# Patient Record
Sex: FEMALE | Race: WHITE | HISPANIC OR LATINO | Employment: FULL TIME | URBAN - METROPOLITAN AREA
[De-identification: names, ages, dates, MRNs, and addresses within clinical notes are randomized per-mention and may not be internally consistent; named-entity substitution may affect disease eponyms.]

---

## 2023-06-09 ENCOUNTER — APPOINTMENT (OUTPATIENT)
Dept: LAB | Facility: HOSPITAL | Age: 68
End: 2023-06-09
Payer: COMMERCIAL

## 2023-06-09 DIAGNOSIS — Z01.818 PREOP TESTING: ICD-10-CM

## 2023-06-09 DIAGNOSIS — M20.41 HAMMER TOE OF RIGHT FOOT: ICD-10-CM

## 2023-06-09 LAB
ALBUMIN SERPL BCP-MCNC: 4.5 G/DL (ref 3.5–5)
ALP SERPL-CCNC: 74 U/L (ref 34–104)
ALT SERPL W P-5'-P-CCNC: 16 U/L (ref 7–52)
ANION GAP SERPL CALCULATED.3IONS-SCNC: 7 MMOL/L (ref 4–13)
AST SERPL W P-5'-P-CCNC: 16 U/L (ref 13–39)
BASOPHILS # BLD AUTO: 0.04 THOUSANDS/ÂΜL (ref 0–0.1)
BASOPHILS NFR BLD AUTO: 1 % (ref 0–1)
BILIRUB SERPL-MCNC: 0.49 MG/DL (ref 0.2–1)
BUN SERPL-MCNC: 12 MG/DL (ref 5–25)
CALCIUM SERPL-MCNC: 9.7 MG/DL (ref 8.4–10.2)
CHLORIDE SERPL-SCNC: 101 MMOL/L (ref 96–108)
CO2 SERPL-SCNC: 30 MMOL/L (ref 21–32)
CREAT SERPL-MCNC: 0.57 MG/DL (ref 0.6–1.3)
EOSINOPHIL # BLD AUTO: 0.22 THOUSAND/ÂΜL (ref 0–0.61)
EOSINOPHIL NFR BLD AUTO: 3 % (ref 0–6)
ERYTHROCYTE [DISTWIDTH] IN BLOOD BY AUTOMATED COUNT: 15.5 % (ref 11.6–15.1)
GFR SERPL CREATININE-BSD FRML MDRD: 96 ML/MIN/1.73SQ M
GLUCOSE P FAST SERPL-MCNC: 90 MG/DL (ref 65–99)
HCT VFR BLD AUTO: 41.8 % (ref 34.8–46.1)
HGB BLD-MCNC: 13.3 G/DL (ref 11.5–15.4)
IMM GRANULOCYTES # BLD AUTO: 0.07 THOUSAND/UL (ref 0–0.2)
IMM GRANULOCYTES NFR BLD AUTO: 1 % (ref 0–2)
LYMPHOCYTES # BLD AUTO: 2.41 THOUSANDS/ÂΜL (ref 0.6–4.47)
LYMPHOCYTES NFR BLD AUTO: 30 % (ref 14–44)
MCH RBC QN AUTO: 28.9 PG (ref 26.8–34.3)
MCHC RBC AUTO-ENTMCNC: 31.8 G/DL (ref 31.4–37.4)
MCV RBC AUTO: 91 FL (ref 82–98)
MONOCYTES # BLD AUTO: 0.55 THOUSAND/ÂΜL (ref 0.17–1.22)
MONOCYTES NFR BLD AUTO: 7 % (ref 4–12)
NEUTROPHILS # BLD AUTO: 4.84 THOUSANDS/ÂΜL (ref 1.85–7.62)
NEUTS SEG NFR BLD AUTO: 58 % (ref 43–75)
NRBC BLD AUTO-RTO: 0 /100 WBCS
PLATELET # BLD AUTO: 262 THOUSANDS/UL (ref 149–390)
PMV BLD AUTO: 11.1 FL (ref 8.9–12.7)
POTASSIUM SERPL-SCNC: 3.9 MMOL/L (ref 3.5–5.3)
PROT SERPL-MCNC: 7.1 G/DL (ref 6.4–8.4)
RBC # BLD AUTO: 4.61 MILLION/UL (ref 3.81–5.12)
SODIUM SERPL-SCNC: 138 MMOL/L (ref 135–147)
WBC # BLD AUTO: 8.13 THOUSAND/UL (ref 4.31–10.16)

## 2023-06-09 PROCEDURE — 85025 COMPLETE CBC W/AUTO DIFF WBC: CPT

## 2023-06-09 PROCEDURE — 36415 COLL VENOUS BLD VENIPUNCTURE: CPT

## 2023-06-09 PROCEDURE — 80053 COMPREHEN METABOLIC PANEL: CPT

## 2023-06-20 ENCOUNTER — TELEPHONE (OUTPATIENT)
Dept: PODIATRY | Facility: CLINIC | Age: 68
End: 2023-06-20

## 2023-06-20 NOTE — TELEPHONE ENCOUNTER
Left message for patient to call me back DIRECTLY to schedule  Left my DIRECT phone # [phone] on message       [phone]

## 2023-06-20 NOTE — TELEPHONE ENCOUNTER
Patient has called several times and is [employer]  When I call back using  Services there is a voicemail left  Attempted again today, patient not home, will try later

## 2023-06-21 ENCOUNTER — TELEPHONE (OUTPATIENT)
Dept: PODIATRY | Facility: CLINIC | Age: 68
End: 2023-06-21

## 2023-06-21 ENCOUNTER — APPOINTMENT (OUTPATIENT)
Dept: PREADMISSION TESTING | Facility: HOSPITAL | Age: 68
End: 2023-06-21
Payer: COMMERCIAL

## 2023-06-21 NOTE — TELEPHONE ENCOUNTER
Caller: Pre Admission Testing/[facility]    Doctor/Office: Chioma Sanders DPM    #: [phone]    Escalation: Pre Admission Testing called to let Dr. Suze Tobar know that Elvin Dorsey canceled her surgery for 6/23/23 due to her work. This needs to be removed from the schedule.   She said she is requesting more foot cream.

## 2023-06-21 NOTE — TELEPHONE ENCOUNTER
Caller: Pre Admission Testing/[facility]    Doctor/Office: Ian Heredia DPM    #: [phone]    Escalation:  Pre Admission Testing said that they spoke with Toby Cabrales and she is canceling her surgery for this Friday, 6/23/23  She had to cancel because of her work  This surgery needs to be removed from the schedule  Toby Cabrales told Mirian Dandy that she needs cream for her foot

## 2023-06-22 ENCOUNTER — HOSPITAL ENCOUNTER (EMERGENCY)
Facility: HOSPITAL | Age: 68
Discharge: HOME | End: 2023-06-22
Attending: STUDENT IN AN ORGANIZED HEALTH CARE EDUCATION/TRAINING PROGRAM
Payer: COMMERCIAL

## 2023-06-22 VITALS
RESPIRATION RATE: 20 BRPM | OXYGEN SATURATION: 100 % | HEART RATE: 89 BPM | SYSTOLIC BLOOD PRESSURE: 131 MMHG | DIASTOLIC BLOOD PRESSURE: 94 MMHG | TEMPERATURE: 98.6 F

## 2023-06-22 DIAGNOSIS — I10 HYPERTENSION, UNSPECIFIED TYPE: Primary | ICD-10-CM

## 2023-06-22 PROCEDURE — 99283 EMERGENCY DEPT VISIT LOW MDM: CPT

## 2023-06-22 PROCEDURE — 93005 ELECTROCARDIOGRAM TRACING: CPT

## 2023-06-23 LAB
ATRIAL RATE: 89 BPM
P AXIS: 50 DEGREES
PR INTERVAL: 178 MS
QRS AXIS: -1 DEGREES
QRSD INTERVAL: 86 MS
QT INTERVAL: 356 MS
QTC INTERVAL: 433 MS
T WAVE AXIS: 31 DEGREES
VENTRICULAR RATE: 89 BPM

## 2023-06-23 PROCEDURE — 93010 ELECTROCARDIOGRAM REPORT: CPT | Performed by: INTERNAL MEDICINE

## 2023-06-23 NOTE — ED PROVIDER NOTES
"History  Chief Complaint   Patient presents with   • High Blood Pressure     Pt came to ER c/o systolic BP at home of 890  Pt denies CP, dyspnea, and dizziness  Pt c/o a warm feeling coming over her face  HPI   [employer], [facility] ED technician providing [employer] translation with patient consent  Nyasia Hale is a 80 yr old female, pmhx of HTN, HLD, presenting to the ED for elevated BP reading at home PTA  Patient reports she was getting ready for bed, took her usual nightly 5 mg amlodipine  After she takes her BP medication she always checks her BP  She took her BP tonight and the numbers kept \"going up\"  - highest /78  She deniedany symptoms while she was checking her BP - denied any CP, SOB, visual changes, HA, numbness/tingling, weakness, presyncope/syncope  She reporst she takes amlodipine 5 mg in the AM and 5 mg qhs  She denies any symptoms in the ED currently as well  BP has naturally decreased while in the ED  Prior to Admission Medications   Prescriptions Last Dose Informant Patient Reported? Taking?    Diclofenac Sodium (VOLTAREN) 1 %   No No   Sig: Apply 2 g topically 4 (four) times a day   amLODIPine (NORVASC) 5 mg tablet   No No   Sig: Take 1 tablet (5 mg total) by mouth 2 (two) times a day   atenolol (TENORMIN) 25 mg tablet   No No   Sig: Take 1 tablet (25 mg total) by mouth daily   benzonatate (TESSALON PERLES) 100 mg capsule   No No   Sig: Take 1 capsule (100 mg total) by mouth 3 (three) times a day as needed for cough   cholecalciferol (VITAMIN D3) 1,000 units tablet   No No   Sig: Take 2 tablets (2,000 Units total) by mouth daily   clopidogrel (PLAVIX) 75 mg tablet   Yes No   Sig: Take 75 mg by mouth daily   Patient not taking: Reported on 5/26/2023   lidocaine (Lidoderm) 5 %   No No   Sig: Apply 1 patch topically daily for 15 days Remove & Discard patch within 12 hours or as directed by MD   naproxen (NAPROSYN) 500 mg tablet   Yes No   Sig: naproxen 500 mg tablet " rosuvastatin (CRESTOR) 10 MG tablet   No No   Sig: Take 1 tablet (10 mg total) by mouth daily      Facility-Administered Medications: None       Past Medical History:   Diagnosis Date   • Allergic    • Hypertension        Past Surgical History:   Procedure Laterality Date   •  SECTION     • HERNIA REPAIR  1969   • HYSTERECTOMY     • KS LAPS ABD PRTM&OMENTUM DX W/WO SPEC BR/WA SPX N/A 2023    Procedure: LAPAROSCOPY DIAGNOSTIC, LYSIS OF ADHESIONS;  Surgeon: Jerry Streeter MD;  Location: [facility] OR;  Service: General       Family History   Problem Relation Age of Onset   • Breast cancer Mother    • Thrombosis Father    • No Known Problems Sister    • No Known Problems Sister    • No Known Problems Sister    • Hypertension Sister    • Hypertension Brother    • Hypertension Brother    • Diabetes Maternal Grandmother      I have reviewed and agree with the history as documented  E-Cigarette/Vaping   • E-Cigarette Use Never User      E-Cigarette/Vaping Substances   • Nicotine No    • THC No    • CBD No    • Flavoring No    • Other No    • Unknown No      Social History     Tobacco Use   • Smoking status: Never   • Smokeless tobacco: Never   Vaping Use   • Vaping Use: Never used   Substance Use Topics   • Alcohol use: Never   • Drug use: Never       Review of Systems   Constitutional: Negative for fever  Eyes: Negative for visual disturbance  Respiratory: Negative for cough and shortness of breath  Cardiovascular: Negative for chest pain, palpitations and leg swelling  Gastrointestinal: Negative for abdominal pain, nausea and vomiting  Neurological: Negative for dizziness, syncope, weakness, light-headedness, numbness and headaches  Physical Exam  Physical Exam  Vitals and nursing note reviewed  Constitutional:       General: She is not in acute distress  Appearance: She is well-developed  She is not ill-appearing, toxic-appearing or diaphoretic     HENT:      Head: Normocephalic and atraumatic  Mouth/Throat:      Pharynx: Oropharynx is clear  Eyes:      Pupils: Pupils are equal, round, and reactive to light  Cardiovascular:      Rate and Rhythm: Normal rate  Pulmonary:      Effort: Pulmonary effort is normal  No respiratory distress  Abdominal:      General: There is no distension  Musculoskeletal:      Cervical back: Neck supple  Right lower leg: No edema  Left lower leg: No edema  Skin:     General: Skin is warm and dry  Neurological:      General: No focal deficit present  Mental Status: She is alert and oriented to person, place, and time  Mental status is at baseline     Psychiatric:         Mood and Affect: Mood normal          Behavior: Behavior normal          Vital Signs  ED Triage Vitals   Temperature Pulse Respirations Blood Pressure SpO2   06/22/23 2114 06/22/23 2109 06/22/23 2109 06/22/23 2109 06/22/23 2109   98 6 °F (37 °C) 89 20 (!) 179/98 100 %      Temp Source Heart Rate Source Patient Position - Orthostatic VS BP Location FiO2 (%)   06/22/23 2114 06/22/23 2109 06/22/23 2109 06/22/23 2109 --   Tympanic Monitor Lying Left arm       Pain Score       --                  Vitals:    06/22/23 2109 06/22/23 2130 06/22/23 2140   BP: (!) 179/98 157/81 131/94   Pulse: 89     Patient Position - Orthostatic VS: Lying         Visual Acuity      ED Medications  Medications - No data to display    Diagnostic Studies  Results Reviewed     None                 No orders to display              Procedures  ECG 12 Lead Documentation Only    Date/Time: 6/22/2023 9:10 PM    Performed by: Rica Angelo DO  Authorized by: Rica Angelo DO    Indications / Diagnosis:  HTN  ECG reviewed by me, the ED Provider: yes    Patient location:  ED  Interpretation:     Interpretation: abnormal    Rate:     ECG rate:  89    ECG rate assessment: normal    Rhythm:     Rhythm: sinus rhythm    QRS:     QRS axis:  Normal    QRS intervals:  Normal  T waves: T waves: flattening      Flattening:  III             ED Course  ED Course as of 06/23/23 0240   Thu Jun 22, 2023 2116 Blood Pressure(!): 179/98   2141 Blood Pressure: 157/81   2141 Blood Pressure: 131/94   2141 Patient BP naturally decreased while in the ED  Again, patient denies any symptoms  Pt discharged with strict return precautions and PCP follow-up  Well appearing, AVSS upon discharge  Pt verbalized understanding of discharge instructions and return precautions  All questions patient had were answered  Medical Decision Making  Soco Zendejas is a 80 yr old female, pmhx of HTN, HLD, presenting to the ED for elevated BP reading at home PTA  Afebrile, initial /98  Patient is otherwise asymptomatic without confusion, chest pain, visual changes, headache, hematuria, or SOB  Patient currently on maintenance blood pressure medications  Doubt CV, AMI, heart failure, renal infarction or failure or other end organ damage  Blood pressure naturally decreased during ED visit without any intervention  Discussed with patient their elevated blood pressure and that BP normalized here without intervention  Recommended patient continue their current regimen and arrange close outpatient follow-up  Patient agreeable with plan and all questions were answered  Patient remained asymptomatic without any complaints upon discharge from ED  Risk  OTC drugs  Prescription drug management  Disposition  Final diagnoses:   Hypertension, unspecified type     Time reflects when diagnosis was documented in both MDM as applicable and the Disposition within this note     Time User Action Codes Description Comment    6/22/2023  9:31 PM Deyanira Plunkett Add [I10] Hypertension, unspecified type       ED Disposition     ED Disposition   Discharge    Condition   Stable    Date/Time   Thu Jun 22, 2023  9:31 PM    Comment   Lor Thakkar discharge to home/self care                 Follow-up Information     Follow up With Specialties Details Why Contact Info Additional Information    BUSTER Cantor Nurse Practitioner Schedule an appointment as soon as possible for a visit in 2 days  [phone]       [facility] Emergency Department Emergency Medicine Go to  As needed, If symptoms worsen [address] [facility] [id number] [address] [facility] Emergency Department          Discharge Medication List as of 6/22/2023  9:41 PM      CONTINUE these medications which have NOT CHANGED    Details   amLODIPine (NORVASC) 5 mg tablet Take 1 tablet (5 mg total) by mouth 2 (two) times a day, Starting Mon 4/17/2023, Normal      atenolol (TENORMIN) 25 mg tablet Take 1 tablet (25 mg total) by mouth daily, Starting Mon 6/5/2023, Normal      benzonatate (TESSALON PERLES) 100 mg capsule Take 1 capsule (100 mg total) by mouth 3 (three) times a day as needed for cough, Starting Tue 1/3/2023, Normal      cholecalciferol (VITAMIN D3) 1,000 units tablet Take 2 tablets (2,000 Units total) by mouth daily, Starting Thu 4/27/2023, Normal      clopidogrel (PLAVIX) 75 mg tablet Take 75 mg by mouth daily, Historical Med      Diclofenac Sodium (VOLTAREN) 1 % Apply 2 g topically 4 (four) times a day, Starting Mon 6/5/2023, Normal      lidocaine (Lidoderm) 5 % Apply 1 patch topically daily for 15 days Remove & Discard patch within 12 hours or as directed by MD, Starting Mon 11/28/2022, Until Tue 12/13/2022, Normal      naproxen (NAPROSYN) 500 mg tablet naproxen 500 mg tablet, Historical Med      rosuvastatin (CRESTOR) 10 MG tablet Take 1 tablet (10 mg total) by mouth daily, Starting Thu 4/27/2023, Normal             No discharge procedures on file      PDMP Review     None          ED Provider  Electronically Signed by Willy Cruz DO  06/23/23 0182

## 2023-06-23 NOTE — DISCHARGE INSTRUCTIONS
Ferreira presión arterial estaba elevada en el Departamento de Emergencias  Dado que no tenía síntomas asociados y ferreira presión arterial disminuyó naturalmente, no se indicó ningún análisis de laboratorio en jose e momento   Khalil Donning un seguimiento con ferreira PCP para repetir el control de la presión arterial y Unk Coffin posible evaluación y manejo adicionales de ferreira presión arterial

## 2023-06-26 ENCOUNTER — OFFICE VISIT (OUTPATIENT)
Dept: FAMILY MEDICINE CLINIC | Facility: CLINIC | Age: 68
End: 2023-06-26
Payer: COMMERCIAL

## 2023-06-26 VITALS
WEIGHT: 172 LBS | DIASTOLIC BLOOD PRESSURE: 80 MMHG | SYSTOLIC BLOOD PRESSURE: 130 MMHG | OXYGEN SATURATION: 97 % | HEART RATE: 75 BPM | BODY MASS INDEX: 31.46 KG/M2 | TEMPERATURE: 97.5 F

## 2023-06-26 DIAGNOSIS — R10.31 RIGHT LOWER QUADRANT ABDOMINAL PAIN: ICD-10-CM

## 2023-06-26 DIAGNOSIS — Z87.19 HISTORY OF SMALL BOWEL OBSTRUCTION: ICD-10-CM

## 2023-06-26 DIAGNOSIS — I10 PRIMARY HYPERTENSION: Primary | ICD-10-CM

## 2023-06-26 PROCEDURE — 99214 OFFICE O/P EST MOD 30 MIN: CPT | Performed by: FAMILY MEDICINE

## 2023-06-26 RX ORDER — NEBULIZER AND COMPRESSOR
EACH MISCELLANEOUS DAILY
Qty: 1 KIT | Refills: 0 | Status: SHIPPED | OUTPATIENT
Start: 2023-06-26

## 2023-06-26 RX ORDER — ATENOLOL 50 MG/1
50 TABLET ORAL DAILY
Qty: 90 TABLET | Refills: 0 | Status: SHIPPED | OUTPATIENT
Start: 2023-06-26

## 2023-06-26 NOTE — ASSESSMENT & PLAN NOTE
Stable  · Seen in ed for hypertensive urgency with bp 404'Z systolic  · Her bp today is 130/80  · Hbpm: 145-201/70-80  · Current regimen is amlodipine 5mg BID and atenolol 25mg  · Will increase atenolol to 50mg daily  · Continue to monitor and follow up at next appointment

## 2023-06-26 NOTE — PROGRESS NOTES
Assessment/Plan:      1  Primary hypertension  Assessment & Plan:  Stable  Seen in ed for hypertensive urgency with bp 953'G systolic  Her bp today is 130/80  Hbpm: 145-201/70-80  Current regimen is amlodipine 5mg BID and atenolol 25mg  Will increase atenolol to 50mg daily  Continue to monitor and follow up at next appointment    Orders:  -     Blood Pressure Monitoring (Adult Blood Pressure Cuff Lg) KIT; Use in the morning  -     atenolol (TENORMIN) 50 mg tablet; Take 1 tablet (50 mg total) by mouth daily  -     Blood Pressure Monitoring (Blood Pressure Monitor/M Cuff) MISC; Use in the morning    2  Right lower quadrant abdominal pain  -     Ambulatory Referral to General Surgery; Future    3  History of small bowel obstruction  -     Ambulatory Referral to General Surgery; Future            Subjective:      Patient ID: Keaton Stovall is a 80 y o  female presents today for er follow up   #[id number]  Her bp self improved while in the ED  She is on amlodipine 5mg BID and atenolol 25mg  She also complains of recurrent right lower quadrant pain  Hx of SBO in 1/2023 s/p ex lap  Patient would like a referral back to general surgery, Dr Ike Ricardo  Patient is eating and drinking normally  No nausea, vomiting, diarrhea, or constipation  She is moving bowels normally  HPI    The following portions of the patient's history were reviewed and updated as appropriate: allergies, current medications, past family history, past medical history, past social history, past surgical history and problem list     Review of Systems   Constitutional: Negative for activity change, chills, diaphoresis and fever  HENT: Negative for ear pain, hearing loss, postnasal drip, rhinorrhea, sinus pressure, sinus pain, sneezing and sore throat  Respiratory: Negative for cough, chest tightness, shortness of breath and wheezing  Cardiovascular: Negative for chest pain, palpitations and leg swelling  Gastrointestinal: Negative for abdominal pain, blood in stool, constipation, diarrhea, nausea and vomiting  Genitourinary: Negative for dysuria, frequency, hematuria and urgency  Musculoskeletal: Positive for neck pain  Negative for arthralgias and myalgias  Neurological: Negative for dizziness, syncope, weakness, light-headedness, numbness and headaches  Objective:      /80 (BP Location: Left arm, Patient Position: Sitting, Cuff Size: Standard)   Pulse 75   Temp 97 5 °F (36 4 °C) (Temporal)   Wt 78 kg (172 lb)   SpO2 97%   BMI 31 46 kg/m²          Physical Exam  Vitals reviewed  Constitutional:       General: She is not in acute distress  Appearance: She is well-developed  She is not diaphoretic  HENT:      Head: Normocephalic and atraumatic  Right Ear: External ear normal       Left Ear: External ear normal       Nose: Nose normal  No congestion  Mouth/Throat:      Mouth: Mucous membranes are moist       Pharynx: Oropharynx is clear  No oropharyngeal exudate  Eyes:      General: No scleral icterus  Right eye: No discharge  Left eye: No discharge  Conjunctiva/sclera: Conjunctivae normal       Pupils: Pupils are equal, round, and reactive to light  Neck:      Thyroid: No thyromegaly  Vascular: No JVD  Trachea: No tracheal deviation  Cardiovascular:      Rate and Rhythm: Normal rate and regular rhythm  Heart sounds: Normal heart sounds  No murmur heard  No friction rub  No gallop  Pulmonary:      Effort: Pulmonary effort is normal  No respiratory distress  Breath sounds: Normal breath sounds  No wheezing or rales  Chest:      Chest wall: No tenderness  Abdominal:      General: Bowel sounds are normal  There is no distension  Palpations: Abdomen is soft  Tenderness: There is no abdominal tenderness  There is no right CVA tenderness, left CVA tenderness, guarding or rebound     Musculoskeletal:         General: Normal range of motion  Cervical back: Normal range of motion and neck supple  No tenderness  Right lower leg: No edema  Left lower leg: No edema  Lymphadenopathy:      Cervical: No cervical adenopathy  Skin:     General: Skin is warm and dry  Neurological:      Mental Status: She is alert and oriented to person, place, and time  Mental status is at baseline  Psychiatric:         Mood and Affect: Mood normal          Behavior: Behavior normal          Thought Content:  Thought content normal          Judgment: Judgment normal

## 2023-07-13 ENCOUNTER — CONSULT (OUTPATIENT)
Dept: FAMILY MEDICINE CLINIC | Facility: CLINIC | Age: 68
End: 2023-07-13
Payer: COMMERCIAL

## 2023-07-13 VITALS
SYSTOLIC BLOOD PRESSURE: 139 MMHG | HEART RATE: 78 BPM | WEIGHT: 170.8 LBS | BODY MASS INDEX: 31.24 KG/M2 | TEMPERATURE: 97.1 F | OXYGEN SATURATION: 95 % | DIASTOLIC BLOOD PRESSURE: 63 MMHG

## 2023-07-13 DIAGNOSIS — Z01.818 PREOPERATIVE CLEARANCE: Primary | ICD-10-CM

## 2023-07-13 DIAGNOSIS — I10 PRIMARY HYPERTENSION: ICD-10-CM

## 2023-07-13 DIAGNOSIS — M20.41 HAMMER TOE OF RIGHT FOOT: ICD-10-CM

## 2023-07-13 PROCEDURE — 99214 OFFICE O/P EST MOD 30 MIN: CPT

## 2023-07-13 NOTE — PROGRESS NOTES
[facility] PRE-OPERATIVE EVALUATION  [facility] PHYSICIAN GROUP - [facility]    NAME: Sammie Tay  AGE: 80 y.o. SEX: female  : 1955     DATE: 2023    [facility] Pre-Operative Evaluation      Chief Complaint: Pre-operative Evaluation     Surgery: Repair right hammertoe, 3rd digit  Anticipated Date of Surgery: 23  Referring Provider: Ynes Nicole       History of Present Illness:     Sammie Tay is a 80 y.o. female who presents to the office today for a preoperative consultation at the request of surgeon, Dr. Sary García, who plans on performing  Repair right hammertoe, 3rd digit on 23. Planned anesthesia is local. Patient has a bleeding risk of: no recent abnormal bleeding. Patient does not have objections to receiving blood products if needed. Current anti-platelet/anti-coagulation medications that the patient is prescribed includes: none. Assessment of Chronic Conditions:   - Hypertension: Well controlled on atenolol and amlodipine      Assessment of Cardiac Risk:  · Denies unstable or severe angina or MI in the last 6 weeks or history of stent placement in the last year   · Denies decompensated heart failure (e.g. New onset heart failure, NYHA functional class IV heart failure, or worsening existing heart failure)  · Denies significant arrhythmias such as high grade AV block, symptomatic ventricular arrhythmia, newly recognized ventricular tachycardia, supraventricular tachycardia with resting heart rate >100, or symptomatic bradycardia  · Denies severe heart valve disease including aortic stenosis or symptomatic mitral stenosis     Exercise Capacity:  · Able to walk 4 blocks without symptoms?: No, related to hammer toe   · Able to walk 2 flights without symptoms?: No, related to hammer toe     Prior Anesthesia Reactions: No     Personal history of venous thromboembolic disease?  No    History of steroid use for >2 weeks within last year? No         Review of Systems:     Review of Systems   Constitutional: Negative for activity change, chills, fatigue and fever. HENT: Negative for congestion, ear pain, rhinorrhea, sore throat and trouble swallowing. Eyes: Negative for pain and visual disturbance. Respiratory: Negative for cough, chest tightness and shortness of breath. Cardiovascular: Negative for chest pain, palpitations and leg swelling. Gastrointestinal: Negative for abdominal pain, constipation, diarrhea, nausea and vomiting. Genitourinary: Negative for difficulty urinating, dysuria, hematuria and urgency. Musculoskeletal: Negative for arthralgias and back pain. Skin: Negative for color change and rash. Neurological: Negative for dizziness, seizures, syncope and headaches. Psychiatric/Behavioral: Negative for dysphoric mood. The patient is not nervous/anxious. All other systems reviewed and are negative. Current Problem List:     Patient Active Problem List   Diagnosis   • Hypertension   • Varicose veins of both lower extremities with pain   • Lumbar radiculopathy       Allergies:      Allergies   Allergen Reactions   • Nsaids Swelling       Current Medications:       Current Outpatient Medications:   •  amLODIPine (NORVASC) 5 mg tablet, Take 1 tablet (5 mg total) by mouth 2 (two) times a day, Disp: 60 tablet, Rfl: 3  •  atenolol (TENORMIN) 50 mg tablet, Take 1 tablet (50 mg total) by mouth daily, Disp: 90 tablet, Rfl: 0  •  benzonatate (TESSALON PERLES) 100 mg capsule, Take 1 capsule (100 mg total) by mouth 3 (three) times a day as needed for cough, Disp: 20 capsule, Rfl: 0  •  Blood Pressure Monitoring (Adult Blood Pressure Cuff Lg) KIT, Use in the morning, Disp: 1 kit, Rfl: 0  •  Blood Pressure Monitoring (Blood Pressure Monitor/M Cuff) MISC, Use in the morning, Disp: 1 each, Rfl: 0  •  cholecalciferol (VITAMIN D3) 1,000 units tablet, Take 2 tablets (2,000 Units total) by mouth daily, Disp: 60 tablet, Rfl: 2  •  Diclofenac Sodium (VOLTAREN) 1 %, Apply 2 g topically 4 (four) times a day, Disp: 100 g, Rfl: 0  •  naproxen (NAPROSYN) 500 mg tablet, naproxen 500 mg tablet, Disp: , Rfl:   •  rosuvastatin (CRESTOR) 10 MG tablet, Take 1 tablet (10 mg total) by mouth daily, Disp: 90 tablet, Rfl: 1  •  lidocaine (Lidoderm) 5 %, Apply 1 patch topically daily for 15 days Remove & Discard patch within 12 hours or as directed by MD, Disp: 15 patch, Rfl: 0    Past Medical History:       Past Medical History:   Diagnosis Date   • Allergic    • Hypertension         Past Surgical History:   Procedure Laterality Date   •  SECTION     • HERNIA REPAIR  1969   • HYSTERECTOMY     • TX LAPS ABD PRTM&OMENTUM DX W/WO SPEC BR/WA SPX N/A 2023    Procedure: LAPAROSCOPY DIAGNOSTIC, LYSIS OF ADHESIONS;  Surgeon: Alessia Ashford MD;  Location: [facility] OR;  Service: General        Family History   Problem Relation Age of Onset   • Breast cancer Mother    • Thrombosis Father    • No Known Problems Sister    • No Known Problems Sister    • No Known Problems Sister    • Hypertension Sister    • Hypertension Brother    • Hypertension Brother    • Diabetes Maternal Grandmother         Social History     Socioeconomic History   • Marital status: /Civil Union     Spouse name: Not on file   • Number of children: 4   • Years of education: Not on file   • Highest education level: Not on file   Occupational History   • Not on file   Tobacco Use   • Smoking status: Never   • Smokeless tobacco: Never   Vaping Use   • Vaping Use: Never used   Substance and Sexual Activity   • Alcohol use: Never   • Drug use: Never   • Sexual activity: Not on file   Other Topics Concern   • Not on file   Social History Narrative   • Not on file     Social Determinants of Health     Financial Resource Strain: Not on file   Food Insecurity: Not on file   Transportation Needs: Not on file   Physical Activity: Not on file   Stress: Not on file   Social Connections: Not on file   Intimate Partner Violence: Not on file   Housing Stability: Not on file        Physical Exam:     /63 (BP Location: Left arm, Patient Position: Sitting, Cuff Size: Adult)   Pulse 78   Temp (!) 97.1 °F (36.2 °C) (Temporal)   Wt 77.5 kg (170 lb 12.8 oz)   SpO2 95%   BMI 31.24 kg/m²     Physical Exam  Vitals and nursing note reviewed. Constitutional:       Appearance: Normal appearance. She is normal weight. HENT:      Head: Normocephalic. Right Ear: Tympanic membrane, ear canal and external ear normal. There is no impacted cerumen. Left Ear: Tympanic membrane, ear canal and external ear normal. There is no impacted cerumen. Nose: Nose normal.      Mouth/Throat:      Mouth: Mucous membranes are moist.      Pharynx: Oropharynx is clear. Eyes:      Extraocular Movements: Extraocular movements intact. Conjunctiva/sclera: Conjunctivae normal.      Pupils: Pupils are equal, round, and reactive to light. Cardiovascular:      Rate and Rhythm: Normal rate and regular rhythm. Pulses: Normal pulses. Heart sounds: Normal heart sounds. Pulmonary:      Effort: Pulmonary effort is normal.      Breath sounds: Normal breath sounds. Abdominal:      General: Bowel sounds are normal. There is no distension. Palpations: Abdomen is soft. Tenderness: There is no abdominal tenderness. Musculoskeletal:         General: Normal range of motion. Cervical back: Normal range of motion. Feet:    Skin:     General: Skin is warm. Capillary Refill: Capillary refill takes less than 2 seconds. Neurological:      General: No focal deficit present. Mental Status: She is alert and oriented to person, place, and time. Mental status is at baseline. Psychiatric:         Mood and Affect: Mood normal.         Behavior: Behavior normal.         Thought Content:  Thought content normal.         Judgment: Judgment normal. Data:     Pre-operative work-up    Laboratory Results: I have personally reviewed the pertinent laboratory results/reports      EKG: I have personally reviewed pertinent reports. Chest x-ray: N/A      Previous cardiopulmonary studies within the past year:  · Echocardiogram: N/A  · Cardiac Catheterization: N/A  · Stress Test: N/A  · Pulmonary Function Testing: N/A      Assessment & Recommendations:     No diagnosis found. Pre-Op Evaluation Assessment  80 y.o. female with planned surgery: Repair right hammertoe, 3rd digit. Known risk factors for perioperative complications: None  none. Current medications which may produce withdrawal symptoms if withheld perioperatively: none. Pre-Op Evaluation Plan  1. Further preoperative workup as follows:   - None; no further preoperative work-up is required    2. Medication Management/Recommendations:   - Patient should continue antihypertensive medications up through and including the day of surgery. - Patient should continue beta-blocker medication up through and including the day of surgery. 3. Prophylaxis for cardiac events with perioperative beta-blockers: not indicated. 4. Patient requires further consultation with: None    Clearance  Patient is CLEARED for surgery without any additional cardiac testing.      Paul Alegria, [address] [place] [id number]  Phone#  [phone]  Fax#  [phone]

## 2023-07-13 NOTE — LETTER
2023     Patrick Tellez, [address]  [address]  [address] [address]    Patient: Sarah Watson   YOB: 1955   Date of Visit: 2023       Dear Dr. Kristie eVra: Thank you for referring Annelise Bobby to me for evaluation. Below are my notes for this consultation. If you have questions, please do not hesitate to call me. I look forward to following your patient along with you. Sincerely,        BUSTER Osorio        CC: No Recipients    Natalie Chun, [address] [facility]  2023 12:17 PM  Incomplete  [facility] PRE-OPERATIVE EVALUATION  [facility]    NAME: Sarah Watson  AGE: 80 y.o. SEX: female  : 1955     DATE: 2023    [facility] Pre-Operative Evaluation      Chief Complaint: Pre-operative Evaluation     Surgery: Repair right hammertoe, 3rd digit  Anticipated Date of Surgery: 23  Referring Provider: No ref. provider found       History of Present Illness:     Sarah Watson is a 80 y.o. female who presents to the office today for a preoperative consultation at the request of surgeon, Dr. Kristie Vera, who plans on performing  Repair right hammertoe, 3rd digit on 23. Planned anesthesia is local. Patient has a bleeding risk of: no recent abnormal bleeding. Patient does not have objections to receiving blood products if needed. Current anti-platelet/anti-coagulation medications that the patient is prescribed includes: none.       Assessment of Chronic Conditions:   - Hypertension: Well controlled on atenolol and amlodipine      Assessment of Cardiac Risk:  · Denies unstable or severe angina or MI in the last 6 weeks or history of stent placement in the last year   · Denies decompensated heart failure (e.g. New onset heart failure, NYHA functional class IV heart failure, or worsening existing heart failure)  · Denies significant arrhythmias such as high grade AV block, symptomatic ventricular arrhythmia, newly recognized ventricular tachycardia, supraventricular tachycardia with resting heart rate >100, or symptomatic bradycardia  · Denies severe heart valve disease including aortic stenosis or symptomatic mitral stenosis     Exercise Capacity:  · Able to walk 4 blocks without symptoms?: No, related to hammer toe   · Able to walk 2 flights without symptoms?: No, related to hammer toe     Prior Anesthesia Reactions: No     Personal history of venous thromboembolic disease? No    History of steroid use for >2 weeks within last year? No         Review of Systems:     Review of Systems   Constitutional: Negative for activity change, chills, fatigue and fever. HENT: Negative for congestion, ear pain, rhinorrhea, sore throat and trouble swallowing. Eyes: Negative for pain and visual disturbance. Respiratory: Negative for cough, chest tightness and shortness of breath. Cardiovascular: Negative for chest pain, palpitations and leg swelling. Gastrointestinal: Negative for abdominal pain, constipation, diarrhea, nausea and vomiting. Genitourinary: Negative for difficulty urinating, dysuria, hematuria and urgency. Musculoskeletal: Negative for arthralgias and back pain. Skin: Negative for color change and rash. Neurological: Negative for dizziness, seizures, syncope and headaches. Psychiatric/Behavioral: Negative for dysphoric mood. The patient is not nervous/anxious. All other systems reviewed and are negative. Current Problem List:     Patient Active Problem List   Diagnosis   • Hypertension   • Varicose veins of both lower extremities with pain   • Lumbar radiculopathy       Allergies:      Allergies   Allergen Reactions   • Nsaids Swelling       Current Medications:       Current Outpatient Medications:   •  amLODIPine (NORVASC) 5 mg tablet, Take 1 tablet (5 mg total) by mouth 2 (two) times a day, Disp: 60 tablet, Rfl: 3  •  atenolol (TENORMIN) 50 mg tablet, Take 1 tablet (50 mg total) by mouth daily, Disp: 90 tablet, Rfl: 0  •  benzonatate (TESSALON PERLES) 100 mg capsule, Take 1 capsule (100 mg total) by mouth 3 (three) times a day as needed for cough, Disp: 20 capsule, Rfl: 0  •  Blood Pressure Monitoring (Adult Blood Pressure Cuff Lg) KIT, Use in the morning, Disp: 1 kit, Rfl: 0  •  Blood Pressure Monitoring (Blood Pressure Monitor/M Cuff) MISC, Use in the morning, Disp: 1 each, Rfl: 0  •  cholecalciferol (VITAMIN D3) 1,000 units tablet, Take 2 tablets (2,000 Units total) by mouth daily, Disp: 60 tablet, Rfl: 2  •  Diclofenac Sodium (VOLTAREN) 1 %, Apply 2 g topically 4 (four) times a day, Disp: 100 g, Rfl: 0  •  naproxen (NAPROSYN) 500 mg tablet, naproxen 500 mg tablet, Disp: , Rfl:   •  rosuvastatin (CRESTOR) 10 MG tablet, Take 1 tablet (10 mg total) by mouth daily, Disp: 90 tablet, Rfl: 1  •  lidocaine (Lidoderm) 5 %, Apply 1 patch topically daily for 15 days Remove & Discard patch within 12 hours or as directed by MD, Disp: 15 patch, Rfl: 0    Past Medical History:       Past Medical History:   Diagnosis Date   • Allergic    • Hypertension         Past Surgical History:   Procedure Laterality Date   •  SECTION     • HERNIA REPAIR  1969   • HYSTERECTOMY     • MT LAPS ABD PRTM&OMENTUM DX W/WO SPEC BR/WA SPX N/A 2023    Procedure: LAPAROSCOPY DIAGNOSTIC, LYSIS OF ADHESIONS;  Surgeon: Edward Gresham MD;  Location: [facility] OR;  Service: General        Family History   Problem Relation Age of Onset   • Breast cancer Mother    • Thrombosis Father    • No Known Problems Sister    • No Known Problems Sister    • No Known Problems Sister    • Hypertension Sister    • Hypertension Brother    • Hypertension Brother    • Diabetes Maternal Grandmother         Social History     Socioeconomic History   • Marital status: /Civil Union     Spouse name: Not on file   • Number of children: 4   • Years of education: Not on file   • Highest education level: Not on file   Occupational History   • Not on file   Tobacco Use   • Smoking status: Never   • Smokeless tobacco: Never   Vaping Use   • Vaping Use: Never used   Substance and Sexual Activity   • Alcohol use: Never   • Drug use: Never   • Sexual activity: Not on file   Other Topics Concern   • Not on file   Social History Narrative   • Not on file     Social Determinants of Health     Financial Resource Strain: Not on file   Food Insecurity: Not on file   Transportation Needs: Not on file   Physical Activity: Not on file   Stress: Not on file   Social Connections: Not on file   Intimate Partner Violence: Not on file   Housing Stability: Not on file        Physical Exam:     /63 (BP Location: Left arm, Patient Position: Sitting, Cuff Size: Adult)   Pulse 78   Temp (!) 97.1 °F (36.2 °C) (Temporal)   Wt 77.5 kg (170 lb 12.8 oz)   SpO2 95%   BMI 31.24 kg/m²     Physical Exam  Vitals and nursing note reviewed. Constitutional:       Appearance: Normal appearance. She is normal weight. HENT:      Head: Normocephalic. Right Ear: Tympanic membrane, ear canal and external ear normal. There is no impacted cerumen. Left Ear: Tympanic membrane, ear canal and external ear normal. There is no impacted cerumen. Nose: Nose normal.      Mouth/Throat:      Mouth: Mucous membranes are moist.      Pharynx: Oropharynx is clear. Eyes:      Extraocular Movements: Extraocular movements intact. Conjunctiva/sclera: Conjunctivae normal.      Pupils: Pupils are equal, round, and reactive to light. Cardiovascular:      Rate and Rhythm: Normal rate and regular rhythm. Pulses: Normal pulses. Heart sounds: Normal heart sounds. Pulmonary:      Effort: Pulmonary effort is normal.      Breath sounds: Normal breath sounds. Abdominal:      General: Bowel sounds are normal. There is no distension. Palpations: Abdomen is soft.       Tenderness: There is no abdominal tenderness. Musculoskeletal:         General: Normal range of motion. Cervical back: Normal range of motion. Feet:    Skin:     General: Skin is warm. Capillary Refill: Capillary refill takes less than 2 seconds. Neurological:      General: No focal deficit present. Mental Status: She is alert and oriented to person, place, and time. Mental status is at baseline. Psychiatric:         Mood and Affect: Mood normal.         Behavior: Behavior normal.         Thought Content: Thought content normal.         Judgment: Judgment normal.          Data:     Pre-operative work-up    Laboratory Results: I have personally reviewed the pertinent laboratory results/reports      EKG: I have personally reviewed pertinent reports. Chest x-ray: N/A      Previous cardiopulmonary studies within the past year:  · Echocardiogram: N/A  · Cardiac Catheterization: N/A  · Stress Test: N/A  · Pulmonary Function Testing: N/A      Assessment & Recommendations:     No diagnosis found. Pre-Op Evaluation Assessment  80 y.o. female with planned surgery: Repair right hammertoe, 3rd digit. Known risk factors for perioperative complications: None  none. Current medications which may produce withdrawal symptoms if withheld perioperatively: none. Pre-Op Evaluation Plan  1. Further preoperative workup as follows:   - None; no further preoperative work-up is required    2. Medication Management/Recommendations:   - Patient should continue antihypertensive medications up through and including the day of surgery. - Patient should continue beta-blocker medication up through and including the day of surgery. 3. Prophylaxis for cardiac events with perioperative beta-blockers: not indicated. 4. Patient requires further consultation with: None    Clearance  Patient is CLEARED for surgery without any additional cardiac testing.      Pito Santos, [address] [place] [address] MEDICAL [address] [address]  Phone#  [phone]  Fax#  [phone]    BUSTER Holly  2023 12:07 PM  Incomplete  [facility] PRE-OPERATIVE EVALUATION  [facility] - [facility]    NAME: Leigh Cornell  AGE: 80 y.o. SEX: female  : 1955     DATE: 2023    [facility] Pre-Operative Evaluation      Chief Complaint: Pre-operative Evaluation     Surgery: Repair right hammertoe, 3rd digit  Anticipated Date of Surgery: 23  Referring Provider: No ref. provider found       History of Present Illness:     Leigh Cornell is a 80 y.o. female who presents to the office today for a preoperative consultation at the request of surgeon, Dr. Carmelita Denton, who plans on performing  Repair right hammertoe, 3rd digit on 23. Planned anesthesia is local. Patient has a bleeding risk of: no recent abnormal bleeding. Patient does not have objections to receiving blood products if needed. Current anti-platelet/anti-coagulation medications that the patient is prescribed includes: none.       Assessment of Chronic Conditions:   - Hypertension: Well controlled on atenolol and amlodipine      Assessment of Cardiac Risk:  · Denies unstable or severe angina or MI in the last 6 weeks or history of stent placement in the last year   · Denies decompensated heart failure (e.g. New onset heart failure, NYHA functional class IV heart failure, or worsening existing heart failure)  · Denies significant arrhythmias such as high grade AV block, symptomatic ventricular arrhythmia, newly recognized ventricular tachycardia, supraventricular tachycardia with resting heart rate >100, or symptomatic bradycardia  · Denies severe heart valve disease including aortic stenosis or symptomatic mitral stenosis     Exercise Capacity:  · Able to walk 4 blocks without symptoms?: No, related to hammer toe   · Able to walk 2 flights without symptoms?: No, related to hammer toe     Prior Anesthesia Reactions: No     Personal history of venous thromboembolic disease? No    History of steroid use for >2 weeks within last year? No         Review of Systems:     Review of Systems   Constitutional: Negative for activity change, chills, fatigue and fever. HENT: Negative for congestion, ear pain, rhinorrhea, sore throat and trouble swallowing. Eyes: Negative for pain and visual disturbance. Respiratory: Negative for cough, chest tightness and shortness of breath. Cardiovascular: Negative for chest pain, palpitations and leg swelling. Gastrointestinal: Negative for abdominal pain, constipation, diarrhea, nausea and vomiting. Genitourinary: Negative for difficulty urinating, dysuria, hematuria and urgency. Musculoskeletal: Negative for arthralgias and back pain. Skin: Negative for color change and rash. Neurological: Negative for dizziness, seizures, syncope and headaches. Psychiatric/Behavioral: Negative for dysphoric mood. The patient is not nervous/anxious. All other systems reviewed and are negative. Current Problem List:     Patient Active Problem List   Diagnosis   • Hypertension   • Varicose veins of both lower extremities with pain   • Lumbar radiculopathy       Allergies:      Allergies   Allergen Reactions   • Nsaids Swelling       Current Medications:       Current Outpatient Medications:   •  amLODIPine (NORVASC) 5 mg tablet, Take 1 tablet (5 mg total) by mouth 2 (two) times a day, Disp: 60 tablet, Rfl: 3  •  atenolol (TENORMIN) 50 mg tablet, Take 1 tablet (50 mg total) by mouth daily, Disp: 90 tablet, Rfl: 0  •  benzonatate (TESSALON PERLES) 100 mg capsule, Take 1 capsule (100 mg total) by mouth 3 (three) times a day as needed for cough, Disp: 20 capsule, Rfl: 0  •  Blood Pressure Monitoring (Adult Blood Pressure Cuff Lg) KIT, Use in the morning, Disp: 1 kit, Rfl: 0  •  Blood Pressure Monitoring (Blood Pressure Monitor/M Cuff) MISC, Use in the morning, Disp: 1 each, Rfl: 0  •  cholecalciferol (VITAMIN D3) 1,000 units tablet, Take 2 tablets (2,000 Units total) by mouth daily, Disp: 60 tablet, Rfl: 2  •  Diclofenac Sodium (VOLTAREN) 1 %, Apply 2 g topically 4 (four) times a day, Disp: 100 g, Rfl: 0  •  naproxen (NAPROSYN) 500 mg tablet, naproxen 500 mg tablet, Disp: , Rfl:   •  rosuvastatin (CRESTOR) 10 MG tablet, Take 1 tablet (10 mg total) by mouth daily, Disp: 90 tablet, Rfl: 1  •  lidocaine (Lidoderm) 5 %, Apply 1 patch topically daily for 15 days Remove & Discard patch within 12 hours or as directed by MD, Disp: 15 patch, Rfl: 0    Past Medical History:       Past Medical History:   Diagnosis Date   • Allergic    • Hypertension         Past Surgical History:   Procedure Laterality Date   •  SECTION     • HERNIA REPAIR  1969   • HYSTERECTOMY     • MD LAPS ABD PRTM&OMENTUM DX W/WO SPEC BR/WA SPX N/A 2023    Procedure: LAPAROSCOPY DIAGNOSTIC, LYSIS OF ADHESIONS;  Surgeon: Carroll Hanson MD;  Location: [facility];  Service: General        Family History   Problem Relation Age of Onset   • Breast cancer Mother    • Thrombosis Father    • No Known Problems Sister    • No Known Problems Sister    • No Known Problems Sister    • Hypertension Sister    • Hypertension Brother    • Hypertension Brother    • Diabetes Maternal Grandmother         Social History     Socioeconomic History   • Marital status: /Civil Union     Spouse name: Not on file   • Number of children: 4   • Years of education: Not on file   • Highest education level: Not on file   Occupational History   • Not on file   Tobacco Use   • Smoking status: Never   • Smokeless tobacco: Never   Vaping Use   • Vaping Use: Never used   Substance and Sexual Activity   • Alcohol use: Never   • Drug use: Never   • Sexual activity: Not on file   Other Topics Concern   • Not on file   Social History Narrative   • Not on file     Social Determinants of Health Financial Resource Strain: Not on file   Food Insecurity: Not on file   Transportation Needs: Not on file   Physical Activity: Not on file   Stress: Not on file   Social Connections: Not on file   Intimate Partner Violence: Not on file   Housing Stability: Not on file        Physical Exam:     /63 (BP Location: Left arm, Patient Position: Sitting, Cuff Size: Adult)   Pulse 78   Temp (!) 97.1 °F (36.2 °C) (Temporal)   Wt 77.5 kg (170 lb 12.8 oz)   SpO2 95%   BMI 31.24 kg/m²     Physical Exam  Vitals and nursing note reviewed. Constitutional:       Appearance: Normal appearance. She is normal weight. HENT:      Head: Normocephalic. Right Ear: Tympanic membrane, ear canal and external ear normal. There is no impacted cerumen. Left Ear: Tympanic membrane, ear canal and external ear normal. There is no impacted cerumen. Nose: Nose normal.      Mouth/Throat:      Mouth: Mucous membranes are moist.      Pharynx: Oropharynx is clear. Eyes:      Extraocular Movements: Extraocular movements intact. Conjunctiva/sclera: Conjunctivae normal.      Pupils: Pupils are equal, round, and reactive to light. Cardiovascular:      Rate and Rhythm: Normal rate and regular rhythm. Pulses: Normal pulses. Heart sounds: Normal heart sounds. Pulmonary:      Effort: Pulmonary effort is normal.      Breath sounds: Normal breath sounds. Abdominal:      General: Bowel sounds are normal. There is no distension. Palpations: Abdomen is soft. Tenderness: There is no abdominal tenderness. Musculoskeletal:         General: Normal range of motion. Cervical back: Normal range of motion. Skin:     General: Skin is warm. Capillary Refill: Capillary refill takes less than 2 seconds. Neurological:      General: No focal deficit present. Mental Status: She is alert and oriented to person, place, and time. Mental status is at baseline.    Psychiatric:         Mood and Affect: Mood normal.         Behavior: Behavior normal.         Thought Content: Thought content normal.         Judgment: Judgment normal.          Data:     Pre-operative work-up    Laboratory Results: I have personally reviewed the pertinent laboratory results/reports      EKG: I have personally reviewed pertinent reports. Chest x-ray: N/A      Previous cardiopulmonary studies within the past year:  · Echocardiogram: N/A  · Cardiac Catheterization: N/A  · Stress Test: N/A  · Pulmonary Function Testing: N/A      Assessment & Recommendations:     No diagnosis found. Pre-Op Evaluation Assessment  80 y.o. female with planned surgery: Repair right hammertoe, 3rd digit. Known risk factors for perioperative complications: None  none. Current medications which may produce withdrawal symptoms if withheld perioperatively: none. Pre-Op Evaluation Plan  1. Further preoperative workup as follows:   - None; no further preoperative work-up is required    2. Medication Management/Recommendations:   - Patient should continue antihypertensive medications up through and including the day of surgery. - Patient should continue beta-blocker medication up through and including the day of surgery. 3. Prophylaxis for cardiac events with perioperative beta-blockers: not indicated. 4. Patient requires further consultation with: None    Clearance  Patient is CLEARED for surgery without any additional cardiac testing.      Yanira Correa, [address] [place] [address]  Phone#  [phone]  Fax#  [phone]

## 2023-07-13 NOTE — H&P (VIEW-ONLY)
[facility] PRE-OPERATIVE EVALUATION  [facility] PHYSICIAN GROUP - [facility]    NAME: Janki Galaviz  AGE: 80 y.o. SEX: female  : 1955     DATE: 2023    [facility] Pre-Operative Evaluation      Chief Complaint: Pre-operative Evaluation     Surgery: Repair right hammertoe, 3rd digit  Anticipated Date of Surgery: 23  Referring Provider: Brendan Colon       History of Present Illness:     Janki Galaviz is a 80 y.o. female who presents to the office today for a preoperative consultation at the request of surgeon, Dr. Laura Shea, who plans on performing  Repair right hammertoe, 3rd digit on 23. Planned anesthesia is local. Patient has a bleeding risk of: no recent abnormal bleeding. Patient does not have objections to receiving blood products if needed. Current anti-platelet/anti-coagulation medications that the patient is prescribed includes: none. Assessment of Chronic Conditions:   - Hypertension: Well controlled on atenolol and amlodipine      Assessment of Cardiac Risk:  · Denies unstable or severe angina or MI in the last 6 weeks or history of stent placement in the last year   · Denies decompensated heart failure (e.g. New onset heart failure, NYHA functional class IV heart failure, or worsening existing heart failure)  · Denies significant arrhythmias such as high grade AV block, symptomatic ventricular arrhythmia, newly recognized ventricular tachycardia, supraventricular tachycardia with resting heart rate >100, or symptomatic bradycardia  · Denies severe heart valve disease including aortic stenosis or symptomatic mitral stenosis     Exercise Capacity:  · Able to walk 4 blocks without symptoms?: No, related to hammer toe   · Able to walk 2 flights without symptoms?: No, related to hammer toe     Prior Anesthesia Reactions: No     Personal history of venous thromboembolic disease?  No    History of steroid use for >2 weeks within last year? No         Review of Systems:     Review of Systems   Constitutional: Negative for activity change, chills, fatigue and fever. HENT: Negative for congestion, ear pain, rhinorrhea, sore throat and trouble swallowing. Eyes: Negative for pain and visual disturbance. Respiratory: Negative for cough, chest tightness and shortness of breath. Cardiovascular: Negative for chest pain, palpitations and leg swelling. Gastrointestinal: Negative for abdominal pain, constipation, diarrhea, nausea and vomiting. Genitourinary: Negative for difficulty urinating, dysuria, hematuria and urgency. Musculoskeletal: Negative for arthralgias and back pain. Skin: Negative for color change and rash. Neurological: Negative for dizziness, seizures, syncope and headaches. Psychiatric/Behavioral: Negative for dysphoric mood. The patient is not nervous/anxious. All other systems reviewed and are negative. Current Problem List:     Patient Active Problem List   Diagnosis   • Hypertension   • Varicose veins of both lower extremities with pain   • Lumbar radiculopathy       Allergies:      Allergies   Allergen Reactions   • Nsaids Swelling       Current Medications:       Current Outpatient Medications:   •  amLODIPine (NORVASC) 5 mg tablet, Take 1 tablet (5 mg total) by mouth 2 (two) times a day, Disp: 60 tablet, Rfl: 3  •  atenolol (TENORMIN) 50 mg tablet, Take 1 tablet (50 mg total) by mouth daily, Disp: 90 tablet, Rfl: 0  •  benzonatate (TESSALON PERLES) 100 mg capsule, Take 1 capsule (100 mg total) by mouth 3 (three) times a day as needed for cough, Disp: 20 capsule, Rfl: 0  •  Blood Pressure Monitoring (Adult Blood Pressure Cuff Lg) KIT, Use in the morning, Disp: 1 kit, Rfl: 0  •  Blood Pressure Monitoring (Blood Pressure Monitor/M Cuff) MISC, Use in the morning, Disp: 1 each, Rfl: 0  •  cholecalciferol (VITAMIN D3) 1,000 units tablet, Take 2 tablets (2,000 Units total) by mouth daily, Disp: 60 tablet, Rfl: 2  •  Diclofenac Sodium (VOLTAREN) 1 %, Apply 2 g topically 4 (four) times a day, Disp: 100 g, Rfl: 0  •  naproxen (NAPROSYN) 500 mg tablet, naproxen 500 mg tablet, Disp: , Rfl:   •  rosuvastatin (CRESTOR) 10 MG tablet, Take 1 tablet (10 mg total) by mouth daily, Disp: 90 tablet, Rfl: 1  •  lidocaine (Lidoderm) 5 %, Apply 1 patch topically daily for 15 days Remove & Discard patch within 12 hours or as directed by MD, Disp: 15 patch, Rfl: 0    Past Medical History:       Past Medical History:   Diagnosis Date   • Allergic    • Hypertension         Past Surgical History:   Procedure Laterality Date   •  SECTION     • HERNIA REPAIR  1969   • HYSTERECTOMY     • NE LAPS ABD PRTM&OMENTUM DX W/WO SPEC BR/WA SPX N/A 2023    Procedure: LAPAROSCOPY DIAGNOSTIC, LYSIS OF ADHESIONS;  Surgeon: Dulce Acosta MD;  Location: [facility] OR;  Service: General        Family History   Problem Relation Age of Onset   • Breast cancer Mother    • Thrombosis Father    • No Known Problems Sister    • No Known Problems Sister    • No Known Problems Sister    • Hypertension Sister    • Hypertension Brother    • Hypertension Brother    • Diabetes Maternal Grandmother         Social History     Socioeconomic History   • Marital status: /Civil Union     Spouse name: Not on file   • Number of children: 4   • Years of education: Not on file   • Highest education level: Not on file   Occupational History   • Not on file   Tobacco Use   • Smoking status: Never   • Smokeless tobacco: Never   Vaping Use   • Vaping Use: Never used   Substance and Sexual Activity   • Alcohol use: Never   • Drug use: Never   • Sexual activity: Not on file   Other Topics Concern   • Not on file   Social History Narrative   • Not on file     Social Determinants of Health     Financial Resource Strain: Not on file   Food Insecurity: Not on file   Transportation Needs: Not on file   Physical Activity: Not on file   Stress: Not on file   Social Connections: Not on file   Intimate Partner Violence: Not on file   Housing Stability: Not on file        Physical Exam:     /63 (BP Location: Left arm, Patient Position: Sitting, Cuff Size: Adult)   Pulse 78   Temp (!) 97.1 °F (36.2 °C) (Temporal)   Wt 77.5 kg (170 lb 12.8 oz)   SpO2 95%   BMI 31.24 kg/m²     Physical Exam  Vitals and nursing note reviewed. Constitutional:       Appearance: Normal appearance. She is normal weight. HENT:      Head: Normocephalic. Right Ear: Tympanic membrane, ear canal and external ear normal. There is no impacted cerumen. Left Ear: Tympanic membrane, ear canal and external ear normal. There is no impacted cerumen. Nose: Nose normal.      Mouth/Throat:      Mouth: Mucous membranes are moist.      Pharynx: Oropharynx is clear. Eyes:      Extraocular Movements: Extraocular movements intact. Conjunctiva/sclera: Conjunctivae normal.      Pupils: Pupils are equal, round, and reactive to light. Cardiovascular:      Rate and Rhythm: Normal rate and regular rhythm. Pulses: Normal pulses. Heart sounds: Normal heart sounds. Pulmonary:      Effort: Pulmonary effort is normal.      Breath sounds: Normal breath sounds. Abdominal:      General: Bowel sounds are normal. There is no distension. Palpations: Abdomen is soft. Tenderness: There is no abdominal tenderness. Musculoskeletal:         General: Normal range of motion. Cervical back: Normal range of motion. Feet:    Skin:     General: Skin is warm. Capillary Refill: Capillary refill takes less than 2 seconds. Neurological:      General: No focal deficit present. Mental Status: She is alert and oriented to person, place, and time. Mental status is at baseline. Psychiatric:         Mood and Affect: Mood normal.         Behavior: Behavior normal.         Thought Content:  Thought content normal.         Judgment: Judgment normal. Data:     Pre-operative work-up    Laboratory Results: I have personally reviewed the pertinent laboratory results/reports      EKG: I have personally reviewed pertinent reports. Chest x-ray: N/A      Previous cardiopulmonary studies within the past year:  · Echocardiogram: N/A  · Cardiac Catheterization: N/A  · Stress Test: N/A  · Pulmonary Function Testing: N/A      Assessment & Recommendations:     No diagnosis found. Pre-Op Evaluation Assessment  80 y.o. female with planned surgery: Repair right hammertoe, 3rd digit. Known risk factors for perioperative complications: None  none. Current medications which may produce withdrawal symptoms if withheld perioperatively: none. Pre-Op Evaluation Plan  1. Further preoperative workup as follows:   - None; no further preoperative work-up is required    2. Medication Management/Recommendations:   - Patient should continue antihypertensive medications up through and including the day of surgery. - Patient should continue beta-blocker medication up through and including the day of surgery. 3. Prophylaxis for cardiac events with perioperative beta-blockers: not indicated. 4. Patient requires further consultation with: None    Clearance  Patient is CLEARED for surgery without any additional cardiac testing.      Svitlana Yan, [address] [place] [id number]  Phone#  [phone]  Fax#  [phone]

## 2023-07-14 DIAGNOSIS — I10 PRIMARY HYPERTENSION: ICD-10-CM

## 2023-07-14 RX ORDER — AMLODIPINE BESYLATE 5 MG/1
TABLET ORAL
Qty: 180 TABLET | Refills: 1 | OUTPATIENT
Start: 2023-07-14

## 2023-07-14 RX ORDER — AMLODIPINE BESYLATE 5 MG/1
TABLET ORAL
Qty: 180 TABLET | Refills: 1 | Status: SHIPPED | OUTPATIENT
Start: 2023-07-14 | End: 2023-07-18 | Stop reason: SDUPTHER

## 2023-07-16 ENCOUNTER — HOSPITAL ENCOUNTER (EMERGENCY)
Facility: HOSPITAL | Age: 68
Discharge: HOME | End: 2023-07-16
Attending: EMERGENCY MEDICINE
Payer: COMMERCIAL

## 2023-07-16 ENCOUNTER — APPOINTMENT (EMERGENCY)
Dept: CT IMAGING | Facility: HOSPITAL | Age: 68
End: 2023-07-16
Payer: COMMERCIAL

## 2023-07-16 VITALS
TEMPERATURE: 98.9 F | OXYGEN SATURATION: 99 % | SYSTOLIC BLOOD PRESSURE: 148 MMHG | WEIGHT: 171 LBS | BODY MASS INDEX: 31.28 KG/M2 | HEART RATE: 63 BPM | RESPIRATION RATE: 18 BRPM | DIASTOLIC BLOOD PRESSURE: 77 MMHG

## 2023-07-16 DIAGNOSIS — S39.011A ABDOMINAL WALL STRAIN, INITIAL ENCOUNTER: Primary | ICD-10-CM

## 2023-07-16 LAB
ANION GAP SERPL CALCULATED.3IONS-SCNC: 5 MMOL/L
BASOPHILS # BLD AUTO: 0.05 THOUSANDS/ÂΜL (ref 0–0.1)
BASOPHILS NFR BLD AUTO: 1 % (ref 0–1)
BILIRUB UR QL STRIP: NEGATIVE
BILIRUB UR QL STRIP: NEGATIVE
BUN SERPL-MCNC: 17 MG/DL (ref 5–25)
CALCIUM SERPL-MCNC: 9.7 MG/DL (ref 8.4–10.2)
CHLORIDE SERPL-SCNC: 102 MMOL/L (ref 96–108)
CLARITY UR: CLEAR
CLARITY UR: CLEAR
CO2 SERPL-SCNC: 29 MMOL/L (ref 21–32)
COLOR UR: YELLOW
COLOR UR: YELLOW
CREAT SERPL-MCNC: 0.59 MG/DL (ref 0.6–1.3)
EOSINOPHIL # BLD AUTO: 0.2 THOUSAND/ÂΜL (ref 0–0.61)
EOSINOPHIL NFR BLD AUTO: 2 % (ref 0–6)
ERYTHROCYTE [DISTWIDTH] IN BLOOD BY AUTOMATED COUNT: 15.6 % (ref 11.6–15.1)
GFR SERPL CREATININE-BSD FRML MDRD: 95 ML/MIN/1.73SQ M
GLUCOSE SERPL-MCNC: 109 MG/DL (ref 65–140)
GLUCOSE UR STRIP-MCNC: NEGATIVE MG/DL
GLUCOSE UR STRIP-MCNC: NEGATIVE MG/DL
HCT VFR BLD AUTO: 41.7 % (ref 34.8–46.1)
HGB BLD-MCNC: 13 G/DL (ref 11.5–15.4)
HGB UR QL STRIP.AUTO: NEGATIVE
HGB UR QL STRIP.AUTO: NEGATIVE
IMM GRANULOCYTES # BLD AUTO: 0.02 THOUSAND/UL (ref 0–0.2)
IMM GRANULOCYTES NFR BLD AUTO: 0 % (ref 0–2)
KETONES UR STRIP-MCNC: NEGATIVE MG/DL
KETONES UR STRIP-MCNC: NEGATIVE MG/DL
LEUKOCYTE ESTERASE UR QL STRIP: NEGATIVE
LEUKOCYTE ESTERASE UR QL STRIP: NEGATIVE
LYMPHOCYTES # BLD AUTO: 2.27 THOUSANDS/ÂΜL (ref 0.6–4.47)
LYMPHOCYTES NFR BLD AUTO: 27 % (ref 14–44)
MCH RBC QN AUTO: 28.4 PG (ref 26.8–34.3)
MCHC RBC AUTO-ENTMCNC: 31.2 G/DL (ref 31.4–37.4)
MCV RBC AUTO: 91 FL (ref 82–98)
MONOCYTES # BLD AUTO: 0.68 THOUSAND/ÂΜL (ref 0.17–1.22)
MONOCYTES NFR BLD AUTO: 8 % (ref 4–12)
NEUTROPHILS # BLD AUTO: 5.05 THOUSANDS/ÂΜL (ref 1.85–7.62)
NEUTS SEG NFR BLD AUTO: 62 % (ref 43–75)
NITRITE UR QL STRIP: NEGATIVE
NITRITE UR QL STRIP: NEGATIVE
NRBC BLD AUTO-RTO: 0 /100 WBCS
PH UR STRIP.AUTO: 7.5 [PH] (ref 4.5–8)
PH UR STRIP.AUTO: 8 [PH] (ref 4.5–8)
PLATELET # BLD AUTO: 238 THOUSANDS/UL (ref 149–390)
PMV BLD AUTO: 10.7 FL (ref 8.9–12.7)
POTASSIUM SERPL-SCNC: 4.1 MMOL/L (ref 3.5–5.3)
PROT UR STRIP-MCNC: NEGATIVE MG/DL
PROT UR STRIP-MCNC: NEGATIVE MG/DL
RBC # BLD AUTO: 4.57 MILLION/UL (ref 3.81–5.12)
SODIUM SERPL-SCNC: 136 MMOL/L (ref 135–147)
SP GR UR STRIP.AUTO: 1.01 (ref 1–1.03)
SP GR UR STRIP.AUTO: 1.02 (ref 1–1.03)
UROBILINOGEN UR QL STRIP.AUTO: 0.2 E.U./DL
UROBILINOGEN UR QL STRIP.AUTO: 0.2 E.U./DL
WBC # BLD AUTO: 8.27 THOUSAND/UL (ref 4.31–10.16)

## 2023-07-16 PROCEDURE — 85025 COMPLETE CBC W/AUTO DIFF WBC: CPT | Performed by: EMERGENCY MEDICINE

## 2023-07-16 PROCEDURE — G1004 CDSM NDSC: HCPCS

## 2023-07-16 PROCEDURE — 36415 COLL VENOUS BLD VENIPUNCTURE: CPT | Performed by: EMERGENCY MEDICINE

## 2023-07-16 PROCEDURE — 99284 EMERGENCY DEPT VISIT MOD MDM: CPT

## 2023-07-16 PROCEDURE — 81003 URINALYSIS AUTO W/O SCOPE: CPT

## 2023-07-16 PROCEDURE — 80048 BASIC METABOLIC PNL TOTAL CA: CPT | Performed by: EMERGENCY MEDICINE

## 2023-07-16 PROCEDURE — 96360 HYDRATION IV INFUSION INIT: CPT

## 2023-07-16 PROCEDURE — 74177 CT ABD & PELVIS W/CONTRAST: CPT

## 2023-07-16 RX ORDER — LIDOCAINE 50 MG/G
1 PATCH TOPICAL DAILY
Qty: 5 PATCH | Refills: 0 | Status: SHIPPED | OUTPATIENT
Start: 2023-07-16 | End: 2023-07-20 | Stop reason: SDUPTHER

## 2023-07-16 RX ADMIN — SODIUM CHLORIDE 1000 ML: 0.9 INJECTION, SOLUTION INTRAVENOUS at 15:31

## 2023-07-16 RX ADMIN — IOHEXOL 100 ML: 350 INJECTION, SOLUTION INTRAVENOUS at 16:05

## 2023-07-16 NOTE — ED PROVIDER NOTES
History  Chief Complaint   Patient presents with   • Abdominal Pain     With surgery in January, with pain pain      52-year-old female with history of hypertension, previous bowel obstruction presents to the ED with left lower quadrant pain. This started today at work. Patient states she supposed to only lift 15 pounds maximum. Her boss made her  something that was much heavier and then she developed left lower quadrant pain. The pain comes and goes. She has had no nausea or vomiting. Her last bowel movement was just prior to the ED arrival.  No fever or urinary complaints. Patient states this feels the same as her previous bowel obstruction in January. History provided by:  Patient   used: Yes    Abdominal Pain  Pain location:  LLQ  Pain quality: aching    Pain radiates to:  Does not radiate  Pain severity:  Moderate  Onset quality:  Sudden  Timing:  Intermittent  Chronicity:  Recurrent  Context: previous surgery    Context: not alcohol use    Context comment:  Heavy lifting  Relieved by:  None tried  Worsened by:  Nothing  Ineffective treatments:  None tried  Associated symptoms: no anorexia, no belching, no chest pain, no chills, no constipation, no cough, no diarrhea, no dysuria, no fatigue, no fever, no flatus, no hematemesis, no hematochezia, no hematuria, no melena, no nausea, no shortness of breath, no sore throat and no vomiting    Risk factors: has not had multiple surgeries, no NSAID use and not obese        Prior to Admission Medications   Prescriptions Last Dose Informant Patient Reported? Taking?    Blood Pressure Monitoring (Adult Blood Pressure Cuff Lg) KIT  Self No No   Sig: Use in the morning   Blood Pressure Monitoring (Blood Pressure Monitor/M Cuff) MISC  Self No No   Sig: Use in the morning   Diclofenac Sodium (VOLTAREN) 1 %  Self No No   Sig: Apply 2 g topically 4 (four) times a day   amLODIPine (NORVASC) 5 mg tablet   No No   Sig: TAKE 1 TABLET BY MOUTH TWICE A DAY   atenolol (TENORMIN) 50 mg tablet  Self No No   Sig: Take 1 tablet (50 mg total) by mouth daily   benzonatate (TESSALON PERLES) 100 mg capsule  Self No No   Sig: Take 1 capsule (100 mg total) by mouth 3 (three) times a day as needed for cough   cholecalciferol (VITAMIN D3) 1,000 units tablet  Self No No   Sig: Take 2 tablets (2,000 Units total) by mouth daily   lidocaine (Lidoderm) 5 %   No No   Sig: Apply 1 patch topically daily for 15 days Remove & Discard patch within 12 hours or as directed by MD   naproxen (NAPROSYN) 500 mg tablet  Self Yes No   Sig: naproxen 500 mg tablet   rosuvastatin (CRESTOR) 10 MG tablet  Self No No   Sig: Take 1 tablet (10 mg total) by mouth daily      Facility-Administered Medications: None       Past Medical History:   Diagnosis Date   • Allergic    • Hypertension        Past Surgical History:   Procedure Laterality Date   •  SECTION     • HERNIA REPAIR  1969   • HYSTERECTOMY     • OR LAPS ABD PRTM&OMENTUM DX W/WO SPEC BR/WA SPX N/A 2023    Procedure: LAPAROSCOPY DIAGNOSTIC, LYSIS OF ADHESIONS;  Surgeon: Mary Kate Trejo MD;  Location: [facility] OR;  Service: General       Family History   Problem Relation Age of Onset   • Breast cancer Mother    • Thrombosis Father    • No Known Problems Sister    • No Known Problems Sister    • No Known Problems Sister    • Hypertension Sister    • Hypertension Brother    • Hypertension Brother    • Diabetes Maternal Grandmother      I have reviewed and agree with the history as documented. E-Cigarette/Vaping   • E-Cigarette Use Never User      E-Cigarette/Vaping Substances   • Nicotine No    • THC No    • CBD No    • Flavoring No    • Other No    • Unknown No      Social History     Tobacco Use   • Smoking status: Never   • Smokeless tobacco: Never   Vaping Use   • Vaping Use: Never used   Substance Use Topics   • Alcohol use: Never   • Drug use: Never       Review of Systems   Constitutional: Negative.   Negative for chills, diaphoresis, fatigue and fever. HENT: Negative. Negative for congestion, rhinorrhea and sore throat. Eyes: Negative. Negative for discharge, redness and itching. Respiratory: Negative. Negative for apnea, cough, chest tightness, shortness of breath and wheezing. Cardiovascular: Negative for chest pain, palpitations and leg swelling. Gastrointestinal: Positive for abdominal pain. Negative for abdominal distention, anorexia, blood in stool, constipation, diarrhea, flatus, hematemesis, hematochezia, melena, nausea and vomiting. Endocrine: Negative. Genitourinary: Negative. Negative for dysuria, flank pain, frequency, hematuria and urgency. Musculoskeletal: Negative. Negative for back pain. Skin: Negative. Allergic/Immunologic: Negative. Neurological: Negative. Negative for dizziness, syncope, weakness, light-headedness, numbness and headaches. Hematological: Negative. All other systems reviewed and are negative. Physical Exam  Physical Exam  Vitals and nursing note reviewed. Constitutional:       General: She is awake. She is not in acute distress. Appearance: She is well-developed and overweight. She is not ill-appearing, toxic-appearing or diaphoretic. HENT:      Head: Normocephalic and atraumatic. Right Ear: External ear normal.      Left Ear: External ear normal.      Nose: Nose normal.      Mouth/Throat:      Mouth: Mucous membranes are moist.      Pharynx: No oropharyngeal exudate. Eyes:      General: No scleral icterus. Right eye: No discharge. Left eye: No discharge. Conjunctiva/sclera: Conjunctivae normal.   Neck:      Thyroid: No thyromegaly. Vascular: No JVD. Trachea: No tracheal deviation. Cardiovascular:      Rate and Rhythm: Normal rate and regular rhythm. Heart sounds: Normal heart sounds. No murmur heard. No friction rub. No gallop.    Pulmonary:      Effort: Pulmonary effort is normal. No respiratory distress. Breath sounds: Normal breath sounds. No stridor. No wheezing, rhonchi or rales. Chest:      Chest wall: No tenderness. Abdominal:      General: Bowel sounds are normal. There is no distension. Palpations: Abdomen is soft. There is no mass. Tenderness: There is abdominal tenderness in the left lower quadrant. There is no guarding or rebound. Hernia: No hernia is present. Skin:     General: Skin is warm and dry. Coloration: Skin is not jaundiced or pale. Findings: No bruising, erythema, lesion or rash. Neurological:      General: No focal deficit present. Mental Status: She is alert and oriented to person, place, and time. Motor: No weakness or abnormal muscle tone. Deep Tendon Reflexes: Reflexes are normal and symmetric. Psychiatric:         Mood and Affect: Mood normal.         Behavior: Behavior is cooperative.          Vital Signs  ED Triage Vitals [07/16/23 1502]   Temperature Pulse Respirations Blood Pressure SpO2   98.9 °F (37.2 °C) 73 20 143/98 100 %      Temp Source Heart Rate Source Patient Position - Orthostatic VS BP Location FiO2 (%)   Oral Monitor Sitting Right arm --      Pain Score       8           Vitals:    07/16/23 1502 07/16/23 1802   BP: 143/98 148/77   Pulse: 73 63   Patient Position - Orthostatic VS: Sitting          Visual Acuity      ED Medications  Medications   sodium chloride 0.9 % bolus 1,000 mL (0 mL Intravenous Stopped 7/16/23 1631)   iohexol (OMNIPAQUE) 350 MG/ML injection (SINGLE-DOSE) 100 mL (100 mL Intravenous Given 7/16/23 1605)       Diagnostic Studies  Results Reviewed     Procedure Component Value Units Date/Time    Urine Macroscopic, POC [[id number]] Collected: 07/16/23 1559    Lab Status: Final result Specimen: Urine Updated: 07/16/23 1601     Color, UA Yellow     Clarity, UA Clear     pH, UA 8.0     Leukocytes, UA Negative     Nitrite, UA Negative     Protein, UA Negative mg/dl      Glucose, UA Negative mg/dl      Ketones, UA Negative mg/dl      Urobilinogen, UA 0.2 E.U./dl      Bilirubin, UA Negative     Occult Blood, UA Negative     Specific Gravity, UA 1.020    Narrative:      CLINITEK RESULT    Basic metabolic panel [[id number]]  (Abnormal) Collected: 07/16/23 1530    Lab Status: Final result Specimen: Blood from Arm, Left Updated: 07/16/23 1552     Sodium 136 mmol/L      Potassium 4.1 mmol/L      Chloride 102 mmol/L      CO2 29 mmol/L      ANION GAP 5 mmol/L      BUN 17 mg/dL      Creatinine 0.59 mg/dL      Glucose 109 mg/dL      Calcium 9.7 mg/dL      eGFR 95 ml/min/1.73sq m     Narrative:      National Kidney Disease Foundation guidelines for Chronic Kidney Disease (CKD):   •  Stage 1 with normal or high GFR (GFR > 90 mL/min/1.73 square meters)  •  Stage 2 Mild CKD (GFR = 60-89 mL/min/1.73 square meters)  •  Stage 3A Moderate CKD (GFR = 45-59 mL/min/1.73 square meters)  •  Stage 3B Moderate CKD (GFR = 30-44 mL/min/1.73 square meters)  •  Stage 4 Severe CKD (GFR = 15-29 mL/min/1.73 square meters)  •  Stage 5 End Stage CKD (GFR <15 mL/min/1.73 square meters)  Note: GFR calculation is accurate only with a steady state creatinine    CBC and differential [[id number]]  (Abnormal) Collected: 07/16/23 1530    Lab Status: Final result Specimen: Blood from Arm, Left Updated: 07/16/23 1537     WBC 8.27 Thousand/uL      RBC 4.57 Million/uL      Hemoglobin 13.0 g/dL      Hematocrit 41.7 %      MCV 91 fL      MCH 28.4 pg      MCHC 31.2 g/dL      RDW 15.6 %      MPV 10.7 fL      Platelets 005 Thousands/uL      nRBC 0 /100 WBCs      Neutrophils Relative 62 %      Immat GRANS % 0 %      Lymphocytes Relative 27 %      Monocytes Relative 8 %      Eosinophils Relative 2 %      Basophils Relative 1 %      Neutrophils Absolute 5.05 Thousands/µL      Immature Grans Absolute 0.02 Thousand/uL      Lymphocytes Absolute 2.27 Thousands/µL      Monocytes Absolute 0.68 Thousand/µL      Eosinophils Absolute 0.20 Thousand/µL Basophils Absolute 0.05 Thousands/µL     Urine Macroscopic, POC [[id number]] Collected: 07/16/23 1535    Lab Status: Final result Specimen: Urine Updated: 07/16/23 1536     Color, UA Yellow     Clarity, UA Clear     pH, UA 7.5     Leukocytes, UA Negative     Nitrite, UA Negative     Protein, UA Negative mg/dl      Glucose, UA Negative mg/dl      Ketones, UA Negative mg/dl      Urobilinogen, UA 0.2 E.U./dl      Bilirubin, UA Negative     Occult Blood, UA Negative     Specific Gravity, UA 1.015    Narrative:      CLINITEK RESULT                 CT abdomen pelvis with contrast   Final Result by Gayatri Perez MD (07/16 1459)         1. There is no acute abdominal/pelvic inflammatory process. 2. There is no evidence of bowel obstruction, free intraperitoneal air, or drainable ascites. The appendix appears within normal limits. 3. Superior T12 endplate compression fracture is again noted with mild to moderate anterior wedging and increased loss of height compared to 1/1/2023 which can be seen with acute on chronic injury. Workstation performed: [id number]                    Procedures  Procedures         ED Course  ED Course as of 07/17/23 2252   Sun Jul 16, 2023   1606 WBC: 8.27   1606 BMP normal   1606 Urine normal                                             Medical Decision Making  26-year-old female presents to the ED with left lower quadrant pain. This started after lifting a heavy object today at work. No other symptoms. Patient states this feels the same as when she had her bowel obstruction and surgery in January. On review of the records, the patient did have lysis of adhesions in January, however she did also complain of nausea and vomiting at that time. She has no nausea and vomiting now. She has mild tenderness in the left lower quadrant.   Her bowel sounds are normal.  I suspect that her symptoms are likely abdominal wall strain, however the patient is pretty insistent on having a CT of her abdomen done because she states this feels exactly the same as her bowel obstruction in January. We will check basic labs, urinalysis and CT abdomen pelvis but very low clinical suspicion for hernia, acute diverticulitis or bowel obstruction. Amount and/or Complexity of Data Reviewed  Labs: ordered. Decision-making details documented in ED Course. Radiology: ordered. Disposition  Final diagnoses:   Abdominal wall strain, initial encounter     Time reflects when diagnosis was documented in both MDM as applicable and the Disposition within this note     Time User Action Codes Description Comment    7/16/2023  5:45 PM Kelly CESAR Add [S39.011A] Abdominal wall strain, initial encounter       ED Disposition     ED Disposition   Discharge    Condition   Good    Date/Time   Sun Jul 16, 2023  5:45 PM    Comment   Gianfranco Forge discharge to home/self care.                Follow-up Information     Follow up With Specialties Details Why Contact BUSTER Montes Nurse Practitioner Schedule an appointment as soon as possible for a visit in 2 days If symptoms worsen [address]  [phone]            Discharge Medication List as of 7/16/2023  6:00 PM      CONTINUE these medications which have CHANGED    Details   lidocaine (LIDODERM) 5 % Apply 1 patch topically over 12 hours daily Remove & Discard patch within 12 hours or as directed by MD, Starting Sun 7/16/2023, Normal         CONTINUE these medications which have NOT CHANGED    Details   amLODIPine (NORVASC) 5 mg tablet TAKE 1 TABLET BY MOUTH TWICE A DAY, Normal      atenolol (TENORMIN) 50 mg tablet Take 1 tablet (50 mg total) by mouth daily, Starting Mon 6/26/2023, Normal      benzonatate (TESSALON PERLES) 100 mg capsule Take 1 capsule (100 mg total) by mouth 3 (three) times a day as needed for cough, Starting Tue 1/3/2023, Normal      Blood Pressure Monitoring (Adult Blood Pressure Cuff Lg) KIT Use in the morning, Starting Mon 6/26/2023, Normal      Blood Pressure Monitoring (Blood Pressure Monitor/M Cuff) MISC Use in the morning, Starting Mon 6/26/2023, Print      cholecalciferol (VITAMIN D3) 1,000 units tablet Take 2 tablets (2,000 Units total) by mouth daily, Starting Thu 4/27/2023, Normal      Diclofenac Sodium (VOLTAREN) 1 % Apply 2 g topically 4 (four) times a day, Starting Mon 6/5/2023, Normal      naproxen (NAPROSYN) 500 mg tablet naproxen 500 mg tablet, Historical Med      rosuvastatin (CRESTOR) 10 MG tablet Take 1 tablet (10 mg total) by mouth daily, Starting Thu 4/27/2023, Normal             No discharge procedures on file.     PDMP Review     None          ED Provider  Electronically Signed by           Howard Zamudio DO  07/17/23 8875

## 2023-07-18 ENCOUNTER — CONSULT (OUTPATIENT)
Dept: SURGERY | Facility: CLINIC | Age: 68
End: 2023-07-18
Payer: COMMERCIAL

## 2023-07-18 ENCOUNTER — OFFICE VISIT (OUTPATIENT)
Dept: FAMILY MEDICINE CLINIC | Facility: CLINIC | Age: 68
End: 2023-07-18
Payer: COMMERCIAL

## 2023-07-18 VITALS
SYSTOLIC BLOOD PRESSURE: 128 MMHG | WEIGHT: 171 LBS | TEMPERATURE: 97.7 F | DIASTOLIC BLOOD PRESSURE: 76 MMHG | HEART RATE: 71 BPM | BODY MASS INDEX: 30.3 KG/M2 | HEIGHT: 63 IN | OXYGEN SATURATION: 98 %

## 2023-07-18 VITALS
HEART RATE: 65 BPM | HEIGHT: 63 IN | WEIGHT: 171.4 LBS | TEMPERATURE: 97.4 F | BODY MASS INDEX: 30.37 KG/M2 | SYSTOLIC BLOOD PRESSURE: 116 MMHG | DIASTOLIC BLOOD PRESSURE: 80 MMHG | OXYGEN SATURATION: 99 %

## 2023-07-18 DIAGNOSIS — R05.1 ACUTE COUGH: ICD-10-CM

## 2023-07-18 DIAGNOSIS — I10 PRIMARY HYPERTENSION: ICD-10-CM

## 2023-07-18 DIAGNOSIS — S39.011D STRAIN OF ABDOMINAL MUSCLE, SUBSEQUENT ENCOUNTER: Primary | ICD-10-CM

## 2023-07-18 DIAGNOSIS — R10.31 RIGHT LOWER QUADRANT ABDOMINAL PAIN: ICD-10-CM

## 2023-07-18 DIAGNOSIS — Z87.19 HISTORY OF SMALL BOWEL OBSTRUCTION: ICD-10-CM

## 2023-07-18 DIAGNOSIS — E78.2 MIXED HYPERLIPIDEMIA: ICD-10-CM

## 2023-07-18 PROCEDURE — 99214 OFFICE O/P EST MOD 30 MIN: CPT | Performed by: NURSE PRACTITIONER

## 2023-07-18 PROCEDURE — 99213 OFFICE O/P EST LOW 20 MIN: CPT | Performed by: SURGERY

## 2023-07-18 RX ORDER — AMLODIPINE BESYLATE 5 MG/1
5 TABLET ORAL 2 TIMES DAILY
Qty: 180 TABLET | Refills: 1 | Status: SHIPPED | OUTPATIENT
Start: 2023-07-18

## 2023-07-18 RX ORDER — ATENOLOL 50 MG/1
50 TABLET ORAL DAILY
Qty: 90 TABLET | Refills: 0 | Status: SHIPPED | OUTPATIENT
Start: 2023-07-18

## 2023-07-18 RX ORDER — ROSUVASTATIN CALCIUM 10 MG/1
10 TABLET, COATED ORAL DAILY
Qty: 90 TABLET | Refills: 1 | Status: SHIPPED | OUTPATIENT
Start: 2023-07-18

## 2023-07-18 RX ORDER — BENZONATATE 100 MG/1
100 CAPSULE ORAL 3 TIMES DAILY PRN
Qty: 20 CAPSULE | Refills: 0 | Status: SHIPPED | OUTPATIENT
Start: 2023-07-18

## 2023-07-18 NOTE — PROGRESS NOTES
Subjective:   Chief Complaint   Patient presents with   • Follow-up     Ed         Patient ID: Ian Whitehead is a 80 y.o. female. Presents today for ED follow up form Sunday 7/16/2023 for complaints of abdominal pain. She was diagnosed with abdominal wall strain which has since resolved. BP today is well controlled at 116/80 and she continues to take amlodipine 5 mg and atenolol 50 mg daily. She has hyperlipidemia and was ordered rosuvastatin 10mg which she didn't realize had been ordered and has not been taking. Discussed the importance of starting on the statin for cholesterol control. The following portions of the patient's history were reviewed and updated as appropriate: allergies, current medications, past family history, past medical history, past social history, past surgical history and problem list.    Review of Systems   Constitutional: Negative for chills, fatigue and fever. Respiratory: Negative for cough and shortness of breath. Cardiovascular: Negative for palpitations and leg swelling. Gastrointestinal: Negative for abdominal pain. Psychiatric/Behavioral: Negative for dysphoric mood. The patient is not nervous/anxious. Objective:  Vitals:    07/18/23 1632   BP: 116/80   BP Location: Left arm   Patient Position: Sitting   Cuff Size: Adult   Pulse: 65   Temp: (!) 97.4 °F (36.3 °C)   TempSrc: Temporal   SpO2: 99%   Weight: 77.7 kg (171 lb 6.4 oz)   Height: 5' 3" (1.6 m)      Physical Exam  Vitals reviewed. Constitutional:       Appearance: Normal appearance. She is obese. Neck:      Vascular: No carotid bruit. Cardiovascular:      Rate and Rhythm: Normal rate and regular rhythm. Pulses: Normal pulses. Heart sounds: Normal heart sounds. Pulmonary:      Effort: Pulmonary effort is normal.      Breath sounds: Normal breath sounds. Musculoskeletal:      Cervical back: Normal range of motion and neck supple.    Lymphadenopathy:      Cervical: No cervical adenopathy. Skin:     General: Skin is warm and dry. Capillary Refill: Capillary refill takes less than 2 seconds. Neurological:      Mental Status: She is alert and oriented to person, place, and time. Psychiatric:         Mood and Affect: Mood normal.         Behavior: Behavior normal.           Assessment/Plan:    No problem-specific Assessment & Plan notes found for this encounter. Diagnoses and all orders for this visit:    Strain of abdominal muscle, subsequent encounter  Comments:  Resolved    Primary hypertension  -     amLODIPine (NORVASC) 5 mg tablet; Take 1 tablet (5 mg total) by mouth 2 (two) times a day  -     atenolol (TENORMIN) 50 mg tablet; Take 1 tablet (50 mg total) by mouth daily    Mixed hyperlipidemia  -     rosuvastatin (CRESTOR) 10 MG tablet; Take 1 tablet (10 mg total) by mouth daily    Acute cough  -     benzonatate (TESSALON PERLES) 100 mg capsule;  Take 1 capsule (100 mg total) by mouth 3 (three) times a day as needed for cough

## 2023-07-18 NOTE — PROGRESS NOTES
Assessment/Plan:  No abdominal pain or tenderness today, unclear etiology of pain may be abdominal wall strain from work but no surgical intervention warranted. Recommend avoiding heavy lifting and using NSAIDs or Tylenol for any discomfort, abdominal wall strain symptoms improve with time. As she is not returning to work for several months due to an upcoming toe surgery anticipate that this will improve prior to returning to work. She does not have any specific lifting restrictions. No problem-specific Assessment & Plan notes found for this encounter. Diagnoses and all orders for this visit:    Right lower quadrant abdominal pain  -     Ambulatory Referral to General Surgery    History of small bowel obstruction  -     Ambulatory Referral to General Surgery          Subjective:      Patient ID: Layo Orta is a 80 y.o. female. She presents she presents with intermittent abdominal wall pain, was right-sided and then she lifted something heavy at work and developed some left-sided pain as well. She had pain yesterday and took a Tylenol, denies any pain today. She went to the emergency room on 7/16 for abdominal pain and had a CT scan at that time that did not show any intra-abdominal or abdominal wall findings. Labs are within normal limits. She does report occasional constipation but says that the pain is not associated with constipation. No nausea or vomiting. She said after her surgery in January the abdominal pain she experienced completely resolved and she has not had any similar symptoms since. She is actually taking a few months off from working due to an upcoming toe surgery. The following portions of the patient's history were reviewed and updated as appropriate:   She  has a past medical history of Allergic and Hypertension.   She   Patient Active Problem List    Diagnosis Date Noted   • Lumbar radiculopathy    • Hypertension    • Varicose veins of both lower extremities with pain 2020     She  has a past surgical history that includes Hernia repair (1969); Hysterectomy;  section; and pr laps abd prtm&omentum dx w/wo spec br/wa spx (N/A, 2023). Her family history includes Breast cancer in her mother; Diabetes in her maternal grandmother; Hypertension in her brother, brother, and sister; No Known Problems in her sister, sister, and sister; Thrombosis in her father. She  reports that she has never smoked. She has never used smokeless tobacco. She reports that she does not drink alcohol and does not use drugs. Current Outpatient Medications   Medication Sig Dispense Refill   • amLODIPine (NORVASC) 5 mg tablet TAKE 1 TABLET BY MOUTH TWICE A  tablet 1   • atenolol (TENORMIN) 50 mg tablet Take 1 tablet (50 mg total) by mouth daily 90 tablet 0   • benzonatate (TESSALON PERLES) 100 mg capsule Take 1 capsule (100 mg total) by mouth 3 (three) times a day as needed for cough 20 capsule 0   • Blood Pressure Monitoring (Adult Blood Pressure Cuff Lg) KIT Use in the morning 1 kit 0   • Blood Pressure Monitoring (Blood Pressure Monitor/M Cuff) MISC Use in the morning 1 each 0   • cholecalciferol (VITAMIN D3) 1,000 units tablet Take 2 tablets (2,000 Units total) by mouth daily 60 tablet 2   • Diclofenac Sodium (VOLTAREN) 1 % Apply 2 g topically 4 (four) times a day 100 g 0   • lidocaine (LIDODERM) 5 % Apply 1 patch topically over 12 hours daily Remove & Discard patch within 12 hours or as directed by MD 5 patch 0   • naproxen (NAPROSYN) 500 mg tablet naproxen 500 mg tablet     • rosuvastatin (CRESTOR) 10 MG tablet Take 1 tablet (10 mg total) by mouth daily 90 tablet 1   • lidocaine (Lidoderm) 5 % Apply 1 patch topically daily for 15 days Remove & Discard patch within 12 hours or as directed by MD 15 patch 0     No current facility-administered medications for this visit. She is allergic to aspirin and nsaids. .    Review of Systems   Gastrointestinal: Positive for abdominal pain. All other systems reviewed and are negative. Objective:      /76 (BP Location: Left arm, Patient Position: Sitting, Cuff Size: Standard)   Pulse 71   Temp 97.7 °F (36.5 °C) (Tympanic)   Ht 5' 3" (1.6 m)   Wt 77.6 kg (171 lb)   SpO2 98%   BMI 30.29 kg/m²          Physical Exam  Vitals reviewed. Constitutional:       General: She is not in acute distress. Appearance: Normal appearance. She is obese. HENT:      Head: Normocephalic and atraumatic. Nose: Nose normal.      Mouth/Throat:      Mouth: Mucous membranes are moist.      Pharynx: Oropharynx is clear. Eyes:      Extraocular Movements: Extraocular movements intact. Conjunctiva/sclera: Conjunctivae normal.      Pupils: Pupils are equal, round, and reactive to light. Cardiovascular:      Rate and Rhythm: Normal rate and regular rhythm. Heart sounds: Normal heart sounds. Pulmonary:      Effort: Pulmonary effort is normal. No respiratory distress. Breath sounds: Normal breath sounds. Abdominal:      General: Abdomen is flat. There is no distension. Palpations: Abdomen is soft. Tenderness: There is no abdominal tenderness. There is no rebound. Hernia: No hernia is present. Comments: No tenderness over right or left lower quadrant where she points to previous sites of pain, incisions well-healed   Musculoskeletal:         General: No swelling or tenderness. Normal range of motion. Cervical back: Normal range of motion and neck supple. Skin:     General: Skin is warm and dry. Neurological:      General: No focal deficit present. Mental Status: She is alert and oriented to person, place, and time.

## 2023-07-19 ENCOUNTER — ANESTHESIA EVENT (OUTPATIENT)
Dept: PERIOP | Facility: HOSPITAL | Age: 68
End: 2023-07-19
Payer: COMMERCIAL

## 2023-07-20 ENCOUNTER — ANNUAL EXAM (OUTPATIENT)
Dept: FAMILY MEDICINE CLINIC | Facility: CLINIC | Age: 68
End: 2023-07-20
Payer: COMMERCIAL

## 2023-07-20 ENCOUNTER — APPOINTMENT (OUTPATIENT)
Dept: LAB | Facility: HOSPITAL | Age: 68
End: 2023-07-20
Payer: COMMERCIAL

## 2023-07-20 VITALS
TEMPERATURE: 98.1 F | BODY MASS INDEX: 30.37 KG/M2 | WEIGHT: 171.4 LBS | SYSTOLIC BLOOD PRESSURE: 120 MMHG | HEART RATE: 80 BPM | DIASTOLIC BLOOD PRESSURE: 78 MMHG | OXYGEN SATURATION: 99 % | HEIGHT: 63 IN

## 2023-07-20 DIAGNOSIS — Z01.419 ENCOUNTER FOR ANNUAL ROUTINE GYNECOLOGICAL EXAMINATION: Primary | ICD-10-CM

## 2023-07-20 DIAGNOSIS — S39.011A ABDOMINAL WALL STRAIN, INITIAL ENCOUNTER: ICD-10-CM

## 2023-07-20 DIAGNOSIS — M79.18 MUSCLE PAIN, LUMBAR: ICD-10-CM

## 2023-07-20 DIAGNOSIS — Z12.31 ENCOUNTER FOR SCREENING MAMMOGRAM FOR BREAST CANCER: ICD-10-CM

## 2023-07-20 DIAGNOSIS — N81.10 BLADDER PROLAPSE, FEMALE, ACQUIRED: ICD-10-CM

## 2023-07-20 PROCEDURE — G0145 SCR C/V CYTO,THINLAYER,RESCR: HCPCS | Performed by: NURSE PRACTITIONER

## 2023-07-20 PROCEDURE — 99212 OFFICE O/P EST SF 10 MIN: CPT | Performed by: NURSE PRACTITIONER

## 2023-07-20 PROCEDURE — G0476 HPV COMBO ASSAY CA SCREEN: HCPCS | Performed by: NURSE PRACTITIONER

## 2023-07-20 PROCEDURE — S0612 ANNUAL GYNECOLOGICAL EXAMINA: HCPCS | Performed by: NURSE PRACTITIONER

## 2023-07-20 RX ORDER — MULTIVIT-MIN/IRON FUM/FOLIC AC 7.5 MG-4
1 TABLET ORAL DAILY
COMMUNITY

## 2023-07-20 RX ORDER — NAPROXEN 500 MG/1
500 TABLET ORAL 2 TIMES DAILY WITH MEALS
Qty: 30 TABLET | Refills: 0 | Status: SHIPPED | OUTPATIENT
Start: 2023-07-20

## 2023-07-20 RX ORDER — LIDOCAINE 50 MG/G
1 PATCH TOPICAL DAILY
Qty: 5 PATCH | Refills: 0 | Status: SHIPPED | OUTPATIENT
Start: 2023-07-20

## 2023-07-20 NOTE — PRE-PROCEDURE INSTRUCTIONS
Pre-Surgery Instructions:   Medication Instructions   • amLODIPine (NORVASC) 5 mg tablet Take day of surgery. • atenolol (TENORMIN) 50 mg tablet Take day of surgery. • benzonatate (TESSALON PERLES) 100 mg capsule Uses PRN- OK to take day of surgery   • Blood Pressure Monitoring (Adult Blood Pressure Cuff Lg) KIT Take day of surgery. • Blood Pressure Monitoring (Blood Pressure Monitor/M Cuff) MISC Take day of surgery. • Calcium-Magnesium-Vitamin D (CALCIUM MAGNESIUM PO) Hold day of surgery. • cholecalciferol (VITAMIN D3) 1,000 units tablet Hold day of surgery. • Diclofenac Sodium (VOLTAREN) 1 % Hold day of surgery. • lidocaine (LIDODERM) 5 % Hold day of surgery. • Multiple Vitamins-Minerals (multivitamin with minerals) tablet Hold day of surgery. • rosuvastatin (CRESTOR) 10 MG tablet Take day of surgery. See above    . Medication instructions for day surgery reviewed. Please use only a sip of water to take your instructed medications. Avoid all over the counter vitamins, supplements and NSAIDS for one week prior to surgery per anesthesia guidelines. Tylenol is ok to take as needed. You will receive a call one business day prior to surgery with an arrival time and hospital directions. If your surgery is scheduled on a Monday, the hospital will be calling you on the Friday prior to your surgery. If you have not heard from anyone by 8pm, please call the hospital supervisor through the hospital  at [phone]. Radha Poe [phone]). Do not eat or drink anything after midnight the night before your surgery, including candy, mints, lifesavers, or chewing gum. Do not drink alcohol 24hrs before your surgery. Try not to smoke at least 24hrs before your surgery. Follow the pre surgery showering instructions as listed in the [facility] Surgical Experience Booklet” or otherwise provided by your surgeon's office. Do not shave the surgical area 24 hours before surgery.  Do not apply any lotions, creams, including makeup, cologne, deodorant, or perfumes after showering on the day of your surgery. No contact lenses, eye make-up, or artificial eyelashes. Remove nail polish, including gel polish, and any artificial, gel, or acrylic nails if possible. Remove all jewelry including rings and body piercing jewelry. Wear causal clothing that is easy to take on and off. Consider your type of surgery. Keep any valuables, jewelry, piercings at home. Please bring any specially ordered equipment (sling, braces) if indicated. Arrange for a responsible person to drive you to and from the hospital on the day of your surgery. Visitor Guidelines discussed. Call the surgeon's office with any new illnesses, exposures, or additional questions prior to surgery. Please reference your [facility] Surgical Experience Booklet” for additional information to prepare for your upcoming surgery.

## 2023-07-20 NOTE — PROGRESS NOTES
Subjective     Brynn Morgan is a 80 y.o.  female and is here for routine health maintenance. The patient reports no problems. History of Present Illness     HPI    Well Adult Physical   Patient here for a Gynecological exam.      Diet and Physical Activity  Diet: well balanced diet  Weight concerns: Patient has class 1 obesity (BMI 30-34. 9)  Exercise: daily      Depression Screen  PHQ-2/9 Depression Screening             History:    Menopause at 45 years  : 6  Para: 4  Breast Fed yes  Bottle Fed no  Both no   Screening  Colononoscopy scheduled  Mammogram    Pap unknown  DEXA   Abnormal pap? not asked      The following portions of the patient's history were reviewed and updated as appropriate: allergies, current medications, past family history, past medical history, past social history, past surgical history and problem list.    Review of Systems     Review of Systems   Constitutional: Negative for chills, fatigue and fever. Cardiovascular: Negative for leg swelling. Gastrointestinal: Negative for abdominal pain. Genitourinary: Negative for dyspareunia, dysuria, frequency, genital sores, menstrual problem, pelvic pain, urgency, vaginal bleeding, vaginal discharge and vaginal pain. Skin: Negative for rash. Hematological: Negative for adenopathy. Psychiatric/Behavioral: Negative for dysphoric mood. The patient is not nervous/anxious.         Breast ROS: negative for breast lumps  Self Breast Exam yes  Genito-Urinary ROS: no dysuria, trouble voiding, or hematuria  Hot Flashes no  Night Sweats no  Vaginal dryness yes  Insomnia no  Sexually Active no  Calcium yes  Vitamin D yes  Weight bearing Exercise yes  Past Medical History     Past Medical History:   Diagnosis Date   • Allergic    • Hammertoe of right foot    • Hypertension        Past Surgical History     Past Surgical History:   Procedure Laterality Date   •  SECTION     • HERNIA REPAIR  1969   • HYSTERECTOMY  1994    removed some of left ovary   • IA LAPS ABD PRTM&OMENTUM DX W/WO SPEC BR/WA SPX N/A 01/01/2023    Procedure: LAPAROSCOPY DIAGNOSTIC, LYSIS OF ADHESIONS;  Surgeon: Rigoberto Ocampo MD;  Location: [facility] OR;  Service: General       Social History     Social History     Socioeconomic History   • Marital status: /Civil Union     Spouse name: None   • Number of children: 4   • Years of education: None   • Highest education level: None   Occupational History   • None   Tobacco Use   • Smoking status: Never   • Smokeless tobacco: Never   Vaping Use   • Vaping Use: Never used   Substance and Sexual Activity   • Alcohol use: Never   • Drug use: Never   • Sexual activity: Not Currently     Partners: Male   Other Topics Concern   • None   Social History Narrative   • None     Social Determinants of Health     Financial Resource Strain: Not on file   Food Insecurity: Not on file   Transportation Needs: Not on file   Physical Activity: Not on file   Stress: Not on file   Social Connections: Not on file   Intimate Partner Violence: Not on file   Housing Stability: Not on file       Family History     Family History   Problem Relation Age of Onset   • Breast cancer Mother    • Thrombosis Father    • No Known Problems Sister    • No Known Problems Sister    • No Known Problems Sister    • Hypertension Sister    • Hypertension Brother    • Hypertension Brother    • Diabetes Maternal Grandmother        Current Medications       Current Outpatient Medications:   •  amLODIPine (NORVASC) 5 mg tablet, Take 1 tablet (5 mg total) by mouth 2 (two) times a day, Disp: 180 tablet, Rfl: 1  •  atenolol (TENORMIN) 50 mg tablet, Take 1 tablet (50 mg total) by mouth daily, Disp: 90 tablet, Rfl: 0  •  benzonatate (TESSALON PERLES) 100 mg capsule, Take 1 capsule (100 mg total) by mouth 3 (three) times a day as needed for cough, Disp: 20 capsule, Rfl: 0  •  Blood Pressure Monitoring (Adult Blood Pressure Cuff Lg) KIT, Use in the morning, Disp: 1 kit, Rfl: 0  •  Blood Pressure Monitoring (Blood Pressure Monitor/M Cuff) MISC, Use in the morning, Disp: 1 each, Rfl: 0  •  Calcium-Magnesium-Vitamin D (CALCIUM MAGNESIUM PO), Take 2 capsules by mouth 3 (three) times a day, Disp: , Rfl:   •  cholecalciferol (VITAMIN D3) 1,000 units tablet, Take 2 tablets (2,000 Units total) by mouth daily, Disp: 60 tablet, Rfl: 2  •  Multiple Vitamins-Minerals (multivitamin with minerals) tablet, Take 1 tablet by mouth daily, Disp: , Rfl:   •  rosuvastatin (CRESTOR) 10 MG tablet, Take 1 tablet (10 mg total) by mouth daily, Disp: 90 tablet, Rfl: 1  •  Diclofenac Sodium (VOLTAREN) 1 %, Apply 2 g topically 4 (four) times a day, Disp: 100 g, Rfl: 0  •  lidocaine (LIDODERM) 5 %, Apply 1 patch topically over 12 hours daily Remove & Discard patch within 12 hours or as directed by MD, Disp: 5 patch, Rfl: 0  •  naproxen (NAPROSYN) 500 mg tablet, Take 1 tablet (500 mg total) by mouth 2 (two) times a day with meals, Disp: 30 tablet, Rfl: 0     Allergies     Allergies   Allergen Reactions   • Aspirin Swelling   • Nsaids Swelling       Objective     /78 (BP Location: Left arm, Patient Position: Sitting, Cuff Size: Adult)   Pulse 80   Temp 98.1 °F (36.7 °C) (Temporal)   Ht 5' 3" (1.6 m)   Wt 77.7 kg (171 lb 6.4 oz)   SpO2 99%   BMI 30.36 kg/m²      Physical Exam  Vitals reviewed. Exam conducted with a chaperone present. Constitutional:       Appearance: She is well-developed. Neck:      Thyroid: No thyromegaly. Cardiovascular:      Rate and Rhythm: Normal rate and regular rhythm. Heart sounds: Normal heart sounds. Pulmonary:      Effort: Pulmonary effort is normal.      Breath sounds: Normal breath sounds. Chest:   Breasts: Radu Score is 5. Breasts are symmetrical.      Right: Normal. No swelling, bleeding, inverted nipple, mass, nipple discharge, skin change or tenderness.       Left: Normal. No swelling, bleeding, inverted nipple, mass, nipple discharge, skin change or tenderness. Abdominal:      Tenderness: There is no abdominal tenderness. Genitourinary:     General: Normal vulva. Radu stage (genital): 5. Labia:         Right: No rash, tenderness, lesion or injury. Left: No rash, tenderness, lesion or injury. Urethra: Prolapse present. Rectum: External hemorrhoid present. Comments: Vagina Atrophic  Cervix surgically absent  Adnexa surgically absent  Uterus surgically absent  Lymphadenopathy:      Cervical: No cervical adenopathy. Skin:     General: Skin is warm and dry. Psychiatric:         Behavior: Behavior normal.           No results found. Health Maintenance     Health Maintenance   Topic Date Due   • COVID-19 Vaccine (1) Never done   • Colorectal Cancer Screening  Never done   • PT PLAN OF CARE  01/05/2023   • Influenza Vaccine (1) 09/01/2023   • Fall Risk  12/06/2023   • Urinary Incontinence Screening  12/08/2023   • Breast Cancer Screening: Mammogram  03/24/2024   • BMI: Followup Plan  04/27/2024   • Annual Physical  04/27/2024   • Depression Screening  07/13/2024   • BMI: Adult  07/20/2024   • DTaP,Tdap,and Td Vaccines (2 - Td or Tdap) 01/01/2026   • Hepatitis C Screening  Completed   • Osteoporosis Screening  Completed   • Pneumococcal Vaccine: 65+ Years  Completed   • HIB Vaccine  Aged Out   • IPV Vaccine  Aged Out   • Hepatitis A Vaccine  Aged Out   • Meningococcal ACWY Vaccine  Aged Out   • HPV Vaccine  Aged Out     Immunization History   Administered Date(s) Administered   • INFLUENZA 12/08/2022   • Influenza, high dose seasonal 0.7 mL 12/08/2022   • Pneumococcal Conjugate Vaccine 20-valent (Pcv20), Polysace 12/08/2022   • Tdap 01/01/2016       Assessment/Plan   1. Encounter for annual routine gynecological examination  - Liquid-based pap, screening  - Liquid-based pap, screening    2. Bladder prolapse, female, acquired  - Ambulatory Referral to Urogynecology; Future    3. Encounter for screening mammogram for breast cancer  - Mammo screening bilateral w 3d & cad; Future    BUSTER Weston

## 2023-07-21 ENCOUNTER — APPOINTMENT (OUTPATIENT)
Dept: RADIOLOGY | Facility: HOSPITAL | Age: 68
End: 2023-07-21
Payer: COMMERCIAL

## 2023-07-21 ENCOUNTER — ANESTHESIA (OUTPATIENT)
Dept: PERIOP | Facility: HOSPITAL | Age: 68
End: 2023-07-21
Payer: COMMERCIAL

## 2023-07-21 ENCOUNTER — HOSPITAL ENCOUNTER (OUTPATIENT)
Facility: HOSPITAL | Age: 68
Setting detail: OUTPATIENT SURGERY
Discharge: HOME | End: 2023-07-21
Attending: PODIATRIST | Admitting: PODIATRIST
Payer: COMMERCIAL

## 2023-07-21 ENCOUNTER — HOSPITAL ENCOUNTER (OUTPATIENT)
Dept: RADIOLOGY | Facility: HOSPITAL | Age: 68
Setting detail: OUTPATIENT SURGERY
Discharge: HOME | End: 2023-07-21
Payer: COMMERCIAL

## 2023-07-21 VITALS
OXYGEN SATURATION: 98 % | RESPIRATION RATE: 18 BRPM | HEIGHT: 63 IN | WEIGHT: 172.84 LBS | DIASTOLIC BLOOD PRESSURE: 72 MMHG | TEMPERATURE: 97.9 F | SYSTOLIC BLOOD PRESSURE: 126 MMHG | HEART RATE: 57 BPM | BODY MASS INDEX: 30.62 KG/M2

## 2023-07-21 DIAGNOSIS — M20.41 HAMMER TOE OF RIGHT FOOT: ICD-10-CM

## 2023-07-21 DIAGNOSIS — Z98.890 POST-OPERATIVE STATE: Primary | ICD-10-CM

## 2023-07-21 LAB
HPV HR 12 DNA CVX QL NAA+PROBE: NEGATIVE
HPV16 DNA CVX QL NAA+PROBE: NEGATIVE
HPV18 DNA CVX QL NAA+PROBE: NEGATIVE

## 2023-07-21 PROCEDURE — 73660 X-RAY EXAM OF TOE(S): CPT

## 2023-07-21 PROCEDURE — 73630 X-RAY EXAM OF FOOT: CPT

## 2023-07-21 PROCEDURE — 99024 POSTOP FOLLOW-UP VISIT: CPT | Performed by: PODIATRIST

## 2023-07-21 PROCEDURE — 28285 REPAIR OF HAMMERTOE: CPT | Performed by: PODIATRIST

## 2023-07-21 PROCEDURE — C1713 ANCHOR/SCREW BN/BN,TIS/BN: HCPCS | Performed by: PODIATRIST

## 2023-07-21 DEVICE — IMPLANTABLE DEVICE: Type: IMPLANTABLE DEVICE | Site: FOOT | Status: FUNCTIONAL

## 2023-07-21 DEVICE — WIRE 0.86MM TROCAR TIP: Type: IMPLANTABLE DEVICE | Status: FUNCTIONAL

## 2023-07-21 RX ORDER — PROPOFOL 10 MG/ML
INJECTION, EMULSION INTRAVENOUS AS NEEDED
Status: DISCONTINUED | OUTPATIENT
Start: 2023-07-21 | End: 2023-07-21

## 2023-07-21 RX ORDER — OXYCODONE HYDROCHLORIDE AND ACETAMINOPHEN 5; 325 MG/1; MG/1
1 TABLET ORAL ONCE AS NEEDED
Status: DISCONTINUED | OUTPATIENT
Start: 2023-07-21 | End: 2023-07-21 | Stop reason: HOSPADM

## 2023-07-21 RX ORDER — FENTANYL CITRATE 50 UG/ML
INJECTION, SOLUTION INTRAMUSCULAR; INTRAVENOUS AS NEEDED
Status: DISCONTINUED | OUTPATIENT
Start: 2023-07-21 | End: 2023-07-21

## 2023-07-21 RX ORDER — OXYCODONE HYDROCHLORIDE AND ACETAMINOPHEN 5; 325 MG/1; MG/1
1 TABLET ORAL EVERY 4 HOURS PRN
Qty: 10 TABLET | Refills: 0 | Status: SHIPPED | OUTPATIENT
Start: 2023-07-21 | End: 2023-07-26

## 2023-07-21 RX ORDER — MAGNESIUM HYDROXIDE 1200 MG/15ML
LIQUID ORAL AS NEEDED
Status: DISCONTINUED | OUTPATIENT
Start: 2023-07-21 | End: 2023-07-21 | Stop reason: HOSPADM

## 2023-07-21 RX ORDER — ONDANSETRON 2 MG/ML
4 INJECTION INTRAMUSCULAR; INTRAVENOUS ONCE AS NEEDED
Status: DISCONTINUED | OUTPATIENT
Start: 2023-07-21 | End: 2023-07-21 | Stop reason: HOSPADM

## 2023-07-21 RX ORDER — CEFAZOLIN SODIUM 2 G/50ML
2000 SOLUTION INTRAVENOUS ONCE
Status: COMPLETED | OUTPATIENT
Start: 2023-07-21 | End: 2023-07-21

## 2023-07-21 RX ORDER — ONDANSETRON 2 MG/ML
INJECTION INTRAMUSCULAR; INTRAVENOUS AS NEEDED
Status: DISCONTINUED | OUTPATIENT
Start: 2023-07-21 | End: 2023-07-21

## 2023-07-21 RX ORDER — MEPERIDINE HYDROCHLORIDE 25 MG/ML
12.5 INJECTION INTRAMUSCULAR; INTRAVENOUS; SUBCUTANEOUS
Status: DISCONTINUED | OUTPATIENT
Start: 2023-07-21 | End: 2023-07-21 | Stop reason: HOSPADM

## 2023-07-21 RX ORDER — PROPOFOL 10 MG/ML
INJECTION, EMULSION INTRAVENOUS CONTINUOUS PRN
Status: DISCONTINUED | OUTPATIENT
Start: 2023-07-21 | End: 2023-07-21

## 2023-07-21 RX ORDER — MIDAZOLAM HYDROCHLORIDE 2 MG/2ML
INJECTION, SOLUTION INTRAMUSCULAR; INTRAVENOUS AS NEEDED
Status: DISCONTINUED | OUTPATIENT
Start: 2023-07-21 | End: 2023-07-21

## 2023-07-21 RX ORDER — SODIUM CHLORIDE, SODIUM LACTATE, POTASSIUM CHLORIDE, CALCIUM CHLORIDE 600; 310; 30; 20 MG/100ML; MG/100ML; MG/100ML; MG/100ML
125 INJECTION, SOLUTION INTRAVENOUS CONTINUOUS
Status: DISCONTINUED | OUTPATIENT
Start: 2023-07-21 | End: 2023-07-21 | Stop reason: HOSPADM

## 2023-07-21 RX ORDER — HYDROMORPHONE HCL/PF 1 MG/ML
0.5 SYRINGE (ML) INJECTION
Status: DISCONTINUED | OUTPATIENT
Start: 2023-07-21 | End: 2023-07-21 | Stop reason: HOSPADM

## 2023-07-21 RX ORDER — FENTANYL CITRATE/PF 50 MCG/ML
25 SYRINGE (ML) INJECTION
Status: DISCONTINUED | OUTPATIENT
Start: 2023-07-21 | End: 2023-07-21 | Stop reason: HOSPADM

## 2023-07-21 RX ADMIN — FENTANYL CITRATE 50 MCG: 50 INJECTION INTRAMUSCULAR; INTRAVENOUS at 10:46

## 2023-07-21 RX ADMIN — SODIUM CHLORIDE, SODIUM LACTATE, POTASSIUM CHLORIDE, AND CALCIUM CHLORIDE 125 ML/HR: .6; .31; .03; .02 INJECTION, SOLUTION INTRAVENOUS at 09:10

## 2023-07-21 RX ADMIN — MIDAZOLAM 1 MG: 1 INJECTION INTRAMUSCULAR; INTRAVENOUS at 10:01

## 2023-07-21 RX ADMIN — PROPOFOL 30 MG: 10 INJECTION, EMULSION INTRAVENOUS at 10:04

## 2023-07-21 RX ADMIN — MIDAZOLAM 1 MG: 1 INJECTION INTRAMUSCULAR; INTRAVENOUS at 09:57

## 2023-07-21 RX ADMIN — FENTANYL CITRATE 25 MCG: 50 INJECTION INTRAMUSCULAR; INTRAVENOUS at 10:24

## 2023-07-21 RX ADMIN — FENTANYL CITRATE 25 MCG: 50 INJECTION INTRAMUSCULAR; INTRAVENOUS at 10:06

## 2023-07-21 RX ADMIN — CEFAZOLIN SODIUM 2000 MG: 2 SOLUTION INTRAVENOUS at 09:57

## 2023-07-21 RX ADMIN — ONDANSETRON 4 MG: 2 INJECTION INTRAMUSCULAR; INTRAVENOUS at 10:45

## 2023-07-21 RX ADMIN — PROPOFOL 60 MCG/KG/MIN: 10 INJECTION, EMULSION INTRAVENOUS at 10:04

## 2023-07-21 NOTE — DISCHARGE INSTR - AVS FIRST PAGE
Discharge Instructions - Podiatry    Weight Bearing Status: Weight bearing as tolerated wearing a surgical shoe on the right foot at all times. Pain: Continue analgesics as directed    Follow-up appointment instructions: Please go to your post op office visit in about 1 week with Dr. Baldomero Boothe. Contact sooner if any increase in pain, or signs of infection occur    Wound Care: Leave dressings clean, dry, and intact to your post op office visit. Keep the foot elevated as much as possible in the first 48 hours post op to minimize bleeding, swelling, and pain.

## 2023-07-21 NOTE — ANESTHESIA PREPROCEDURE EVALUATION
Procedure:  REPAIR HAMMERTOE 3RD TOE (Right: Foot)    Relevant Problems   ANESTHESIA (within normal limits)      CARDIO   (+) Hypertension      ENDO (within normal limits)      GI/HEPATIC (within normal limits)      PULMONARY (within normal limits)      Musculoskeletal and Integument   (+) Hammertoe of right foot        Physical Exam    Airway    Mallampati score: I  TM Distance: >3 FB  Neck ROM: full     Dental   No notable dental hx     Cardiovascular  Cardiovascular exam normal    Pulmonary  Pulmonary exam normal     Other Findings        Anesthesia Plan  ASA Score- 2     Anesthesia Type- IV sedation with anesthesia with ASA Monitors. Additional Monitors:   Airway Plan:     Comment: Required Brazilian translation for consent via ipad. Plan Factors-    Chart reviewed. Patient summary reviewed. Induction- intravenous. Postoperative Plan-     Informed Consent- Anesthetic plan and risks discussed with patient.

## 2023-07-21 NOTE — INTERVAL H&P NOTE
H&P reviewed. After examining the patient I find no changes in the patients condition since the H&P had been written.     Vitals:    07/21/23 0823   BP: 144/73   Pulse: 66   Resp: 16   Temp: 97.8 °F (36.6 °C)   SpO2: 98%

## 2023-07-21 NOTE — ANESTHESIA POSTPROCEDURE EVALUATION
Post-Op Assessment Note    CV Status:  Stable    Pain management: adequate     Mental Status:  Alert and awake   Hydration Status:  Euvolemic   PONV Controlled:  Controlled   Airway Patency:  Patent      Post Op Vitals Reviewed: Yes            No notable events documented.     BP      Temp      Pulse     Resp      SpO2      /72   Pulse 57   Temp 97.9 °F (36.6 °C) (Temporal)   Resp 18   Ht 5' 3" (1.6 m)   Wt 78.4 kg (172 lb 13.5 oz)   SpO2 98%   BMI 30.62 kg/m²

## 2023-07-21 NOTE — DISCHARGE SUMMARY
Discharge Summary Outpatient Procedure Podiatry -   Kimmy De La Cruz 80 y.o. female MRN: [id number]  Unit/Bed#: OR POOL Encounter: [id number]    Admission Date: 7/21/2023     Admitting Diagnosis: Hammer toe of right foot [M20.41]    Discharge Diagnosis: same    Procedures Performed: REPAIR HAMMERTOE 3RD TOE: 83305 (CPT®)    Complications: none    Condition at Discharge: stable    Discharge instructions/Information to patient and family:   See after visit summary for information provided to patient and family. Provisions for Follow-Up Care/Important appointments:  See after visit summary for information related to follow-up care and any pertinent home health orders. Discharge Medications:  See after visit summary for reconciled discharge medications provided to patient and family.

## 2023-07-21 NOTE — OP NOTE
OPERATIVE REPORT - Podiatry  PATIENT NAME: Tom Teixeira    :  1955  MRN: [id number]  Pt Location: [facility] OR ROOM 03    SURGERY DATE: 2023    Surgeon(s) and Role:     * Olman Reaves DPM - Primary     * Isaac Felty, DPM - Assisting    Pre-op Diagnosis:  Hammer toe of right foot [M20.41]    Post-Op Diagnosis Codes:     * Hammer toe of right foot [M20.41]    Procedure(s) (LRB):  REPAIR HAMMERTOE 3RD TOE (Right)    Specimen(s):  * No specimens in log *    Estimated Blood Loss:   Minimal    Drains:  * No LDAs found *    Anesthesia Type:   IV Sedation with Anesthesia with 5 ml of 1% Lidocaine and 0.5% Bupivacaine in a 1:1 mixture    Hemostasis:  Ankle tourniquet 250mmHg    Materials:  Implant Name Type Inv. Item Serial No.  Lot No. LRB No. Used Action   WIRE 0.86MM TROCAR TIP - [id number]  WIRE 0.86MM TROCAR TIP  [employer]  Right 1 Implanted     3-0 Vicryl suture  4-0 Nylon suture    Operative Findings:  Consistent with pre op diagnosis    Complications:   None    Procedure and Technique:     Under mild sedation, the patient was brought into the operating room and placed on the operating room table in the supine position. IV sedation was achieved by anesthesia team and a universal timeout was performed where all parties are in agreement of correct patient, correct procedure and correct site. A pneumatic tourniquet was then placed over the patient's right ankle with ample padding. A 3rd ray block was performed consisting of 5 ml of 1% Lidocaine and 0.5% Bupivacaine in a 1:1 mixture. The foot was then prepped and draped in the usual aseptic manner. An esmarch bandage was used to exsangunate the foot and the pneumatic tourniquet was then inflated to 250mmHg. Attention was then directed to the right 3rd toe. A 15 blade was used to make a transverse ellipse skin incision over the dorsal PIPJ. This ellipse of skin was excised and passed off the field.  A transverse tenotomy was then made in the extensor tendon and the tendon was reflected proximally. The medial and lateral collateral attachments at the PIPJ were then freed to fully expose the head of the proximal phalanx. The sagittal saw was then used to resect the head of the proximal phalanx and resect the articular surface of the base of the middle phalanx. These portions of bone and cartilage were excised and passed off the field. A K wire was then inserted longitudinally across the PIPJ and DIPJ and extending out the tip of the toe. An Arthrex headless cannulated compression screw was then placed over the K wire per  protocol with positioning across the DIPJ and PIPJ. The K wire was removed. C arm xray was used intra op at all critical steps. The toe was noted to rest in a rectus position and no further procedures were required. The site was irrigated with normal saline. 3-0 Vicryl suture was used to repair the extensor tendon. Skin closure was obtained using 4-0 Nylon suture in interrupted fashion. The site was dressed with adaptic, 4x4 gauze, stephen, and 4in ACE. The tourniquet was deflated at approximately 23 min and normal hyperemic response was noted to all digits. The patient tolerated the procedure and anesthesia well without immediate complications and transferred to PACU with vital signs stable. Dr. Fernandez Brandt was present during the entire procedure and participated in all key aspects. SIGNATURE: Kenneth Cobian DPM  DATE: July 21, 2023  TIME: 10:55 AM      Portions of the record may have been created with voice recognition software. Occasional wrong word or "sound a like" substitutions may have occurred due to the inherent limitations of voice recognition software. Read the chart carefully and recognize, using context, where substitutions have occurred.

## 2023-07-27 LAB
LAB AP GYN PRIMARY INTERPRETATION: NORMAL
Lab: NORMAL

## 2023-07-31 ENCOUNTER — OFFICE VISIT (OUTPATIENT)
Dept: OBGYN CLINIC | Facility: CLINIC | Age: 68
End: 2023-07-31
Payer: COMMERCIAL

## 2023-07-31 VITALS
HEIGHT: 63 IN | BODY MASS INDEX: 30.48 KG/M2 | WEIGHT: 172 LBS | DIASTOLIC BLOOD PRESSURE: 70 MMHG | SYSTOLIC BLOOD PRESSURE: 120 MMHG

## 2023-07-31 DIAGNOSIS — M79.642 LEFT HAND PAIN: Primary | ICD-10-CM

## 2023-07-31 PROCEDURE — 99213 OFFICE O/P EST LOW 20 MIN: CPT | Performed by: PHYSICIAN ASSISTANT

## 2023-07-31 NOTE — PROGRESS NOTES
Assessment:    Left ring trigger finger with recent increase of pain in this area after injury  No fracture on x-ray    Plan:    Recommend Voltaren gel to the area as she has listed oral NSAID allergy, and has tolerated this before. Activities as tolerated, no restrictions. Follow-up 6-8 weeks for re-evaluation. If still symptomatic at that time, consider repeat steroid injection. Visit Diagnoses     Left hand pain    -  Primary    Relevant Orders    XR hand 3+ vw left                   Subjective:     HPI    Patient ID:  Carmelita Peres is a 80 y.o. female presenting for follow-up evaluation of the left ring trigger finger. She was provided an injection at last visit which she states helped. She has no further locking or triggering of the digit. Today she states about 2 weeks ago she had an injury to the left hand where she was sitting in the backseat of a car and grabbed onto something and twisted this finger. It flared up her symptoms of pain however did not experience much locking or triggering. She has no numbness and tingling of the finger. She still feels like she has the same range of motion. The following portions of the patient's history were reviewed and updated as appropriate: allergies, current medications, past family history, past medical history, past social history, past surgical history, and problem list.    Review of Systems     Objective:    Imaging: Left hand x-rays obtained in the office today demonstrate no acute abnormality at the area of concern. No fracture or dislocation at the MCP joint of the left ring finger. No significant arthritic changes. Physical Exam     Vitals:    07/31/23 1038   BP: 120/70       General appearance:  NAD   Cardiac:  Regular rate  Lungs:  Unlabored breathing  Abdomen:  Non-distended    Orthopedic Examination:  Left ring finger     Inspection:  No open wounds or erythema. No swelling or ecchymosis.     Palpation: Mild tenderness palpation A1 pulley    Range-of-motion: Full extension of the digit, just short of full flexion due to stiffness at the PIP joint.     Strength: 5/5 , flexion extension    Sensation: Intact to light touch    Special Tests: Palpable radial pulse  Good cap refill at the fingertip

## 2023-08-07 ENCOUNTER — OFFICE VISIT (OUTPATIENT)
Dept: PODIATRY | Facility: CLINIC | Age: 68
End: 2023-08-07

## 2023-08-07 VITALS
DIASTOLIC BLOOD PRESSURE: 70 MMHG | BODY MASS INDEX: 30.48 KG/M2 | WEIGHT: 172 LBS | SYSTOLIC BLOOD PRESSURE: 120 MMHG | HEIGHT: 63 IN

## 2023-08-07 DIAGNOSIS — M20.41 HAMMER TOE OF RIGHT FOOT: Primary | ICD-10-CM

## 2023-08-07 PROCEDURE — 99024 POSTOP FOLLOW-UP VISIT: CPT | Performed by: PODIATRIST

## 2023-08-07 NOTE — PROGRESS NOTES
Assessment/Plan:      Diagnoses and all orders for this visit:    Hammer toe of right foot      Patient is stable postop 2.5 weeks    Incision: healed sutures removed. Instructions given to patient. Rest the foot as much as possible and elevate/ice  if swollen. Ambulatory status: WB in surgical shoe, Serial XR in 3-4 weeks. Surgical shoe until then    Images reviewed today: postop images shown to patient, no concerns    RTC: 3-4 weeks, serial XR, hopeful transition to sneaker    Subjective:     Patient ID: Mario Hanson is a 80 y.o. female. DOS: 7/21/2023     Procedure: right 3rd hammertoe repair, arthrodesis     Condition: Dressing C/D/I. No pain. Patient denies N/F/V/C/D/CP/SOB          Review of Systems      Objective:     Physical Exam  Vitals reviewed. Cardiovascular:      Pulses: Normal pulses. Musculoskeletal:         General: No deformity (right 3rd toe is fused at DIPJ and PIPJ, normal MTPJ ROM. CRT brisk. No calf pain). Neurological:      Mental Status: She is alert.

## 2023-08-09 ENCOUNTER — TELEPHONE (OUTPATIENT)
Age: 68
End: 2023-08-09

## 2023-08-09 ENCOUNTER — CONSULT (OUTPATIENT)
Dept: GASTROENTEROLOGY | Facility: MEDICAL CENTER | Age: 68
End: 2023-08-09
Payer: COMMERCIAL

## 2023-08-09 VITALS
HEART RATE: 71 BPM | TEMPERATURE: 98.7 F | BODY MASS INDEX: 30.47 KG/M2 | DIASTOLIC BLOOD PRESSURE: 74 MMHG | SYSTOLIC BLOOD PRESSURE: 116 MMHG | WEIGHT: 172 LBS

## 2023-08-09 DIAGNOSIS — Z12.11 COLON CANCER SCREENING: Primary | ICD-10-CM

## 2023-08-09 PROCEDURE — 99203 OFFICE O/P NEW LOW 30 MIN: CPT | Performed by: PHYSICIAN ASSISTANT

## 2023-08-09 NOTE — PROGRESS NOTES
[facility] - Outpatient Consultation  Tigist Shine 80 y.o. female MRN: [id number]  Encounter: [id number]      Assessment and Plan    1. Colon cancer screening  Last colonoscopy in 1992 with polyps removed, repeat was recommended 5 years later but she was unable to repeat the colonoscopy at that time and had yes to undergo a repeat. At this time she has no GI symptoms or complaints. Denies any family history of colon cancer  -Colonoscopy discussed in detail including risks of bleeding, infection, bowel perforation, and missed polyps    Follow up as needed after colonoscopy     ______________________________________________________________________    History of Present Illness  Mylene Donahue is a 80 y.o. female with HTN and recent SBO 1/2023 s/p ex lap with lysis of adhesions here for consultation of colon cancer screening. Last colonoscopy in 1992 with polyps removed, repeat was recommended 5 years later but she was unable to repeat the colonoscopy at that time and had yes to undergo a repeat. At this time she has no GI symptoms or complaints. Denies any family history of colon cancer. Of note she did recently have a SBO in January for which she is s/p ex lap with lysis of adhesions. All bowel appeared healthy and no resection was required. [employer] interpretor utilized      Review of Systems   Constitutional: Negative for activity change, appetite change, chills, fatigue, fever and unexpected weight change. Gastrointestinal: Negative for abdominal distention, abdominal pain, anal bleeding, blood in stool, constipation, diarrhea, nausea, rectal pain and vomiting. Musculoskeletal: Positive for gait problem. Psychiatric/Behavioral: Negative for confusion.        Past Medical History  Past Medical History:   Diagnosis Date   • Allergic    • Hammertoe of right foot     OR   reapir today 7/21/2023   • Hypertension    • Wears glasses        Past Social history  Past Surgical History:   Procedure Laterality Date   •  SECTION     • HERNIA REPAIR  1969   • HYSTERECTOMY      removed some of left ovary   • MI CORRECTION HAMMERTOE Right 2023    Procedure: REPAIR HAMMERTOE 3RD TOE;  Surgeon: Makayla Srinivasan DPM;  Location: [facility];  Service: Podiatry   • MI LAPS ABD PRTM&OMENTUM DX W/WO SPEC BR/WA SPX N/A 2023    Procedure: LAPAROSCOPY DIAGNOSTIC, LYSIS OF ADHESIONS;  Surgeon: Daniel Sosa MD;  Location: [facility] OR;  Service: General     Social History     Socioeconomic History   • Marital status: /Civil Union     Spouse name: Not on file   • Number of children: 4   • Years of education: Not on file   • Highest education level: Not on file   Occupational History   • Not on file   Tobacco Use   • Smoking status: Never   • Smokeless tobacco: Never   Vaping Use   • Vaping Use: Never used   Substance and Sexual Activity   • Alcohol use: Never   • Drug use: Never   • Sexual activity: Not Currently     Partners: Male   Other Topics Concern   • Not on file   Social History Narrative   • Not on file     Social Determinants of Health     Financial Resource Strain: Not on file   Food Insecurity: Not on file   Transportation Needs: Not on file   Physical Activity: Not on file   Stress: Not on file   Social Connections: Not on file   Intimate Partner Violence: Not on file   Housing Stability: Not on file     Social History     Substance and Sexual Activity   Alcohol Use Never     Social History     Substance and Sexual Activity   Drug Use Never     Social History     Tobacco Use   Smoking Status Never   Smokeless Tobacco Never       Past Family History  Family History   Problem Relation Age of Onset   • Breast cancer Mother    • Thrombosis Father    • No Known Problems Sister    • No Known Problems Sister    • No Known Problems Sister    • Hypertension Sister    • Hypertension Brother    • Hypertension Brother    • Diabetes Maternal Grandmother        Current Medications  Current Outpatient Medications   Medication Sig Dispense Refill   • amLODIPine (NORVASC) 5 mg tablet Take 1 tablet (5 mg total) by mouth 2 (two) times a day 180 tablet 1   • atenolol (TENORMIN) 50 mg tablet Take 1 tablet (50 mg total) by mouth daily 90 tablet 0   • benzonatate (TESSALON PERLES) 100 mg capsule Take 1 capsule (100 mg total) by mouth 3 (three) times a day as needed for cough 20 capsule 0   • Blood Pressure Monitoring (Adult Blood Pressure Cuff Lg) KIT Use in the morning 1 kit 0   • Blood Pressure Monitoring (Blood Pressure Monitor/M Cuff) MISC Use in the morning 1 each 0   • Calcium-Magnesium-Vitamin D (CALCIUM MAGNESIUM PO) Take 2 capsules by mouth 3 (three) times a day     • cholecalciferol (VITAMIN D3) 1,000 units tablet Take 2 tablets (2,000 Units total) by mouth daily 60 tablet 2   • Diclofenac Sodium (VOLTAREN) 1 % Apply 2 g topically 4 (four) times a day 100 g 0   • lidocaine (LIDODERM) 5 % Apply 1 patch topically over 12 hours daily Remove & Discard patch within 12 hours or as directed by MD 5 patch 0   • Multiple Vitamins-Minerals (multivitamin with minerals) tablet Take 1 tablet by mouth daily     • naproxen (NAPROSYN) 500 mg tablet Take 1 tablet (500 mg total) by mouth 2 (two) times a day with meals 30 tablet 0   • rosuvastatin (CRESTOR) 10 MG tablet Take 1 tablet (10 mg total) by mouth daily 90 tablet 1     No current facility-administered medications for this visit.        Allergies  Allergies   Allergen Reactions   • Aspirin Swelling   • Nsaids Swelling         The following portions of the patient's history were reviewed and updated as appropriate: allergies, current medications, past medical history, past social history, past surgical history and problem list.      Vitals  Vitals:    08/09/23 0821   BP: 116/74   BP Location: Right arm   Pulse: 71   Temp: 98.7 °F (37.1 °C)   Weight: 78 kg (172 lb)         Physical Exam  Constitutional   General appearance: Patient is seated and in no acute distress, well appearing and well nourished. Head and Face   Head and face: Normal.    Eyes   Conjunctiva and lids: No erythema, swelling or discharge. Anicteric. Ears, Nose, Mouth, and Throat   Hearing: Normal.    Neck: Supple, trachea midline. Pulmonary   Respiratory effort: No increased work of breathing or signs of respiratory distress. Lungs: Clear to ascultation, no wheezes, rhonchi, or rales  Cardiovascular   Heart: Regular rate and rhythm, no murmurs gallops or rubs   Examination of extremities for edema and/or varicosities: Normal.    Musculoskeletal   Gait and station: Wheel chair    Skin   Skin and subcutaneous tissue: Warm, dry, and intact. No visible jaundice, lesions or rashes. Psychiatric   Judgment and insight: Normal  Recent and remote memory:  Normal  Mood and affect: Normal      Results  No visits with results within 1 Day(s) from this visit. Latest known visit with results is:   Annual Exam on 07/20/2023   Component Date Value   • Case Report 07/20/2023                      Value:Gynecologic Cytology Report                       Case: [id number]                                  Authorizing Provider:  BUSTER Boone           Collected:           07/20/2023 1207              Ordering Location:     [address] [place] [address]  Received:            07/20/2023 1207                                     Medical Group                                                                First Screen:          Eric Manzano                                                               Specimen:    LIQUID-BASED PAP, SCREENING, Vaginal                                                      • Primary Interpretation 07/20/2023 Negative for intraepithelial lesion or malignancy    • Specimen Adequacy 07/20/2023 Satisfactory for evaluation. • Additional Information 07/20/2023                      Value: This result contains rich text formatting which cannot be displayed here. • HPV Other HR 07/20/2023 Negative    • HPV16 07/20/2023 Negative    • HPV18 07/20/2023 Negative        Radiology Results  XR foot 3+ vw right    Result Date: 8/1/2023  Narrative: RIGHT FOOT INDICATION:   post op. COMPARISON: Right third toe intraoperative fluoroscopy 7/21/2023 VIEWS:  XR FOOT 3+ VW RIGHT Images: 3 FINDINGS: There is no acute fracture or dislocation. Fixation screw is present across the third PIP and DIP joints. Mild lateral subluxation at the third MTP joint. Calcaneal spur present. No lytic or blastic osseous lesion. Soft tissues are unremarkable. Impression: Stable postsurgical changes of third hammertoe repair. Workstation performed: [id number]     XR toe right third min 2 views    Result Date: 8/1/2023  Narrative: C-ARM -right third toe INDICATION: M20.41: Other hammer toe(s) (acquired), right foot. Procedure guidance. COMPARISON:  None TECHNIQUE: FLUOROSCOPY TIME:   12 seconds 3 FLUOROSCOPIC IMAGES FINDINGS: Fluoroscopic guidance provided for procedure guidance. Third hammertoe repair. Osseous and soft tissue detail limited by technique. Impression: Fluoroscopic guidance provided for procedure guidance. Please refer to the separate procedure notes for additional details. Workstation performed: [id number]     CT abdomen pelvis with contrast    Result Date: 7/16/2023  Narrative: CT ABDOMEN AND PELVIS WITH IV CONTRAST INDICATION:   LLQ abdominal pain Left lower quadrant pain/history of bowel obstruction. COMPARISON: CT abdomen and pelvis 1/1/2023 TECHNIQUE:  CT examination of the abdomen and pelvis was performed. Multiplanar 2D reformatted images were created from the source data. This examination, like all CT scans performed in the [facility], was performed utilizing techniques to minimize radiation dose exposure, including the use of iterative reconstruction and automated exposure control.  Radiation dose length product (DLP) for this visit:  683 mGy-cm IV Contrast:  100 mL of iohexol (OMNIPAQUE) Enteric Contrast:  Enteric contrast was not administered. FINDINGS: ABDOMEN LOWER CHEST:  No clinically significant abnormality identified in the visualized lower chest. LIVER/BILIARY TREE:  One or more subcentimeter sharply circumscribed low-density hepatic lesion(s) are noted, too small to accurately characterize, but statistically most likely to represent subcentimeter hepatic cysts. No suspicious solid hepatic lesion is identified. Hepatic contours are normal.  No biliary dilatation. GALLBLADDER:  No calcified gallstones. No pericholecystic inflammatory change. SPLEEN:  Unremarkable. PANCREAS:  Unremarkable. ADRENAL GLANDS:  Unremarkable. KIDNEYS/URETERS:  Unremarkable. No hydronephrosis. STOMACH AND BOWEL:  Unremarkable. APPENDIX: A normal appendix was visualized. ABDOMINOPELVIC CAVITY:  No ascites. No pneumoperitoneum. No lymphadenopathy. VESSELS: Atherosclerotic changes are present. No evidence of aneurysm. PELVIS REPRODUCTIVE ORGANS: Surgical changes of prior hysterectomy. URINARY BLADDER:  Unremarkable. ABDOMINAL WALL/INGUINAL REGIONS:  Unremarkable. OSSEOUS STRUCTURES: Grade 1 degenerative anterolisthesis is noted at the L4-5 and L5-S1 levels. Superior T12 endplate compression fracture with mild to moderate anterior wedging and increased loss of height compared to prior     Impression: 1. There is no acute abdominal/pelvic inflammatory process. 2. There is no evidence of bowel obstruction, free intraperitoneal air, or drainable ascites. The appendix appears within normal limits. 3. Superior T12 endplate compression fracture is again noted with mild to moderate anterior wedging and increased loss of height compared to 1/1/2023 which can be seen with acute on chronic injury. Workstation performed: [id number]       Orders  No orders of the defined types were placed in this encounter.

## 2023-08-14 ENCOUNTER — TELEPHONE (OUTPATIENT)
Dept: OTHER | Facility: HOSPITAL | Age: 68
End: 2023-08-14

## 2023-08-14 NOTE — TELEPHONE ENCOUNTER
Patient called in using translation line  Interp- [id number]    Regarding QDWP#[id number]  Paper work needs to be sent to fax #: [phone] or can be emailed to: [email or web address]    Patient stated the office would know what she is referring to please look into this and call patient to let her know what is going on with the paperwork.  Her call back number is as follows: [phone]
Patient called in using translation line. Interp- [id number]    Patient called regarding her Oxycodone script. She is requesting a refill on this medication, but it shows as  in her chart. Please look into this an call patient with any questions.
Never smoker

## 2023-08-17 ENCOUNTER — HOSPITAL ENCOUNTER (EMERGENCY)
Facility: HOSPITAL | Age: 68
Discharge: HOME | End: 2023-08-17
Attending: EMERGENCY MEDICINE
Payer: COMMERCIAL

## 2023-08-17 VITALS
SYSTOLIC BLOOD PRESSURE: 147 MMHG | BODY MASS INDEX: 31.32 KG/M2 | WEIGHT: 176.81 LBS | OXYGEN SATURATION: 99 % | TEMPERATURE: 98.4 F | RESPIRATION RATE: 20 BRPM | HEART RATE: 65 BPM | DIASTOLIC BLOOD PRESSURE: 84 MMHG

## 2023-08-17 DIAGNOSIS — R51.9 HEADACHE: Primary | ICD-10-CM

## 2023-08-17 DIAGNOSIS — M20.41 HAMMERTOE OF RIGHT FOOT: ICD-10-CM

## 2023-08-17 DIAGNOSIS — R42 DIZZINESS: ICD-10-CM

## 2023-08-17 LAB
2HR DELTA HS TROPONIN: 0 NG/L
ANION GAP SERPL CALCULATED.3IONS-SCNC: 4 MMOL/L
ATRIAL RATE: 64 BPM
BASOPHILS # BLD AUTO: 0.04 THOUSANDS/ÂΜL (ref 0–0.1)
BASOPHILS NFR BLD AUTO: 0 % (ref 0–1)
BILIRUB UR QL STRIP: NEGATIVE
BUN SERPL-MCNC: 22 MG/DL (ref 5–25)
CALCIUM SERPL-MCNC: 9.3 MG/DL (ref 8.4–10.2)
CARDIAC TROPONIN I PNL SERPL HS: 3 NG/L
CARDIAC TROPONIN I PNL SERPL HS: 3 NG/L
CHLORIDE SERPL-SCNC: 105 MMOL/L (ref 96–108)
CLARITY UR: CLEAR
CO2 SERPL-SCNC: 30 MMOL/L (ref 21–32)
COLOR UR: YELLOW
CREAT SERPL-MCNC: 0.65 MG/DL (ref 0.6–1.3)
EOSINOPHIL # BLD AUTO: 0.27 THOUSAND/ÂΜL (ref 0–0.61)
EOSINOPHIL NFR BLD AUTO: 3 % (ref 0–6)
ERYTHROCYTE [DISTWIDTH] IN BLOOD BY AUTOMATED COUNT: 15.3 % (ref 11.6–15.1)
GFR SERPL CREATININE-BSD FRML MDRD: 92 ML/MIN/1.73SQ M
GLUCOSE SERPL-MCNC: 118 MG/DL (ref 65–140)
GLUCOSE UR STRIP-MCNC: NEGATIVE MG/DL
HCT VFR BLD AUTO: 40.7 % (ref 34.8–46.1)
HGB BLD-MCNC: 12.6 G/DL (ref 11.5–15.4)
HGB UR QL STRIP.AUTO: NEGATIVE
IMM GRANULOCYTES # BLD AUTO: 0.02 THOUSAND/UL (ref 0–0.2)
IMM GRANULOCYTES NFR BLD AUTO: 0 % (ref 0–2)
KETONES UR STRIP-MCNC: NEGATIVE MG/DL
LEUKOCYTE ESTERASE UR QL STRIP: NEGATIVE
LYMPHOCYTES # BLD AUTO: 2.51 THOUSANDS/ÂΜL (ref 0.6–4.47)
LYMPHOCYTES NFR BLD AUTO: 28 % (ref 14–44)
MAGNESIUM SERPL-MCNC: 2.3 MG/DL (ref 1.9–2.7)
MCH RBC QN AUTO: 28.5 PG (ref 26.8–34.3)
MCHC RBC AUTO-ENTMCNC: 31 G/DL (ref 31.4–37.4)
MCV RBC AUTO: 92 FL (ref 82–98)
MONOCYTES # BLD AUTO: 0.57 THOUSAND/ÂΜL (ref 0.17–1.22)
MONOCYTES NFR BLD AUTO: 6 % (ref 4–12)
NEUTROPHILS # BLD AUTO: 5.49 THOUSANDS/ÂΜL (ref 1.85–7.62)
NEUTS SEG NFR BLD AUTO: 63 % (ref 43–75)
NITRITE UR QL STRIP: NEGATIVE
NRBC BLD AUTO-RTO: 0 /100 WBCS
P AXIS: 59 DEGREES
PH UR STRIP.AUTO: 7 [PH] (ref 4.5–8)
PLATELET # BLD AUTO: 233 THOUSANDS/UL (ref 149–390)
PMV BLD AUTO: 11 FL (ref 8.9–12.7)
POTASSIUM SERPL-SCNC: 4 MMOL/L (ref 3.5–5.3)
PR INTERVAL: 190 MS
PROT UR STRIP-MCNC: NEGATIVE MG/DL
QRS AXIS: 24 DEGREES
QRSD INTERVAL: 78 MS
QT INTERVAL: 398 MS
QTC INTERVAL: 410 MS
RBC # BLD AUTO: 4.42 MILLION/UL (ref 3.81–5.12)
SODIUM SERPL-SCNC: 139 MMOL/L (ref 135–147)
SP GR UR STRIP.AUTO: 1.02 (ref 1–1.03)
T WAVE AXIS: -4 DEGREES
UROBILINOGEN UR QL STRIP.AUTO: 0.2 E.U./DL
VENTRICULAR RATE: 64 BPM
WBC # BLD AUTO: 8.9 THOUSAND/UL (ref 4.31–10.16)

## 2023-08-17 PROCEDURE — 81003 URINALYSIS AUTO W/O SCOPE: CPT

## 2023-08-17 PROCEDURE — 93005 ELECTROCARDIOGRAM TRACING: CPT

## 2023-08-17 PROCEDURE — 80048 BASIC METABOLIC PNL TOTAL CA: CPT | Performed by: EMERGENCY MEDICINE

## 2023-08-17 PROCEDURE — 36415 COLL VENOUS BLD VENIPUNCTURE: CPT | Performed by: EMERGENCY MEDICINE

## 2023-08-17 PROCEDURE — 84484 ASSAY OF TROPONIN QUANT: CPT | Performed by: EMERGENCY MEDICINE

## 2023-08-17 PROCEDURE — 99284 EMERGENCY DEPT VISIT MOD MDM: CPT

## 2023-08-17 PROCEDURE — 83735 ASSAY OF MAGNESIUM: CPT | Performed by: EMERGENCY MEDICINE

## 2023-08-17 PROCEDURE — 93010 ELECTROCARDIOGRAM REPORT: CPT | Performed by: STUDENT IN AN ORGANIZED HEALTH CARE EDUCATION/TRAINING PROGRAM

## 2023-08-17 PROCEDURE — 85025 COMPLETE CBC W/AUTO DIFF WBC: CPT | Performed by: EMERGENCY MEDICINE

## 2023-08-17 RX ORDER — ACETAMINOPHEN 325 MG/1
650 TABLET ORAL ONCE
Status: COMPLETED | OUTPATIENT
Start: 2023-08-17 | End: 2023-08-17

## 2023-08-17 RX ORDER — TRAMADOL HYDROCHLORIDE 50 MG/1
25 TABLET ORAL 2 TIMES DAILY PRN
Qty: 2 TABLET | Refills: 0 | Status: SHIPPED | OUTPATIENT
Start: 2023-08-17 | End: 2023-08-19

## 2023-08-17 RX ORDER — MECLIZINE HYDROCHLORIDE 25 MG/1
25 TABLET ORAL 3 TIMES DAILY PRN
Qty: 9 TABLET | Refills: 0 | Status: SHIPPED | OUTPATIENT
Start: 2023-08-17 | End: 2023-08-23 | Stop reason: SDUPTHER

## 2023-08-17 RX ORDER — LIDOCAINE 50 MG/G
1 PATCH TOPICAL DAILY
Qty: 4 PATCH | Refills: 0 | Status: SHIPPED | OUTPATIENT
Start: 2023-08-17 | End: 2023-08-21

## 2023-08-17 RX ORDER — MECLIZINE HYDROCHLORIDE 25 MG/1
25 TABLET ORAL ONCE
Status: COMPLETED | OUTPATIENT
Start: 2023-08-17 | End: 2023-08-17

## 2023-08-17 RX ORDER — LIDOCAINE 50 MG/G
1 PATCH TOPICAL ONCE
Status: DISCONTINUED | OUTPATIENT
Start: 2023-08-17 | End: 2023-08-17 | Stop reason: HOSPADM

## 2023-08-17 RX ADMIN — MECLIZINE HYDROCHLORIDE 25 MG: 25 TABLET ORAL at 14:49

## 2023-08-17 RX ADMIN — LIDOCAINE 1 PATCH: 50 PATCH TOPICAL at 14:52

## 2023-08-17 RX ADMIN — ACETAMINOPHEN 325MG 650 MG: 325 TABLET ORAL at 14:48

## 2023-08-17 NOTE — ED PROVIDER NOTES
History  Chief Complaint   Patient presents with   • Headache     Pt c/o headache and dizziness for the past x 2 days. Pt denies cp/sob/n/v/d or fevers     Patient is a pleasant 72-year-old female [place]-speaking only coming in today with complaints of headache and dizziness. She has a history of hypertension and hyperlipidemia. States that her symptoms started about 2 days ago and nontraumatic in nature. She states that her pain is at the back of her head and points to the occipital region. It does not radiate. Describes it as a pain. She states that it is worse with laying flat or with movement. She has no falls, fevers, chills, photophobia, diplopia. She denies any tenderness. She denies any focal weakness of the bilateral upper extremities or lower extremities. She denies any focal paresthesias of the bilateral upper extremities or lower extremity. She has not taken anything for this. She also states when she is lying down and turns to her left she has dizziness described as room spinning. She states it is no other time and has no other associated symptoms. She denies any palpitations, syncope, nausea, vomiting, fevers, chest pain, shortness of breath, dyspnea on exertion. She states that she had surgery in July and she ran out of her medications. She has a prescription just oxycodone. In chart review it shows that patient made a phone call on the 14th stating that she was out of her oxycodone. History provided by:  Patient and medical records   used: Yes    Dizziness  Quality:  Room spinning  Severity:  Mild  Onset quality:  Gradual  Timing:  Intermittent  Progression:  Unchanged  Chronicity:  New  Context comment:  Turning head to the left while laying flat  Relieved by:  None tried  Worsened by:   Movement  Ineffective treatments:  None tried  Associated symptoms: headaches    Associated symptoms: no blood in stool, no chest pain, no diarrhea, no hearing loss, no nausea, no palpitations, no shortness of breath, no syncope, no tinnitus, no vision changes, no vomiting and no weakness    Risk factors: no anemia, no heart disease, no hx of stroke, no hx of vertigo, no Meniere's disease, no multiple medications and no new medications        Prior to Admission Medications   Prescriptions Last Dose Informant Patient Reported? Taking?    Blood Pressure Monitoring (Adult Blood Pressure Cuff Lg) KIT  Self No No   Sig: Use in the morning   Blood Pressure Monitoring (Blood Pressure Monitor/M Cuff) MISC  Self No No   Sig: Use in the morning   Calcium-Magnesium-Vitamin D (CALCIUM MAGNESIUM PO)   Yes No   Sig: Take 2 capsules by mouth 3 (three) times a day   Diclofenac Sodium (VOLTAREN) 1 %   No No   Sig: Apply 2 g topically 4 (four) times a day   Multiple Vitamins-Minerals (multivitamin with minerals) tablet   Yes No   Sig: Take 1 tablet by mouth daily   amLODIPine (NORVASC) 5 mg tablet   No No   Sig: Take 1 tablet (5 mg total) by mouth 2 (two) times a day   atenolol (TENORMIN) 50 mg tablet   No No   Sig: Take 1 tablet (50 mg total) by mouth daily   benzonatate (TESSALON PERLES) 100 mg capsule   No No   Sig: Take 1 capsule (100 mg total) by mouth 3 (three) times a day as needed for cough   cholecalciferol (VITAMIN D3) 1,000 units tablet  Self No No   Sig: Take 2 tablets (2,000 Units total) by mouth daily   lidocaine (LIDODERM) 5 %   No No   Sig: Apply 1 patch topically over 12 hours daily Remove & Discard patch within 12 hours or as directed by MD   naproxen (NAPROSYN) 500 mg tablet   No No   Sig: Take 1 tablet (500 mg total) by mouth 2 (two) times a day with meals   rosuvastatin (CRESTOR) 10 MG tablet   No No   Sig: Take 1 tablet (10 mg total) by mouth daily   sodium picosulfate, magnesium oxide, citric acid (Clenpiq) oral solution   No No   Sig: Use as directed as per written instructions from office      Facility-Administered Medications: None       Past Medical History:   Diagnosis Date   • Allergic    • Hammertoe of right foot     OR   reapir today 2023   • Hypertension    • Wears glasses        Past Surgical History:   Procedure Laterality Date   •  SECTION     • HERNIA REPAIR  1969   • HYSTERECTOMY      removed some of left ovary   • TX CORRECTION HAMMERTOE Right 2023    Procedure: REPAIR HAMMERTOE 3RD TOE;  Surgeon: Arnel Cho DPM;  Location: [facility];  Service: Podiatry   • TX LAPS ABD PRTM&OMENTUM DX W/WO SPEC BR/WA SPX N/A 2023    Procedure: LAPAROSCOPY DIAGNOSTIC, LYSIS OF ADHESIONS;  Surgeon: Gertrude Lr MD;  Location: [facility];  Service: General       Family History   Problem Relation Age of Onset   • Breast cancer Mother    • Thrombosis Father    • No Known Problems Sister    • No Known Problems Sister    • No Known Problems Sister    • Hypertension Sister    • Hypertension Brother    • Hypertension Brother    • Diabetes Maternal Grandmother      I have reviewed and agree with the history as documented. E-Cigarette/Vaping   • E-Cigarette Use Never User      E-Cigarette/Vaping Substances   • Nicotine No    • THC No    • CBD No    • Flavoring No    • Other No    • Unknown No      Social History     Tobacco Use   • Smoking status: Never   • Smokeless tobacco: Never   Vaping Use   • Vaping Use: Never used   Substance Use Topics   • Alcohol use: Never   • Drug use: Never       Review of Systems   Constitutional: Negative. HENT: Negative. Negative for hearing loss and tinnitus. Respiratory: Negative. Negative for shortness of breath. Cardiovascular: Negative. Negative for chest pain, palpitations and syncope. Gastrointestinal: Negative. Negative for blood in stool, diarrhea, nausea and vomiting. Genitourinary: Negative. Musculoskeletal: Negative. Neurological: Positive for dizziness and headaches. Negative for weakness. Hematological: Negative. Psychiatric/Behavioral: Negative.     All other systems reviewed and are negative. Physical Exam  Physical Exam  Vitals and nursing note reviewed. Constitutional:       General: She is not in acute distress. Appearance: She is well-developed. HENT:      Head: Normocephalic and atraumatic. Comments: Patient maintaining airway and secretions. No stridor . No brawniness under tongue. Mouth/Throat:      Mouth: Mucous membranes are moist.   Eyes:      Extraocular Movements: Extraocular movements intact. Conjunctiva/sclera: Conjunctivae normal.      Pupils: Pupils are equal, round, and reactive to light. Cardiovascular:      Rate and Rhythm: Normal rate and regular rhythm. Pulses:           Radial pulses are 2+ on the right side and 2+ on the left side. Dorsalis pedis pulses are 2+ on the right side and 2+ on the left side. Heart sounds: Normal heart sounds, S1 normal and S2 normal. No murmur heard. Pulmonary:      Effort: Pulmonary effort is normal. No respiratory distress. Breath sounds: Normal breath sounds. Abdominal:      Palpations: Abdomen is soft. Tenderness: There is no abdominal tenderness. Musculoskeletal:         General: No swelling. Cervical back: Neck supple. Right lower leg: No edema. Left lower leg: No edema. Skin:     General: Skin is warm and dry. Capillary Refill: Capillary refill takes less than 2 seconds. Neurological:      General: No focal deficit present. Mental Status: She is alert and oriented to person, place, and time. Cranial Nerves: Cranial nerves 2-12 are intact. Sensory: Sensation is intact. Motor: Motor function is intact. Coordination: Coordination is intact. Comments: No slurred speech. No facial asymmetry. No tongue deviation. Patient is independent with bed mobility. She can move bile upper extremities and lower extremities spontaneously of each other independently and pain-free.  No nystagmus     Psychiatric: Mood and Affect: Mood normal.         Behavior: Behavior normal.         Thought Content:  Thought content normal.         Judgment: Judgment normal.         Vital Signs  ED Triage Vitals [08/17/23 1415]   Temperature Pulse Respirations Blood Pressure SpO2   98.4 °F (36.9 °C) 66 17 154/74 100 %      Temp Source Heart Rate Source Patient Position - Orthostatic VS BP Location FiO2 (%)   Oral Monitor Lying Right arm --      Pain Score       5           Vitals:    08/17/23 1415 08/17/23 1609 08/17/23 1732   BP: 154/74 150/70 147/84   Pulse: 66 64 65   Patient Position - Orthostatic VS: Lying           Visual Acuity      ED Medications  Medications   lidocaine (LIDODERM) 5 % patch 1 patch (1 patch Topical Medication Applied 8/17/23 1452)   meclizine (ANTIVERT) tablet 25 mg (25 mg Oral Given 8/17/23 1449)   acetaminophen (TYLENOL) tablet 650 mg (650 mg Oral Given 8/17/23 1448)       Diagnostic Studies  Results Reviewed     Procedure Component Value Units Date/Time    HS Troponin I 2hr [[id number]]  (Normal) Collected: 08/17/23 1652    Lab Status: Final result Specimen: Blood from Arm, Left Updated: 08/17/23 1721     hs TnI 2hr 3 ng/L      Delta 2hr hsTnI 0 ng/L     HS Troponin 0hr (reflex protocol) [[id number]]  (Normal) Collected: 08/17/23 1458    Lab Status: Final result Specimen: Blood from Arm, Left Updated: 08/17/23 1526     hs TnI 0hr 3 ng/L     Basic metabolic panel [[id number]] Collected: 08/17/23 1458    Lab Status: Final result Specimen: Blood from Arm, Left Updated: 08/17/23 1519     Sodium 139 mmol/L      Potassium 4.0 mmol/L      Chloride 105 mmol/L      CO2 30 mmol/L      ANION GAP 4 mmol/L      BUN 22 mg/dL      Creatinine 0.65 mg/dL      Glucose 118 mg/dL      Calcium 9.3 mg/dL      eGFR 92 ml/min/1.73sq m     Narrative:      [facility] guidelines for Chronic Kidney Disease (CKD):   •  Stage 1 with normal or high GFR (GFR > 90 mL/min/1.73 square meters)  •  Stage 2 Mild CKD (GFR = 60-89 mL/min/1.73 square meters)  •  Stage 3A Moderate CKD (GFR = 45-59 mL/min/1.73 square meters)  •  Stage 3B Moderate CKD (GFR = 30-44 mL/min/1.73 square meters)  •  Stage 4 Severe CKD (GFR = 15-29 mL/min/1.73 square meters)  •  Stage 5 End Stage CKD (GFR <15 mL/min/1.73 square meters)  Note: GFR calculation is accurate only with a steady state creatinine    Magnesium [[id number]]  (Normal) Collected: 08/17/23 1458    Lab Status: Final result Specimen: Blood from Arm, Left Updated: 08/17/23 1519     Magnesium 2.3 mg/dL     CBC and differential [[id number]]  (Abnormal) Collected: 08/17/23 1458    Lab Status: Final result Specimen: Blood from Arm, Left Updated: 08/17/23 1506     WBC 8.90 Thousand/uL      RBC 4.42 Million/uL      Hemoglobin 12.6 g/dL      Hematocrit 40.7 %      MCV 92 fL      MCH 28.5 pg      MCHC 31.0 g/dL      RDW 15.3 %      MPV 11.0 fL      Platelets 255 Thousands/uL      nRBC 0 /100 WBCs      Neutrophils Relative 63 %      Immat GRANS % 0 %      Lymphocytes Relative 28 %      Monocytes Relative 6 %      Eosinophils Relative 3 %      Basophils Relative 0 %      Neutrophils Absolute 5.49 Thousands/µL      Immature Grans Absolute 0.02 Thousand/uL      Lymphocytes Absolute 2.51 Thousands/µL      Monocytes Absolute 0.57 Thousand/µL      Eosinophils Absolute 0.27 Thousand/µL      Basophils Absolute 0.04 Thousands/µL     Urine Macroscopic, POC [[id number]] Collected: 08/17/23 1502    Lab Status: Final result Specimen: Urine Updated: 08/17/23 1503     Color, UA Yellow     Clarity, UA Clear     pH, UA 7.0     Leukocytes, UA Negative     Nitrite, UA Negative     Protein, UA Negative mg/dl      Glucose, UA Negative mg/dl      Ketones, UA Negative mg/dl      Urobilinogen, UA 0.2 E.U./dl      Bilirubin, UA Negative     Occult Blood, UA Negative     Specific Gravity, UA 1.020    Narrative:      CLINITEK RESULT                 No orders to display              Procedures  Procedures         ED Course  ED Course as of 08/17/23 1745   Thu Aug 17, 2023   1427 Stormy:   [id number], [place]    Patient is a 80year old female coming in with headache and dizziness that started 2 days ago. On exam, well appearing in no distress. NIH 0. Will check EKG, labs as well as give meclizine for symptomatic control. Patient was updated that that her surgery was approximately 1 month ago and was prescribed narcotic she will have to reach back to her surgeon    Disclosure: Voice to text software was used in the preparation of this document and could have resulted in translational errors.      Occasional wrong word or "sound a like" substitutions may have occurred due to the inherent limitations of voice recognition software.  Read the chart carefully and recognize, using context, where substitutions have occurred.       I have independently reviewed external records are available to me to the level of detail possible within the time constraints of my patient care responsibilities in the ED.       1505 Urine Macroscopic, POC  No UTI   1549 Labs reviewed and without actionable derangement    HEART score 4 with Trop 3. Will complete Delta trop   0200 Patient ambulated to the ER with a nonantalgic gait   1725 Delta troponin of 0   1729 Paige Burrows: [id number]    With  services Long discussion with patient regarding work-up. She is aware of labs. After medications of Lidoderm, acetaminophen and meclizine patient is asymptomatic. Discussed with her will DC home with this as well as referral to specialist.  Patient agreeable. Answered questions at bedside. Patient remains neuro intact with no focal deficits. Ambulated without ataxia.   NIH 0    Counseling: I had a detailed discussion with the patient and/or guardian regarding: the historical points, exam findings, and any diagnostic results supporting the discharge diagnosis, lab results, radiology results, discharge instructions reviewed with patient and/or family/caregiver and understanding was verbalized. Instructions given to return to the emergency department if symptoms worsen or persist, or if there are any questions or concerns that arise at home. All imaging and/or lab testing discussed with patient, strict return to ED precautions discussed. Patient recommended to follow up promptly with appropriate outpatient provider. Patient and/or family members verbalizes understanding and agrees with plan. Patient and/or family members were given opportunity to ask questions, all questions were answered at this time. Patient is stable for discharge                 HEART Risk Score    Flowsheet Row Most Recent Value   Heart Score Risk Calculator    History 0 Filed at: 08/17/2023 1548   ECG 1 Filed at: 08/17/2023 1548   Age 2 Filed at: 08/17/2023 1548   Risk Factors 1 Filed at: 08/17/2023 1548   Troponin 0 Filed at: 08/17/2023 1548   HEART Score 4 Filed at: 08/17/2023 1548           Stroke Assessment     Row Name 08/17/23 1427             NIH Stroke Scale    Interval Baseline      Level of Consciousness (1a.) 0      LOC Questions (1b.) 0      LOC Commands (1c.) 0      Best Gaze (2.) 0      Visual (3.) 0      Facial Palsy (4.) 0      Motor Arm, Left (5a.) 0      Motor Arm, Right (5b.) 0      Motor Leg, Left (6a.) 0      Motor Leg, Right (6b.) 0      Limb Ataxia (7.) 0      Sensory (8.) 0      Best Language (9.) 0      Dysarthria (10.) 0      Extinction and Inattention (11.) (Formerly Neglect) 0      Total 0              Flowsheet Row Most Recent Value   Thrombolytic Decision Options    Thrombolytic Decision Patient not a candidate. Patient is not a candidate options Unclear time of onset outside appropriate time window., Symptoms resolved/clearly non disabling. SBIRT 20yo+    Flowsheet Row Most Recent Value   Initial Alcohol Screen: US AUDIT-C     1. How often do you have a drink containing alcohol? 0 Filed at: 08/17/2023 1510   3a. Male UNDER 65:  How often do you have five or more drinks on one occasion? 0 Filed at: 08/17/2023 1510   3b. FEMALE Any Age, or MALE 65+: How often do you have 4 or more drinks on one occassion? 0 Filed at: 08/17/2023 1510   Audit-C Score 0 Filed at: 08/17/2023 1510                    Medical Decision Making  Differential diagnosis includes but not limited to: BPPV, Menière's, labyrinthitis, CVA, TIA, arrhthymias, DAVINA, electrolyte dysfunction, orthostatic hypotension, dehydration, medication reaction, OM, vestibular neuritis. Patient's headache is similar to prior chronic headaches. There are no focal neurologic findings on exam.  The headache was not sudden in onset and was not maximum intensity at onset. Patient does not have a fever and is not immunocompromised. Headache has not been progressively worsening. Patient denies jaw claudication, muscle aches or temporal artery pain. Doubt carbon monoxide poisoning as there are not multiple patients with similar complaints in the home. Patient denies any history of clotting disorders. Patient denies trauma. Patient denies eye pain. Patient denies any cervical manipulation with facial pain or headache. Patient denies dizziness with headache. EKG INTERPRETATION @   RHYTHM: Normal sinus rhythm at 64 bpm  AXIS: Normal axis  INTERVALS: NC interval measured at 190 ms  QRS COMPLEX: QRS measured at 78 ms  ST SEGMENT: Nonspecific ST segment changes. Diffuse artifact  QT INTERVAL: QTc measured at 410 ms  COMPARED WITH PRIOR compared to old EKG on June 22, 2023 no acute change  Interpretation by Ajay Georges DO      EKG INTERPRETATION @ 1650  RHYTHM: Normal sinus rhythm at 60 bpm  AXIS: Normal axis  INTERVALS: NC interval measured 212 ms consistent with first-degree AV block  QRS COMPLEX: QRS measured at 78 ms  ST SEGMENT: Nonspecific ST segment changes.   Diffuse artifact  QT INTERVAL: QTc measured at 424 ms  COMPARED WITH PRIOR compared to prior today first-degree block    Interpretation by Nicky Leal DO [place]          Amount and/or Complexity of Data Reviewed  External Data Reviewed: notes. Labs: ordered. Decision-making details documented in ED Course. Details: No urine infection. No anemia or thrombocytopenia  Troponin 3 with heart score 4. Delta troponin of 0  No acute kidney injury or electrolyte dysfunction  ECG/medicine tests: ordered and independent interpretation performed. Decision-making details documented in ED Course. Details: No ischemia or arrhythmia      Risk  OTC drugs. Prescription drug management. Disposition  Final diagnoses:   Headache   Dizziness   Hammertoe of right foot     Time reflects when diagnosis was documented in both MDM as applicable and the Disposition within this note     Time User Action Codes Description Comment    8/17/2023  5:30 PM Ella Deist [R51.9] Headache     8/17/2023  5:30 PM Bendock, Trini Car Add [R42] Dizziness     8/17/2023  5:35 PM Bendock, Trini Car Add [M20.41] Hammertoe of right foot       ED Disposition     ED Disposition   Discharge    Condition   Stable    Date/Time   Thu Aug 17, 2023  5:30 PM    Comment   Tawana Emanuel FariaDePeterson discharge to home/self care.                Follow-up Information     Follow up With Specialties Details Why Contact Info Additional Information    BUSTER Cervantes Nurse Practitioner Schedule an appointment as soon as possible for a visit in 1 week  [address] [id number] [address] Otolaryngology Schedule an appointment as soon as possible for a visit in 1 week  [address] [facility] [address]  [address] [facility] [phone] [address] [place], [address] [facility] [address] [place], [place] [address]          Patient's Medications   Discharge Prescriptions    LIDOCAINE (LIDODERM) 5 %    Apply 1 patch topically over 12 hours daily for 4 days Remove & Discard patch within 12 hours or as directed by MD       Start Date: 8/17/2023 End Date: 8/21/2023       Order Dose: 1 patch       Quantity: 4 patch    Refills: 0    MECLIZINE (ANTIVERT) 25 MG TABLET    Take 1 tablet (25 mg total) by mouth 3 (three) times a day as needed for dizziness for up to 3 days       Start Date: 8/17/2023 End Date: 8/20/2023       Order Dose: 25 mg       Quantity: 9 tablet    Refills: 0    TRAMADOL (ULTRAM) 50 MG TABLET    Take 0.5 tablets (25 mg total) by mouth 2 (two) times a day as needed for moderate pain for up to 2 days       Start Date: 8/17/2023 End Date: 8/19/2023       Order Dose: 25 mg       Quantity: 2 tablet    Refills: 0           PDMP Review     None          ED Provider  Electronically Signed by           Rajiv Singer DO  08/17/23 4522

## 2023-08-18 LAB
ATRIAL RATE: 58 BPM
P AXIS: 54 DEGREES
PR INTERVAL: 212 MS
QRS AXIS: 18 DEGREES
QRSD INTERVAL: 78 MS
QT INTERVAL: 432 MS
QTC INTERVAL: 424 MS
T WAVE AXIS: 31 DEGREES
VENTRICULAR RATE: 58 BPM

## 2023-08-18 PROCEDURE — 93010 ELECTROCARDIOGRAM REPORT: CPT

## 2023-08-23 DIAGNOSIS — R42 DIZZINESS: ICD-10-CM

## 2023-08-23 RX ORDER — MECLIZINE HYDROCHLORIDE 25 MG/1
25 TABLET ORAL 3 TIMES DAILY PRN
Qty: 9 TABLET | Refills: 0 | Status: SHIPPED | OUTPATIENT
Start: 2023-08-23 | End: 2023-09-01 | Stop reason: SDUPTHER

## 2023-09-01 DIAGNOSIS — S39.011A ABDOMINAL WALL STRAIN, INITIAL ENCOUNTER: ICD-10-CM

## 2023-09-01 DIAGNOSIS — R42 DIZZINESS: ICD-10-CM

## 2023-09-01 DIAGNOSIS — M79.18 MUSCLE PAIN, LUMBAR: ICD-10-CM

## 2023-09-01 DIAGNOSIS — I10 PRIMARY HYPERTENSION: ICD-10-CM

## 2023-09-01 RX ORDER — MECLIZINE HYDROCHLORIDE 25 MG/1
25 TABLET ORAL 3 TIMES DAILY PRN
Qty: 9 TABLET | Refills: 0 | Status: SHIPPED | OUTPATIENT
Start: 2023-09-01 | End: 2023-09-04

## 2023-09-01 RX ORDER — ATENOLOL 50 MG/1
50 TABLET ORAL DAILY
Qty: 90 TABLET | Refills: 0 | Status: SHIPPED | OUTPATIENT
Start: 2023-09-01 | End: 2023-09-06 | Stop reason: SDUPTHER

## 2023-09-01 RX ORDER — NAPROXEN 500 MG/1
500 TABLET ORAL 2 TIMES DAILY WITH MEALS
Qty: 30 TABLET | Refills: 0 | Status: SHIPPED | OUTPATIENT
Start: 2023-09-01

## 2023-09-05 ENCOUNTER — NURSE TRIAGE (OUTPATIENT)
Dept: OTHER | Facility: OTHER | Age: 68
End: 2023-09-05

## 2023-09-05 ENCOUNTER — OFFICE VISIT (OUTPATIENT)
Dept: PODIATRY | Facility: CLINIC | Age: 68
End: 2023-09-05

## 2023-09-05 VITALS
HEIGHT: 63 IN | BODY MASS INDEX: 31.18 KG/M2 | WEIGHT: 176 LBS | SYSTOLIC BLOOD PRESSURE: 130 MMHG | DIASTOLIC BLOOD PRESSURE: 90 MMHG

## 2023-09-05 DIAGNOSIS — M20.41 HAMMER TOE OF RIGHT FOOT: Primary | ICD-10-CM

## 2023-09-05 PROCEDURE — 99024 POSTOP FOLLOW-UP VISIT: CPT | Performed by: PODIATRIST

## 2023-09-05 NOTE — TELEPHONE ENCOUNTER
Regarding: colonoscopy prep instruction  ----- Message from Kumar Alas sent at 9/5/2023  6:16 PM EDT -----  "I have a colonoscopy scheduled for tomorrow and I need help with prep instruction"    Needs

## 2023-09-05 NOTE — TELEPHONE ENCOUNTER
Reason for Disposition  • [1] Caller has URGENT question or concern AND [2] triager unable to answer question    Answer Assessment - Initial Assessment Questions  1. DATE/TIME: "When did you have your colonoscopy?"       9/5/23  2. MAIN CONCERN: "What is your main concern right now?" "What questions do you have?"      How do I do my bowel prep?     Protocols used: COLONOSCOPY SYMPTOMS AND QUESTIONS-ADULT-

## 2023-09-05 NOTE — LETTER
September 5, 2023     Patient: Jade Olivas  YOB: 1955  Date of Visit: 9/5/2023      To Whom it May Concern:    Jade Olivas is under my professional care. Tabitha Partida was seen in my office on 9/5/2023. Tabitha Partida is healing well from foot surgery. She may return to work October 15 full duty. .    If you have any questions or concerns, please don't hesitate to call.          Sincerely,          Chemo Hill DPM        CC: No Recipients

## 2023-09-05 NOTE — PROGRESS NOTES
Assessment/Plan:      Diagnoses and all orders for this visit:    Hammer toe of right foot  -     X-ray foot right 3+ views; Future      Patient is stable postop 6 weeks    Incision: healed    Instructions given to patient. Rest the foot as much as possible and elevate/ice  if swollen. Ambulatory status: transition to sneaker, slowly increase activity    Images reviewed today: XR shows stable alignment of the right 3rd toe. The IPJ are fusing. Hardware stable. NO acute complications or concerns    RTC: 6 weeks if concerns, she is healing well    Yakut interpretor used during visit. #[id number]      Subjective:     Patient ID: Danii Armando is a 80 y.o. female. DOS: 7/21/2023     Procedure: right 3rd hammertoe repair, arthrodesis     Condition: Patient is 6 weeks postop hammertoe surgery. PAin is mild. The toe is swollen. Review of Systems   Constitutional: Negative. Musculoskeletal: Positive for arthralgias and joint swelling. Skin: Negative for color change and wound. Neurological: Negative for numbness. Objective:     Physical Exam  Vitals reviewed. Constitutional:       Appearance: She is not ill-appearing or diaphoretic. Cardiovascular:      Rate and Rhythm: Normal rate. Pulses: Normal pulses. Pulmonary:      Effort: Pulmonary effort is normal. No respiratory distress. Skin:     Capillary Refill: Capillary refill takes less than 2 seconds. Findings: No erythema or rash. Neurological:      Mental Status: She is alert and oriented to person, place, and time. Sensory: No sensory deficit. Gait: Gait normal.   Psychiatric:         Mood and Affect: Mood normal.       Right 3rd toe incision is healed. Mild edema. NO PAIN. The IPJ are fused. No MTPJ instability.  The toe is anatomically straight

## 2023-09-06 ENCOUNTER — ANESTHESIA (OUTPATIENT)
Dept: GASTROENTEROLOGY | Facility: HOSPITAL | Age: 68
End: 2023-09-06

## 2023-09-06 ENCOUNTER — ANESTHESIA EVENT (OUTPATIENT)
Dept: GASTROENTEROLOGY | Facility: HOSPITAL | Age: 68
End: 2023-09-06

## 2023-09-06 ENCOUNTER — HOSPITAL ENCOUNTER (OUTPATIENT)
Dept: GASTROENTEROLOGY | Facility: HOSPITAL | Age: 68
Setting detail: OUTPATIENT SURGERY
Discharge: HOME | End: 2023-09-06
Attending: INTERNAL MEDICINE | Admitting: INTERNAL MEDICINE
Payer: COMMERCIAL

## 2023-09-06 VITALS
WEIGHT: 170 LBS | OXYGEN SATURATION: 100 % | BODY MASS INDEX: 30.12 KG/M2 | TEMPERATURE: 98.4 F | SYSTOLIC BLOOD PRESSURE: 140 MMHG | HEIGHT: 63 IN | RESPIRATION RATE: 12 BRPM | DIASTOLIC BLOOD PRESSURE: 90 MMHG | HEART RATE: 65 BPM

## 2023-09-06 DIAGNOSIS — Z12.11 COLON CANCER SCREENING: ICD-10-CM

## 2023-09-06 PROBLEM — K57.90 DIVERTICULOSIS: Status: ACTIVE | Noted: 2023-09-06

## 2023-09-06 PROCEDURE — G0121 COLON CA SCRN NOT HI RSK IND: HCPCS | Performed by: INTERNAL MEDICINE

## 2023-09-06 RX ORDER — SODIUM CHLORIDE, SODIUM LACTATE, POTASSIUM CHLORIDE, CALCIUM CHLORIDE 600; 310; 30; 20 MG/100ML; MG/100ML; MG/100ML; MG/100ML
125 INJECTION, SOLUTION INTRAVENOUS CONTINUOUS
Status: CANCELLED | OUTPATIENT
Start: 2023-09-06

## 2023-09-06 RX ORDER — MIRABEGRON 50 MG/1
TABLET, FILM COATED, EXTENDED RELEASE ORAL EVERY 24 HOURS
COMMUNITY
Start: 2023-08-30

## 2023-09-06 RX ORDER — SODIUM CHLORIDE, SODIUM LACTATE, POTASSIUM CHLORIDE, CALCIUM CHLORIDE 600; 310; 30; 20 MG/100ML; MG/100ML; MG/100ML; MG/100ML
125 INJECTION, SOLUTION INTRAVENOUS CONTINUOUS
Status: DISCONTINUED | OUTPATIENT
Start: 2023-09-06 | End: 2023-09-10 | Stop reason: HOSPADM

## 2023-09-06 RX ORDER — LIDOCAINE HYDROCHLORIDE 10 MG/ML
0.5 INJECTION, SOLUTION EPIDURAL; INFILTRATION; INTRACAUDAL; PERINEURAL ONCE AS NEEDED
Status: DISCONTINUED | OUTPATIENT
Start: 2023-09-06 | End: 2023-09-10 | Stop reason: HOSPADM

## 2023-09-06 RX ORDER — SODIUM CHLORIDE, SODIUM LACTATE, POTASSIUM CHLORIDE, CALCIUM CHLORIDE 600; 310; 30; 20 MG/100ML; MG/100ML; MG/100ML; MG/100ML
INJECTION, SOLUTION INTRAVENOUS CONTINUOUS PRN
Status: DISCONTINUED | OUTPATIENT
Start: 2023-09-06 | End: 2023-09-06

## 2023-09-06 RX ORDER — PROPOFOL 10 MG/ML
INJECTION, EMULSION INTRAVENOUS AS NEEDED
Status: DISCONTINUED | OUTPATIENT
Start: 2023-09-06 | End: 2023-09-06

## 2023-09-06 RX ORDER — LIDOCAINE HYDROCHLORIDE 10 MG/ML
0.5 INJECTION, SOLUTION EPIDURAL; INFILTRATION; INTRACAUDAL; PERINEURAL ONCE AS NEEDED
Status: CANCELLED | OUTPATIENT
Start: 2023-09-06

## 2023-09-06 RX ORDER — ATENOLOL 50 MG/1
TABLET ORAL
COMMUNITY
End: 2023-09-06 | Stop reason: SDUPTHER

## 2023-09-06 RX ORDER — ATENOLOL 50 MG/1
TABLET ORAL
COMMUNITY

## 2023-09-06 RX ADMIN — PROPOFOL 50 MG: 10 INJECTION, EMULSION INTRAVENOUS at 13:08

## 2023-09-06 RX ADMIN — SODIUM CHLORIDE, SODIUM LACTATE, POTASSIUM CHLORIDE, AND CALCIUM CHLORIDE: .6; .31; .03; .02 INJECTION, SOLUTION INTRAVENOUS at 12:40

## 2023-09-06 RX ADMIN — PROPOFOL 50 MG: 10 INJECTION, EMULSION INTRAVENOUS at 13:13

## 2023-09-06 RX ADMIN — SODIUM CHLORIDE, SODIUM LACTATE, POTASSIUM CHLORIDE, AND CALCIUM CHLORIDE 125 ML/HR: .6; .31; .03; .02 INJECTION, SOLUTION INTRAVENOUS at 11:46

## 2023-09-06 RX ADMIN — PROPOFOL 50 MG: 10 INJECTION, EMULSION INTRAVENOUS at 13:20

## 2023-09-06 RX ADMIN — PROPOFOL 40 MG: 10 INJECTION, EMULSION INTRAVENOUS at 12:59

## 2023-09-06 RX ADMIN — PROPOFOL 40 MG: 10 INJECTION, EMULSION INTRAVENOUS at 12:56

## 2023-09-06 RX ADMIN — PROPOFOL 20 MG: 10 INJECTION, EMULSION INTRAVENOUS at 13:24

## 2023-09-06 RX ADMIN — PROPOFOL 50 MG: 10 INJECTION, EMULSION INTRAVENOUS at 13:03

## 2023-09-06 RX ADMIN — PROPOFOL 120 MG: 10 INJECTION, EMULSION INTRAVENOUS at 12:54

## 2023-09-06 NOTE — ANESTHESIA PREPROCEDURE EVALUATION
Procedure:  COLONOSCOPY    Relevant Problems   CARDIO   (+) Hypertension      Lab Results   Component Value Date    WBC 8.90 08/17/2023    HGB 12.6 08/17/2023    HCT 40.7 08/17/2023    MCV 92 08/17/2023     08/17/2023     Lab Results   Component Value Date    SODIUM 139 08/17/2023    K 4.0 08/17/2023     08/17/2023    CO2 30 08/17/2023    BUN 22 08/17/2023    CREATININE 0.65 08/17/2023    GLUC 118 08/17/2023    CALCIUM 9.3 08/17/2023     No results found for: "INR", "PROTIME"  No results found for: "HGBA1C"       Physical Exam    Airway    Mallampati score: II  TM Distance: >3 FB  Neck ROM: full     Dental   No notable dental hx     Cardiovascular      Pulmonary      Other Findings        Anesthesia Plan  ASA Score- 2     Anesthesia Type- IV sedation with anesthesia with ASA Monitors. Additional Monitors:   Airway Plan:           Plan Factors-Exercise tolerance (METS): >4 METS. Chart reviewed. EKG reviewed. Existing labs reviewed. Patient summary reviewed. Patient is not a current smoker. Obstructive sleep apnea risk education given perioperatively. Induction- intravenous. Postoperative Plan-     Informed Consent- Anesthetic plan and risks discussed with patient. I personally reviewed this patient with the CRNA. Discussed and agreed on the Anesthesia Plan with the CRNA. Sherita Rao

## 2023-09-06 NOTE — H&P
History and Physical - [facility]  Rosa Rojo 80 y.o. female MRN: [id number]                  HPI: Rosa Rojo is a 80y.o. year old female who presents for colonoscopy for colon cancer screening. REVIEW OF SYSTEMS: Per the HPI, and otherwise unremarkable. Historical Information   Past Medical History:   Diagnosis Date   • Allergic    • Hammertoe of right foot     OR   reapir today 2023   • Hypertension    • Wears glasses      Past Surgical History:   Procedure Laterality Date   •  SECTION     • HERNIA REPAIR  1969   • HYSTERECTOMY      removed some of left ovary   • FL CORRECTION HAMMERTOE Right 2023    Procedure: REPAIR HAMMERTOE 3RD TOE;  Surgeon: Nelly Beck DPM;  Location: [facility];  Service: Podiatry   • FL LAPS ABD PRTM&OMENTUM DX W/WO SPEC BR/WA SPX N/A 2023    Procedure: LAPAROSCOPY DIAGNOSTIC, LYSIS OF ADHESIONS;  Surgeon: Yanira Ferguson MD;  Location[facility];  Service: General     Social History   Social History     Substance and Sexual Activity   Alcohol Use Never     Social History     Substance and Sexual Activity   Drug Use Never     Social History     Tobacco Use   Smoking Status Never   Smokeless Tobacco Never     Family History   Problem Relation Age of Onset   • Breast cancer Mother    • Thrombosis Father    • No Known Problems Sister    • No Known Problems Sister    • No Known Problems Sister    • Hypertension Sister    • Hypertension Brother    • Hypertension Brother    • Diabetes Maternal Grandmother        Meds/Allergies     (Not in a hospital admission)      Allergies   Allergen Reactions   • Aspirin Swelling   • Nsaids Swelling       Objective     There were no vitals taken for this visit.       PHYSICAL EXAM    Gen: NAD  CV: RRR  CHEST: Clear  ABD: soft, NT/ND  EXT: no edema      ASSESSMENT/PLAN:  Rosa Rojo is a 80y.o. year old female who presents for colonoscopy for colon cancer screening. The patient is stable and optimized for the procedure, we reviewed risk and benefits. Risk include but not limited to infection, bleeding, perforation and missing a lesion.

## 2023-09-06 NOTE — ANESTHESIA POSTPROCEDURE EVALUATION
Post-Op Assessment Note    CV Status:  Stable    Pain management: adequate     Mental Status:  Alert and awake   Hydration Status:  Euvolemic   PONV Controlled:  Controlled   Airway Patency:  Patent      Post Op Vitals Reviewed: Yes      Staff: CRNA         There were no known notable events for this encounter.     /71 (09/06/23 1331)    Temp      Pulse 57 (09/06/23 1331)   Resp 12 (09/06/23 1331)    SpO2 97 % (09/06/23 1331)

## 2023-10-13 DIAGNOSIS — M79.18 MUSCLE PAIN, LUMBAR: ICD-10-CM

## 2023-10-13 DIAGNOSIS — E78.2 MIXED HYPERLIPIDEMIA: ICD-10-CM

## 2023-10-13 DIAGNOSIS — I10 PRIMARY HYPERTENSION: ICD-10-CM

## 2023-10-13 DIAGNOSIS — S39.011A ABDOMINAL WALL STRAIN, INITIAL ENCOUNTER: ICD-10-CM

## 2023-10-13 RX ORDER — ROSUVASTATIN CALCIUM 10 MG/1
10 TABLET, COATED ORAL DAILY
Qty: 90 TABLET | Refills: 1 | Status: SHIPPED | OUTPATIENT
Start: 2023-10-13

## 2023-10-13 RX ORDER — NAPROXEN 500 MG/1
500 TABLET ORAL 2 TIMES DAILY WITH MEALS
Qty: 30 TABLET | Refills: 0 | Status: SHIPPED | OUTPATIENT
Start: 2023-10-13

## 2023-10-13 RX ORDER — AMLODIPINE BESYLATE 5 MG/1
5 TABLET ORAL 2 TIMES DAILY
Qty: 180 TABLET | Refills: 1 | Status: SHIPPED | OUTPATIENT
Start: 2023-10-13

## 2023-10-16 DIAGNOSIS — E55.9 VITAMIN D DEFICIENCY: ICD-10-CM

## 2023-10-16 RX ORDER — MELATONIN
2000 DAILY
Qty: 60 TABLET | Refills: 2 | Status: SHIPPED | OUTPATIENT
Start: 2023-10-16

## 2023-10-20 ENCOUNTER — TELEPHONE (OUTPATIENT)
Age: 68
End: 2023-10-20

## 2023-10-20 NOTE — TELEPHONE ENCOUNTER
[facility]    Doctor/Office: Dr. Joanna Thomson    #: [phone]    Escalation: Patient would like to come to the office to  office notes, and any paperwork she can turn in to her insurance stating she had surgery. She would like a return call to know when is a good time to stop in the office to pick this up. Please return call and advise. Patient needs .  Thank you

## 2023-11-09 DIAGNOSIS — S39.011A ABDOMINAL WALL STRAIN, INITIAL ENCOUNTER: ICD-10-CM

## 2023-11-09 DIAGNOSIS — M79.18 MUSCLE PAIN, LUMBAR: ICD-10-CM

## 2023-11-10 RX ORDER — NAPROXEN 500 MG/1
500 TABLET ORAL 2 TIMES DAILY WITH MEALS
Qty: 30 TABLET | Refills: 0 | Status: SHIPPED | OUTPATIENT
Start: 2023-11-10

## 2023-11-21 ENCOUNTER — TELEPHONE (OUTPATIENT)
Age: 68
End: 2023-11-21

## 2023-11-21 NOTE — TELEPHONE ENCOUNTER
Patient following up on [address] to be faxed to them. Please call and advise if this has been done. Thank you.

## 2023-11-21 NOTE — TELEPHONE ENCOUNTER
Caller: Madeleine Villalobos [facility]    Doctor/Office: Dr. Hope Whipple    #: [phone]    Escalation: Care Patient would like a return call from the dr. Ralph Johns was used. Please return call.  Thank you

## 2023-12-01 ENCOUNTER — OFFICE VISIT (OUTPATIENT)
Dept: OBGYN CLINIC | Facility: HOSPITAL | Age: 68
End: 2023-12-01
Payer: COMMERCIAL

## 2023-12-01 VITALS — DIASTOLIC BLOOD PRESSURE: 77 MMHG | HEART RATE: 76 BPM | SYSTOLIC BLOOD PRESSURE: 127 MMHG

## 2023-12-01 DIAGNOSIS — M54.2 CERVICAL PAIN (NECK): ICD-10-CM

## 2023-12-01 DIAGNOSIS — M43.16 SPONDYLOLISTHESIS AT L4-L5 LEVEL: Primary | ICD-10-CM

## 2023-12-01 PROCEDURE — 99214 OFFICE O/P EST MOD 30 MIN: CPT | Performed by: ORTHOPAEDIC SURGERY

## 2023-12-01 RX ORDER — METHOCARBAMOL 500 MG/1
500 TABLET, FILM COATED ORAL 4 TIMES DAILY
Qty: 30 TABLET | Refills: 1 | Status: SHIPPED | OUTPATIENT
Start: 2023-12-01

## 2023-12-01 NOTE — PROGRESS NOTES
NAME: Bakari Meade  : 1955  PCP: BUSTER Mosley    Chief complaint: low back pain     HPI:  80 y.o. female presenting for initial visit with chief complaint of pain at her waist/low back. The patient speaks Lithuanian and an  was utilized for the visit today. She is accompanied by her friend, a young female, for her visit today. She reports to the office with low back pain which began about 2 months ago. She has never had these symptoms before. She reports she feels tired which has increased the symptoms in her back. She denies any weakness in her legs. She reports she can walk a few blocks but needs to take breaks. She stands 10 hours per day at work but requires frequent breaks. She works at a [employer] in [facility]. She gets 2 breaks at work throughout the day. Denies any charles trauma. Denies fever or chills. Denies fine motor changes such as buttoning of shirt or change in gait. She was recently in the emergency room on 10/11/2022 for neck pain. However at today's evaluation she denies any neck pain, denies any cervical radicular symptoms. Denies any symptoms of cervical myelopathy. Conservative therapy includes the following:  no injections. no physical therapy     The patient has noticed significant impairment in performing the following ADLs: walking, standing for long periods of time. She is able to work as tolerated due to the pain. Update on 2023:   Jarret Caro is here for follow up after physical therapy. The patient speaks Lithuanian and an  was utilized for the visit today. She notes she feels a little better after physical therapy. She has been able to walk farther since starting physical therapy, noting she didn't feel fatigued at all on her walk into the office today. However, physical therapy has not helped directly with decreasing her pain. She has continued low back pain with intermittent radiation into her right anterior thigh.  Increased LE cramps. Pain increased when going from seated to standing position. Pain also increased with back extension. Denies charles LE weakness. Denies numbness/tingling. Pain worse with back extension. She is currently taking naproxen with some improvement in pain. 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 She is able to continue working, but notes some increased pain. Notes she uses a cart at work to lean on due to increased balance issues, noting it is exacerbated by her increased back pain. Also notes she has difficulty gripping items with her left hand, otherwise denies UE weakness. Denies pain in her UE, but notes some increased cramping in her arms. Some mild intermittent neck pain. Back pain > neck pain at this time. Back pain > leg pain. DEXA scan 1/13/2023 that demonstrated osteopenia. Update on 3/24/23  The patient follows up for the results of her lumbar MRI. She reports that she has gone to physical therapy and is doing home exercises with some benefit, but is not satisfied with her pain relief. She reports continued pain in the low back and radiation down her right lower extremity. She would like to explore additional options. The visit was conducted with assistance of a telephone . 6/3/2023 update  Patient is here for follow-up. She had an epidural steroid injection and notes almost complete relief of her pain. She has no other complaints at this time. Overall she is doing well. 12/1/2023 Update  Presents today for follow-up evaluation of her low back pain. Bengali  was utilized for the visit today. She states that her pain is localized into the lower lumbar spine most notably with standing. She states that when her walks he does hunch forward to alleviate her pain. She denies charles trauma. She also denies any distal paresthesias. She has been utilizing naproxen with some symptomatic relief. She has undergone a epidural injection at the L4-L5 level from Dr. Yinka Mcdonnell in May which provided with great symptomatic relief.   She states that previous physical therapy in the earlier part of the year was also beneficial.  She is currently not interested in surgical intervention at this time      FAMILY HISTORY   Family History   Problem Relation Age of Onset    Breast cancer Mother     Thrombosis Father     No Known Problems Sister     No Known Problems Sister     No Known Problems Sister     Hypertension Sister     Hypertension Brother     Hypertension Brother     Diabetes Maternal Grandmother        PAST MEDICAL HISTORY:   Past Medical History:   Diagnosis Date    Allergic     Hammertoe of right foot     OR   reapir today 7/21/2023    Hypertension     Wears glasses        MEDICATIONS:  Current Outpatient Medications   Medication Sig Dispense Refill    amLODIPine (NORVASC) 5 mg tablet Take 1 tablet (5 mg total) by mouth 2 (two) times a day 180 tablet 1    atenolol (TENORMIN) 50 mg tablet TAKE 2 TABLETS (2,000 UNITS TOTAL) BY MOUTH DAILY Oral for 30 Days      benzonatate (TESSALON PERLES) 100 mg capsule Take 1 capsule (100 mg total) by mouth 3 (three) times a day as needed for cough 20 capsule 0    Blood Pressure Monitoring (Adult Blood Pressure Cuff Lg) KIT Use in the morning 1 kit 0    Blood Pressure Monitoring (Blood Pressure Monitor/M Cuff) MISC Use in the morning 1 each 0    Calcium-Magnesium-Vitamin D (CALCIUM MAGNESIUM PO) Take 2 capsules by mouth 3 (three) times a day      cholecalciferol (VITAMIN D3) 1,000 units tablet Take 2 tablets (2,000 Units total) by mouth daily 60 tablet 2    Diclofenac Sodium (VOLTAREN) 1 % Apply 2 g topically 4 (four) times a day 100 g 0    lidocaine (LIDODERM) 5 % Apply 1 patch topically over 12 hours daily Remove & Discard patch within 12 hours or as directed by MD 5 patch 0    Mirabegron ER (Myrbetriq) 50 MG TB24 every 24 hours      Multiple Vitamins-Minerals (multivitamin with minerals) tablet Take 1 tablet by mouth daily      naproxen (NAPROSYN) 500 mg tablet Take 1 tablet (500 mg total) by mouth 2 (two) times a day with meals 30 tablet 0    rosuvastatin (CRESTOR) 10 MG tablet Take 1 tablet (10 mg total) by mouth daily 90 tablet 1    sodium picosulfate, magnesium oxide, citric acid (Clenpiq) oral solution Use as directed as per written instructions from office 320 mL 0    lidocaine (LIDODERM) 5 % Apply 1 patch topically over 12 hours daily for 4 days Remove & Discard patch within 12 hours or as directed by MD 4 patch 0    meclizine (ANTIVERT) 25 mg tablet Take 1 tablet (25 mg total) by mouth 3 (three) times a day as needed for dizziness for up to 3 days 9 tablet 0     No current facility-administered medications for this visit.        PAST SURGICAL HISTORY:  Past Surgical History:   Procedure Laterality Date     SECTION      HERNIA REPAIR  1969    HYSTERECTOMY      removed some of left ovary    MD CORRECTION HAMMERTOE Right 2023    Procedure: REPAIR HAMMERTOE 3RD TOE;  Surgeon: Kendell Fields DPM;  [facility];  Service: Podiatry    MD LAPS ABD PRTM&OMENTUM DX W/WO SPEC BR/WA SPX N/A 2023    Procedure: LAPAROSCOPY DIAGNOSTIC, LYSIS OF ADHESIONS;  Surgeon: Jarad Cyr MD;  [facility];  Service: General       SOCIAL HISTORY:  Social History     Socioeconomic History    Marital status: /Civil Union     Spouse name: Not on file    Number of children: 4    Years of education: Not on file    Highest education level: Not on file   Occupational History    Not on file   Tobacco Use    Smoking status: Never    Smokeless tobacco: Never   Vaping Use    Vaping Use: Never used   Substance and Sexual Activity    Alcohol use: Never    Drug use: Never    Sexual activity: Not Currently     Partners: Male   Other Topics Concern    Not on file   Social History Narrative    Not on file     Social Determinants of Health     Financial Resource Strain: Not on file   Food Insecurity: Not on file   Transportation Needs: Not on file   Physical Activity: Not on file   Stress: Not on file   Social Connections: Not on file   Intimate Partner Violence: Not on file   Housing Stability: Not on file       ALLERGIES:  Allergies   Allergen Reactions    Aspirin Swelling    Nsaids Swelling       ROS:   Constitutional:  No fever, chills, night sweats, loss of appetite   HEENT No no sore throat, difficulty swallowing   Cardiovascular:  No chest pain, palpitations, BLE edema, MELISSA   Respiratory:  No SOB, coughing, chest congestion   Gastrointestinal:  No nausea, vomiting, abdominal pain   Genitourinary:  No dysuria, hematuria, urinary urgency/frequency   Musculoskeletal:  See HPI   Skin:  No rash, erythema, edema   Neurologic:  See HPI   Psychiatric Illness:  No depression, anxiety, mood disorder, substance abuse disorder     PHYSICAL EXAM:  There were no vitals taken for this visit. General:  Well-developed,appears well, no acute distress   Respiratory:  No SOB, no abnormal effort (use of accessory muscles). GI / Abdominal:  Soft. No abdominal masses or tenderness. Nondistended. Gait & balance No evidence of myelopathic gait. Lumbar spine range of motion:  -Forward flexion to 90  -Extension to neutral  -Lateral bend 45 right, 45 left  -Rotation 45 right, 45 left  There is no point tenderness with palpation along the posterior cervical, thoracic, lumbar spine.      Neurologic:      Lower Extremity Motor Function    Right  Left    Iliopsoas  5/5  5/5    Quadriceps 5/5 5/5   Tibialis anterior  5/5  5/5    EHL  5/5  5/5    Gastroc. muscle  5/5  5/5    Heel rise  5/5  5/5    Toe rise  5/5  5/5      Sensory: light touch is intact to bilateral upper and lower extremities     Reflexes:    Right Left   Patellar 1+ 1+   Achilles 1+ 1+   Babinski neg neg     Other tests:  Straight Leg Raise: negative  Armas's: negative  Clonus: negative    Right hamstring tightness  Symmetric ROM of hips and knees   Pain in hips with SI compression test     IMAGING: I have personally reviewed the images and these are my findings: No new images for review at today's visit. X-rays taken of Lumbar spine on 11/18/22:  L4-5 degenerative spondylolisthesis with instability noted on dynamic flexion-extension radiographs, L4-5 endplate sclerosis appreciated, L4-5 L5-S1 facet hypertrophy, L5-S1 disc space degeneration with loss of disc space height, there is also coronal plane deformity noted with apex at L 4 5 disc space        MRI Lumbar Spine 3/24/23: Multilevel lumbar degenerative changes with L5-S1 disc base degeneration, L4-5 degenerative spondylolisthesis, right-sided L4-5 lateral recess stenosis and foraminal stenosis, no significant central stenosis however there does appear to be partial reduction of her degenerative spondylolisthesis on supine imaging        ASSESSMENT / Medical Decision Making (MDM):  80 y.o. female with low back pain. Here for follow up after completion of physical therapy. No incontinence of bowel or bladder, fever, chills, night sweats. Physical exam showing no focal neurologic deficits     Imaging reviewed as above. Findings most notable for L4-5 degenerative spondylolisthesis with dynamic instability and right foraminal stenosis L4-5. Impression of lumbar degenerative disease, lumbar degenerative spondylolisthesis L4-5. Plan: The above clinical, physical and imaging findings were reviewed with the patient. Ana Dobson has a constellation of findings consistent with lumbar myofascial pain, mechanical low back pain, mild neurogenic claudication in the setting lumbar degenerative disease and lumbar degenerative spondylolisthesis L4-5. In addition she does appear to have left upper trapezius spasm. Fortunately Palak Arias has had substantial pain relief with physical therapy and previous L4-L5 epidural injection.   At this time I recommend continued conservative treatments with a referral back to physical therapy as well as a referral back to Dr. Marshall Dickerson in spine and pain for a potential repeat L4-5 epidural injection. She was also prescribed Robaxin to be taken as needed. Patient will follow-up as needed. Patient instructed to return to office/ER sooner if symptoms are not improving, getting worse, or new worrisome/neurologic symptoms arise.

## 2023-12-07 DIAGNOSIS — M43.16 SPONDYLOLISTHESIS AT L4-L5 LEVEL: ICD-10-CM

## 2023-12-07 RX ORDER — METHOCARBAMOL 500 MG/1
500 TABLET, FILM COATED ORAL 4 TIMES DAILY
Qty: 30 TABLET | Refills: 0 | OUTPATIENT
Start: 2023-12-07

## 2023-12-07 NOTE — TELEPHONE ENCOUNTER
Patient called medication refill line to have her methocarbamol resent to a different [facility] as the one it was sent to is not open.     Please resend to [facility]pharmacy #[id number] Genaro Zurita, [address] [place] [address]

## 2023-12-08 ENCOUNTER — OFFICE VISIT (OUTPATIENT)
Dept: PAIN MEDICINE | Facility: MEDICAL CENTER | Age: 68
End: 2023-12-08
Payer: COMMERCIAL

## 2023-12-08 VITALS
HEART RATE: 67 BPM | SYSTOLIC BLOOD PRESSURE: 137 MMHG | DIASTOLIC BLOOD PRESSURE: 77 MMHG | WEIGHT: 174 LBS | BODY MASS INDEX: 30.83 KG/M2 | HEIGHT: 63 IN

## 2023-12-08 DIAGNOSIS — M47.816 LUMBAR SPONDYLOSIS: Primary | ICD-10-CM

## 2023-12-08 DIAGNOSIS — M54.50 CHRONIC BILATERAL LOW BACK PAIN WITHOUT SCIATICA: ICD-10-CM

## 2023-12-08 DIAGNOSIS — G89.29 CHRONIC BILATERAL LOW BACK PAIN WITHOUT SCIATICA: ICD-10-CM

## 2023-12-08 PROCEDURE — 99214 OFFICE O/P EST MOD 30 MIN: CPT | Performed by: PHYSICIAN ASSISTANT

## 2023-12-08 RX ORDER — VIBEGRON 75 MG/1
TABLET, FILM COATED ORAL EVERY 24 HOURS
COMMUNITY
Start: 2023-10-26

## 2023-12-08 NOTE — PROGRESS NOTES
Assessment:  1. Lumbar spondylosis    2. Chronic bilateral low back pain without sciatica        Plan:  While the patient was in the office today, I did have a thorough conversation via  regarding their chronic pain syndrome, medication management, and treatment plan options. The patient's low back pain persists despite time, relative rest, activity modification and therapy. Based on the patient's symptoms examination, I suspect that the pain is being generated by the lumbar facet joints. We will schedule the patient for diagnostic lumbar medial branch blockade bilateral L3-5 using the double block paradigm. If the patient receives significant relief of appropriate duration with lidocaine 2%, we will confirm with Marcaine 0.5%. If the patient demonstrates appropriate response to medial branch blockade we will schedule for radiofrequency ablation to provide long-term relief. Complete risks and benefits including bleeding, infection, tissue reaction, nerve injury and allergic reaction were discussed. The approach was demonstrated using models and literature was provided. Verbal and written consent was obtained. The patient has been experiencing moderate to severe axial spine pain that is causing functional deficit. The pain has been present for at least 3 months and is not improving with conservative care. Currently the patient is not experiencing any radicular features nor neurogenic claudication. Non-facet pathology has been ruled out on clinical evaluation. My impressions and treatment recommendations were discussed in detail with the patient who verbalized understanding and had no further questions. Discharge instructions were provided. I personally saw and examined the patient and I agree with the above discussed plan of care.     Orders Placed This Encounter   Procedures   • FL spine and pain procedure     Standing Status:   Future     Standing Expiration Date:   12/8/2027     Order Specific Question:   Reason for Exam:     Answer:   b/l L3-5 MBB #1     Order Specific Question:   Anticoagulant hold needed? Answer:   no     New Medications Ordered This Visit   Medications   • Vibegron (Gemtesa) 75 MG TABS     Sig: every 24 hours       History of Present Illness:  Jade Olivas is a 80 y.o. female who presents for a follow up office visit in regards to No chief complaint on file. .   The patient’s current symptoms include chronic low back pain that she presently rates an 8 out of 10 and describes it as a constant dull, aching, cramping type of pain across the low back. Patient previously underwent an L4-5 epidural steroid injection and reports complete resolution of radicular pain. Her complaints are mechanical/axial at this point. Her pain is made worse with standing and bending. She had a consultation with Dr. Jhon Mcgee who did not recommend any surgical intervention at this time. She has previously attended physical therapy and does perform home exercises as instructed by them. I have personally reviewed and/or updated the patient's past medical history, past surgical history, family history, social history, current medications, allergies, and vital signs today. Review of Systems   Respiratory:  Negative for shortness of breath. Cardiovascular:  Negative for chest pain. Gastrointestinal:  Negative for constipation, diarrhea, nausea and vomiting. Musculoskeletal:  Positive for back pain, gait problem and myalgias. Negative for arthralgias and joint swelling. Skin:  Negative for rash. Neurological:  Negative for dizziness, seizures and weakness. Psychiatric/Behavioral:  Positive for decreased concentration. All other systems reviewed and are negative.       Patient Active Problem List   Diagnosis   • Hypertension   • Varicose veins of both lower extremities with pain   • Lumbar radiculopathy   • Hammertoe of right foot   • Diverticulosis       Past Medical History:   Diagnosis Date   • Allergic    • Hammertoe of right foot     OR   reapir today 2023   • Hypertension    • Wears glasses        Past Surgical History:   Procedure Laterality Date   •  SECTION     • HERNIA REPAIR  1969   • HYSTERECTOMY      removed some of left ovary   • MA CORRECTION HAMMERTOE Right 2023    Procedure: REPAIR HAMMERTOE 3RD TOE;  Surgeon: Jose Morrison DPM;  [facility];  Service: Podiatry   • MA LAPS ABD PRTM&OMENTUM DX W/WO SPEC BR/WA SPX N/A 2023    Procedure: LAPAROSCOPY DIAGNOSTIC, LYSIS OF ADHESIONS;  Surgeon: Arturo Gross MD;  Location: [facility];  Service: General       Family History   Problem Relation Age of Onset   • Breast cancer Mother    • Thrombosis Father    • No Known Problems Sister    • No Known Problems Sister    • No Known Problems Sister    • Hypertension Sister    • Hypertension Brother    • Hypertension Brother    • Diabetes Maternal Grandmother        Social History     Occupational History   • Not on file   Tobacco Use   • Smoking status: Never   • Smokeless tobacco: Never   Vaping Use   • Vaping Use: Never used   Substance and Sexual Activity   • Alcohol use: Never   • Drug use: Never   • Sexual activity: Not Currently     Partners: Male       Current Outpatient Medications on File Prior to Visit   Medication Sig   • amLODIPine (NORVASC) 5 mg tablet Take 1 tablet (5 mg total) by mouth 2 (two) times a day   • atenolol (TENORMIN) 50 mg tablet TAKE 2 TABLETS (2,000 UNITS TOTAL) BY MOUTH DAILY Oral for 30 Days   • benzonatate (TESSALON PERLES) 100 mg capsule Take 1 capsule (100 mg total) by mouth 3 (three) times a day as needed for cough   • Calcium-Magnesium-Vitamin D (CALCIUM MAGNESIUM PO) Take 2 capsules by mouth 3 (three) times a day   • cholecalciferol (VITAMIN D3) 1,000 units tablet Take 2 tablets (2,000 Units total) by mouth daily   • Diclofenac Sodium (VOLTAREN) 1 % Apply 2 g topically 4 (four) times a day   • lidocaine (LIDODERM) 5 % Apply 1 patch topically over 12 hours daily Remove & Discard patch within 12 hours or as directed by MD   • methocarbamol (ROBAXIN) 500 mg tablet Take 1 tablet (500 mg total) by mouth 4 (four) times a day   • Mirabegron ER (Myrbetriq) 50 MG TB24 every 24 hours   • Multiple Vitamins-Minerals (multivitamin with minerals) tablet Take 1 tablet by mouth daily   • naproxen (NAPROSYN) 500 mg tablet Take 1 tablet (500 mg total) by mouth 2 (two) times a day with meals   • rosuvastatin (CRESTOR) 10 MG tablet Take 1 tablet (10 mg total) by mouth daily   • sodium picosulfate, magnesium oxide, citric acid (Clenpiq) oral solution Use as directed as per written instructions from office   • Vibegron (Gemtesa) 75 MG TABS every 24 hours   • Blood Pressure Monitoring (Adult Blood Pressure Cuff Lg) KIT Use in the morning (Patient not taking: Reported on 12/8/2023)   • Blood Pressure Monitoring (Blood Pressure Monitor/M Cuff) MISC Use in the morning (Patient not taking: Reported on 12/8/2023)   • lidocaine (LIDODERM) 5 % Apply 1 patch topically over 12 hours daily for 4 days Remove & Discard patch within 12 hours or as directed by MD   • meclizine (ANTIVERT) 25 mg tablet Take 1 tablet (25 mg total) by mouth 3 (three) times a day as needed for dizziness for up to 3 days     No current facility-administered medications on file prior to visit. Allergies   Allergen Reactions   • Aspirin Swelling   • Nsaids Swelling       Physical Exam:    /77   Pulse 67   Ht 5' 3" (1.6 m)   Wt 78.9 kg (174 lb)   BMI 30.82 kg/m²     Constitutional:normal, well developed, well nourished, alert, in no distress and non-toxic and no overt pain behavior.   Eyes:anicteric  HEENT:grossly intact  Neck:supple, symmetric, trachea midline and no masses   Pulmonary:even and unlabored  Cardiovascular:No edema or pitting edema present  Skin:Normal without rashes or lesions and well hydrated  Psychiatric:Mood and affect appropriate  Neurologic:Cranial Nerves II-XII grossly intact  Musculoskeletal: Tender bilateral lumbar facet joints, positive facet loading test, limited range of motion with extension. Negative straight leg raise test bilaterally. Imaging  MRI LUMBAR SPINE WITHOUT CONTRAST     INDICATION: M54.16: Radiculopathy, lumbar region  M54.50: Low back pain, unspecified  M43.16: Spondylolisthesis, lumbar region. COMPARISON:  None. TECHNIQUE:  Multiplanar, multisequence imaging of the lumbar spine was performed. .          IMAGE QUALITY:  Diagnostic     FINDINGS:     VERTEBRAL BODIES:  There are 5 lumbar type vertebral bodies. T12 superior endplate compression fracture with 1 mm retropulsion. Marrow edema noted at T12. Anterolisthesis L2-3, L4-5 and L5-S1. Leftward convex scoliosis apex L4. SACRUM:  Normal signal within the sacrum. No evidence of insufficiency or stress fracture. DISTAL CORD AND CONUS:  Normal size and signal within the distal cord and conus. PARASPINAL SOFT TISSUES:  Paraspinal soft tissues are unremarkable. LOWER THORACIC DISC SPACES:  Normal disc height and signal.  No disc herniation, canal stenosis or foraminal narrowing. LUMBAR DISC SPACES:     L1-L2:  No significant central or foraminal narrowing. L2-L3:  Diffuse annular bulge with marginal osteophyte eccentric left results in mild central stenosis and mild left foraminal narrowing. Moderate facet hypertrophy with ligamentous infolding. L3-L4:  Moderate facet hypertrophy with mild annular bulge. Mild right foraminal narrowing. Central canal patent. L4-L5:  Severe facet degenerative change right greater than left results in anterolisthesis with uncovering the posterior disc margin. Moderate to severe right foraminal narrowing noted. Central canal is patent. L5-S1:  Moderate facet hypertrophy.   Degenerative disc osteophyte complex with marginal osteophytes results in moderate left and mild right foraminal narrowing. Mild central stenosis. OTHER FINDINGS:  None. IMPRESSION:     Recent or evolving T12 compression fracture with minimal loss of height anteriorly and 1 mm retropulsion. No significant central or foraminal narrowing. Severe facet degenerative changes are seen at L4-5 resulting in anterolisthesis with uncovering the posterior disc margin resulting in moderate to severe right foraminal narrowing. Facet disease at L4-5 accounts for anterolisthesis resulting in moderate left and mild right foraminal narrowing with mild central stenosis.

## 2023-12-08 NOTE — PATIENT INSTRUCTIONS
Lumbar Radiofrequency Ablation   WHAT YOU NEED TO KNOW:   What do I need to know about lumbar radiofrequency ablation? Lumbar radiofrequency ablation (RFA) is a procedure used to treat facet joint pain in your lower back. Facet joints are found at the back of each vertebra. A needle electrode is used to send electrical currents to the nerves in your facet joint. The electrical currents create heat that damages the nerve so it cannot send pain signals. How do I prepare for lumbar RFA? Your healthcare provider will talk to you about how to prepare for this procedure. You may be told to not to eat or drink anything after midnight on the day of your procedure. Your provider will tell you what medicines to take or not take on the day of your procedure. What will happen during lumbar RFA? You will lie on your stomach. You will be given local anesthesia to numb the area of your back where the needle electrode will be inserted. You may be given a sedative to help keep you relaxed. You may still feel pressure or pushing during the procedure, but you should not feel any pain. Your healthcare provider will use fluoroscopy (a type of x-ray) to guide the needle electrode to the nerves near your facet joint. Your healthcare provider may touch the affected nerve to make sure the needle electrode is in the right place. You will feel tingling or pressure when your provider does this. Your provider will then apply local anesthesia to the nerve to numb it. This will prevent you from feeling pain when your provider applies heat to the nerve. Your provider will then apply heat to the nerve using the needle electrode. Your provider may need to apply heat to more than one nerve. Your provider will remove the needle electrode and apply a bandage over the area. What are the risks of lumbar RFA? You may have pain, numbness, tingling, or burning in the area where the lumbar RFA was done. These normally go away within 6 weeks. The needle electrode may injure your spinal nerves. This may cause permanent leg weakness or nerve pain. CARE AGREEMENT:   You have the right to help plan your care. Learn about your health condition and how it may be treated. Discuss treatment options with your healthcare providers to decide what care you want to receive. You always have the right to refuse treatment. The above information is an  only. It is not intended as medical advice for individual conditions or treatments. Talk to your doctor, nurse or pharmacist before following any medical regimen to see if it is safe and effective for you. © Copyright Masha Sin 2023 Information is for End User's use only and may not be sold, redistributed or otherwise used for commercial purposes.

## 2023-12-12 ENCOUNTER — TELEPHONE (OUTPATIENT)
Dept: PODIATRY | Facility: CLINIC | Age: 68
End: 2023-12-12

## 2023-12-12 NOTE — TELEPHONE ENCOUNTER
Caller: Reynaldo Buckley/Gustavo    Doctor/Office: Lalo Campos    #: [phone]      FRX#[phone]    Escalation: [employer] wanted all the office notes plus the Operative notes from 7/21/23 to present. I gave him the phone and fax # for the [facility] hospitals department.

## 2023-12-19 ENCOUNTER — TELEPHONE (OUTPATIENT)
Dept: OBGYN CLINIC | Facility: HOSPITAL | Age: 68
End: 2023-12-19

## 2023-12-22 ENCOUNTER — HOSPITAL ENCOUNTER (OUTPATIENT)
Dept: RADIOLOGY | Facility: MEDICAL CENTER | Age: 68
End: 2023-12-22
Payer: COMMERCIAL

## 2023-12-22 VITALS
TEMPERATURE: 97.9 F | HEART RATE: 59 BPM | DIASTOLIC BLOOD PRESSURE: 75 MMHG | SYSTOLIC BLOOD PRESSURE: 134 MMHG | OXYGEN SATURATION: 98 % | RESPIRATION RATE: 18 BRPM

## 2023-12-22 DIAGNOSIS — M47.816 LUMBAR SPONDYLOSIS: ICD-10-CM

## 2023-12-22 PROCEDURE — 64494 INJ PARAVERT F JNT L/S 2 LEV: CPT | Performed by: PHYSICAL MEDICINE & REHABILITATION

## 2023-12-22 PROCEDURE — 64493 INJ PARAVERT F JNT L/S 1 LEV: CPT | Performed by: PHYSICAL MEDICINE & REHABILITATION

## 2023-12-22 RX ADMIN — Medication 3 ML: at 11:13

## 2023-12-22 NOTE — DISCHARGE INSTRUCTIONS
ACTIVITY  Please do activities that will bring on the normal pain that we are rating. For example, if vacuuming or walking increases the pain, do that. This will give the most accurate response to the diary.  You may shower, but no tub baths today, or applied heat.    CARE OF THE INJECTION SITE  This area may be numb for several hours after the injection.  Notify [facility] if you have any of the following:  redness, drainage, swelling, or fever above 100°F.    SPECIAL INSTRUCTIONS  Please return the MBB diary to our office by mail, fax, or drop it off.    MEDICATIONS  Please do not take any break through or short acting pain medications for 8 hours after the block.    INSTRUCCIONES PARA EL JERONIMO DEL BLOQUEO DE LA DEEP MEDIAL    ACTIVIDAD   Realice las actividades que normalmente provocarían el dolor que estamos midiendo. Por ejemplo, si cuando aspira o camina siente más dolor, alexandra eso. Nubieber le brindará respuestas más precisas para el diario.   Puede ducharse, facundo no tome wolf en tinas ni se aplique calor por hoy.    CUIDADO DEL ÁREA DE APLICACIÓN DE LA INYECCIÓN   Esta área puede estar entumecida sheeba varias horas después de la inyección.   Informe a [facility] si se presenta alguno de estos síntomas: enrojecimiento, secreción, inflamación o fiebre superior a 100 °F.    INSTRUCCIONES ESPECIALES  Envíe de regreso el diario de bloqueo de la deep medial por correo, fax o pase a dejarlo.    MEDICAMENTOS   No tome analgésicos de acción a corto plazo o intermitente sheeba las 8 horas posteriores  al bloqueo.   Continúe tomando todos chelsie medicamentos de rutina.   Continue to take all routine medications.  Our office may have instructed you to hold some medications.    As no general anesthesia was used in today's procedure, you should not experience any side effects related to anesthesia.     If you have a problem specifically related to your procedure, please call our office at [phone].    Problems not related to your procedure should be directed to your primary care physician.

## 2023-12-22 NOTE — H&P
History of Present Illness: The patient is a 68 y.o. female who presents with complaints of bilateral lower back pain    Past Medical History:   Diagnosis Date    Allergic     Hammertoe of right foot     OR   reapir today 2023    Hypertension     Wears glasses        Past Surgical History:   Procedure Laterality Date     SECTION      HERNIA REPAIR  1969    HYSTERECTOMY      removed some of left ovary    AR CORRECTION HAMMERTOE Right 2023    Procedure: REPAIR HAMMERTOE 3RD TOE;  Surgeon: Lokesh Simmons DPM;  [facility];  Service: Podiatry    AR LAPS ABD PRTM&OMENTUM DX W/WO SPEC BR/WA SPX N/A 2023    Procedure: LAPAROSCOPY DIAGNOSTIC, LYSIS OF ADHESIONS;  Surgeon: Debbie Clark MD;  Location[facility];  Service: General         Current Outpatient Medications:     amLODIPine (NORVASC) 5 mg tablet, Take 1 tablet (5 mg total) by mouth 2 (two) times a day, Disp: 180 tablet, Rfl: 1    atenolol (TENORMIN) 50 mg tablet, TAKE 2 TABLETS (2,000 UNITS TOTAL) BY MOUTH DAILY Oral for 30 Days, Disp: , Rfl:     benzonatate (TESSALON PERLES) 100 mg capsule, Take 1 capsule (100 mg total) by mouth 3 (three) times a day as needed for cough, Disp: 20 capsule, Rfl: 0    Blood Pressure Monitoring (Adult Blood Pressure Cuff Lg) KIT, Use in the morning (Patient not taking: Reported on 2023), Disp: 1 kit, Rfl: 0    Blood Pressure Monitoring (Blood Pressure Monitor/M Cuff) MISC, Use in the morning (Patient not taking: Reported on 2023), Disp: 1 each, Rfl: 0    Calcium-Magnesium-Vitamin D (CALCIUM MAGNESIUM PO), Take 2 capsules by mouth 3 (three) times a day, Disp: , Rfl:     cholecalciferol (VITAMIN D3) 1,000 units tablet, Take 2 tablets (2,000 Units total) by mouth daily, Disp: 60 tablet, Rfl: 2    Diclofenac Sodium (VOLTAREN) 1 %, Apply 2 g topically 4 (four) times a day, Disp: 100 g, Rfl: 0    lidocaine (LIDODERM) 5 %, Apply 1 patch topically over 12 hours daily Remove &  Discard patch within 12 hours or as directed by MD, Disp: 5 patch, Rfl: 0    lidocaine (LIDODERM) 5 %, Apply 1 patch topically over 12 hours daily for 4 days Remove & Discard patch within 12 hours or as directed by MD, Disp: 4 patch, Rfl: 0    meclizine (ANTIVERT) 25 mg tablet, Take 1 tablet (25 mg total) by mouth 3 (three) times a day as needed for dizziness for up to 3 days, Disp: 9 tablet, Rfl: 0    methocarbamol (ROBAXIN) 500 mg tablet, Take 1 tablet (500 mg total) by mouth 4 (four) times a day, Disp: 30 tablet, Rfl: 1    Mirabegron ER (Myrbetriq) 50 MG TB24, every 24 hours, Disp: , Rfl:     Multiple Vitamins-Minerals (multivitamin with minerals) tablet, Take 1 tablet by mouth daily, Disp: , Rfl:     naproxen (NAPROSYN) 500 mg tablet, Take 1 tablet (500 mg total) by mouth 2 (two) times a day with meals, Disp: 30 tablet, Rfl: 0    rosuvastatin (CRESTOR) 10 MG tablet, Take 1 tablet (10 mg total) by mouth daily, Disp: 90 tablet, Rfl: 1    sodium picosulfate, magnesium oxide, citric acid (Clenpiq) oral solution, Use as directed as per written instructions from office, Disp: 320 mL, Rfl: 0    Vibegron (Gemtesa) 75 MG TABS, every 24 hours, Disp: , Rfl:   No current facility-administered medications for this encounter.    Allergies   Allergen Reactions    Aspirin Swelling    Nsaids Swelling       Physical Exam:   Vitals:    12/22/23 1059   BP: 145/80   Pulse: 59   Resp: 18   Temp: 97.9 °F (36.6 °C)   SpO2: 97%     General: Awake, Alert, Oriented x 3, Mood and affect appropriate  Respiratory: Respirations even and unlabored  Cardiovascular: Peripheral pulses intact; no edema  Musculoskeletal Exam: bilateral lower back pain    ASA Score: 3    Patient/Chart Verification  Patient ID Verified: Verbal  Consents Confirmed: Procedural, To be obtained in the Pre-Procedure area  H&P( within 30 days) Verified: To be obtained in the Pre-Procedure area  Allergies Reviewed: Yes  Anticoag/NSAID held?: NA  Currently on antibiotics?:  No  Pregnancy denied?: NA    Assessment:   1. Lumbar spondylosis        Plan: b/l L3-5 MBB #1

## 2023-12-28 ENCOUNTER — TELEPHONE (OUTPATIENT)
Dept: OBGYN CLINIC | Facility: HOSPITAL | Age: 68
End: 2023-12-28

## 2023-12-28 ENCOUNTER — TELEPHONE (OUTPATIENT)
Age: 68
End: 2023-12-28

## 2023-12-28 NOTE — TELEPHONE ENCOUNTER
Caller: Patient    Doctor/Office: Claudette    Call regarding :  Medical records     Call was transferred to: MONICA

## 2023-12-28 NOTE — TELEPHONE ENCOUNTER
Caller: Patient/    Doctor: kami    Reason for call: Patient called and while I was verifying her identity the patient received a call and asked me to hold. The patient never came back on the line. Dc'd the call    Call back#: na

## 2024-01-04 ENCOUNTER — TELEPHONE (OUTPATIENT)
Dept: RADIOLOGY | Facility: MEDICAL CENTER | Age: 69
End: 2024-01-04

## 2024-01-05 NOTE — TELEPHONE ENCOUNTER
Procedure scheduled 1/26/24 with the help of a .    Reviewed instructions: , NPO 1 hour prior, loose-fitting/comfortable clothes, if ill/fever/infx/abx to call and reschedule.  Also pain level at leat 5/10 and refrain from PRN, as-needed pain meds 6h prior.  Patient stated verbal understanding.

## 2024-01-05 NOTE — TELEPHONE ENCOUNTER
Left Message via  # [phone] for patient  to call me back DIRECTLY to schedule.  Left my DIRECT phone # 2x on message.

## 2024-01-05 NOTE — TELEPHONE ENCOUNTER
Caller: patient    Doctor/Office: SIRENA    Call regarding :  called back , transfer to      Call was transferred to:

## 2024-01-11 ENCOUNTER — TELEPHONE (OUTPATIENT)
Age: 69
End: 2024-01-11

## 2024-01-11 DIAGNOSIS — I10 PRIMARY HYPERTENSION: ICD-10-CM

## 2024-01-11 DIAGNOSIS — M79.18 MUSCLE PAIN, LUMBAR: ICD-10-CM

## 2024-01-11 DIAGNOSIS — E55.9 VITAMIN D DEFICIENCY: ICD-10-CM

## 2024-01-11 DIAGNOSIS — E78.2 MIXED HYPERLIPIDEMIA: ICD-10-CM

## 2024-01-11 RX ORDER — ROSUVASTATIN CALCIUM 10 MG/1
10 TABLET, COATED ORAL DAILY
Qty: 90 TABLET | Refills: 1 | Status: SHIPPED | OUTPATIENT
Start: 2024-01-11

## 2024-01-11 RX ORDER — AMLODIPINE BESYLATE 5 MG/1
5 TABLET ORAL 2 TIMES DAILY
Qty: 180 TABLET | Refills: 1 | Status: SHIPPED | OUTPATIENT
Start: 2024-01-11

## 2024-01-11 RX ORDER — MELATONIN
2000 DAILY
Qty: 60 TABLET | Refills: 2 | Status: SHIPPED | OUTPATIENT
Start: 2024-01-11

## 2024-01-11 NOTE — TELEPHONE ENCOUNTER
Pt called in requesting a refill on the following medications:     Amlodopine 5mg  Atenolol 50mg  Diclofenac Sodium  Vitamin D3  Rosuvastatin 10mg      Please send prescriptions to [facility] [place]      [id number] was used during this call

## 2024-01-11 NOTE — TELEPHONE ENCOUNTER
Forwarding to PA team for review.   Patient needs prior authorization for Norvasc 5 mg tablet  [facility]

## 2024-01-12 RX ORDER — ATENOLOL 50 MG/1
50 TABLET ORAL DAILY
Qty: 30 TABLET | Refills: 1 | Status: SHIPPED | OUTPATIENT
Start: 2024-01-12

## 2024-01-12 NOTE — TELEPHONE ENCOUNTER
PA for amLODIPine (NORVASC) 5 mg tablet     Submitted via  [x]CMM-KEY [id number]  []Surescripts-Case ID #   []Faxed to plan   []Other website   []Phone call Case ID #     Office notes sent, clinical questions answered. Awaiting determination

## 2024-01-15 NOTE — TELEPHONE ENCOUNTER
PA for  amLODIPine (NORVASC) 5 mg tablet  not required     Reason- (screenshot if applicable)      Message sent to provider pool No

## 2024-01-18 ENCOUNTER — OFFICE VISIT (OUTPATIENT)
Dept: OBGYN CLINIC | Facility: CLINIC | Age: 69
End: 2024-01-18
Payer: COMMERCIAL

## 2024-01-18 VITALS
HEIGHT: 63 IN | WEIGHT: 174 LBS | SYSTOLIC BLOOD PRESSURE: 125 MMHG | BODY MASS INDEX: 30.83 KG/M2 | DIASTOLIC BLOOD PRESSURE: 70 MMHG

## 2024-01-18 DIAGNOSIS — M65.342 ACQUIRED TRIGGER FINGER OF LEFT RING FINGER: Primary | ICD-10-CM

## 2024-01-18 DIAGNOSIS — M67.442 GANGLION CYST OF FLEXOR TENDON SHEATH OF FINGER OF LEFT HAND: ICD-10-CM

## 2024-01-18 PROCEDURE — 99213 OFFICE O/P EST LOW 20 MIN: CPT | Performed by: SURGERY

## 2024-01-18 NOTE — PROGRESS NOTES
Assessment:    Left ring trigger finger which resolved with steroid injection  Flexor tendon sheath cyst    Plan:    Physical exam was performed and we discussed she has a small cyst at her flexor tendon.  Can rupture with a needle or monitor as it may resolve on it's own  Patient would like to have the needle aspiration  F/U PRN          Visit Diagnoses       Left hand pain    -  Primary    Relevant Orders    XR hand 3+ vw left                     Subjective:     HPI    Patient ID:  Mylene Wolff is a 68 y.o. female presenting for follow-up evaluation of the left ring trigger finger.  She was last seen 7/31/2023.    Patient states that her ring finger has been doing very but she has a painful mass at the base of her finger. She has not had any locking or catching of her ring finger since the injection  The following portions of the patient's history were reviewed and updated as appropriate: allergies, current medications, past family history, past medical history, past social history, past surgical history, and problem list.    Review of Systems     Objective:    Imaging: No images reviewed      Physical Exam     Vitals:    01/18/24 1441   BP: 125/70       General appearance:  NAD   Cardiac:  Regular rate  Lungs:  Unlabored breathing  Abdomen:  Non-distended    Orthopedic Examination:  Left ring finger     Inspection:  No open wounds or erythema.  No swelling or ecchymosis.    Palpation: Mild tenderness palpation A1 pulley Tender nodule noted at A1 pulley about 0.5 cm in diameter    Range-of-motion: Full  composite fist    Strength: 5/5 , flexion extension    Sensation: Intact to light touch    Special Tests: Palpable radial pulse  Good cap refill at the fingertip    Hand/upper extremity injection  Universal Protocol:  Consent: Verbal consent obtained.  Risks and benefits: risks, benefits and alternatives were discussed  Consent given by: patient  Timeout called at: 1/18/2024 2:53 PM.  Patient  understanding: patient states understanding of the procedure being performed  Site marked: the operative site was marked  Patient identity confirmed: verbally with patient  Supporting Documentation  Indications: tendon swelling   Procedure Details  Condition comment: Flexor tendon cyst   Needle size: 18 G  Ultrasound guidance: no  Approach: volar  Aspirate amount: 1 mL  Aspirate: clear  Patient tolerance: patient tolerated the procedure well with no immediate complications  Dressing:  Sterile dressing applied       Mass resolved with aspiration

## 2024-01-24 ENCOUNTER — TELEPHONE (OUTPATIENT)
Age: 69
End: 2024-01-24

## 2024-01-24 NOTE — TELEPHONE ENCOUNTER
Patient called, I merged rodriguez line in - [id number].  At first she was asking to drop off ppwk to have signed by PCP for work restrictions.  She then decided to book an appt and will bring ppwk with her tomorrow.

## 2024-01-25 ENCOUNTER — OFFICE VISIT (OUTPATIENT)
Dept: FAMILY MEDICINE CLINIC | Facility: CLINIC | Age: 69
End: 2024-01-25

## 2024-01-25 VITALS
TEMPERATURE: 97.6 F | WEIGHT: 179 LBS | OXYGEN SATURATION: 92 % | BODY MASS INDEX: 31.71 KG/M2 | SYSTOLIC BLOOD PRESSURE: 110 MMHG | DIASTOLIC BLOOD PRESSURE: 70 MMHG | HEART RATE: 72 BPM | HEIGHT: 63 IN

## 2024-01-25 DIAGNOSIS — M47.816 LUMBAR SPONDYLOSIS: Primary | ICD-10-CM

## 2024-01-25 DIAGNOSIS — M20.41 HAMMERTOE OF RIGHT FOOT: ICD-10-CM

## 2024-01-25 DIAGNOSIS — I10 PRIMARY HYPERTENSION: ICD-10-CM

## 2024-01-25 DIAGNOSIS — M65.30 TRIGGER FINGER OF LEFT HAND, UNSPECIFIED FINGER: ICD-10-CM

## 2024-01-25 RX ORDER — ATENOLOL 50 MG/1
50 TABLET ORAL DAILY
Qty: 90 TABLET | Refills: 1 | Status: SHIPPED | OUTPATIENT
Start: 2024-01-25

## 2024-01-25 NOTE — PROGRESS NOTES
Name: Mylene Wolff      : 1955      MRN: [id number]  Encounter Provider: BUSTER Gunn  Encounter Date: 2024   Encounter department: [facility]    Assessment & Plan     1. Lumbar spondylosis  Assessment & Plan:  Follows with orthopedics and pain management       2. Primary hypertension  Assessment & Plan:  Today's /70     Current medication: amlodipine 5mg BID, Atenolol 50mg     Advised patient on symptoms of hypotension & severe HTN  Limit salt-intake & caffeine. DASH diet discussed, minimize stress level  Weight reduction efforts via improved diet & increased exercise    Orders:  -     atenolol (TENORMIN) 50 mg tablet; Take 1 tablet (50 mg total) by mouth daily    3. Hammertoe of right foot  Assessment & Plan:  Follows with podiatry  Still has limited mobility   Does not use any assistive devices      4. Trigger finger of left hand, unspecified finger  Assessment & Plan:  Limited function of the left hand  Needs more time to accomplish tasks              Subjective      Pain requiring more time at work to accomplish tasks. Paperwork to be filled out today.       Review of Systems   Constitutional:  Negative for activity change, chills, fatigue and fever.   HENT:  Negative for congestion, ear pain, rhinorrhea, sore throat and trouble swallowing.    Eyes:  Negative for pain and visual disturbance.   Respiratory:  Negative for cough, chest tightness and shortness of breath.    Cardiovascular:  Negative for chest pain, palpitations and leg swelling.   Gastrointestinal:  Negative for abdominal pain, constipation, diarrhea, nausea and vomiting.   Genitourinary:  Negative for difficulty urinating, dysuria, hematuria and urgency.   Musculoskeletal:  Negative for arthralgias and back pain.   Skin:  Negative for color change and rash.   Neurological:  Negative for dizziness, seizures, syncope and headaches.   Psychiatric/Behavioral:  Negative for  dysphoric mood. The patient is not nervous/anxious.    All other systems reviewed and are negative.      Current Outpatient Medications on File Prior to Visit   Medication Sig    amLODIPine (NORVASC) 5 mg tablet Take 1 tablet (5 mg total) by mouth 2 (two) times a day    benzonatate (TESSALON PERLES) 100 mg capsule Take 1 capsule (100 mg total) by mouth 3 (three) times a day as needed for cough    Calcium-Magnesium-Vitamin D (CALCIUM MAGNESIUM PO) Take 2 capsules by mouth 3 (three) times a day    cholecalciferol (VITAMIN D3) 1,000 units tablet Take 2 tablets (2,000 Units total) by mouth daily    Diclofenac Sodium (VOLTAREN) 1 % Apply 2 g topically 4 (four) times a day    lidocaine (LIDODERM) 5 % Apply 1 patch topically over 12 hours daily Remove & Discard patch within 12 hours or as directed by MD    methocarbamol (ROBAXIN) 500 mg tablet Take 1 tablet (500 mg total) by mouth 4 (four) times a day    Mirabegron ER (Myrbetriq) 50 MG TB24 every 24 hours    naproxen (NAPROSYN) 500 mg tablet Take 1 tablet (500 mg total) by mouth 2 (two) times a day with meals    rosuvastatin (CRESTOR) 10 MG tablet Take 1 tablet (10 mg total) by mouth daily    sodium picosulfate, magnesium oxide, citric acid (Clenpiq) oral solution Use as directed as per written instructions from office    Vibegron (Gemtesa) 75 MG TABS every 24 hours    Blood Pressure Monitoring (Adult Blood Pressure Cuff Lg) KIT Use in the morning (Patient not taking: Reported on 12/8/2023)    Blood Pressure Monitoring (Blood Pressure Monitor/M Cuff) MISC Use in the morning (Patient not taking: Reported on 12/8/2023)    lidocaine (LIDODERM) 5 % Apply 1 patch topically over 12 hours daily for 4 days Remove & Discard patch within 12 hours or as directed by MD    meclizine (ANTIVERT) 25 mg tablet Take 1 tablet (25 mg total) by mouth 3 (three) times a day as needed for dizziness for up to 3 days    Multiple Vitamins-Minerals (multivitamin with minerals) tablet Take 1 tablet by  "mouth daily (Patient not taking: Reported on 1/18/2024)       Objective     /70 (BP Location: Left arm, Patient Position: Sitting, Cuff Size: Standard)   Pulse 72   Temp 97.6 °F (36.4 °C) (Temporal)   Ht 5' 3\" (1.6 m)   Wt 81.2 kg (179 lb)   SpO2 92%   BMI 31.71 kg/m²     Physical Exam  Vitals and nursing note reviewed.   Constitutional:       Appearance: Normal appearance. She is normal weight.   HENT:      Head: Normocephalic.      Right Ear: Tympanic membrane, ear canal and external ear normal. There is no impacted cerumen.      Left Ear: Tympanic membrane, ear canal and external ear normal. There is no impacted cerumen.      Nose: Nose normal.      Mouth/Throat:      Mouth: Mucous membranes are moist.      Pharynx: Oropharynx is clear.   Eyes:      Extraocular Movements: Extraocular movements intact.      Conjunctiva/sclera: Conjunctivae normal.      Pupils: Pupils are equal, round, and reactive to light.   Cardiovascular:      Rate and Rhythm: Normal rate and regular rhythm.      Pulses: Normal pulses.      Heart sounds: Normal heart sounds.   Pulmonary:      Effort: Pulmonary effort is normal.      Breath sounds: Normal breath sounds.   Abdominal:      General: Bowel sounds are normal. There is no distension.      Palpations: Abdomen is soft.      Tenderness: There is no abdominal tenderness.   Musculoskeletal:         General: Normal range of motion.      Cervical back: Normal range of motion.   Skin:     General: Skin is warm.      Capillary Refill: Capillary refill takes less than 2 seconds.   Neurological:      General: No focal deficit present.      Mental Status: She is alert and oriented to person, place, and time. Mental status is at baseline.   Psychiatric:         Mood and Affect: Mood normal.         Behavior: Behavior normal.         Thought Content: Thought content normal.         Judgment: Judgment normal.       BUSTER Gunn    "

## 2024-01-26 ENCOUNTER — HOSPITAL ENCOUNTER (OUTPATIENT)
Dept: RADIOLOGY | Facility: MEDICAL CENTER | Age: 69
End: 2024-01-26
Payer: COMMERCIAL

## 2024-01-26 VITALS
OXYGEN SATURATION: 95 % | DIASTOLIC BLOOD PRESSURE: 77 MMHG | RESPIRATION RATE: 20 BRPM | TEMPERATURE: 97.8 F | HEART RATE: 57 BPM | SYSTOLIC BLOOD PRESSURE: 134 MMHG

## 2024-01-26 DIAGNOSIS — M47.816 LUMBAR SPONDYLOSIS: ICD-10-CM

## 2024-01-26 PROCEDURE — 64494 INJ PARAVERT F JNT L/S 2 LEV: CPT | Performed by: PHYSICAL MEDICINE & REHABILITATION

## 2024-01-26 PROCEDURE — 64493 INJ PARAVERT F JNT L/S 1 LEV: CPT | Performed by: PHYSICAL MEDICINE & REHABILITATION

## 2024-01-26 RX ORDER — BUPIVACAINE HYDROCHLORIDE 5 MG/ML
3 INJECTION, SOLUTION EPIDURAL; INTRACAUDAL ONCE
Status: COMPLETED | OUTPATIENT
Start: 2024-01-26 | End: 2024-01-26

## 2024-01-26 RX ADMIN — BUPIVACAINE HYDROCHLORIDE 3 ML: 5 INJECTION, SOLUTION EPIDURAL; INTRACAUDAL at 10:41

## 2024-01-26 NOTE — H&P
History of Present Illness: The patient is a 68 y.o. female who presents with complaints of bilateral lower back pain    Past Medical History:   Diagnosis Date    Allergic     Hammertoe of right foot     OR   reapir today 2023    Hypertension     Wears glasses        Past Surgical History:   Procedure Laterality Date     SECTION      HERNIA REPAIR  1969    HYSTERECTOMY      removed some of left ovary    IA CORRECTION HAMMERTOE Right 2023    Procedure: REPAIR HAMMERTOE 3RD TOE;  Surgeon: Lokesh iSmmons DPM;  Location: [facility];  Service: Podiatry    IA LAPS ABD PRTM&OMENTUM DX W/WO SPEC BR/WA SPX N/A 2023    Procedure: LAPAROSCOPY DIAGNOSTIC, LYSIS OF ADHESIONS;  Surgeon: Debbie Clark MD;  Location: [facility];  Service: General         Current Outpatient Medications:     amLODIPine (NORVASC) 5 mg tablet, Take 1 tablet (5 mg total) by mouth 2 (two) times a day, Disp: 180 tablet, Rfl: 1    atenolol (TENORMIN) 50 mg tablet, Take 1 tablet (50 mg total) by mouth daily, Disp: 90 tablet, Rfl: 1    benzonatate (TESSALON PERLES) 100 mg capsule, Take 1 capsule (100 mg total) by mouth 3 (three) times a day as needed for cough, Disp: 20 capsule, Rfl: 0    Blood Pressure Monitoring (Adult Blood Pressure Cuff Lg) KIT, Use in the morning (Patient not taking: Reported on 2023), Disp: 1 kit, Rfl: 0    Blood Pressure Monitoring (Blood Pressure Monitor/M Cuff) MISC, Use in the morning (Patient not taking: Reported on 2023), Disp: 1 each, Rfl: 0    Calcium-Magnesium-Vitamin D (CALCIUM MAGNESIUM PO), Take 2 capsules by mouth 3 (three) times a day, Disp: , Rfl:     cholecalciferol (VITAMIN D3) 1,000 units tablet, Take 2 tablets (2,000 Units total) by mouth daily, Disp: 60 tablet, Rfl: 2    Diclofenac Sodium (VOLTAREN) 1 %, Apply 2 g topically 4 (four) times a day, Disp: 100 g, Rfl: 0    lidocaine (LIDODERM) 5 %, Apply 1 patch topically over 12 hours daily Remove & Discard patch  within 12 hours or as directed by MD, Disp: 5 patch, Rfl: 0    lidocaine (LIDODERM) 5 %, Apply 1 patch topically over 12 hours daily for 4 days Remove & Discard patch within 12 hours or as directed by MD, Disp: 4 patch, Rfl: 0    meclizine (ANTIVERT) 25 mg tablet, Take 1 tablet (25 mg total) by mouth 3 (three) times a day as needed for dizziness for up to 3 days, Disp: 9 tablet, Rfl: 0    methocarbamol (ROBAXIN) 500 mg tablet, Take 1 tablet (500 mg total) by mouth 4 (four) times a day, Disp: 30 tablet, Rfl: 1    Mirabegron ER (Myrbetriq) 50 MG TB24, every 24 hours, Disp: , Rfl:     Multiple Vitamins-Minerals (multivitamin with minerals) tablet, Take 1 tablet by mouth daily (Patient not taking: Reported on 1/18/2024), Disp: , Rfl:     naproxen (NAPROSYN) 500 mg tablet, Take 1 tablet (500 mg total) by mouth 2 (two) times a day with meals, Disp: 30 tablet, Rfl: 0    rosuvastatin (CRESTOR) 10 MG tablet, Take 1 tablet (10 mg total) by mouth daily, Disp: 90 tablet, Rfl: 1    sodium picosulfate, magnesium oxide, citric acid (Clenpiq) oral solution, Use as directed as per written instructions from office, Disp: 320 mL, Rfl: 0    Vibegron (Gemtesa) 75 MG TABS, every 24 hours, Disp: , Rfl:     Current Facility-Administered Medications:     bupivacaine (PF) (MARCAINE) 0.5 % injection 3 mL, 3 mL, Injection, Once, Philip Hanks, DO    Allergies   Allergen Reactions    Aspirin Swelling    Nsaids Swelling       Physical Exam:   Vitals:    01/26/24 1028   BP: 127/73   Pulse: 60   Resp: 20   Temp: 97.8 °F (36.6 °C)   SpO2: 96%     General: Awake, Alert, Oriented x 3, Mood and affect appropriate  Respiratory: Respirations even and unlabored  Cardiovascular: Peripheral pulses intact; no edema  Musculoskeletal Exam: bilateral lower back pain    ASA Score: 3    Patient/Chart Verification  Patient ID Verified: Verbal  ID Band Applied: No  Consents Confirmed: Procedural, To be obtained in the Pre-Procedure area  H&P( within 30 days)  Verified: To be obtained in the Pre-Procedure area  Interval H&P(within 24 hr) Complete (required for Outpatients and Surgery Admit only): To be obtained in the Pre-Procedure area  Allergies Reviewed: Yes  Anticoag/NSAID held?: NA  Currently on antibiotics?: No    Assessment:   1. Lumbar spondylosis        Plan: B/L L3-5 MBB #2 (50011, [id number])

## 2024-01-26 NOTE — DISCHARGE INSTRUCTIONS
INSTRUCCIONES PARA EL JERONIMO DEL BLOQUEO DE LA DEEP MEDIAL    ACTIVIDAD   Realice las actividades que normalmente provocarían el dolor que estamos midiendo. Por ejemplo, si cuando aspira o camina siente más dolor, alexandra eso. Poneto le brindará respuestas más precisas para el diario.   Puede ducharse, facundo no tome wolf en tinas ni se aplique calor por hoy.    CUIDADO DEL ÁREA DE APLICACIÓN DE LA INYECCIÓN   Esta área puede estar entumecida sheeba varias horas después de la inyección.   Informe a [facility] si se presenta alguno de estos síntomas: enrojecimiento, secreción, inflamación o fiebre superior a 100 °F.    INSTRUCCIONES ESPECIALES  Envíe de regreso el diario de bloqueo de la deep medial por correo, fax o pase a dejarlo.    MEDICAMENTOS   No tome analgésicos de acción a corto plazo o intermitente sheeba las 8 horas posteriores  al bloqueo.   Continúe tomando todos chelsie medicamentos de rutina.

## 2024-01-26 NOTE — ASSESSMENT & PLAN NOTE
Today's /70     Current medication: amlodipine 5mg BID, Atenolol 50mg     Advised patient on symptoms of hypotension & severe HTN  Limit salt-intake & caffeine. DASH diet discussed, minimize stress level  Weight reduction efforts via improved diet & increased exercise

## 2024-02-02 ENCOUNTER — OFFICE VISIT (OUTPATIENT)
Dept: OBGYN CLINIC | Facility: HOSPITAL | Age: 69
End: 2024-02-02
Payer: COMMERCIAL

## 2024-02-02 VITALS
BODY MASS INDEX: 31.72 KG/M2 | SYSTOLIC BLOOD PRESSURE: 133 MMHG | HEIGHT: 63 IN | DIASTOLIC BLOOD PRESSURE: 78 MMHG | HEART RATE: 67 BPM | WEIGHT: 179.01 LBS

## 2024-02-02 DIAGNOSIS — M54.2 CERVICAL PAIN (NECK): ICD-10-CM

## 2024-02-02 DIAGNOSIS — M43.16 SPONDYLOLISTHESIS AT L4-L5 LEVEL: Primary | ICD-10-CM

## 2024-02-02 PROCEDURE — 99212 OFFICE O/P EST SF 10 MIN: CPT | Performed by: ORTHOPAEDIC SURGERY

## 2024-02-02 NOTE — PROGRESS NOTES
NAME: Mylene Wolff  : 1955  PCP: BUSTER Gunn    Chief complaint: low back pain     HPI:  69 y.o. female presenting for initial visit with chief complaint of pain at her waist/low back. The patient speaks Surinamese and an  was utilized for the visit today. She is accompanied by her friend, a young female, for her visit today. She reports to the office with low back pain which began about 2 months ago. She has never had these symptoms before. She reports she feels tired which has increased the symptoms in her back.  She denies any weakness in her legs. She reports she can walk a few blocks but needs to take breaks. She stands 10 hours per day at work but requires frequent breaks. She works at a [employer] in [place]. She gets 2 breaks at work throughout the day. Denies any charles trauma. Denies fever or chills. Denies fine motor changes such as buttoning of shirt or change in gait.     She was recently in the emergency room on 10/11/2022 for neck pain.  However at today's evaluation she denies any neck pain, denies any cervical radicular symptoms.  Denies any symptoms of cervical myelopathy.    Conservative therapy includes the following:  no injections.    no physical therapy     The patient has noticed significant impairment in performing the following ADLs: walking, standing for long periods of time. She is able to work as tolerated due to the pain.    Update on 2023:   Mylene is here for follow up after physical therapy. The patient speaks Surinamese and an  was utilized for the visit today. She notes she feels a little better after physical therapy. She has been able to walk farther since starting physical therapy, noting she didn't feel fatigued at all on her walk into the office today. However, physical therapy has not helped directly with decreasing her pain. She has continued low back pain with intermittent radiation into her right anterior thigh. Increased LE  cramps. Pain increased when going from seated to standing position. Pain also increased with back extension. Denies charles LE weakness. Denies numbness/tingling. Pain worse with back extension.    She is currently taking naproxen with some improvement in pain.    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 She is able to continue working, but notes some increased pain.    Notes she uses a cart at work to lean on due to increased balance issues, noting it is exacerbated by her increased back pain. Also notes she has difficulty gripping items with her left hand, otherwise denies UE weakness. Denies pain in her UE, but notes some increased cramping in her arms. Some mild intermittent neck pain. Back pain > neck pain at this time. Back pain > leg pain.    DEXA scan 1/13/2023 that demonstrated osteopenia.    Update on 3/24/23  The patient follows up for the results of her lumbar MRI.  She reports that she has gone to physical therapy and is doing home exercises with some benefit, but is not satisfied with her pain relief. She reports continued pain in the low back and radiation down her right lower extremity. She would like to explore additional options.     The visit was conducted with assistance of a telephone .    6/3/2023 update  Patient is here for follow-up.  She had an epidural steroid injection and notes almost complete relief of her pain.  She has no other complaints at this time.  Overall she is doing well.    12/1/2023 Update  Presents today for follow-up evaluation of her low back pain. Puerto [place]ican  was utilized for the visit today. She states that her pain is localized into the lower lumbar spine most notably with standing. She states that when her walks he does hunch forward to alleviate her pain. She denies charles trauma.  She also denies any distal paresthesias.  She has been utilizing naproxen with some symptomatic relief.   She has undergone a epidural injection at the L4-L5 level from Dr. Hanks in May which provided with great symptomatic relief.  She states that previous physical therapy in the earlier part of the year was also beneficial.  She is currently not interested in surgical intervention at this time    2/2/24 Update  Patient presents for follow up evaluation. She has received L3-5 bilateral MBB with only temporarily relief.  Her pain complaint is she has run out of pain medication and would like to apply for disability. She complains of a several day history of neck pain that does not radiate. Her low back pain got relief with injection but she would like to discuss surgery. She will see spine and pain management for her disability form and return with her daughter for a more comprehensive discussion of surgery for her low back.      FAMILY HISTORY   Family History   Problem Relation Age of Onset    Breast cancer Mother     Thrombosis Father     No Known Problems Sister     No Known Problems Sister     No Known Problems Sister     Hypertension Sister     Hypertension Brother     Hypertension Brother     Diabetes Maternal Grandmother        PAST MEDICAL HISTORY:   Past Medical History:   Diagnosis Date    Allergic     Hammertoe of right foot     OR   reapir today 7/21/2023    Hypertension     Wears glasses        MEDICATIONS:  Current Outpatient Medications   Medication Sig Dispense Refill    amLODIPine (NORVASC) 5 mg tablet Take 1 tablet (5 mg total) by mouth 2 (two) times a day 180 tablet 1    atenolol (TENORMIN) 50 mg tablet Take 1 tablet (50 mg total) by mouth daily 90 tablet 1    benzonatate (TESSALON PERLES) 100 mg capsule Take 1 capsule (100 mg total) by mouth 3 (three) times a day as needed for cough 20 capsule 0    Blood Pressure Monitoring (Adult Blood Pressure Cuff Lg) KIT Use in the morning (Patient not taking: Reported on 12/8/2023) 1 kit 0    Blood Pressure Monitoring (Blood Pressure Monitor/M Cuff) MISC Use  in the morning (Patient not taking: Reported on 2023) 1 each 0    Calcium-Magnesium-Vitamin D (CALCIUM MAGNESIUM PO) Take 2 capsules by mouth 3 (three) times a day      cholecalciferol (VITAMIN D3) 1,000 units tablet Take 2 tablets (2,000 Units total) by mouth daily 60 tablet 2    Diclofenac Sodium (VOLTAREN) 1 % Apply 2 g topically 4 (four) times a day 100 g 0    lidocaine (LIDODERM) 5 % Apply 1 patch topically over 12 hours daily Remove & Discard patch within 12 hours or as directed by MD 5 patch 0    lidocaine (LIDODERM) 5 % Apply 1 patch topically over 12 hours daily for 4 days Remove & Discard patch within 12 hours or as directed by MD 4 patch 0    meclizine (ANTIVERT) 25 mg tablet Take 1 tablet (25 mg total) by mouth 3 (three) times a day as needed for dizziness for up to 3 days 9 tablet 0    methocarbamol (ROBAXIN) 500 mg tablet Take 1 tablet (500 mg total) by mouth 4 (four) times a day 30 tablet 1    Mirabegron ER (Myrbetriq) 50 MG TB24 every 24 hours      Multiple Vitamins-Minerals (multivitamin with minerals) tablet Take 1 tablet by mouth daily (Patient not taking: Reported on 2024)      naproxen (NAPROSYN) 500 mg tablet Take 1 tablet (500 mg total) by mouth 2 (two) times a day with meals 30 tablet 0    rosuvastatin (CRESTOR) 10 MG tablet Take 1 tablet (10 mg total) by mouth daily 90 tablet 1    sodium picosulfate, magnesium oxide, citric acid (Clenpiq) oral solution Use as directed as per written instructions from office 320 mL 0    Vibegron (Gemtesa) 75 MG TABS every 24 hours       No current facility-administered medications for this visit.       PAST SURGICAL HISTORY:  Past Surgical History:   Procedure Laterality Date     SECTION      HERNIA REPAIR  1969    HYSTERECTOMY      removed some of left ovary    GA CORRECTION HAMMERTOE Right 2023    Procedure: REPAIR HAMMERTOE 3RD TOE;  Surgeon: Lokesh Simmons DPM;  Location: [facility];  Service: Podiatry    GA LAPS ABD  "PRTM&OMENTUM DX W/WO SPEC BR/WA SPX N/A 01/01/2023    Procedure: LAPAROSCOPY DIAGNOSTIC, LYSIS OF ADHESIONS;  Surgeon: Debbie Clark MD;  Location: [facility];  Service: General       SOCIAL HISTORY:  Social History     Socioeconomic History    Marital status: /Civil Union     Spouse name: Not on file    Number of children: 4    Years of education: Not on file    Highest education level: Not on file   Occupational History    Not on file   Tobacco Use    Smoking status: Never    Smokeless tobacco: Never   Vaping Use    Vaping status: Never Used   Substance and Sexual Activity    Alcohol use: Never    Drug use: Never    Sexual activity: Not Currently     Partners: Male   Other Topics Concern    Not on file   Social History Narrative    Not on file     Social Determinants of Health     Financial Resource Strain: Not on file   Food Insecurity: Not on file   Transportation Needs: Not on file   Physical Activity: Not on file   Stress: Not on file   Social Connections: Not on file   Intimate Partner Violence: Not on file   Housing Stability: Not on file       ALLERGIES:  Allergies   Allergen Reactions    Aspirin Swelling    Nsaids Swelling       ROS:   Constitutional:  No fever, chills, night sweats, loss of appetite   HEENT No no sore throat, difficulty swallowing   Cardiovascular:  No chest pain, palpitations, BLE edema, MELISSA   Respiratory:  No SOB, coughing, chest congestion   Gastrointestinal:  No nausea, vomiting, abdominal pain   Genitourinary:  No dysuria, hematuria, urinary urgency/frequency   Musculoskeletal:  See HPI   Skin:  No rash, erythema, edema   Neurologic:  See HPI   Psychiatric Illness:  No depression, anxiety, mood disorder, substance abuse disorder     PHYSICAL EXAM:  /78   Pulse 67   Ht 5' 3\" (1.6 m)   Wt 81.2 kg (179 lb 0.2 oz)   BMI 31.71 kg/m²          General:  Well-developed,appears well, no acute distress   Respiratory:  No SOB, no abnormal effort (use of accessory muscles). "    GI / Abdominal:  Soft. No abdominal masses or tenderness. Nondistended.    Gait & balance No evidence of myelopathic gait.      Lumbar spine range of motion:  -Forward flexion to 90  -Extension to neutral  -Lateral bend 45 right, 45 left  -Rotation 45 right, 45 left  There is no point tenderness with palpation along the posterior cervical, thoracic, lumbar spine.     Neurologic:      Lower Extremity Motor Function    Right  Left    Iliopsoas  5/5  5/5    Quadriceps 5/5 5/5   Tibialis anterior  5/5  5/5    EHL  5/5  5/5    Gastroc. muscle  5/5  5/5    Heel rise  5/5  5/5    Toe rise  5/5  5/5      Sensory: light touch is intact to bilateral upper and lower extremities     Reflexes:    Right Left   Patellar 1+ 1+   Achilles 1+ 1+   Babinski neg neg     Upper Extremity Motor Function   Right  Left    Deltoid  5/5  5/5    Bicep  5/5  5/5    Wrist extension  5/5  5/5    Tricep  5/5  5/5    Finger flexion/  5/5  5/5    Hand intrinsic  5/5  5/5            Other tests:  Straight Leg Raise: negative  Armas's: negative  Clonus: negative    Right hamstring tightness  Symmetric ROM of hips and knees   Pain in hips with SI compression test     IMAGING: I have personally reviewed the images and these are my findings: No new images for review at today's visit.    X-rays taken of Lumbar spine on 11/18/22:  L4-5 degenerative spondylolisthesis with instability noted on dynamic flexion-extension radiographs, L4-5 endplate sclerosis appreciated, L4-5 L5-S1 facet hypertrophy, L5-S1 disc space degeneration with loss of disc space height, there is also coronal plane deformity noted with apex at L 4 5 disc space        MRI Lumbar Spine 3/24/23: Multilevel lumbar degenerative changes with L5-S1 disc base degeneration, L4-5 degenerative spondylolisthesis, right-sided L4-5 lateral recess stenosis and foraminal stenosis, no significant central stenosis however there does appear to be partial reduction of her degenerative  spondylolisthesis on supine imaging        Xrays of the cervical spine 11/18/22  Multilevel degenerative changes with loss of disc height, bilateral uncovertebral degenerative changes at C5-6    ASSESSMENT / Medical Decision Making (MDM):  69 y.o. female with low back pain. Here for follow up after completion of physical therapy. No incontinence of bowel or bladder, fever, chills, night sweats.     Physical exam showing no focal neurologic deficits     Imaging reviewed as above.  Findings most notable for L4-5 degenerative spondylolisthesis with dynamic instability and right foraminal stenosis L4-5.    Impression of lumbar degenerative disease, lumbar degenerative spondylolisthesis L4-5, multi-level cervical spondylosis without radicular symptoms and left trapezius spasm.     Plan: The above clinical, physical and imaging findings were reviewed with the patient. Mylnee has a constellation of findings consistent with lumbar myofascial pain, mechanical low back pain, mild neurogenic claudication in the setting lumbar degenerative disease and lumbar degenerative spondylolisthesis L4-5.  In addition she does appear to have left upper trapezius spasm. Fortunately Yocasta has had substantial pain relief with physical therapy and previous L4-L5 epidural injection.  Return to office with her daughter after family discussion for comprehensive discussion of surgery for her low back.  Patient instructed to return to office/ER sooner if symptoms are not improving, getting worse, or new worrisome/neurologic symptoms arise.

## 2024-02-05 ENCOUNTER — TELEPHONE (OUTPATIENT)
Age: 69
End: 2024-02-05

## 2024-02-09 ENCOUNTER — OFFICE VISIT (OUTPATIENT)
Dept: PAIN MEDICINE | Facility: MEDICAL CENTER | Age: 69
End: 2024-02-09
Payer: COMMERCIAL

## 2024-02-09 ENCOUNTER — APPOINTMENT (OUTPATIENT)
Dept: RADIOLOGY | Facility: MEDICAL CENTER | Age: 69
End: 2024-02-09
Payer: COMMERCIAL

## 2024-02-09 VITALS
WEIGHT: 181 LBS | DIASTOLIC BLOOD PRESSURE: 85 MMHG | BODY MASS INDEX: 32.07 KG/M2 | HEART RATE: 67 BPM | SYSTOLIC BLOOD PRESSURE: 138 MMHG | HEIGHT: 63 IN

## 2024-02-09 DIAGNOSIS — M54.2 CERVICAL PAIN (NECK): ICD-10-CM

## 2024-02-09 DIAGNOSIS — M47.816 LUMBAR SPONDYLOSIS: Primary | ICD-10-CM

## 2024-02-09 DIAGNOSIS — M43.16 SPONDYLOLISTHESIS AT L4-L5 LEVEL: ICD-10-CM

## 2024-02-09 PROCEDURE — 99214 OFFICE O/P EST MOD 30 MIN: CPT

## 2024-02-09 PROCEDURE — 72050 X-RAY EXAM NECK SPINE 4/5VWS: CPT

## 2024-02-09 NOTE — PROGRESS NOTES
Assessment:  1. Lumbar spondylosis    2. Spondylolisthesis at L4-L5 level    3. Cervical pain (neck)        Plan:  Patient returned her pain diary today from L3-L5 MBB #2 on 1/26/2024.  We reviewed this at length together and patient has elected to undergo right and left L3-L5 radiofrequency ablation.  Patient states that she has been experiencing pain in her neck recently that feels similar to the pain in her low back.  X-ray cervical spine to further evaluate.  Continue application of lidocaine patches to the affected area as needed.  Refills of this medication were sent to the patient's pharmacy today  Follow-up after radiofrequency ablation, or sooner if needed.    My impressions and treatment recommendations were discussed in detail with the patient who verbalized understanding and had no further questions.  Discharge instructions were provided. I personally saw and examined the patient and I agree with the above discussed plan of care.    Orders Placed This Encounter   Procedures    FL spine and pain procedure     Please schedule this first     Standing Status:   Future     Standing Expiration Date:   2/9/2028     Order Specific Question:   Reason for Exam:     Answer:   Right L3-L5 RFA     Order Specific Question:   Anticoagulant hold needed?     Answer:   No    FL spine and pain procedure     Standing Status:   Future     Standing Expiration Date:   2/9/2028     Order Specific Question:   Reason for Exam:     Answer:   Left L3-L5 RFA     Order Specific Question:   Anticoagulant hold needed?     Answer:   No    XR spine cervical complete 4 or 5 vw non injury     Standing Status:   Future     Number of Occurrences:   1     Standing Expiration Date:   2/9/2028     Scheduling Instructions:      Bring along any outside films relating to this procedure.           History of Present Illness:  Mylene CallahanPhaniarabella is a 69 y.o. female with a history of lumbar spondylosis and lumbar radiculopathy who presents for  a follow up office visit in regards to ongoing bilateral axial low back pain that does not radiate into the lower extremities.  The patient states that her pain is constant, and she is currently rating it a 10/10 on the numeric pain rating scale.  She describes the quality of her pain as dull, aching, and burning.     The patient underwent bilateral L3-L5 MBB #1 and reported 80 to 100% pain relief for 12 hours following the procedure.  She was scheduled for MBB #2 on 1/26/2024, and never returned her pain diary.  She does have this documentation with her today for review.  The patient was unaware that the medial branch blocks are diagnostic procedures that are only supposed to provide short-term relief of her pain.  I explained to her that these blocks are test to confirm that the radiofrequency ablation would be effective in relieving her pain.  She has agreed to move forward with left and right L3-L5 RFA.    The patient is also complaining of axial cervical spine pain that feels similar to the pain in her low back.  She has not yet been evaluated for this pain complaint.    Patient reports that she has had significant pain relief with use of lidocaine patches.  She does require refills of this medication today.  The patient is also prescribed naproxen and methocarbamol, which help to alleviate her pain as well.    Please note that the patient is primarily Cambodian speaking and required interpretive services for this office visit. Interpretor [id number] was available throughout the entire office visit and interpreted with the patient in agreement and verbalizing understanding.    I have personally reviewed and/or updated the patient's past medical history, past surgical history, family history, social history, current medications, allergies, and vital signs today.     Review of Systems   Respiratory:  Negative for shortness of breath.    Cardiovascular:  Negative for chest pain.   Gastrointestinal:  Negative for  constipation, diarrhea, nausea and vomiting.   Musculoskeletal:  Positive for back pain. Negative for arthralgias, gait problem, joint swelling and myalgias.   Skin:  Negative for rash.   Neurological:  Negative for dizziness, seizures and weakness.   All other systems reviewed and are negative.      Patient Active Problem List   Diagnosis    Hypertension    Varicose veins of both lower extremities with pain    Lumbar radiculopathy    Hammertoe of right foot    Diverticulosis    Lumbar spondylosis    Trigger finger of left hand       Past Medical History:   Diagnosis Date    Allergic     Hammertoe of right foot     OR   reapir today 2023    Hypertension     Wears glasses        Past Surgical History:   Procedure Laterality Date     SECTION      HERNIA REPAIR  1969    HYSTERECTOMY      removed some of left ovary    [place] CORRECTION HAMMERTOE Right 2023    Procedure: REPAIR HAMMERTOE 3RD TOE;  Surgeon: Lokesh Simmons DPM;  Location: [facility];  Service: Podiatry    TN LAPS ABD PRTM&OMENTUM DX W/WO SPEC BR/WA SPX N/A 2023    Procedure: LAPAROSCOPY DIAGNOSTIC, LYSIS OF ADHESIONS;  Surgeon: Debbie Clark MD;  Location: [facility];  Service: General       Family History   Problem Relation Age of Onset    Breast cancer Mother     Thrombosis Father     No Known Problems Sister     No Known Problems Sister     No Known Problems Sister     Hypertension Sister     Hypertension Brother     Hypertension Brother     Diabetes Maternal Grandmother        Social History     Occupational History    Not on file   Tobacco Use    Smoking status: Never    Smokeless tobacco: Never   Vaping Use    Vaping status: Never Used   Substance and Sexual Activity    Alcohol use: Never    Drug use: Never    Sexual activity: Not Currently     Partners: Male       Current Outpatient Medications on File Prior to Visit   Medication Sig    amLODIPine (NORVASC) 5 mg tablet Take 1 tablet (5 mg total) by mouth 2  (two) times a day    atenolol (TENORMIN) 50 mg tablet Take 1 tablet (50 mg total) by mouth daily    benzonatate (TESSALON PERLES) 100 mg capsule Take 1 capsule (100 mg total) by mouth 3 (three) times a day as needed for cough    Calcium-Magnesium-Vitamin D (CALCIUM MAGNESIUM PO) Take 2 capsules by mouth 3 (three) times a day    cholecalciferol (VITAMIN D3) 1,000 units tablet Take 2 tablets (2,000 Units total) by mouth daily    Diclofenac Sodium (VOLTAREN) 1 % Apply 2 g topically 4 (four) times a day    lidocaine (LIDODERM) 5 % Apply 1 patch topically over 12 hours daily Remove & Discard patch within 12 hours or as directed by MD    methocarbamol (ROBAXIN) 500 mg tablet Take 1 tablet (500 mg total) by mouth 4 (four) times a day    Mirabegron ER (Myrbetriq) 50 MG TB24 every 24 hours    Multiple Vitamins-Minerals (multivitamin with minerals) tablet Take 1 tablet by mouth daily    naproxen (NAPROSYN) 500 mg tablet Take 1 tablet (500 mg total) by mouth 2 (two) times a day with meals    rosuvastatin (CRESTOR) 10 MG tablet Take 1 tablet (10 mg total) by mouth daily    sodium picosulfate, magnesium oxide, citric acid (Clenpiq) oral solution Use as directed as per written instructions from office    Vibegron (Gemtesa) 75 MG TABS every 24 hours    Blood Pressure Monitoring (Adult Blood Pressure Cuff Lg) KIT Use in the morning (Patient not taking: Reported on 12/8/2023)    Blood Pressure Monitoring (Blood Pressure Monitor/M Cuff) MISC Use in the morning (Patient not taking: Reported on 12/8/2023)    lidocaine (LIDODERM) 5 % Apply 1 patch topically over 12 hours daily for 4 days Remove & Discard patch within 12 hours or as directed by MD    meclizine (ANTIVERT) 25 mg tablet Take 1 tablet (25 mg total) by mouth 3 (three) times a day as needed for dizziness for up to 3 days     No current facility-administered medications on file prior to visit.       Allergies   Allergen Reactions    Aspirin Swelling    Nsaids Swelling  "      Physical Exam:    /85   Pulse 67   Ht 5' 3\" (1.6 m)   Wt 82.1 kg (181 lb)   BMI 32.06 kg/m²     Constitutional:normal, well developed, well nourished, alert, in no distress and non-toxic and no overt pain behavior.  Eyes:anicteric  HEENT:grossly intact  Neck:supple, symmetric, trachea midline and no masses   Pulmonary:even and unlabored  Cardiovascular:No edema or pitting edema present  Skin:Normal without rashes or lesions and well hydrated  Psychiatric:Mood and affect appropriate  Neurologic:Cranial Nerves II-XII grossly intact  Musculoskeletal:antalgic gait.  Lumbar flexion, extension, and rotation moderately limited and severely painful.  Tender to palpation over the lumbar paraspinal musculature.  Tender to palpation over the lumbar facet joints bilaterally.    "

## 2024-02-12 ENCOUNTER — TELEPHONE (OUTPATIENT)
Dept: RADIOLOGY | Facility: MEDICAL CENTER | Age: 69
End: 2024-02-12

## 2024-02-12 NOTE — TELEPHONE ENCOUNTER
Procedures scheduled at in office visit    Reviewed instructions: , NPO 1 hour prior, loose-fitting/comfortable clothing, if ill/fever/infx/abx to call and reschedule.  No need to hold any meds prior.  Patient stated verbal understanding.

## 2024-02-16 ENCOUNTER — TELEPHONE (OUTPATIENT)
Age: 69
End: 2024-02-16

## 2024-02-16 DIAGNOSIS — R05.1 ACUTE COUGH: ICD-10-CM

## 2024-02-16 NOTE — TELEPHONE ENCOUNTER
Pt called and stated she wanted the results for the xray did on her head, and she also wants someone to put a prescription [facility] her [facility] for cough medicine. Please advise [phone] thank you.

## 2024-02-20 RX ORDER — BENZONATATE 100 MG/1
100 CAPSULE ORAL 3 TIMES DAILY PRN
Qty: 20 CAPSULE | Refills: 0 | Status: SHIPPED | OUTPATIENT
Start: 2024-02-20

## 2024-02-21 DIAGNOSIS — E55.9 VITAMIN D DEFICIENCY: ICD-10-CM

## 2024-02-21 RX ORDER — MELATONIN
2000 DAILY
Qty: 60 TABLET | Refills: 5 | Status: SHIPPED | OUTPATIENT
Start: 2024-02-21

## 2024-02-21 NOTE — TELEPHONE ENCOUNTER
Medication: cholecalciferol (Vitamin D3)    Dose/Frequency: 1,000 units tablet    Quantity: 60    Pharmacy: [id number]    Office:   [x] PCP/Provider - Janine Sotelo  [] Speciality/Provider -     Does the patient have enough for 3 days?   [x] Yes   [] No - Send as HP to POD

## 2024-02-22 ENCOUNTER — RA CDI HCC (OUTPATIENT)
Dept: OTHER | Facility: HOSPITAL | Age: 69
End: 2024-02-22

## 2024-02-28 ENCOUNTER — TELEPHONE (OUTPATIENT)
Dept: PAIN MEDICINE | Facility: MEDICAL CENTER | Age: 69
End: 2024-02-28

## 2024-02-28 ENCOUNTER — HOSPITAL ENCOUNTER (OUTPATIENT)
Dept: RADIOLOGY | Facility: MEDICAL CENTER | Age: 69
Discharge: HOME | End: 2024-02-28
Admitting: PHYSICAL MEDICINE & REHABILITATION
Payer: COMMERCIAL

## 2024-02-28 VITALS
DIASTOLIC BLOOD PRESSURE: 76 MMHG | OXYGEN SATURATION: 99 % | RESPIRATION RATE: 18 BRPM | TEMPERATURE: 97.7 F | SYSTOLIC BLOOD PRESSURE: 131 MMHG | HEART RATE: 59 BPM

## 2024-02-28 DIAGNOSIS — M47.816 LUMBAR SPONDYLOSIS: ICD-10-CM

## 2024-02-28 PROCEDURE — 64635 DESTROY LUMB/SAC FACET JNT: CPT | Performed by: PHYSICAL MEDICINE & REHABILITATION

## 2024-02-28 PROCEDURE — 64636 DESTROY L/S FACET JNT ADDL: CPT | Performed by: PHYSICAL MEDICINE & REHABILITATION

## 2024-02-28 RX ADMIN — Medication 5 ML: at 09:08

## 2024-02-28 NOTE — DISCHARGE INSTRUCTIONS
Medial Branch Radiofrequency Ablation     WHAT YOU NEED TO KNOW:   Medial branch radiofrequency ablation (RFA) is a procedure used to treat facet joint pain in your neck, mid back, or lower back. Facet joints are found at the back of each vertebra. A needle electrode is used to send electrical currents to the nerves in your facet joint. The electrical currents create heat that damages the nerve so it cannot send pain signals.     ACTIVITY  Do not drive or operate machinery today.  No strenuous activity today - bending, lifting, etc.  You may shower today, but do not sit in a tub of water.  Resume normal activities tomorrow as tolerated.    CARE OF THE INJECTION SITE  If you have soreness or pain, apply ice to the area today (20 minutes on/20 minutes off).  Starting tomorrow, you may use warm, moist heat or ice if needed.  Notify [facility] if you have any of the following: redness, drainage, swelling, or fever above 100°F.    SPECIAL INSTRUCTIONS  Our office will call you tomorrow for a progress report and make an appointment for a follow up visit in 4 weeks.  If you feel a sunburn-like sensation in the area of your procedure, call our office.    MEDICATIONS  Continue to take all routine medications.  Our office may have instructed you to hold some medications.    As no general anesthesia was used in today's procedure, you should not experience any side effects related to anesthesia.     If you have a problem specifically related to your procedure, please call our office at [phone].  Problems not related to your procedure should be directed to your primary care physician. INSTRUCCIONES DE JERONIMO PARA LA NEUROTOMÍA POR RADIOFRECUENCIA DE LA DAREN MEDIAL    ACTIVIDAD   No conduzca ni opere maquinarias por hoy.   No realice actividades extenuantes por hoy, virginie agacharse, levantar objetos, etc.   Puede ducharse facundo no tome wolf en lillian jarred.   Retome chelsie actividades normales desde mañana en la  medida en que las tolere.    CUIDADO DEL ÁREA DE APLICACIÓN DE LA INYECCIÓN   Si siente molestias o dolor, aplique hielo en el área por ani (colóquelo sheeba 20 minutos y retírelo sheeba otros 20 minutos).   A partir de mañana, podrá aplicar calor húmedo o hielo, si lo necesita.   Informe a [facility] si se presenta alguno de estos síntomas: enrojecimiento, secreción, inflamación o fiebre superior a 100 °F.    INSTRUCCIONES ESPECIALES   Lo llamarán de nuestro consultorio mañana para que informe sobre ferreira progreso y para programar lillian giana de seguimiento en 4 semanas.   Si siente virginie si tuviera lillian quemadura de sol en el área en la que le realizaron el procedimiento, llame a nuestro consultorio.    MEDICAMENTOS   Continúe tomando todos chelsie medicamentos de rutina.   Es posible que en nuestro consultorio le hayan indicado que deje de rae algunos medicamentos.   Puede volver a tomarlos el:              Si tiene algún problema relacionado específicamente con el procedimiento, llame a nuestro consultorio al [phone]. Si tiene problemas que no están relacionados con ferreira procedimiento, debe comunicarse con ferreira médico de cabecera.

## 2024-02-28 NOTE — H&P
History of Present Illness: The patient is a 69 y.o. female who presents with complaints of right low back pain    Past Medical History:   Diagnosis Date    Allergic     Hammertoe of right foot     OR   reapir today 2023    Hypertension     Wears glasses        Past Surgical History:   Procedure Laterality Date     SECTION      HERNIA REPAIR  1969    HYSTERECTOMY      removed some of left ovary    IA CORRECTION HAMMERTOE Right 2023    Procedure: REPAIR HAMMERTOE 3RD TOE;  Surgeon: Lokesh Simmosn DPM;  Location: [facility];  Service: Podiatry    IA LAPS ABD PRTM&OMENTUM DX W/WO SPEC BR/WA SPX N/A 2023    Procedure: LAPAROSCOPY DIAGNOSTIC, LYSIS OF ADHESIONS;  Surgeon: Debbie Clark MD;  Location: [facility];  Service: General         Current Outpatient Medications:     amLODIPine (NORVASC) 5 mg tablet, Take 1 tablet (5 mg total) by mouth 2 (two) times a day, Disp: 180 tablet, Rfl: 1    atenolol (TENORMIN) 50 mg tablet, Take 1 tablet (50 mg total) by mouth daily, Disp: 90 tablet, Rfl: 1    benzonatate (TESSALON PERLES) 100 mg capsule, Take 1 capsule (100 mg total) by mouth 3 (three) times a day as needed for cough, Disp: 20 capsule, Rfl: 0    Blood Pressure Monitoring (Adult Blood Pressure Cuff Lg) KIT, Use in the morning (Patient not taking: Reported on 2023), Disp: 1 kit, Rfl: 0    Blood Pressure Monitoring (Blood Pressure Monitor/M Cuff) MISC, Use in the morning (Patient not taking: Reported on 2023), Disp: 1 each, Rfl: 0    Calcium-Magnesium-Vitamin D (CALCIUM MAGNESIUM PO), Take 2 capsules by mouth 3 (three) times a day, Disp: , Rfl:     cholecalciferol (VITAMIN D3) 1,000 units tablet, Take 2 tablets (2,000 Units total) by mouth daily, Disp: 60 tablet, Rfl: 5    Diclofenac Sodium (VOLTAREN) 1 %, Apply 2 g topically 4 (four) times a day, Disp: 100 g, Rfl: 0    lidocaine (LIDODERM) 5 %, Apply 1 patch topically over 12 hours daily Remove & Discard patch within  12 hours or as directed by MD, Disp: 5 patch, Rfl: 0    lidocaine (LIDODERM) 5 %, Apply 1 patch topically over 12 hours daily for 4 days Remove & Discard patch within 12 hours or as directed by MD, Disp: 4 patch, Rfl: 0    meclizine (ANTIVERT) 25 mg tablet, Take 1 tablet (25 mg total) by mouth 3 (three) times a day as needed for dizziness for up to 3 days, Disp: 9 tablet, Rfl: 0    methocarbamol (ROBAXIN) 500 mg tablet, Take 1 tablet (500 mg total) by mouth 4 (four) times a day, Disp: 30 tablet, Rfl: 1    Mirabegron ER (Myrbetriq) 50 MG TB24, every 24 hours, Disp: , Rfl:     Multiple Vitamins-Minerals (multivitamin with minerals) tablet, Take 1 tablet by mouth daily, Disp: , Rfl:     naproxen (NAPROSYN) 500 mg tablet, Take 1 tablet (500 mg total) by mouth 2 (two) times a day with meals, Disp: 30 tablet, Rfl: 0    rosuvastatin (CRESTOR) 10 MG tablet, Take 1 tablet (10 mg total) by mouth daily, Disp: 90 tablet, Rfl: 1    sodium picosulfate, magnesium oxide, citric acid (Clenpiq) oral solution, Use as directed as per written instructions from office, Disp: 320 mL, Rfl: 0    Vibegron (Gemtesa) 75 MG TABS, every 24 hours, Disp: , Rfl:   No current facility-administered medications for this encounter.    Allergies   Allergen Reactions    Aspirin Swelling    Nsaids Swelling       Physical Exam:   Vitals:    02/28/24 0850   BP: 128/80   Pulse: 58   Resp: 18   Temp: 97.7 °F (36.5 °C)   SpO2: 97%     General: Awake, Alert, Oriented x 3, Mood and affect appropriate  Respiratory: Respirations even and unlabored  Cardiovascular: Peripheral pulses intact; no edema  Musculoskeletal Exam: right low back pain    ASA Score: 3    Patient/Chart Verification  Patient ID Verified: Verbal  ID Band Applied: No  Consents Confirmed: Procedural  H&P( within 30 days) Verified: To be obtained in the Pre-Procedure area  Interval H&P(within 24 hr) Complete (required for Outpatients and Surgery Admit only): To be obtained in the Pre-Procedure  area  Allergies Reviewed: Yes  Anticoag/NSAID held?: NA  Currently on antibiotics?: No  Pregnancy denied?: NA    Assessment:   1. Lumbar spondylosis        Plan: Right L3-L5 RFA

## 2024-02-29 ENCOUNTER — OFFICE VISIT (OUTPATIENT)
Dept: FAMILY MEDICINE CLINIC | Facility: CLINIC | Age: 69
End: 2024-02-29

## 2024-02-29 VITALS
TEMPERATURE: 97.6 F | HEIGHT: 63 IN | OXYGEN SATURATION: 100 % | WEIGHT: 179.6 LBS | BODY MASS INDEX: 31.82 KG/M2 | DIASTOLIC BLOOD PRESSURE: 64 MMHG | HEART RATE: 80 BPM | SYSTOLIC BLOOD PRESSURE: 122 MMHG

## 2024-02-29 DIAGNOSIS — Z76.89 ENCOUNTER TO ESTABLISH CARE: ICD-10-CM

## 2024-02-29 DIAGNOSIS — M54.2 CERVICALGIA: Primary | ICD-10-CM

## 2024-02-29 DIAGNOSIS — M54.16 LUMBAR RADICULOPATHY: ICD-10-CM

## 2024-02-29 NOTE — ASSESSMENT & PLAN NOTE
Under the management of Orthopedics and Pain management     02/28/24 Fluoroscopically-guided Radiofrequency denervation of the right L3-5 medial branch/dorsal ramus nerve(s)

## 2024-02-29 NOTE — PROGRESS NOTES
Name: Mylene Wolff      : 1955      MRN: [id number]  Encounter Provider: BUSTER Gunn  Encounter Date: 2024   Encounter department: [facility]    Assessment & Plan     1. Cervicalgia  Assessment & Plan:  24 Cervical x-ray   No fracture.   Stepwise degenerative retrolisthesis C3-4, C4-5, C5-6 and C6-7.  Disc space narrowing C3-4, C4-5, C5-6 and C6-7  Under the management of pain management and orthopedics   Patient would like to see neurosurgery _ Referral placed       Orders:  -     Ambulatory Referral to Neurosurgery; Future    2. Lumbar radiculopathy  Assessment & Plan:  Under the management of Orthopedics and Pain management     24 Fluoroscopically-guided Radiofrequency denervation of the right L3-5 medial branch/dorsal ramus nerve(s)    Orders:  -     Ambulatory Referral to Neurosurgery; Future    3. Encounter to establish care  -     Ambulatory Referral to Obstetrics / Gynecology; Future           Subjective      Patient here for a follow up on recent imaging       Review of Systems   Constitutional:  Negative for activity change, chills, fatigue and fever.   HENT:  Negative for congestion, ear pain, rhinorrhea, sore throat and trouble swallowing.    Eyes:  Negative for pain and visual disturbance.   Respiratory:  Negative for cough, chest tightness and shortness of breath.    Cardiovascular:  Negative for chest pain, palpitations and leg swelling.   Gastrointestinal:  Negative for abdominal pain, constipation, diarrhea, nausea and vomiting.   Genitourinary:  Negative for difficulty urinating, dysuria, hematuria and urgency.   Musculoskeletal:  Negative for arthralgias and back pain.   Skin:  Negative for color change and rash.   Neurological:  Negative for dizziness, seizures, syncope and headaches.   Psychiatric/Behavioral:  Negative for dysphoric mood. The patient is not nervous/anxious.    All other systems reviewed and are  negative.      Current Outpatient Medications on File Prior to Visit   Medication Sig    amLODIPine (NORVASC) 5 mg tablet Take 1 tablet (5 mg total) by mouth 2 (two) times a day    atenolol (TENORMIN) 50 mg tablet Take 1 tablet (50 mg total) by mouth daily    benzonatate (TESSALON PERLES) 100 mg capsule Take 1 capsule (100 mg total) by mouth 3 (three) times a day as needed for cough    Calcium-Magnesium-Vitamin D (CALCIUM MAGNESIUM PO) Take 2 capsules by mouth 3 (three) times a day    cholecalciferol (VITAMIN D3) 1,000 units tablet Take 2 tablets (2,000 Units total) by mouth daily    Diclofenac Sodium (VOLTAREN) 1 % Apply 2 g topically 4 (four) times a day    lidocaine (LIDODERM) 5 % Apply 1 patch topically over 12 hours daily Remove & Discard patch within 12 hours or as directed by MD    methocarbamol (ROBAXIN) 500 mg tablet Take 1 tablet (500 mg total) by mouth 4 (four) times a day    Mirabegron ER (Myrbetriq) 50 MG TB24 every 24 hours    Multiple Vitamins-Minerals (multivitamin with minerals) tablet Take 1 tablet by mouth daily    naproxen (NAPROSYN) 500 mg tablet Take 1 tablet (500 mg total) by mouth 2 (two) times a day with meals    rosuvastatin (CRESTOR) 10 MG tablet Take 1 tablet (10 mg total) by mouth daily    sodium picosulfate, magnesium oxide, citric acid (Clenpiq) oral solution Use as directed as per written instructions from office    Vibegron (Gemtesa) 75 MG TABS every 24 hours    Blood Pressure Monitoring (Adult Blood Pressure Cuff Lg) KIT Use in the morning (Patient not taking: Reported on 12/8/2023)    Blood Pressure Monitoring (Blood Pressure Monitor/M Cuff) MISC Use in the morning (Patient not taking: Reported on 12/8/2023)    lidocaine (LIDODERM) 5 % Apply 1 patch topically over 12 hours daily for 4 days Remove & Discard patch within 12 hours or as directed by MD    meclizine (ANTIVERT) 25 mg tablet Take 1 tablet (25 mg total) by mouth 3 (three) times a day as needed for dizziness for up to 3  "days       Objective     /64 (BP Location: Left arm, Patient Position: Sitting, Cuff Size: Adult)   Pulse 80   Temp 97.6 °F (36.4 °C) (Temporal)   Ht 5' 3\" (1.6 m)   Wt 81.5 kg (179 lb 9.6 oz)   SpO2 100%   BMI 31.81 kg/m²     Physical Exam  Vitals and nursing note reviewed.   Constitutional:       Appearance: Normal appearance. She is normal weight.   HENT:      Head: Normocephalic.      Right Ear: Tympanic membrane, ear canal and external ear normal. There is no impacted cerumen.      Left Ear: Tympanic membrane, ear canal and external ear normal. There is no impacted cerumen.      Nose: Nose normal.      Mouth/Throat:      Mouth: Mucous membranes are moist.      Pharynx: Oropharynx is clear.   Eyes:      Extraocular Movements: Extraocular movements intact.      Conjunctiva/sclera: Conjunctivae normal.      Pupils: Pupils are equal, round, and reactive to light.   Cardiovascular:      Rate and Rhythm: Normal rate and regular rhythm.      Pulses: Normal pulses.      Heart sounds: Normal heart sounds.   Pulmonary:      Effort: Pulmonary effort is normal.      Breath sounds: Normal breath sounds.   Abdominal:      General: Bowel sounds are normal. There is no distension.      Palpations: Abdomen is soft.      Tenderness: There is no abdominal tenderness.   Musculoskeletal:         General: Normal range of motion.      Cervical back: Normal range of motion.   Skin:     General: Skin is warm.      Capillary Refill: Capillary refill takes less than 2 seconds.   Neurological:      General: No focal deficit present.      Mental Status: She is alert and oriented to person, place, and time. Mental status is at baseline.   Psychiatric:         Mood and Affect: Mood normal.         Behavior: Behavior normal.         Thought Content: Thought content normal.         Judgment: Judgment normal.       BUSTER Gunn    "

## 2024-02-29 NOTE — ASSESSMENT & PLAN NOTE
02/09/24 Cervical x-ray   No fracture.   Stepwise degenerative retrolisthesis C3-4, C4-5, C5-6 and C6-7.  Disc space narrowing C3-4, C4-5, C5-6 and C6-7  Under the management of pain management and orthopedics   Patient would like to see neurosurgery _ Referral placed

## 2024-02-29 NOTE — TELEPHONE ENCOUNTER
--DANIE--  RN s/w pt with assist from  [facility] # [id number]  Pt did well over night no s/s of infection or sunburn sensation.  Aware it takes 2 weeks to notice pain relief and 4-6 weeks for full pain effect to be achieved.  Aware to medicate as previous for discomfort and may use ice or heat.  Current pain level? 0/10  Confirmed next appt.  Call if any questions or concerns prior to next appt.

## 2024-03-01 ENCOUNTER — TELEPHONE (OUTPATIENT)
Age: 69
End: 2024-03-01

## 2024-03-06 NOTE — ASSESSMENT & PLAN NOTE
New evaluation of neck pain   Complains of neck pain for the last 1.5 months without injury or trauma.  Pain radiates out to both shoulders, especially in the muscles.  Denies any pain, numbness, tingling radiating out to her arms.  Has arthritis in her hands and is noting difficulty picking up small things with the left hand.  Right-hand-dominant.  Ambulating independently.  No BBI  No PT or GILDARDO for her neck  Hx of scoliosis, T12 fracture, lumbar spondylosis with spondylolisthesis - follows with Dr. Wilkins.   Exam: Midline neck and bilateral trapezius TTP and tightness.  5/5 throughout, LT intact, 2+ DTRs, no Danae's or clonus, intact rapid finger movements      Imaging:   XR cervical 2/9/24: No acute osseous abnormality. Degenerative changes as above    Plan:  Reviewed imaging with patient via .  She has degenerative changes in her cervical spine.  Her symptoms currently sound more musculoskeletal as she is experiencing a lot of muscular pain and tightness.  She is not complaining of radiculopathy and she is not myelopathic on exam.  No neurosurgical invention or further workup recommended at this time.  She would like a referral to physical therapy.  I agree and referral is placed.  She should work on neck strengthening, stretching, gentle range of motion, massage.  If her pain becomes severe, discussed that she can consider injections through pain management where she is already established.  Reviewed red flag signs and symptoms.  Follow-up as needed.  Call with any questions or concerns.

## 2024-03-07 ENCOUNTER — CONSULT (OUTPATIENT)
Dept: NEUROSURGERY | Facility: CLINIC | Age: 69
End: 2024-03-07
Payer: COMMERCIAL

## 2024-03-07 VITALS
SYSTOLIC BLOOD PRESSURE: 136 MMHG | BODY MASS INDEX: 32.25 KG/M2 | OXYGEN SATURATION: 99 % | WEIGHT: 182 LBS | HEIGHT: 63 IN | RESPIRATION RATE: 20 BRPM | DIASTOLIC BLOOD PRESSURE: 78 MMHG | TEMPERATURE: 97.8 F | HEART RATE: 71 BPM

## 2024-03-07 DIAGNOSIS — M54.2 CERVICALGIA: ICD-10-CM

## 2024-03-07 DIAGNOSIS — M54.16 LUMBAR RADICULOPATHY: ICD-10-CM

## 2024-03-07 PROCEDURE — 99204 OFFICE O/P NEW MOD 45 MIN: CPT | Performed by: PHYSICIAN ASSISTANT

## 2024-03-07 NOTE — PROGRESS NOTES
Neurosurgery Office Note  Mylene Wolff 69 y.o. female MRN: [id number]      Assessment/Plan     Cervicalgia  New evaluation of neck pain   Complains of neck pain for the last 1.5 months without injury or trauma.  Pain radiates out to both shoulders, especially in the muscles.  Denies any pain, numbness, tingling radiating out to her arms.  Has arthritis in her hands and is noting difficulty picking up small things with the left hand.  Right-hand-dominant.  Ambulating independently.  No BBI  No PT or IGLDARDO for her neck  Hx of scoliosis, T12 fracture, lumbar spondylosis with spondylolisthesis - follows with Dr. Wilkins.   Exam: Midline neck and bilateral trapezius TTP and tightness.  5/5 throughout, LT intact, 2+ DTRs, no Danae's or clonus, intact rapid finger movements      Imaging:   XR cervical 2/9/24: No acute osseous abnormality. Degenerative changes as above    Plan:  Reviewed imaging with patient via .  She has degenerative changes in her cervical spine.  Her symptoms currently sound more musculoskeletal as she is experiencing a lot of muscular pain and tightness.  She is not complaining of radiculopathy and she is not myelopathic on exam.  No neurosurgical invention or further workup recommended at this time.  She would like a referral to physical therapy.  I agree and referral is placed.  She should work on neck strengthening, stretching, gentle range of motion, massage.  If her pain becomes severe, discussed that she can consider injections through pain management where she is already established.  Reviewed red flag signs and symptoms.  Follow-up as needed.  Call with any questions or concerns.       Diagnoses and all orders for this visit:    Cervicalgia  -     Ambulatory Referral to Neurosurgery  -     Ambulatory referral to Physical Therapy; Future    Lumbar radiculopathy  -     Ambulatory Referral to Neurosurgery          I have spent a total time of 45 minutes on  03/07/24 in caring for this patient including Diagnostic results, Instructions for management, Impressions, Counseling / Coordination of care, Documenting in the medical record, Reviewing / ordering tests, medicine, procedures  , and Obtaining or reviewing history  .      CHIEF COMPLAINT    Chief Complaint   Patient presents with    Consult     Chief Complaint: NECK PAIN       HISTORY    History of Present Illness     69 y.o. year old female     69-year-old female seen for new evaluation of neck pain for the last 1.5 months.  Denies any injury or trauma.  Even changed her pillow without significant relief.  She reports pain in the middle of her neck that goes out to her shoulders especially in the muscle.  Massage has helped.  Denies any pain, numbness, tingling radiating into her arms or fingers.  She has some arthritis in her hands.  Feels that she is losing strength in the left first and second fingers And has difficulty picking up small things with the left hand.  Right-hand-dominant.  She ambulates independently.  No BBI.  She has not done PT, or had any GILDARDO.  She is following with Dr. Wilkins for her lumbar spine.   utilized for visit.        See Discussion    REVIEW OF SYSTEMS    Review of Systems   Constitutional: Negative.    HENT: Negative.     Eyes: Negative.    Respiratory: Negative.     Cardiovascular: Negative.    Gastrointestinal: Negative.    Endocrine: Negative.    Musculoskeletal:  Positive for neck pain (back of the neck, radiates to bI SHOULDERS). Negative for gait problem and neck stiffness.   Allergic/Immunologic: Negative.    Neurological:  Positive for weakness (Left arm and hands is weak) and numbness (Left harm hands is nunb, can't grap with the left hands, and left leg is contracted).   Psychiatric/Behavioral:  Positive for sleep disturbance.        ROS obtained by MA. Reviewed. See HPI.     Meds/Allergies     Current Outpatient Medications   Medication Sig Dispense  Refill    amLODIPine (NORVASC) 5 mg tablet Take 1 tablet (5 mg total) by mouth 2 (two) times a day 180 tablet 1    atenolol (TENORMIN) 50 mg tablet Take 1 tablet (50 mg total) by mouth daily 90 tablet 1    benzonatate (TESSALON PERLES) 100 mg capsule Take 1 capsule (100 mg total) by mouth 3 (three) times a day as needed for cough 20 capsule 0    Calcium-Magnesium-Vitamin D (CALCIUM MAGNESIUM PO) Take 2 capsules by mouth 3 (three) times a day      cholecalciferol (VITAMIN D3) 1,000 units tablet Take 2 tablets (2,000 Units total) by mouth daily 60 tablet 5    Diclofenac Sodium (VOLTAREN) 1 % Apply 2 g topically 4 (four) times a day 100 g 0    naproxen (NAPROSYN) 500 mg tablet Take 1 tablet (500 mg total) by mouth 2 (two) times a day with meals 30 tablet 0    rosuvastatin (CRESTOR) 10 MG tablet Take 1 tablet (10 mg total) by mouth daily 90 tablet 1    Blood Pressure Monitoring (Adult Blood Pressure Cuff Lg) KIT Use in the morning (Patient not taking: Reported on 12/8/2023) 1 kit 0    Blood Pressure Monitoring (Blood Pressure Monitor/M Cuff) MISC Use in the morning (Patient not taking: Reported on 12/8/2023) 1 each 0    lidocaine (LIDODERM) 5 % Apply 1 patch topically over 12 hours daily Remove & Discard patch within 12 hours or as directed by MD (Patient not taking: Reported on 3/7/2024) 5 patch 0    lidocaine (LIDODERM) 5 % Apply 1 patch topically over 12 hours daily for 4 days Remove & Discard patch within 12 hours or as directed by MD 4 patch 0    meclizine (ANTIVERT) 25 mg tablet Take 1 tablet (25 mg total) by mouth 3 (three) times a day as needed for dizziness for up to 3 days 9 tablet 0    methocarbamol (ROBAXIN) 500 mg tablet Take 1 tablet (500 mg total) by mouth 4 (four) times a day (Patient not taking: Reported on 3/7/2024) 30 tablet 1    Mirabegron ER (Myrbetriq) 50 MG TB24 every 24 hours (Patient not taking: Reported on 3/7/2024)      Multiple Vitamins-Minerals (multivitamin with minerals) tablet Take 1  "tablet by mouth daily (Patient not taking: Reported on 3/7/2024)      sodium picosulfate, magnesium oxide, citric acid (Clenpiq) oral solution Use as directed as per written instructions from office (Patient not taking: Reported on 3/7/2024) 320 mL 0    Vibegron (Gemtesa) 75 MG TABS every 24 hours (Patient not taking: Reported on 3/7/2024)       No current facility-administered medications for this visit.       Allergies   Allergen Reactions    Aspirin Swelling    Nsaids Swelling       PAST HISTORY    Past Medical History:   Diagnosis Date    Allergic     Hammertoe of right foot     OR   reapir today 2023    Hypertension     Wears glasses        Past Surgical History:   Procedure Laterality Date     SECTION      HERNIA REPAIR  1969    HYSTERECTOMY      removed some of left ovary    IN CORRECTION HAMMERTOE Right 2023    Procedure: REPAIR HAMMERTOE 3RD TOE;  Surgeon: Lokesh Simmons DPM;  Location: [facility];  Service: Podiatry    IN LAPS ABD PRTM&OMENTUM DX W/WO SPEC BR/WA SPX N/A 2023    Procedure: LAPAROSCOPY DIAGNOSTIC, LYSIS OF ADHESIONS;  Surgeon: Debbie Clark MD;  Location: [facility];  Service: General       Social History     Tobacco Use    Smoking status: Never    Smokeless tobacco: Never   Vaping Use    Vaping status: Never Used   Substance Use Topics    Alcohol use: Never    Drug use: Never       Family History   Problem Relation Age of Onset    Breast cancer Mother     Thrombosis Father     No Known Problems Sister     No Known Problems Sister     No Known Problems Sister     Hypertension Sister     Hypertension Brother     Hypertension Brother     Diabetes Maternal Grandmother          Above history personally reviewed.       EXAM    Vitals:Blood pressure 136/78, pulse 71, temperature 97.8 °F (36.6 °C), temperature source Temporal, resp. rate 20, height 5' 3\" (1.6 m), weight 82.6 kg (182 lb), SpO2 99%.,Body mass index is 32.24 kg/m².     Physical Exam  Vitals " reviewed.   Constitutional:       General: She is awake.      Appearance: Normal appearance.   HENT:      Head: Normocephalic and atraumatic.   Eyes:      Conjunctiva/sclera: Conjunctivae normal.   Neck:      Comments: Midline neck and bilateral trapezius TTP and tightness  Cardiovascular:      Rate and Rhythm: Normal rate.   Pulmonary:      Effort: Pulmonary effort is normal.   Skin:     General: Skin is warm and dry.   Neurological:      Mental Status: She is alert and oriented to person, place, and time.      Motor: Motor strength is normal.     Deep Tendon Reflexes:      Reflex Scores:       Bicep reflexes are 2+ on the right side and 2+ on the left side.       Brachioradialis reflexes are 2+ on the right side and 2+ on the left side.       Patellar reflexes are 2+ on the right side and 2+ on the left side.  Psychiatric:         Attention and Perception: Attention and perception normal.         Mood and Affect: Mood and affect normal.         Speech: Speech normal.         Behavior: Behavior normal. Behavior is cooperative.         Thought Content: Thought content normal.         Cognition and Memory: Cognition and memory normal.         Judgment: Judgment normal.         Neurologic Exam     Mental Status   Oriented to person, place, and time.   Follows 2 step commands.   Attention: normal. Concentration: normal.   Speech: speech is normal   Level of consciousness: alert  Knowledge: good.   Normal comprehension.     Cranial Nerves     CN XI   Right trapezius strength: normal  Left trapezius strength: normal    Motor Exam   Muscle bulk: normal  Overall muscle tone: normal    Strength   Strength 5/5 throughout.     Sensory Exam   Right arm light touch: normal  Left arm light touch: normal    Gait, Coordination, and Reflexes     Tremor   Resting tremor: absent  Intention tremor: absent  Action tremor: absent    Reflexes   Right brachioradialis: 2+  Left brachioradialis: 2+  Right biceps: 2+  Left biceps: 2+  Right  patellar: 2+  Left patellar: 2+  Right Armas: absent  Left Armas: absent  Right ankle clonus: absent  Left ankle clonus: absent  Antalgic gait  Intact rapid finger movements         MEDICAL DECISION MAKING    Imaging Studies:     FL spine and pain procedure    Result Date: 2/28/2024  Narrative: Indication: Mechanical low back pain Preoperative diagnosis: 1. Lumbar spondylosis 2. Low back pain Postoperative diagnosis: 1. Lumbar spondylosis 2. Low back pain Procedure: Fluoroscopically-guided Radiofrequency denervation of the right L3-5 medial branch/dorsal ramus nerve(s) EBL: none Specimens: not applicable After discussing the risks, benefits, and alternatives to the procedure, the patient expressed understanding and wished to proceed. The patient was brought to the fluoroscopy suite and placed in the prone position. Procedural pause conducted to verify: correct patient identity, procedure to be performed and as applicable, correct side and site, correct patient position, and availability of implants, special equipment and special requirements. Using fluoroscopy, the junction of the transverse process and superior articulating process of the appropriate levels were identified and marked. The skin was sterilely prepped and draped in the usual fashion using Chloraprep skin prep. The skin and subcutaneous tissue were anesthetized with 0.5 % lidocaine. Using fluoroscopic guidance, a 150 mm 18 gauge RFK RF cannula with a 10 mm active tip was advanced to each target. This was confirmed using PA, lateral and oblique fluoroscopic views. Motor testing was performed at 2Hz up to 2 volts, and the measured tissue impedances were satisfactory. With final needle positioning, there was no evidence of radicular stimulation. Prior to lesioning, 2% lidocaine was injected at each site. After waiting 1-2 minutes for local anesthesia to take effect, lesioning was performed at 90 degrees Celsius for 90 seconds. The patient tolerated  the procedure well and there were no apparent complications. After appropriate observation, the patient was dismissed from the clinic in good condition under their own power.    XR spine cervical complete 4 or 5 vw non injury    Result Date: 2/9/2024  Narrative: CERVICAL SPINE INDICATION:   Cervicalgia. COMPARISON: None available VIEWS:  XR SPINE CERVICAL COMPLETE 4 OR 5 VW NON INJURY Images: 7 FINDINGS: No fracture. Stepwise degenerative retrolisthesis C3-4, C4-5, C5-6 and C6-7. Disc space narrowing C3-4, C4-5, C5-6 and C6-7 The neuroforamina are patent. The prevertebral soft tissues are within normal limits.  The lung apices are clear.     Impression: No acute osseous abnormality. Degenerative changes as above. Electronically signed: 02/09/2024 01:34 PM Ryley Walls, MPH,MD      I have personally reviewed pertinent reports.   and I have personally reviewed pertinent films in PACS

## 2024-03-08 DIAGNOSIS — I10 PRIMARY HYPERTENSION: ICD-10-CM

## 2024-03-08 RX ORDER — ATENOLOL 50 MG/1
50 TABLET ORAL DAILY
Qty: 90 TABLET | Refills: 1 | Status: SHIPPED | OUTPATIENT
Start: 2024-03-08

## 2024-03-08 RX ORDER — AMLODIPINE BESYLATE 5 MG/1
5 TABLET ORAL 2 TIMES DAILY
Qty: 180 TABLET | Refills: 1 | Status: SHIPPED | OUTPATIENT
Start: 2024-03-08

## 2024-03-13 ENCOUNTER — OFFICE VISIT (OUTPATIENT)
Dept: URGENT CARE | Facility: MEDICAL CENTER | Age: 69
End: 2024-03-13
Payer: COMMERCIAL

## 2024-03-13 ENCOUNTER — HOSPITAL ENCOUNTER (OUTPATIENT)
Dept: RADIOLOGY | Facility: MEDICAL CENTER | Age: 69
Discharge: HOME | End: 2024-03-13
Payer: COMMERCIAL

## 2024-03-13 ENCOUNTER — APPOINTMENT (OUTPATIENT)
Dept: RADIOLOGY | Facility: MEDICAL CENTER | Age: 69
End: 2024-03-13
Payer: COMMERCIAL

## 2024-03-13 VITALS
HEART RATE: 62 BPM | SYSTOLIC BLOOD PRESSURE: 151 MMHG | DIASTOLIC BLOOD PRESSURE: 77 MMHG | TEMPERATURE: 98.5 F | OXYGEN SATURATION: 98 % | RESPIRATION RATE: 18 BRPM

## 2024-03-13 VITALS
OXYGEN SATURATION: 99 % | RESPIRATION RATE: 18 BRPM | SYSTOLIC BLOOD PRESSURE: 143 MMHG | TEMPERATURE: 97.1 F | DIASTOLIC BLOOD PRESSURE: 70 MMHG | HEART RATE: 60 BPM
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
## 2024-03-13 DIAGNOSIS — M47.816 LUMBAR SPONDYLOSIS: ICD-10-CM

## 2024-03-13 DIAGNOSIS — S29.9XXA RIB INJURY: Primary | ICD-10-CM

## 2024-03-13 PROCEDURE — 99203 OFFICE O/P NEW LOW 30 MIN: CPT

## 2024-03-13 PROCEDURE — 71101 X-RAY EXAM UNILAT RIBS/CHEST: CPT

## 2024-03-13 RX ORDER — LIDOCAINE 50 MG/G
1 PATCH TOPICAL DAILY
Qty: 30 PATCH | Refills: 0 | Status: SHIPPED | OUTPATIENT
Start: 2024-03-13

## 2024-03-13 NOTE — PATIENT INSTRUCTIONS
Recommend rest, ice. Over the counter pain medication as directed on packaging as needed for pain - Extra Strength Tylenol. Prescribed lidocaine patches, use as directed.    Follow up with PCP in 3-5 days.  Proceed to  ER if symptoms worsen.    If tests are performed, our office will contact you with results only if changes need to made to the care plan discussed with you at the visit. You can review your full results on [facility] Mychart.    Contusión de [place]   LO QUE NECESITA SABER:   Lillian contusión de vidya es un moretón en lillian o más de chelsie costillas.        INSTRUCCIONES SOBRE EL [facility] HOSPITALARIA:   Regrese a la christin de emergencias si:  Usted tiene un aumento en el dolor de pecho.    Le falta el aire.    Usted empieza a expectorar shankar.    Ferreira dolor no mejora con medicamento para dolor.    Comuníquese con ferreira médico si:  Usted tiene tos.    Tiene fiebre.    Usted tiene preguntas o inquietudes acerca de ferreira condición o cuidado.    Medicamentos: Es posible que usted necesite alguno de los siguientes:  LOLY virginie el ibuprofeno, ayudan a disminuir la inflamación, el dolor y la fiebre. Jose E medicamento está disponible con o sin lillian receta médica. Los LOLY pueden causar sangrado estomacal o problemas renales en ciertas personas. Si usted está tomando un anticoagulante,  siempre  pregunte si los LOLY son seguros para usted. Siempre lucio la etiqueta de jose e medicamento y siga las instrucciones. No administre jose e medicamento a niños menores de 6 meses de bing sin antes obtener la autorización del médico.     Los analgésicos recetados podrían administrarse. Pregunte cómo rae estos medicamentos de lillian forma flood.    Cressey chelsie medicamentos virginie se le haya indicado. Consulte con ferreira médico si usted ryan que ferreira medicamento no le está ayudando o si presenta efectos secundarios. Infórmele al médico si usted es alérgico a algún medicamento. Mantenga lillian lista actualizada de los medicamentos, las vitaminas y los productos  herbales que inga. Incluya los siguientes datos de los medicamentos: cantidad, frecuencia y motivo de administración. Traiga con usted la lista o los envases de las píldoras a chelsie citas de seguimiento. Lleve la lista de los medicamentos con usted en anel de lillian emergencia.    Respiración profunda:  Para ayudar a evitar la neumonía, respire profundo 10 veces cada hora, aún cuando se despierte en la noche. Sujete chelsie costillas con chelsie jenna o con lillian almohada mientras inga las respiraciones profundas o tose. Lucien ayudará a disminuir el dolor.    Es posible que usted necesite un espirómetro incentivo para ayudarlo a rae respiraciones más profundas. Coloque la pieza plástica en la boca y tome un respiro muy profundo. Sostenga el aire lo thiago que usted pueda. Luego, deje salir el aire. Sachi esto 10 veces seguidas cada hora mientras esté despierto.    Descanse: Descanse las costillas para disminuir la inflamación y permitir que la lesión sane mas rápido. Evite las actividades que podrían provocar más dolor o dañar chelsie costillas. Conforme ferreira dolor disminuya, comience a realizar movimientos lentamente.  Hielo: El hielo ayuda a disminuir la inflamación y el dolor. El hielo también puede contribuir a evitar el daño de los tejidos. Use lillian compresa de hielo o ponga hielo triturado en lillian bolsa de plástico. Cubra la bolsa con lillian toalla y aplíquela sobre el área fracturada por 15 a 20 minutos cada hora virginie se le indicó.  Acuda a la consulta de control con ferreira médico según las indicaciones: Anote chelsie preguntas para que se acuerde de hacerlas sheeba chelsie visitas.  © Copyright [employer]ative 2023 Information is for End User's use only and may not be sold, redistributed or otherwise used for commercial purposes.  Esta información es sólo para uso en educación. Ferreira intención no es darle un consejo médico sobre enfermedades o tratamientos. Colsulte con ferreira médico, enfermera o farmacéutico antes de seguir cualquier régimen médico para  saber si es seguro y efectivo para usted.

## 2024-03-13 NOTE — H&P
History of Present Illness: The patient is a 69 y.o. female who presents with complaints of left low back pain    Past Medical History:   Diagnosis Date    Allergic     Hammertoe of right foot     OR   reapir today 2023    Hypertension     Wears glasses        Past Surgical History:   Procedure Laterality Date     SECTION      HERNIA REPAIR  1969    HYSTERECTOMY      removed some of left ovary    IN CORRECTION HAMMERTOE Right 2023    Procedure: REPAIR HAMMERTOE 3RD TOE;  Surgeon: Lokesh Simmons DPM;  Location: [facility];  Service: Podiatry    IN LAPS ABD PRTM&OMENTUM DX W/WO SPEC BR/WA SPX N/A 2023    Procedure: LAPAROSCOPY DIAGNOSTIC, LYSIS OF ADHESIONS;  Surgeon: Debbie Clark MD;  Location: [facility];  Service: General         Current Outpatient Medications:     amLODIPine (NORVASC) 5 mg tablet, Take 1 tablet (5 mg total) by mouth 2 (two) times a day, Disp: 180 tablet, Rfl: 1    atenolol (TENORMIN) 50 mg tablet, Take 1 tablet (50 mg total) by mouth daily, Disp: 90 tablet, Rfl: 1    benzonatate (TESSALON PERLES) 100 mg capsule, Take 1 capsule (100 mg total) by mouth 3 (three) times a day as needed for cough, Disp: 20 capsule, Rfl: 0    Blood Pressure Monitoring (Adult Blood Pressure Cuff Lg) KIT, Use in the morning (Patient not taking: Reported on 2023), Disp: 1 kit, Rfl: 0    Blood Pressure Monitoring (Blood Pressure Monitor/M Cuff) MISC, Use in the morning (Patient not taking: Reported on 2023), Disp: 1 each, Rfl: 0    Calcium-Magnesium-Vitamin D (CALCIUM MAGNESIUM PO), Take 2 capsules by mouth 3 (three) times a day, Disp: , Rfl:     cholecalciferol (VITAMIN D3) 1,000 units tablet, Take 2 tablets (2,000 Units total) by mouth daily, Disp: 60 tablet, Rfl: 5    Diclofenac Sodium (VOLTAREN) 1 %, Apply 2 g topically 4 (four) times a day, Disp: 100 g, Rfl: 0    lidocaine (LIDODERM) 5 %, Apply 1 patch topically over 12 hours daily Remove & Discard patch within 12  hours or as directed by MD (Patient not taking: Reported on 3/7/2024), Disp: 5 patch, Rfl: 0    lidocaine (LIDODERM) 5 %, Apply 1 patch topically over 12 hours daily for 4 days Remove & Discard patch within 12 hours or as directed by MD, Disp: 4 patch, Rfl: 0    meclizine (ANTIVERT) 25 mg tablet, Take 1 tablet (25 mg total) by mouth 3 (three) times a day as needed for dizziness for up to 3 days, Disp: 9 tablet, Rfl: 0    methocarbamol (ROBAXIN) 500 mg tablet, Take 1 tablet (500 mg total) by mouth 4 (four) times a day (Patient not taking: Reported on 3/7/2024), Disp: 30 tablet, Rfl: 1    Mirabegron ER (Myrbetriq) 50 MG TB24, every 24 hours (Patient not taking: Reported on 3/7/2024), Disp: , Rfl:     Multiple Vitamins-Minerals (multivitamin with minerals) tablet, Take 1 tablet by mouth daily (Patient not taking: Reported on 3/7/2024), Disp: , Rfl:     naproxen (NAPROSYN) 500 mg tablet, Take 1 tablet (500 mg total) by mouth 2 (two) times a day with meals, Disp: 30 tablet, Rfl: 0    rosuvastatin (CRESTOR) 10 MG tablet, Take 1 tablet (10 mg total) by mouth daily, Disp: 90 tablet, Rfl: 1    sodium picosulfate, magnesium oxide, citric acid (Clenpiq) oral solution, Use as directed as per written instructions from office (Patient not taking: Reported on 3/7/2024), Disp: 320 mL, Rfl: 0    Vibegron (Gemtesa) 75 MG TABS, every 24 hours (Patient not taking: Reported on 3/7/2024), Disp: , Rfl:     Allergies   Allergen Reactions    Aspirin Swelling    Nsaids Swelling       Physical Exam:   Vitals:    03/13/24 1047   BP: 151/77   Pulse: 62   Resp: 18   Temp: 98.5 °F (36.9 °C)   SpO2: 98%     General: Awake, Alert, Oriented x 3, Mood and affect appropriate  Respiratory: Respirations even and unlabored  Cardiovascular: Peripheral pulses intact; no edema  Musculoskeletal Exam: left low back pain    ASA Score: 3    Patient/Chart Verification  Patient ID Verified: Verbal  ID Band Applied: No  Consents Confirmed: Procedural  H&P( within  30 days) Verified: To be obtained in the Pre-Procedure area  Interval H&P(within 24 hr) Complete (required for Outpatients and Surgery Admit only): To be obtained in the Pre-Procedure area  Allergies Reviewed: Yes  Anticoag/NSAID held?: NA  Currently on antibiotics?: No  Pregnancy denied?: NA    Assessment:   1. Lumbar spondylosis        Plan: Left L3-L5 RFA    Patient states she fell this morning and is having significant pain in the left sided ribs, she cannot tolerate lying down for the procedure today.

## 2024-03-13 NOTE — PROGRESS NOTES
Pt was taken into the procedure room, attempted to position patient on the table and pt could not tolerate laying on her stomach due to pain from a fall she had earlier today.  JE cancelled the procedure. Pt instructed multiple times to go to PCP, urgent care or the ER to be evaluated.  Pt planned to go to urgent care at .   with patient

## 2024-03-13 NOTE — PROGRESS NOTES
[facility]        NAME: Mylene Wolff is a 69 y.o. female  : 1955    MRN: [id number]  DATE: 2024  TIME: 1:07 PM    Assessment and Plan   Rib injury [S29.9XXA]  1. Rib injury        2. Fall, initial encounter  XR ribs left w pa chest min 3 views    lidocaine (Lidoderm) 5 %        Radiology to determine final read of left ribs x-ray. Advised patient that she will only be called if her treatment plan needs to be changed and that her x-ray results will be on [facility]. Discussed treatment for bruised or broken ribs is symptomatic. Prescribed lidocaine patches, advised use of Extra Strength Tylenol as patient has allergy to NSAIDs listed. Advised follow up with PCP within the next few days.    Patient Instructions     Recommend rest, ice. Over the counter pain medication as directed on packaging as needed for pain - Extra Strength Tylenol. Prescribed lidocaine patches, use as directed.    Follow up with PCP in 3-5 days.  Proceed to  ER if symptoms worsen.    If tests are performed, our office will contact you with results only if changes need to made to the care plan discussed with you at the visit. You can review your full results on [facility].    Chief Complaint     Chief Complaint   Patient presents with    Fall     Patient c/o left side breast, shoulder, and rib pain after she fell. Patient reports that she fell this morning and hit her left side of her body on a box when she went down.         History of Present Illness       [place]  [id number].  Patient presents after fall this morning around 10:20 in the morning. She states she was outside of her house, she went to go back inside and tripped over the door frame. She fell, hitting onto boxes before landing on the ground. Denies hitting her head. She notes pain on her left side, over the area of her ribs. She would rate her pain currently as an 8/10. She denies difficulty breathing but notes deep breathing  causes pain. She was [facility] to [facility] today but was unable to lie flat on the procedure table due to pain from the fall, so they sent her here to be evaluated. She has not yet done anything for treatment of her pain.        Review of Systems   Review of Systems   Respiratory:  Negative for shortness of breath.    Musculoskeletal:  Positive for arthralgias.         Current Medications       Current Outpatient Medications:     lidocaine (Lidoderm) 5 %, Apply 1 patch topically over 12 hours daily Remove & Discard patch within 12 hours or as directed by MD, Disp: 30 patch, Rfl: 0    amLODIPine (NORVASC) 5 mg tablet, Take 1 tablet (5 mg total) by mouth 2 (two) times a day, Disp: 180 tablet, Rfl: 1    atenolol (TENORMIN) 50 mg tablet, Take 1 tablet (50 mg total) by mouth daily, Disp: 90 tablet, Rfl: 1    benzonatate (TESSALON PERLES) 100 mg capsule, Take 1 capsule (100 mg total) by mouth 3 (three) times a day as needed for cough, Disp: 20 capsule, Rfl: 0    Blood Pressure Monitoring (Adult Blood Pressure Cuff Lg) KIT, Use in the morning (Patient not taking: Reported on 12/8/2023), Disp: 1 kit, Rfl: 0    Blood Pressure Monitoring (Blood Pressure Monitor/M Cuff) MISC, Use in the morning (Patient not taking: Reported on 12/8/2023), Disp: 1 each, Rfl: 0    Calcium-Magnesium-Vitamin D (CALCIUM MAGNESIUM PO), Take 2 capsules by mouth 3 (three) times a day, Disp: , Rfl:     cholecalciferol (VITAMIN D3) 1,000 units tablet, Take 2 tablets (2,000 Units total) by mouth daily, Disp: 60 tablet, Rfl: 5    Diclofenac Sodium (VOLTAREN) 1 %, Apply 2 g topically 4 (four) times a day, Disp: 100 g, Rfl: 0    lidocaine (LIDODERM) 5 %, Apply 1 patch topically over 12 hours daily Remove & Discard patch within 12 hours or as directed by MD (Patient not taking: Reported on 3/7/2024), Disp: 5 patch, Rfl: 0    lidocaine (LIDODERM) 5 %, Apply 1 patch topically over 12 hours daily for 4 days Remove & Discard patch  within 12 hours or as directed by MD, Disp: 4 patch, Rfl: 0    meclizine (ANTIVERT) 25 mg tablet, Take 1 tablet (25 mg total) by mouth 3 (three) times a day as needed for dizziness for up to 3 days, Disp: 9 tablet, Rfl: 0    methocarbamol (ROBAXIN) 500 mg tablet, Take 1 tablet (500 mg total) by mouth 4 (four) times a day (Patient not taking: Reported on 3/7/2024), Disp: 30 tablet, Rfl: 1    Mirabegron ER (Myrbetriq) 50 MG TB24, every 24 hours (Patient not taking: Reported on 3/7/2024), Disp: , Rfl:     Multiple Vitamins-Minerals (multivitamin with minerals) tablet, Take 1 tablet by mouth daily (Patient not taking: Reported on 3/7/2024), Disp: , Rfl:     naproxen (NAPROSYN) 500 mg tablet, Take 1 tablet (500 mg total) by mouth 2 (two) times a day with meals, Disp: 30 tablet, Rfl: 0    rosuvastatin (CRESTOR) 10 MG tablet, Take 1 tablet (10 mg total) by mouth daily, Disp: 90 tablet, Rfl: 1    sodium picosulfate, magnesium oxide, citric acid (Clenpiq) oral solution, Use as directed as per written instructions from office (Patient not taking: Reported on 3/7/2024), Disp: 320 mL, Rfl: 0    Vibegron (Gemtesa) 75 MG TABS, every 24 hours (Patient not taking: Reported on 3/7/2024), Disp: , Rfl:   No current facility-administered medications for this visit.    Current Allergies     Allergies as of 2024 - Reviewed 2024   Allergen Reaction Noted    Aspirin Swelling 2021    Nsaids Swelling 2022            The following portions of the patient's history were reviewed and updated as appropriate: allergies, current medications, past family history, past medical history, past social history, past surgical history and problem list.     Past Medical History:   Diagnosis Date    Allergic     Hammertoe of right foot     OR   reapir today 2023    Hypertension     Wears glasses        Past Surgical History:   Procedure Laterality Date     SECTION      HERNIA REPAIR  1969    HYSTERECTOMY       removed some of left ovary    [place] CORRECTION HAMMERTOE Right 7/21/2023    Procedure: REPAIR HAMMERTOE 3RD TOE;  Surgeon: Lokesh Simmons DPM;  Location: [facility];  Service: Podiatry    [place] LAPS ABD PRTM&OMENTUM DX W/WO SPEC BR/WA SPX N/A 01/01/2023    Procedure: LAPAROSCOPY DIAGNOSTIC, LYSIS OF ADHESIONS;  Surgeon: Debbie Clark MD;  Location: [facility];  Service: General       Family History   Problem Relation Age of Onset    Breast cancer Mother     Thrombosis Father     No Known Problems Sister     No Known Problems Sister     No Known Problems Sister     Hypertension Sister     Hypertension Brother     Hypertension Brother     Diabetes Maternal Grandmother          Medications have been verified.        Objective   /70   Pulse 60   Temp (!) 97.1 °F (36.2 °C)   Resp 18   SpO2 99%        Physical Exam     Physical Exam  Vitals and nursing note reviewed.   Constitutional:       General: She is not in acute distress.     Appearance: Normal appearance. She is not ill-appearing.   Cardiovascular:      Rate and Rhythm: Normal rate and regular rhythm.      Pulses: Normal pulses.      Heart sounds: Normal heart sounds.   Pulmonary:      Effort: Pulmonary effort is normal.      Breath sounds: Normal breath sounds.   Chest:       Musculoskeletal:      Left shoulder: No swelling, deformity or bony tenderness. Normal range of motion.      Cervical back: Tenderness (left paraspinal muscles) present. No swelling, deformity or bony tenderness. Normal range of motion.   Neurological:      Mental Status: She is alert.

## 2024-03-15 ENCOUNTER — OFFICE VISIT (OUTPATIENT)
Dept: FAMILY MEDICINE CLINIC | Facility: CLINIC | Age: 69
End: 2024-03-15
Payer: COMMERCIAL

## 2024-03-15 VITALS
TEMPERATURE: 97.6 F | BODY MASS INDEX: 31.86 KG/M2 | HEIGHT: 63 IN | DIASTOLIC BLOOD PRESSURE: 70 MMHG | OXYGEN SATURATION: 99 % | SYSTOLIC BLOOD PRESSURE: 143 MMHG | WEIGHT: 179.8 LBS | HEART RATE: 68 BPM
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
## 2024-03-15 PROCEDURE — 99214 OFFICE O/P EST MOD 30 MIN: CPT

## 2024-03-15 PROCEDURE — 96372 THER/PROPH/DIAG INJ SC/IM: CPT

## 2024-03-15 RX ORDER — METHOCARBAMOL 500 MG/1
500 TABLET, FILM COATED ORAL 4 TIMES DAILY
Qty: 30 TABLET | Refills: 1 | Status: SHIPPED | OUTPATIENT
Start: 2024-03-15

## 2024-03-15 RX ORDER — KETOROLAC TROMETHAMINE 30 MG/ML
30 INJECTION, SOLUTION INTRAMUSCULAR; INTRAVENOUS ONCE
Status: DISCONTINUED | OUTPATIENT
Start: 2024-03-15 | End: 2024-03-15

## 2024-03-15 RX ORDER — KETOROLAC TROMETHAMINE 30 MG/ML
30 INJECTION, SOLUTION INTRAMUSCULAR; INTRAVENOUS ONCE
Status: COMPLETED | OUTPATIENT
Start: 2024-03-15 | End: 2024-03-15

## 2024-03-15 RX ORDER — PREDNISONE 20 MG/1
40 TABLET ORAL DAILY
Qty: 10 TABLET | Refills: 0 | Status: SHIPPED | OUTPATIENT
Start: 2024-03-15 | End: 2024-03-20

## 2024-03-15 RX ADMIN — KETOROLAC TROMETHAMINE 30 MG: 30 INJECTION, SOLUTION INTRAMUSCULAR; INTRAVENOUS at 14:41

## 2024-03-15 NOTE — PROGRESS NOTES
"Name: Mylene Wolff      : 1955      MRN: [id number]  Encounter Provider: BUSTER Gnun  Encounter Date: 3/15/2024   Encounter department: [facility]    Assessment & Plan     1. Fall, subsequent encounter  Assessment & Plan:  Fall on 24 around 10:20 in the morning.   She states she was outside of her house, went to go back inside and tripped over the door frame.   She fell on her left side into boxes before landing on the ground. Denies hitting her head.   She notes pain on her left side, over the area of her ribs.  Xray negative for fracture     She was [facility] to be [facility] for a procedure on 24 but was unable to lie flat on the procedure table due to pain from the fall    Today 03/15/24   Still in pain   Prednisone and methocarbamol prescribed   In review of allergies patient does not have a true allergy to NSAIDs but \"heard they are bad\". Education provided.   Ketorolac given     Orders:  -     methocarbamol (ROBAXIN) 500 mg tablet; Take 1 tablet (500 mg total) by mouth 4 (four) times a day  -     predniSONE 20 mg tablet; Take 2 tablets (40 mg total) by mouth daily for 5 days  -     ketorolac (TORADOL) injection 30 mg           Subjective      Fall  The accident occurred 2 days ago. The fall occurred while walking. She fell from a height of 3 to 5 ft. She landed on Carpet (onto the coffee table and couch). There was no blood loss. Pertinent negatives include no abdominal pain, fever, headaches, hematuria, nausea or vomiting.     Review of Systems   Constitutional:  Positive for activity change. Negative for appetite change, chills, fatigue and fever.   HENT:  Negative for congestion, ear pain, rhinorrhea, sore throat and trouble swallowing.    Eyes:  Negative for pain and visual disturbance.   Respiratory:  Negative for cough, chest tightness and shortness of breath.    Cardiovascular:  Negative for chest pain, palpitations and " leg swelling.   Gastrointestinal:  Negative for abdominal pain, constipation, diarrhea, nausea and vomiting.   Genitourinary:  Negative for difficulty urinating, dysuria, hematuria and urgency.   Musculoskeletal:  Positive for arthralgias, back pain and myalgias.   Skin:  Negative for color change and rash.   Neurological:  Negative for dizziness, seizures, syncope and headaches.   Psychiatric/Behavioral:  Negative for decreased concentration, dysphoric mood and sleep disturbance. The patient is not nervous/anxious.    All other systems reviewed and are negative.      Current Outpatient Medications on File Prior to Visit   Medication Sig    amLODIPine (NORVASC) 5 mg tablet Take 1 tablet (5 mg total) by mouth 2 (two) times a day    atenolol (TENORMIN) 50 mg tablet Take 1 tablet (50 mg total) by mouth daily    benzonatate (TESSALON PERLES) 100 mg capsule Take 1 capsule (100 mg total) by mouth 3 (three) times a day as needed for cough    Calcium-Magnesium-Vitamin D (CALCIUM MAGNESIUM PO) Take 2 capsules by mouth 3 (three) times a day    cholecalciferol (VITAMIN D3) 1,000 units tablet Take 2 tablets (2,000 Units total) by mouth daily    Diclofenac Sodium (VOLTAREN) 1 % Apply 2 g topically 4 (four) times a day    lidocaine (Lidoderm) 5 % Apply 1 patch topically over 12 hours daily Remove & Discard patch within 12 hours or as directed by MD    naproxen (NAPROSYN) 500 mg tablet Take 1 tablet (500 mg total) by mouth 2 (two) times a day with meals    rosuvastatin (CRESTOR) 10 MG tablet Take 1 tablet (10 mg total) by mouth daily    Blood Pressure Monitoring (Adult Blood Pressure Cuff Lg) KIT Use in the morning (Patient not taking: Reported on 12/8/2023)    Blood Pressure Monitoring (Blood Pressure Monitor/M Cuff) MISC Use in the morning (Patient not taking: Reported on 12/8/2023)    lidocaine (LIDODERM) 5 % Apply 1 patch topically over 12 hours daily Remove & Discard patch within 12 hours or as directed by MD (Patient not  "taking: Reported on 3/7/2024)    lidocaine (LIDODERM) 5 % Apply 1 patch topically over 12 hours daily for 4 days Remove & Discard patch within 12 hours or as directed by MD    meclizine (ANTIVERT) 25 mg tablet Take 1 tablet (25 mg total) by mouth 3 (three) times a day as needed for dizziness for up to 3 days    Mirabegron ER (Myrbetriq) 50 MG TB24 every 24 hours (Patient not taking: Reported on 3/7/2024)    Multiple Vitamins-Minerals (multivitamin with minerals) tablet Take 1 tablet by mouth daily (Patient not taking: Reported on 3/7/2024)    sodium picosulfate, magnesium oxide, citric acid (Clenpiq) oral solution Use as directed as per written instructions from office (Patient not taking: Reported on 3/7/2024)    Vibegron (Gemtesa) 75 MG TABS every 24 hours (Patient not taking: Reported on 3/7/2024)    [DISCONTINUED] methocarbamol (ROBAXIN) 500 mg tablet Take 1 tablet (500 mg total) by mouth 4 (four) times a day (Patient not taking: Reported on 3/7/2024)       Objective     /70 (BP Location: Left arm, Patient Position: Sitting, Cuff Size: Adult)   Pulse 68   Temp 97.6 °F (36.4 °C) (Temporal)   Ht 5' 3\" (1.6 m)   Wt 81.6 kg (179 lb 12.8 oz)   SpO2 99%   BMI 31.85 kg/m²     Physical Exam  Vitals and nursing note reviewed.   Constitutional:       Appearance: Normal appearance. She is normal weight.   HENT:      Head: Normocephalic.      Right Ear: Tympanic membrane, ear canal and external ear normal. There is no impacted cerumen.      Left Ear: Tympanic membrane, ear canal and external ear normal. There is no impacted cerumen.      Nose: Nose normal.      Mouth/Throat:      Mouth: Mucous membranes are moist.      Pharynx: Oropharynx is clear.   Eyes:      Extraocular Movements: Extraocular movements intact.      Conjunctiva/sclera: Conjunctivae normal.      Pupils: Pupils are equal, round, and reactive to light.   Cardiovascular:      Rate and Rhythm: Normal rate and regular rhythm.      Pulses: Normal " pulses.      Heart sounds: Normal heart sounds.   Pulmonary:      Effort: Pulmonary effort is normal.      Breath sounds: Normal breath sounds.   Abdominal:      General: Bowel sounds are normal. There is no distension.      Palpations: Abdomen is soft.      Tenderness: There is no abdominal tenderness.   Musculoskeletal:      Left shoulder: Tenderness present. Decreased range of motion.      Cervical back: Normal range of motion.      Thoracic back: Signs of trauma, tenderness and bony tenderness present. No swelling, edema or deformity. Decreased range of motion.   Skin:     General: Skin is warm.      Capillary Refill: Capillary refill takes less than 2 seconds.   Neurological:      General: No focal deficit present.      Mental Status: She is alert and oriented to person, place, and time. Mental status is at baseline.   Psychiatric:         Mood and Affect: Mood normal.         Behavior: Behavior normal.         Thought Content: Thought content normal.         Judgment: Judgment normal.       Patient Instructions   Prevención de caídas   LO QUE NECESITA SABER:   La prevención de caídas incluye formas de hacer más seguro ferreira hogar y otras áreas. La prevención también incluye cómo moverse más cuidadosamente para evitar lillian caída. Las condiciones médicas que provocan cambios en la presión arterial, la visión o la fuerza muscular y la coordinación pueden llegar a aumentar ferreira riesgo de caídas. Los medicamentos también pueden aumentar ferreira riesgo de caídas si le provocan mareos, debilidad o somnolencia.  INSTRUCCIONES SOBRE EL [facility]ONIMO HOSPITALARIA:   Sachi los arreglos necesarios para que otra persona llame al número de emergencias local (911 en [place]) si:  Se ha caído y está inconsciente.    Se ha caído y no puede  parte de ferreira cuerpo.    Llame a ferreira médico si:  Se ha caído y tiene dolor en el cuerpo o dolor de shawn.    Usted tiene preguntas o inquietudes acerca de ferreira condición o  cuidado.    Recomendaciones para prevenir caídas:  Póngase de pie o siéntese despacio. [place] puede ayudarlo a mantener ferreira equilibrio y prevenir lillian caída.    Use dispositivos de apoyo virginie se le indique. Ferreira médico le puede recomendar que use un bastón o un caminador para que lo asista en ferreira equilibrio. Es posible que necesite que le instalen barandas en el baño cerca al inodoro o en la ducha.    Use calzado que le quede gloria y tenga suelas antideslizantes. Use zapatos dentro y fuera de casa. Utilice pantuflas con lillian suela de buen agarre. No use zapatos con tacones altos.    Manténgase activo. El ejercicio puede ayudar a fortalecer los músculos y mejorar ferreira equilibrio. Ferreira médico le puede recomendar hacer ejercicios aeróbicos acuáticos o caminar. También le puede recomendar la fisioterapia para mejorar ferreira coordinación. Nunca comience un programa de ejercicios sin consultarlo nick con ferreira médico.         Controle chelsie afecciones médicas. Cumpla con todas las citas con chelsie médicos. Visite al oculista virginie se le ha indicado.    Recomendaciones para la seguridad en el hogar:      Utilizar un dispositivo de alerta médica. Brittney es un dispositivo que puede llevar puesto y le permite llamar para pedir ayuda en anel de que tenga lillian caída. Pídale a ferreira médico más información.    Agregue elementos para prevenir caídas en el baño. Coloque tiras antideslizantes en el piso de la ducha o de la bañera para evitar resbalones. Use lillian alfombra de baño si no tiene alfombra en el cuarto de baño. [place] evitará que se caiga cuando sale de la ducha o la bañera. Use un asiento de ducha de modo que no necesitará ponerse de pie mientras se ducha. Siéntese en el inodoro o en lillian silla en el cuarto de baño para secarse y vestirse. [place] impedirá que pierda el equilibrio mientras se seca o se viste estando de pie.    Mantener los pasillos despejados. Despeje las vías y las escaleras por donde camina retirando los libros, los zapatos u otros  objetos. Coloque los cables del teléfono y las lámparas fuera de ferreira salas para que no tenga que caminar sobre ellos. Si no puede moverlos, sujételos con cinta adhesiva. Retire los tapetes pequeños. Si no puede quitar un tapete, sujételo con cinta de doble jodi. Lo cual evitará que se tropiece con éstos.    Instale lillian buena iluminación en ferreira hogar. Use lamparillas de noche para ayudar a iluminar los pasillos al baño o a la cocina. Siempre encienda la kiki antes de empezar a caminar.    Mantenga los objetos que usa con frecuencia en estantes dentro de ferreira alcance. No use un banquito para intentar alcanzar un elemento.    Pinte o coloque cinta reflectiva en los bordes de las escaleras. Lo cual puede ayudarle a armen mejor las escaleras.  Planifique con antelación por si se : Hable con familiares, amigos y vecinos para crear un plan para las caídas. Alguien tendrá que pedir ayuda de emergencia si sufre lillian lesión o lo encuentran inconsciente. Si es posible, lleve siempre consigo un teléfono móvil o un dispositivo de alerta de emergencias. Puedes ponerse en contacto con los servicios de emergencia pulsando un botón del dispositivo. Pídale a ferreira médico más información.  Acuda a la consulta de control con ferreira médico según las indicaciones: Anote chelsie preguntas para que se acuerde de hacerlas sheeba chelsie visitas.  © Copyright [employer]  Information is for End User's use only and may not be sold, redistributed or otherwise used for commercial purposes.  Esta información es sólo para uso en educación. Ferreira intención no es darle un consejo médico sobre enfermedades o tratamientos. Colsulte con ferreira médico, enfermera o farmacéutico antes de seguir cualquier régimen médico para saber si es seguro y efectivo para usted.      BUSTER Gunn

## 2024-03-15 NOTE — ASSESSMENT & PLAN NOTE
"Fall on 03/13/24 around 10:20 in the morning.   She states she was outside of her house, went to go back inside and tripped over the door frame.   She fell on her left side into boxes before landing on the ground. Denies hitting her head.   She notes pain on her left side, over the area of her ribs.  Xray negative for fracture     She was supposed to be [facility] for a procedure on 03/13/24 but was unable to lie flat on the procedure table due to pain from the fall    Today 03/15/24   Still in pain   Prednisone and methocarbamol prescribed   In review of allergies patient does not have a true allergy to NSAIDs but \"heard they are bad\". Education provided.   Ketorolac given   "

## 2024-03-15 NOTE — PATIENT INSTRUCTIONS
Prevención de caídas   LO QUE NECESITA SABER:   La prevención de caídas incluye formas de hacer más seguro ferreira hogar y otras áreas. La prevención también incluye cómo moverse más cuidadosamente para evitar lillian caída. Las condiciones médicas que provocan cambios en la presión arterial, la visión o la fuerza muscular y la coordinación pueden llegar a aumentar ferreira riesgo de caídas. Los medicamentos también pueden aumentar ferreira riesgo de caídas si le provocan mareos, debilidad o somnolencia.  INSTRUCCIONES SOBRE EL [facility]ITAL[facility]IA:   Sachi los arreglos necesarios para que otra persona llame al número de emergencias local (911 en los [place]) si:  Se ha caído y está inconsciente.    Se ha caído y no puede  parte de ferreira cuerpo.    Llame a ferreira médico si:  Se ha caído y tiene dolor en el cuerpo o dolor de shawn.    Usted tiene preguntas o inquietudes acerca de ferreira condición o cuidado.    Recomendaciones para prevenir caídas:  Póngase de pie o siéntese despacio. [place] puede ayudarlo a mantener ferreira equilibrio y prevenir lillian caída.    Use dispositivos de apoyo virginie se le indique. Ferreira médico le puede recomendar que use un bastón o un caminador para que lo asista en ferreira equilibrio. Es posible que necesite que le instalen barandas en el baño cerca al inodoro o en la ducha.    Use calzado que le quede gloria y tenga suelas antideslizantes. Use zapatos dentro y fuera de casa. Utilice pantuflas con lillian suela de buen agarre. No use zapatos con tacones altos.    Manténgase activo. El ejercicio puede ayudar a fortalecer los músculos y mejorar ferreira equilibrio. Ferreira médico le puede recomendar hacer ejercicios aeróbicos acuáticos o caminar. También le puede recomendar la fisioterapia para mejorar ferreira coordinación. Nunca comience un programa de ejercicios sin consultarlo nick con ferreira médico.         Controle chelsie afecciones médicas. Cumpla con todas las citas con chelsie médicos. Visite al oculista virginie se le ha indicado.    Recomendaciones para  la seguridad en el hogar:      Utilizar un dispositivo de alerta médica. Brittney es un dispositivo que puede llevar puesto y le permite llamar para pedir ayuda en anel de que tenga lillian caída. Pídale a ferreira médico más información.    Agregue elementos para prevenir caídas en el baño. Coloque tiras antideslizantes en el piso de la ducha o de la bañera para evitar resbalones. Use lillian alfombra de baño si no tiene alfombra en el cuarto de baño. [place] evitará que se caiga cuando sale de la ducha o la bañera. Use un asiento de ducha de modo que no necesitará ponerse de pie mientras se ducha. Siéntese en el inodoro o en lillian silla en el cuarto de baño para secarse y vestirse. [place] impedirá que pierda el equilibrio mientras se seca o se viste estando de pie.    Mantener los pasillos despejados. Despeje las vías y las escaleras por donde camina retirando los libros, los zapatos u otros objetos. Coloque los cables del teléfono y las lámparas fuera de ferreira salas para que no tenga que caminar sobre ellos. Si no puede moverlos, sujételos con cinta adhesiva. Retire los tapetes pequeños. Si no puede quitar un tapete, sujételo con cinta de doble jodi. Lo cual evitará que se tropiece con éstos.    Instale lillian buena iluminación en ferreira hogar. Use lamparillas de noche para ayudar a iluminar los pasillos al baño o a la cocina. Siempre encienda la kiki antes de empezar a caminar.    Mantenga los objetos que usa con frecuencia en estantes dentro de ferreira alcance. No use un banquito para intentar alcanzar un elemento.    Pinte o coloque cinta reflectiva en los bordes de las escaleras. Lo cual puede ayudarle a armen mejor las escaleras.  Planifique con antelación por si se : Hable con familiares, amigos y vecinos para crear un plan para las caídas. Alguien tendrá que pedir ayuda de emergencia si sufre llilian lesión o lo encuentran inconsciente. Si es posible, lleve siempre consigo un teléfono móvil o un dispositivo de alerta de emergencias. Puedes ponerse  en contacto con los servicios de emergencia pulsando un botón del dispositivo. Pídale a ferreira médico más información.  Acuda a la consulta de control con ferreira médico según las indicaciones: Anote chelsie preguntas para que se acuerde de hacerlas sheeba chelsie visitas.  © Copyright [employer] 2023 Information is for End User's use only and may not be sold, redistributed or otherwise used for commercial purposes.  Esta información es sólo para uso en educación. Ferreira intención no es darle un consejo médico sobre enfermedades o tratamientos. Colsulte con ferreira médico, enfermera o farmacéutico antes de seguir cualquier régimen médico para saber si es seguro y efectivo para usted.

## 2024-03-20 ENCOUNTER — TELEPHONE (OUTPATIENT)
Dept: OBGYN CLINIC | Facility: MEDICAL CENTER | Age: 69
End: 2024-03-20

## 2024-03-20 ENCOUNTER — TELEPHONE (OUTPATIENT)
Dept: OBGYN CLINIC | Facility: CLINIC | Age: 69
End: 2024-03-20

## 2024-03-20 NOTE — TELEPHONE ENCOUNTER
Looked in patient chart and there was an annual appt. Cancelled for 7/25/2024. There were no previous annual exams in her chart. Her appt. On 3/21/24 is a valid appt.

## 2024-03-20 NOTE — TELEPHONE ENCOUNTER
If you go into her chart in her encounters and scroll down to 7/20/23 it shows she had an annual exam. Please call pt per Holly.

## 2024-03-20 NOTE — TELEPHONE ENCOUNTER
----- Message from BUSTER Trevino sent at 3/20/2024 12:46 PM EDT -----  Pt is scheduled for annual exam 3/21/23. Her last exam was 7/20/23. She will most likely receive a charge if we do an annual exam. Please call to see if there is a problem she needs to be seen for. If not will need to be scheduled 7/21/2025 if she has MC or 7/21/2024 if her ins allows yearly exams  This should also be placed on the issue tracker for access since she was scheduled incorrectly < 1 year since last annual exam     Thank you

## 2024-03-21 ENCOUNTER — OFFICE VISIT (OUTPATIENT)
Dept: URGENT CARE | Facility: MEDICAL CENTER | Age: 69
End: 2024-03-21
Payer: COMMERCIAL

## 2024-03-21 ENCOUNTER — TELEPHONE (OUTPATIENT)
Dept: FAMILY MEDICINE CLINIC | Facility: CLINIC | Age: 69
End: 2024-03-21

## 2024-03-21 ENCOUNTER — TELEPHONE (OUTPATIENT)
Age: 69
End: 2024-03-21

## 2024-03-21 VITALS
SYSTOLIC BLOOD PRESSURE: 145 MMHG | RESPIRATION RATE: 18 BRPM | OXYGEN SATURATION: 98 % | DIASTOLIC BLOOD PRESSURE: 74 MMHG | TEMPERATURE: 97.1 F | HEART RATE: 64 BPM

## 2024-03-21 DIAGNOSIS — R07.81 RIB PAIN: Primary | ICD-10-CM

## 2024-03-21 PROCEDURE — 99213 OFFICE O/P EST LOW 20 MIN: CPT

## 2024-03-21 RX ORDER — LIDOCAINE 50 MG/G
1 PATCH TOPICAL DAILY
Qty: 10 PATCH | Refills: 0 | Status: SHIPPED | OUTPATIENT
Start: 2024-03-21 | End: 2024-03-31

## 2024-03-21 NOTE — PATIENT INSTRUCTIONS
Patient  can use extra strength tylenol for pain  Lido derm patches on every 12 hours and then off for 12 hours   Use ice or heat for pain I her ribs/back  Follow up with PCP

## 2024-03-21 NOTE — TELEPHONE ENCOUNTER
Caller: mike    Doctor: fernando    Reason for call: pt would like a refill on her lidocaine patches. She got them from urgent care.    Call back#: [phone]

## 2024-03-21 NOTE — TELEPHONE ENCOUNTER
Patient came in to the office to request another Toradol injection due to left shoulder pain and bodyache due to recent fall. She states she has trouble sleeping and feels achey due to the fall. Patient told provider out of the office and may too early to receive any Toradol injection. She stated she will continue the OTC pain reliever and ice and refused an appointment tomorrow with one of other providers.     Patient returned to the office within 10 minutes and said she changed her mind, scheduled for 03/22/2024 at 11 am. Patient was given an appt reminder card.

## 2024-03-21 NOTE — PROGRESS NOTES
[facility]        NAME: Mylene Wolff is a 69 y.o. female  : 1955    MRN: [id number]  DATE: 2024  TIME: 1:41 PM    Assessment and Plan   Rib pain [R07.81]  1. Rib pain  lidocaine (Lidoderm) 5 %            Patient Instructions   Patient  can use extra strength tylenol for pain  Lido derm patches on every 12 hours and then off for 12 hours   Use ice or heat for pain I her ribs/back  Follow up with PCP    Follow up with PCP in 3-5 days.  Proceed to  ER if symptoms worsen.    If tests have been performed at [facility], our office will contact you with results if changes need to be made to the care plan discussed with you at the visit.  You can review your full results on [facility].    Chief Complaint     Chief Complaint   Patient presents with    Pain     Patient continues with left side body pain due to a fall on 2024. She return to the office for pain [place]  with the Lidoderm patches 5%.         History of Present Illness       This is  a 69 year old female who was seen here after a fall on the  with rib pain.  She was given robaxin and lido derm patches.  She states she is out of the patches.  We discussed using over the counter salon pas or Lido 1% patches.  She has a follow up appointment tomorrow with her PCP we did discuss via the  to discuss pain management with him. SHe denies any SOB or chest pain  Pain is  in the L upper back and L lateral rib cage        Review of Systems   Review of Systems   Constitutional:  Negative for chills and fever.   HENT:  Negative for congestion, ear pain, postnasal drip, sinus pain and sore throat.    Eyes:  Negative for pain and itching.   Respiratory:  Negative for cough, shortness of breath and wheezing.    Cardiovascular:  Negative for chest pain and palpitations.   Gastrointestinal:  Negative for abdominal pain, constipation, diarrhea, nausea and vomiting (upper L back and lateral L rib cage).    Genitourinary:  Negative for difficulty urinating and hematuria.   Musculoskeletal:  Positive for myalgias. Negative for arthralgias.   Skin:  Negative for rash.   Neurological:  Negative for dizziness, light-headedness and headaches.   Psychiatric/Behavioral:  Negative for agitation and sleep disturbance. The patient is not nervous/anxious.          Current Medications       Current Outpatient Medications:     lidocaine (Lidoderm) 5 %, Apply 1 patch topically over 12 hours daily for 10 days Remove & Discard patch within 12 hours or as directed by MD, Disp: 10 patch, Rfl: 0    amLODIPine (NORVASC) 5 mg tablet, Take 1 tablet (5 mg total) by mouth 2 (two) times a day, Disp: 180 tablet, Rfl: 1    atenolol (TENORMIN) 50 mg tablet, Take 1 tablet (50 mg total) by mouth daily, Disp: 90 tablet, Rfl: 1    benzonatate (TESSALON PERLES) 100 mg capsule, Take 1 capsule (100 mg total) by mouth 3 (three) times a day as needed for cough, Disp: 20 capsule, Rfl: 0    Blood Pressure Monitoring (Adult Blood Pressure Cuff Lg) KIT, Use in the morning (Patient not taking: Reported on 12/8/2023), Disp: 1 kit, Rfl: 0    Blood Pressure Monitoring (Blood Pressure Monitor/M Cuff) MISC, Use in the morning (Patient not taking: Reported on 12/8/2023), Disp: 1 each, Rfl: 0    Calcium-Magnesium-Vitamin D (CALCIUM MAGNESIUM PO), Take 2 capsules by mouth 3 (three) times a day, Disp: , Rfl:     cholecalciferol (VITAMIN D3) 1,000 units tablet, Take 2 tablets (2,000 Units total) by mouth daily, Disp: 60 tablet, Rfl: 5    Diclofenac Sodium (VOLTAREN) 1 %, Apply 2 g topically 4 (four) times a day, Disp: 100 g, Rfl: 0    lidocaine (LIDODERM) 5 %, Apply 1 patch topically over 12 hours daily Remove & Discard patch within 12 hours or as directed by MD (Patient not taking: Reported on 3/7/2024), Disp: 5 patch, Rfl: 0    lidocaine (LIDODERM) 5 %, Apply 1 patch topically over 12 hours daily for 4 days Remove & Discard patch within 12 hours or as directed by  MD, Disp: 4 patch, Rfl: 0    lidocaine (Lidoderm) 5 %, Apply 1 patch topically over 12 hours daily Remove & Discard patch within 12 hours or as directed by MD, Disp: 30 patch, Rfl: 0    meclizine (ANTIVERT) 25 mg tablet, Take 1 tablet (25 mg total) by mouth 3 (three) times a day as needed for dizziness for up to 3 days, Disp: 9 tablet, Rfl: 0    methocarbamol (ROBAXIN) 500 mg tablet, Take 1 tablet (500 mg total) by mouth 4 (four) times a day, Disp: 30 tablet, Rfl: 1    Mirabegron ER (Myrbetriq) 50 MG TB24, every 24 hours (Patient not taking: Reported on 3/7/2024), Disp: , Rfl:     Multiple Vitamins-Minerals (multivitamin with minerals) tablet, Take 1 tablet by mouth daily (Patient not taking: Reported on 3/7/2024), Disp: , Rfl:     naproxen (NAPROSYN) 500 mg tablet, Take 1 tablet (500 mg total) by mouth 2 (two) times a day with meals, Disp: 30 tablet, Rfl: 0    rosuvastatin (CRESTOR) 10 MG tablet, Take 1 tablet (10 mg total) by mouth daily, Disp: 90 tablet, Rfl: 1    sodium picosulfate, magnesium oxide, citric acid (Clenpiq) oral solution, Use as directed as per written instructions from office (Patient not taking: Reported on 3/7/2024), Disp: 320 mL, Rfl: 0    Vibegron (Gemtesa) 75 MG TABS, every 24 hours (Patient not taking: Reported on 3/7/2024), Disp: , Rfl:     Current Allergies     Allergies as of 2024 - Reviewed 2024   Allergen Reaction Noted    Aspirin Swelling 2021            The following portions of the patient's history were reviewed and updated as appropriate: allergies, current medications, past family history, past medical history, past social history, past surgical history and problem list.     Past Medical History:   Diagnosis Date    Allergic     Hammertoe of right foot     OR   reapir today 2023    Hypertension     Wears glasses        Past Surgical History:   Procedure Laterality Date     SECTION      HERNIA REPAIR  1969    HYSTERECTOMY      removed some  of left ovary    DC CORRECTION HAMMERTOE Right 7/21/2023    Procedure: REPAIR HAMMERTOE 3RD TOE;  Surgeon: Lokesh Simmons DPM;  Location: [facility];  Service: Podiatry    DC LAPS ABD PRTM&OMENTUM DX W/WO SPEC BR/WA SPX N/A 01/01/2023    Procedure: LAPAROSCOPY DIAGNOSTIC, LYSIS OF ADHESIONS;  Surgeon: Debbie Clark MD;  Location: [facility];  Service: General       Family History   Problem Relation Age of Onset    Breast cancer Mother     Thrombosis Father     No Known Problems Sister     No Known Problems Sister     No Known Problems Sister     Hypertension Sister     Hypertension Brother     Hypertension Brother     Diabetes Maternal Grandmother          Medications have been verified.        Objective   /74   Pulse 64   Temp (!) 97.1 °F (36.2 °C)   Resp 18   SpO2 98%   No LMP recorded. Patient has had a hysterectomy.       Physical Exam     Physical Exam  Vitals reviewed.   Constitutional:       General: She is not in acute distress.     Appearance: Normal appearance.   HENT:      Head: Normocephalic and atraumatic.      Right Ear: Tympanic membrane and ear canal normal.      Left Ear: Tympanic membrane and ear canal normal.      Mouth/Throat:      Mouth: Mucous membranes are moist.      Pharynx: No oropharyngeal exudate or posterior oropharyngeal erythema.   Eyes:      Extraocular Movements: Extraocular movements intact.      Conjunctiva/sclera: Conjunctivae normal.      Pupils: Pupils are equal, round, and reactive to light.   Cardiovascular:      Rate and Rhythm: Normal rate and regular rhythm.      Pulses: Normal pulses.      Heart sounds: Normal heart sounds. No murmur heard.  Pulmonary:      Effort: Pulmonary effort is normal. No respiratory distress.      Breath sounds: Normal breath sounds.   Abdominal:      General: Abdomen is flat. Bowel sounds are normal. There is no distension.      Palpations: Abdomen is soft.      Tenderness: There is no abdominal tenderness. There is no  guarding or rebound.   Musculoskeletal:         General: Tenderness present. No swelling. Deformity: upper back and L lateral rib cage.Normal range of motion.      Cervical back: Normal range of motion and neck supple. No tenderness.   Skin:     General: Skin is warm.   Neurological:      General: No focal deficit present.      Mental Status: She is alert.   Psychiatric:         Mood and Affect: Mood normal.         Behavior: Behavior normal.         Judgment: Judgment normal.

## 2024-03-22 ENCOUNTER — TELEPHONE (OUTPATIENT)
Age: 69
End: 2024-03-22

## 2024-03-22 DIAGNOSIS — R07.81 RIB PAIN: Primary | ICD-10-CM

## 2024-03-22 RX ORDER — PREDNISONE 20 MG/1
40 TABLET ORAL DAILY
Qty: 10 TABLET | Refills: 0 | Status: SHIPPED | OUTPATIENT
Start: 2024-03-22 | End: 2024-03-27

## 2024-03-22 NOTE — TELEPHONE ENCOUNTER
RN attempted to reach pt regarding lidocaine patches.  With help of  # [id number] RN left a MOM with c/b number office hours and location provided.

## 2024-03-22 NOTE — TELEPHONE ENCOUNTER
" services conferenced in [place] #[id number]. Patient unable to make her appointment today as she is still in too much pain. She is home resting. Using Lidoderm patches, and taking methocarbamol. She is asking for a refill of Prednisone to [facility]. She went to [facility] yesterday for her pain, states she came in to [facility] asked for an injection of \"ibuprofen\" but was denied. She wants another injection as it gave her some temporary relief. Please advise when she can have another injection of ketorolac. R/s her appointment to 5/2/24 as she requested. Advised her to call for a sooner appointment is she has a flare-up of sx or to return to [facility] for evaluation. She acknowledged understanding.  "

## 2024-03-25 ENCOUNTER — OFFICE VISIT (OUTPATIENT)
Dept: DENTISTRY | Facility: CLINIC | Age: 69
End: 2024-03-25

## 2024-03-25 VITALS — HEART RATE: 66 BPM | SYSTOLIC BLOOD PRESSURE: 143 MMHG | DIASTOLIC BLOOD PRESSURE: 70 MMHG

## 2024-03-25 VITALS — DIASTOLIC BLOOD PRESSURE: 81 MMHG | TEMPERATURE: 98.2 F | HEART RATE: 67 BPM | SYSTOLIC BLOOD PRESSURE: 149 MMHG

## 2024-03-25 DIAGNOSIS — Z01.20 ENCOUNTER FOR DENTAL EXAMINATION: Primary | ICD-10-CM

## 2024-03-25 PROCEDURE — D0150 COMPREHENSIVE ORAL EVALUATION - NEW OR ESTABLISHED PATIENT: HCPCS | Performed by: DENTIST

## 2024-03-25 PROCEDURE — D0210 INTRAORAL - COMPLETE SERIES OF RADIOGRAPHIC IMAGES: HCPCS

## 2024-03-25 NOTE — DENTAL PROCEDURE DETAILS
Mylene Wolff presents for a Comprehensive exam. Verbal consent for treatment given in addition to the forms.     Reviewed health history - Patient is ASA II  Consents signed: Yes    Translation  [id number] rooming  [id number] EXAM  DR GARCIA    Patient has existing lower RPD made in the DR by a relative of hers     fits her fine  Has a history of deep cleaning many years ago   she appears to be doing well now with the exception of # 16     Perio: Localized # 15-(14) / 16    will need # 16 extracted   Pain Scale: 1  Caries Assessment: High  Radiographs: Complete mouth series     Oral Hygiene instruction reviewed and given.  Recommended Hygiene recall visits with the Mylene.     Treatment Plan:  1.  Infection control: referred for   2.  Periodontal therapy: adult prophy/ [facility]  3.  Caries control: as charted  4.  Occlusal evaluation:   multiple abfractions noted  super eruptions  wears a lower cast metal RPD  nu seats are on lower premolars/ almost look more like severe attrition!    Patient states she was told she should have CR #3  very large filling present but DR MARQUEZ explained there is not enough room for CR with her bite    sugg. New nu  also need Class Vs restored,     Prognosis is guarded  Referrals needed: No  Next Visit:      TRANSLATION NEEDED  NV 1  nu # 3  PLEASE EVAL IF # 16 CAN BE EXTRACTED HERE   DEEP POCKETING NOTED  NV 2  INIT PROPHY

## 2024-03-25 NOTE — TELEPHONE ENCOUNTER
RN attempted to reach pt regarding previous. VMMLOM with c/b number office hours and location provided.  With assist of  # [id number]

## 2024-03-26 NOTE — TELEPHONE ENCOUNTER
RN s/w pt regarding lidocaine patches with assist of Philip # [id number] Slovak interpretor    Pt stated she was able to fill what urgent care sent in and she also wanted to say that she had paperwork that she wanted to have filled out for her employer per pt she did drop some off last week and is dropping more off tomorrow.    --please see previous and paperwork to be filled out

## 2024-03-26 NOTE — PROGRESS NOTES
Patient presents for NP Comp exam & FMX, upon completing rooming. I had explained to patient we need take x-rays and she said she was seen at another office 6M ago. I had explained to her the insurance might not pay for new films. Yael checked her insurance and she doesn't have coverage at this time. Patient chose to reschedule appointment until she confirms she has coverage.

## 2024-03-26 NOTE — TELEPHONE ENCOUNTER
Caller: mike Chakraborty    Doctor: Demario    Reason for call: pt returning the nurse's call.  Please call back after 4 pm    Call back#: [phone]    With Yakut interpretor [phone]

## 2024-03-28 NOTE — TELEPHONE ENCOUNTER
L/M via   Jomar  [id number]. Not able to complete FMLA ppw as there is no documentation form dr Reagan

## 2024-03-28 NOTE — TELEPHONE ENCOUNTER
Caller: Patient     Doctor: Demario     Reason for call: Will be dropping of the other forms to the office that were missing.     Patient filled out her part of the form , it is the [facility] form     Call back#: [phone]

## 2024-04-07 DIAGNOSIS — E78.2 MIXED HYPERLIPIDEMIA: ICD-10-CM

## 2024-04-07 RX ORDER — ROSUVASTATIN CALCIUM 10 MG/1
10 TABLET, COATED ORAL DAILY
Qty: 90 TABLET | Refills: 1 | Status: SHIPPED | OUTPATIENT
Start: 2024-04-07

## 2024-04-26 ENCOUNTER — TELEPHONE (OUTPATIENT)
Age: 69
End: 2024-04-26

## 2024-04-26 NOTE — TELEPHONE ENCOUNTER
Patient called in regards to needing a letter for citizonship, unclear exactly what is needed . Patient stated she previously spoke to someone in regards to it but unable to locate anything .Tried to assist with  Carson Mendez .  Please return call with Welsh speaker .

## 2024-04-30 ENCOUNTER — TELEPHONE (OUTPATIENT)
Age: 69
End: 2024-04-30

## 2024-04-30 NOTE — TELEPHONE ENCOUNTER
Caller: renay    Doctor: Isaiah    Reason for call: patient canceled tomorrow appt. Feeling well, stated if PA has any question can call.  Kenyan speaker    Call back#:

## 2024-05-02 ENCOUNTER — TELEPHONE (OUTPATIENT)
Age: 69
End: 2024-05-02

## 2024-05-02 NOTE — TELEPHONE ENCOUNTER
Patient called along with  [place] #[id number] - Patient stated that she had to work this morning and forgot about appointment. Appointment scheduled for tomorrow at 1:40pm.

## 2024-05-03 ENCOUNTER — OFFICE VISIT (OUTPATIENT)
Dept: OBGYN CLINIC | Facility: HOSPITAL | Age: 69
End: 2024-05-03
Payer: COMMERCIAL

## 2024-05-03 VITALS
SYSTOLIC BLOOD PRESSURE: 145 MMHG | HEART RATE: 74 BPM | HEIGHT: 63 IN | WEIGHT: 179.9 LBS | DIASTOLIC BLOOD PRESSURE: 78 MMHG | BODY MASS INDEX: 31.88 KG/M2

## 2024-05-03 DIAGNOSIS — E78.2 MIXED HYPERLIPIDEMIA: ICD-10-CM

## 2024-05-03 DIAGNOSIS — M43.16 SPONDYLOLISTHESIS AT L4-L5 LEVEL: Primary | ICD-10-CM

## 2024-05-03 PROCEDURE — 99212 OFFICE O/P EST SF 10 MIN: CPT | Performed by: ORTHOPAEDIC SURGERY

## 2024-05-03 RX ORDER — ROSUVASTATIN CALCIUM 10 MG/1
10 TABLET, COATED ORAL DAILY
Qty: 90 TABLET | Refills: 1 | Status: SHIPPED | OUTPATIENT
Start: 2024-05-03

## 2024-05-10 ENCOUNTER — HOSPITAL ENCOUNTER (OUTPATIENT)
Dept: MRI IMAGING | Facility: HOSPITAL | Age: 69
End: 2024-05-10
Attending: ORTHOPAEDIC SURGERY
Payer: COMMERCIAL

## 2024-05-10 DIAGNOSIS — M43.16 SPONDYLOLISTHESIS AT L4-L5 LEVEL: ICD-10-CM

## 2024-05-10 PROCEDURE — 72148 MRI LUMBAR SPINE W/O DYE: CPT

## 2024-05-13 ENCOUNTER — HOSPITAL ENCOUNTER (EMERGENCY)
Facility: HOSPITAL | Age: 69
Discharge: HOME | End: 2024-05-13
Attending: EMERGENCY MEDICINE
Payer: COMMERCIAL

## 2024-05-13 VITALS
WEIGHT: 183.2 LBS | HEART RATE: 72 BPM | OXYGEN SATURATION: 95 % | DIASTOLIC BLOOD PRESSURE: 85 MMHG | SYSTOLIC BLOOD PRESSURE: 146 MMHG | RESPIRATION RATE: 18 BRPM | BODY MASS INDEX: 32.45 KG/M2 | TEMPERATURE: 98 F

## 2024-05-13 DIAGNOSIS — I10 HYPERTENSION: ICD-10-CM

## 2024-05-13 DIAGNOSIS — T50.905A MEDICATION ADVERSE EFFECT, INITIAL ENCOUNTER: Primary | ICD-10-CM

## 2024-05-13 PROCEDURE — 99282 EMERGENCY DEPT VISIT SF MDM: CPT

## 2024-05-13 PROCEDURE — 99284 EMERGENCY DEPT VISIT MOD MDM: CPT

## 2024-05-13 RX ORDER — LOSARTAN POTASSIUM 25 MG/1
25 TABLET ORAL DAILY
Qty: 30 TABLET | Refills: 0 | Status: SHIPPED | OUTPATIENT
Start: 2024-05-13 | End: 2024-05-30

## 2024-05-15 ENCOUNTER — TELEPHONE (OUTPATIENT)
Age: 69
End: 2024-05-15

## 2024-05-15 NOTE — ED PROVIDER NOTES
"History  Chief Complaint   Patient presents with    Leg Swelling     Pt reports months of leg swelling.  States that tonight, she realized it correlated with taking her amlodipine.  No other sx or c/o.     69-year-old female, Slovak-speaking only which  is used for entirety of evaluation, presents today with concerns of leg swelling \"for months\".  Patient states that she just realized tonight that it was her amlodipine that she has been taking that seems to make her leg swell.  States that she has no shortness of breath or chest pain.  Denies any leg pain or injuries.  No nausea or vomiting.  No diarrhea or constipation.  Denies any abdominal, flank, or back pain.        Prior to Admission Medications   Prescriptions Last Dose Informant Patient Reported? Taking?   Blood Pressure Monitoring (Adult Blood Pressure Cuff Lg) KIT  Self No No   Sig: Use in the morning   Patient not taking: Reported on 12/8/2023   Blood Pressure Monitoring (Blood Pressure Monitor/M Cuff) MISC  Self No No   Sig: Use in the morning   Patient not taking: Reported on 12/8/2023   Calcium-Magnesium-Vitamin D (CALCIUM MAGNESIUM PO)  Self Yes No   Sig: Take 2 capsules by mouth 3 (three) times a day   Diclofenac Sodium (VOLTAREN) 1 %  Self No No   Sig: Apply 2 g topically 4 (four) times a day   Mirabegron ER (Myrbetriq) 50 MG TB24  Self Yes No   Sig: every 24 hours   Patient not taking: Reported on 3/7/2024   Multiple Vitamins-Minerals (multivitamin with minerals) tablet  Self Yes No   Sig: Take 1 tablet by mouth daily   Vibegron (Gemtesa) 75 MG TABS  Self Yes No   Sig: every 24 hours   Patient not taking: Reported on 3/7/2024   amLODIPine (NORVASC) 5 mg tablet   No No   Sig: Take 1 tablet (5 mg total) by mouth 2 (two) times a day   atenolol (TENORMIN) 50 mg tablet   No No   Sig: Take 1 tablet (50 mg total) by mouth daily   benzonatate (TESSALON PERLES) 100 mg capsule  Self No No   Sig: Take 1 capsule (100 mg total) by mouth 3 (three) " times a day as needed for cough   Patient not taking: Reported on 3/25/2024   cholecalciferol (VITAMIN D3) 1,000 units tablet  Self No No   Sig: Take 2 tablets (2,000 Units total) by mouth daily   lidocaine (LIDODERM) 5 %  Self No No   Sig: Apply 1 patch topically over 12 hours daily Remove & Discard patch within 12 hours or as directed by MD   Patient not taking: Reported on 3/7/2024   lidocaine (LIDODERM) 5 %   No No   Sig: Apply 1 patch topically over 12 hours daily for 4 days Remove & Discard patch within 12 hours or as directed by MD   lidocaine (Lidoderm) 5 %   No No   Sig: Apply 1 patch topically over 12 hours daily Remove & Discard patch within 12 hours or as directed by MD   Patient not taking: Reported on 3/25/2024   lidocaine (Lidoderm) 5 %   No No   Sig: Apply 1 patch topically over 12 hours daily for 10 days Remove & Discard patch within 12 hours or as directed by MD   meclizine (ANTIVERT) 25 mg tablet   No No   Sig: Take 1 tablet (25 mg total) by mouth 3 (three) times a day as needed for dizziness for up to 3 days   methocarbamol (ROBAXIN) 500 mg tablet   No No   Sig: Take 1 tablet (500 mg total) by mouth 4 (four) times a day   naproxen (NAPROSYN) 500 mg tablet  Self No No   Sig: Take 1 tablet (500 mg total) by mouth 2 (two) times a day with meals   Patient not taking: Reported on 3/25/2024   rosuvastatin (CRESTOR) 10 MG tablet   No No   Sig: Take 1 tablet (10 mg total) by mouth daily   sodium picosulfate, magnesium oxide, citric acid (Clenpiq) oral solution  Self No No   Sig: Use as directed as per written instructions from office   Patient not taking: Reported on 3/7/2024      Facility-Administered Medications: None       Past Medical History:   Diagnosis Date    Allergic     Hammertoe of right foot     OR   reapir today 2023    Hypertension     Wears glasses        Past Surgical History:   Procedure Laterality Date     SECTION      HERNIA REPAIR  1969    HYSTERECTOMY       removed some of left ovary    LA CORRECTION HAMMERTOE Right 7/21/2023    Procedure: REPAIR HAMMERTOE 3RD TOE;  Surgeon: Lokesh Simmons DPM;  Location: [facility];  Service: Podiatry    LA LAPS ABD PRTM&OMENTUM DX W/WO SPEC BR/WA SPX N/A 01/01/2023    Procedure: LAPAROSCOPY DIAGNOSTIC, LYSIS OF ADHESIONS;  Surgeon: Debbie Clark MD;  Location: [facility];  Service: General       Family History   Problem Relation Age of Onset    Breast cancer Mother     Thrombosis Father     No Known Problems Sister     No Known Problems Sister     No Known Problems Sister     Hypertension Sister     Hypertension Brother     Hypertension Brother     Diabetes Maternal Grandmother      I have reviewed and agree with the history as documented.    E-Cigarette/Vaping    E-Cigarette Use Never User      E-Cigarette/Vaping Substances    Nicotine No     THC No     CBD No     Flavoring No     Other No     Unknown No      Social History     Tobacco Use    Smoking status: Never    Smokeless tobacco: Never   Vaping Use    Vaping status: Never Used   Substance Use Topics    Alcohol use: Never    Drug use: Never       Review of Systems   Constitutional:  Negative for chills and fever.   HENT:  Negative for ear pain and sore throat.    Eyes:  Negative for pain and visual disturbance.   Respiratory:  Negative for cough and shortness of breath.    Cardiovascular:  Positive for leg swelling. Negative for chest pain and palpitations.   Gastrointestinal:  Negative for abdominal pain and vomiting.   Genitourinary:  Negative for dysuria and hematuria.   Musculoskeletal:  Negative for arthralgias and back pain.   Skin:  Negative for color change and rash.   Neurological:  Negative for seizures and syncope.   All other systems reviewed and are negative.      Physical Exam  Physical Exam  Vitals and nursing note reviewed.   Constitutional:       General: She is not in acute distress.     Appearance: She is well-developed.   HENT:      Head:  Normocephalic and atraumatic.   Eyes:      Conjunctiva/sclera: Conjunctivae normal.   Cardiovascular:      Rate and Rhythm: Normal rate and regular rhythm.      Heart sounds: No murmur heard.  Pulmonary:      Effort: Pulmonary effort is normal. No respiratory distress.      Breath sounds: Normal breath sounds.   Abdominal:      Palpations: Abdomen is soft.      Tenderness: There is no abdominal tenderness.   Musculoskeletal:         General: No swelling.      Cervical back: Neck supple.      Right lower leg: No edema.      Left lower leg: No edema.      Comments: No swelling or abnormalities noted on exam of the ankle.   Skin:     General: Skin is warm and dry.      Capillary Refill: Capillary refill takes less than 2 seconds.   Neurological:      Mental Status: She is alert.   Psychiatric:         Mood and Affect: Mood normal.         Vital Signs  ED Triage Vitals [05/13/24 2049]   Temperature Pulse Respirations Blood Pressure SpO2   98 °F (36.7 °C) 72 18 146/85 95 %      Temp Source Heart Rate Source Patient Position - Orthostatic VS BP Location FiO2 (%)   Oral Monitor Sitting Left arm --      Pain Score       --           Vitals:    05/13/24 2049   BP: 146/85   Pulse: 72   Patient Position - Orthostatic VS: Sitting         Visual Acuity      ED Medications  Medications - No data to display    Diagnostic Studies  Results Reviewed       None                   No orders to display              Procedures  Procedures         ED Course                               SBIRT 20yo+      Flowsheet Row Most Recent Value   Initial Alcohol Screen: US AUDIT-C     1. How often do you have a drink containing alcohol? 0 Filed at: 05/13/2024 2052   2. How many drinks containing alcohol do you have on a typical day you are drinking?  0 Filed at: 05/13/2024 2052   3b. FEMALE Any Age, or MALE 65+: How often do you have 4 or more drinks on one occassion? 0 Filed at: 05/13/2024 2052   Audit-C Score 0 Filed at: 05/13/2024 2052   YULIANA:  "How many times in the past year have you...    Used an illegal drug or used a prescription medication for non-medical reasons? Never Filed at: 05/13/2024 2052                      Medical Decision Making  69-year-old female presents today with concerns of leg swelling.  Physical exam overall reassuring.  Patient states that she thinks it is related pain that is causing it.  Informed patient to stop taking amlodipine and start medication, losartan, for her blood pressure and to follow-up with her family doctor for further evaluation and treatment of her chronic medical conditions.  ------------------------------------------------------------  Strict return precautions discussed. Patient at time of discharge well-appearing in no acute distress, all questions answered. Patient agreeable to plan.  Patient's vitals, lab/imaging results, diagnosis, and treatment plan were discussed with the patient. All new/changed medications were discussed with patient, specifically, route of administration, how often and when to take, and where they can be picked up. Strict return precautions as well as close follow up with PCP was discussed with the patient and the patient was agreeable to my recommendations.  Patient verbally acknowledged understanding of the above communications. All labs reviewed and utilized in the medical decision making process (if labs were ordered). Portions of the record may have been created with voice recognition software.  Occasional wrong word or \"sound a like\" substitutions may have occurred due to the inherent limitations of voice recognition software.  Read the chart carefully and recognize, using context, where substitutions have occurred.      Risk  Prescription drug management.             Disposition  Final diagnoses:   Medication adverse effect, initial encounter   Hypertension     Time reflects when diagnosis was documented in both MDM as applicable and the Disposition within this note       Time " User Action Codes Description Comment    5/13/2024  9:14 PM Steve Avila [T50.905A] Medication adverse effect, initial encounter     5/13/2024  9:14 PM Steve Avila [[id number]] Hypertension           ED Disposition       ED Disposition   Discharge    Condition   Stable    Date/Time   Mon May 13, 2024 2114    Comment   Mylene Wolff discharge to home/self care.                   Follow-up Information       Follow up With Specialties Details Why Contact Info Additional Information    [facility] Emergency Medicine Go to  If symptoms worsen [address] [id number] [phone] [facility]    BUSTER Gunn Nurse Practitioner Schedule an appointment as soon as possible for a visit  As needed [address]  [place] [address]  [phone]               Discharge Medication List as of 5/13/2024  9:22 PM        START taking these medications    Details   losartan (COZAAR) 25 mg tablet Take 1 tablet (25 mg total) by mouth daily, Starting Mon 5/13/2024, Until Wed 6/12/2024, Normal           CONTINUE these medications which have NOT CHANGED    Details   atenolol (TENORMIN) 50 mg tablet Take 1 tablet (50 mg total) by mouth daily, Starting Fri 3/8/2024, Normal      benzonatate (TESSALON PERLES) 100 mg capsule Take 1 capsule (100 mg total) by mouth 3 (three) times a day as needed for cough, Starting Tue 2/20/2024, Normal      Blood Pressure Monitoring (Adult Blood Pressure Cuff Lg) KIT Use in the morning, Starting Mon 6/26/2023, Normal      Blood Pressure Monitoring (Blood Pressure Monitor/M Cuff) MISC Use in the morning, Starting Mon 6/26/2023, Print      Calcium-Magnesium-Vitamin D (CALCIUM MAGNESIUM PO) Take 2 capsules by mouth 3 (three) times a day, Historical Med      cholecalciferol (VITAMIN D3) 1,000 units tablet Take 2 tablets (2,000 Units total) by mouth daily, Starting Wed 2/21/2024,  Normal      Diclofenac Sodium (VOLTAREN) 1 % Apply 2 g topically 4 (four) times a day, Starting Thu 1/11/2024, Normal      !! lidocaine (LIDODERM) 5 % Apply 1 patch topically over 12 hours daily Remove & Discard patch within 12 hours or as directed by MD, Starting Thu 7/20/2023, Normal      !! lidocaine (Lidoderm) 5 % Apply 1 patch topically over 12 hours daily Remove & Discard patch within 12 hours or as directed by MD, Starting Wed 3/13/2024, Normal      meclizine (ANTIVERT) 25 mg tablet Take 1 tablet (25 mg total) by mouth 3 (three) times a day as needed for dizziness for up to 3 days, Starting Fri 9/1/2023, Until Mon 9/4/2023 at 2359, Normal      methocarbamol (ROBAXIN) 500 mg tablet Take 1 tablet (500 mg total) by mouth 4 (four) times a day, Starting Fri 3/15/2024, Normal      Mirabegron ER (Myrbetriq) 50 MG TB24 every 24 hours, Starting Wed 8/30/2023, Historical Med      Multiple Vitamins-Minerals (multivitamin with minerals) tablet Take 1 tablet by mouth daily, Historical Med      naproxen (NAPROSYN) 500 mg tablet Take 1 tablet (500 mg total) by mouth 2 (two) times a day with meals, Starting Fri 11/10/2023, Normal      rosuvastatin (CRESTOR) 10 MG tablet Take 1 tablet (10 mg total) by mouth daily, Starting Fri 5/3/2024, Normal      sodium picosulfate, magnesium oxide, citric acid (Clenpiq) oral solution Use as directed as per written instructions from office, Normal      Vibegron (Gemtesa) 75 MG TABS every 24 hours, Starting Thu 10/26/2023, Historical Med       !! - Potential duplicate medications found. Please discuss with provider.        STOP taking these medications       amLODIPine (NORVASC) 5 mg tablet Comments:   Reason for Stopping:               No discharge procedures on file.    PDMP Review       None            ED Provider  Electronically Signed by             Steve Avila PA-C  05/15/24 [id number]

## 2024-05-15 NOTE — TELEPHONE ENCOUNTER
Caller: BRIAN Kaba    Doctor: Demario    Reason for call: Sesar called to see if we received medical records request form.  It is not currently under media.  Sesar will refax    Call back#: n/a        Call reference # [id number]

## 2024-05-17 ENCOUNTER — HOSPITAL ENCOUNTER (OUTPATIENT)
Dept: RADIOLOGY | Facility: HOSPITAL | Age: 69
Discharge: HOME | End: 2024-05-17
Attending: ORTHOPAEDIC SURGERY

## 2024-05-17 ENCOUNTER — OFFICE VISIT (OUTPATIENT)
Dept: OBGYN CLINIC | Facility: HOSPITAL | Age: 69
End: 2024-05-17
Payer: COMMERCIAL

## 2024-05-17 VITALS
WEIGHT: 180.2 LBS | DIASTOLIC BLOOD PRESSURE: 77 MMHG | HEART RATE: 68 BPM | BODY MASS INDEX: 31.93 KG/M2 | HEIGHT: 63 IN | SYSTOLIC BLOOD PRESSURE: 128 MMHG

## 2024-05-17 DIAGNOSIS — M47.816 LUMBAR SPONDYLOSIS: ICD-10-CM

## 2024-05-17 DIAGNOSIS — M43.16 SPONDYLOLISTHESIS AT L4-L5 LEVEL: ICD-10-CM

## 2024-05-17 DIAGNOSIS — Z01.818 PRE-OP EVALUATION: ICD-10-CM

## 2024-05-17 DIAGNOSIS — Z01.818 PRE-OP EVALUATION: Primary | ICD-10-CM

## 2024-05-17 PROCEDURE — 99215 OFFICE O/P EST HI 40 MIN: CPT | Performed by: ORTHOPAEDIC SURGERY

## 2024-05-17 RX ORDER — CHLORHEXIDINE GLUCONATE ORAL RINSE 1.2 MG/ML
15 SOLUTION DENTAL ONCE
OUTPATIENT
Start: 2024-05-17 | End: 2024-05-17

## 2024-05-17 RX ORDER — CEFAZOLIN SODIUM 2 G/50ML
2000 SOLUTION INTRAVENOUS ONCE
OUTPATIENT
Start: 2024-05-17 | End: 2024-05-17

## 2024-05-17 RX ORDER — TRANEXAMIC ACID 10 MG/ML
1000 INJECTION, SOLUTION INTRAVENOUS ONCE
OUTPATIENT
Start: 2024-05-17

## 2024-05-18 NOTE — PROGRESS NOTES
Assessment & Plan/Medical Decision Makin y.o. female with Back Pain and Bilateral Radicular Leg Pain and imaging findings most notable for lumbar spondylosis , L4-5 spondylolisthesis, foraminal stenosis L4-5 and L5-S1        The clinical, physical and imaging findings were reviewed with the patient.  Mylene has a constellation of findings consistent with Lumbar Radiculopathy in the setting of lumbar degenerative disease, L4-5 spondylolisthesis with severe right sided foraminal and lateral recess stenosis and L5-S1 disc degeneration with left sided foraminal stenosis.  Ongoing low back and bilateral lower extremity pain. Reports pain and symptoms interfere with her daily activities and functioning.  She has tried oral medications, physical therapy, and interventional spine procedures with only temporary improvement.       We discussed the differential diagnosis including extremity/musculoskeltal pathology, peripheral nerve compression, vascular claudication etc. Mylene's symptoms, exam findings and imaging are most consistent with lumbar radiculopathy.  In my opinion, no further diagnostic work-up (EMG, etc) is warranted at this time.  Discussed treatment options.  Reviewed the role of further non operative treatment such as physical therapy, activity modification, medication management and interventional spine procedures.  Given that her symptoms have persisted despite these conservative interventions, recommend consideration of surgical intervention.  Mylene would like to proceed with lumbar surgery.       We discussed surgical options.  In my opinion, would be posterior lumbar decompression L4-S1 and posterior lumbar fusion with instrumentation L4-S1, with transforaminal lumbar interbody fusion to address mechanical back pain, unstable spondylolisthesis, severe foraminal stenosis.  We reviewed the options - such as instrumented vs non-instrumented, posterolateral vs interbody, local autograft vs ICBG vs  allograft +/- biologic/graft extender/graft substitute supplementation for obtaining solid arthrodesis as well as the associated risk/benefit profiles and fusion efficacy as well as the FDA status of the instrumentation and products.  We also discussed the role of decompression surgery alone, but given instability and ongoing mechanical back pain we reviewed the role of fusion.  After discussion with the patient will plan on posterolateral instrumented +/- TLIF with use of local autograft and allograft with graft extender/substitute supplementation.  Explained at length the rationale for surgical invention and postoperative expectations.  We discussed and Mylene expressed her understanding, that in general, spine surgery is more predictive in improving extremity/radicular discomfort rather than axial spine pain, and arresting the progression of spinal cord/nerve dysfunction rather than improving.  Will utilize LSO brace immobilization during post operative recovery to help reduce pain by restricting mobility of the trunk and facilitate healing.      I reviewed with the patient possible risks of surgery which included the risk of infection, blood loss, risk of damage to adjacent nerves, vessels organs, risk of spinal fluid leak and meningitis, risk of chronic pain, incomplete resolution of symptoms, instability, adjacent segment degeneration & disease, risk of nonunion and instrumentation failure, disease transmission, need for further surgery, DVT, pulmonary embolism, blindness, paralysis and even death, as well as other risk on consent form.   We also discussed patient specific risks and mitigation including advanced age, hypertension, osteopenia and associated iglesia- and post- operative related risks.       We reviewed infection prevention.  Reviewed medications; instructed patient medications to stop before surgery (i.e. blood thinners, NSAIDs, DMARDs and vitamins)     Also reviewed with patient our narcotic  policy.  We will provide medications up to 60 days postop.  In the event patient requires more medications, will need to consult their PCP and/or see a pain management physician.     I have queried the [place] drug monitoring database for Mylene to better assist in clinical decision making regarding prescriptions for controlled medications.  After querying the database and considering the clinical picture I have determined that he is a candidate for a prescription for control medication in the initial postoperative period.     All questions were answered.  Patient verbalized understanding.  Informed consent was obtained. Consent form was signed.  Mylene had no further questions.  The entirety of the evaluation and consent was done with certified Filipino interpretor present.       She will recieve a pre-operative chest Xray and scoliosis series xrays. Orders for preoperative labs (CBC, CMP, PTT, INR, type & screen, and ECG) were also placed at today's visit. Discussed pre-operative clearances, medical optimization and risk stratification.  Mylene needs pre-operative clearance from PCP and obtain PATs.      Pre operative patient reported outcome measures were obtained.      Patient instructed to return to office/ER sooner if symptoms are not improving, getting worse, or new worrisome/neurologic symptoms arise.     Subjective:      Chief Complaint: Back Pain    HPI:  Mylene Wolff is a 69 y.o. female presenting for follow up visit with chief complaint of back and bilateral radicular leg pain.  Please see detailed notes from 11/18/22, 2/24/23, 3/24/23, 6/2/23, 12/1/23, 2/2/24 and 5/3/24 for complete history and attempted non operative treatments .  Mylene obtained updated MRI and is here today for surgical discussion.  Dexa scan from 2023 indicates osteopenia and has continued with calcium and vitamin D as prescribed by PCP.    Conservative therapy includes the following:   Medications: robaxin,  steroids, naproxen, lidocaine patches    Injections: multiple injections with Dr. Hanks     Physical Therapy: completed, with benefit initially but non sustained   Chiropractic Medicine: has not attempted  Accupunture/Massage Therapy: has not attempted   These therapeutic modalities were ineffective at providing sustained pain relief/functional improvement.     Nicotine dependent: no    Objective:     Family History   Problem Relation Age of Onset    Breast cancer Mother     Thrombosis Father     No Known Problems Sister     No Known Problems Sister     No Known Problems Sister     Hypertension Sister     Hypertension Brother     Hypertension Brother     Diabetes Maternal Grandmother        Past Medical History:   Diagnosis Date    Allergic     Hammertoe of right foot     OR   reapir today 7/21/2023    Hypertension     Wears glasses        Current Outpatient Medications   Medication Sig Dispense Refill    atenolol (TENORMIN) 50 mg tablet Take 1 tablet (50 mg total) by mouth daily 90 tablet 1    benzonatate (TESSALON PERLES) 100 mg capsule Take 1 capsule (100 mg total) by mouth 3 (three) times a day as needed for cough (Patient not taking: Reported on 3/25/2024) 20 capsule 0    Blood Pressure Monitoring (Adult Blood Pressure Cuff Lg) KIT Use in the morning (Patient not taking: Reported on 12/8/2023) 1 kit 0    Blood Pressure Monitoring (Blood Pressure Monitor/M Cuff) MISC Use in the morning (Patient not taking: Reported on 12/8/2023) 1 each 0    Calcium-Magnesium-Vitamin D (CALCIUM MAGNESIUM PO) Take 2 capsules by mouth 3 (three) times a day      cholecalciferol (VITAMIN D3) 1,000 units tablet Take 2 tablets (2,000 Units total) by mouth daily 60 tablet 5    Diclofenac Sodium (VOLTAREN) 1 % Apply 2 g topically 4 (four) times a day 100 g 0    lidocaine (LIDODERM) 5 % Apply 1 patch topically over 12 hours daily Remove & Discard patch within 12 hours or as directed by MD (Patient not taking: Reported on 3/7/2024) 5  patch 0    lidocaine (LIDODERM) 5 % Apply 1 patch topically over 12 hours daily for 4 days Remove & Discard patch within 12 hours or as directed by MD 4 patch 0    lidocaine (Lidoderm) 5 % Apply 1 patch topically over 12 hours daily Remove & Discard patch within 12 hours or as directed by MD (Patient not taking: Reported on 3/25/2024) 30 patch 0    lidocaine (Lidoderm) 5 % Apply 1 patch topically over 12 hours daily for 10 days Remove & Discard patch within 12 hours or as directed by MD 10 patch 0    losartan (COZAAR) 25 mg tablet Take 1 tablet (25 mg total) by mouth daily 30 tablet 0    meclizine (ANTIVERT) 25 mg tablet Take 1 tablet (25 mg total) by mouth 3 (three) times a day as needed for dizziness for up to 3 days 9 tablet 0    methocarbamol (ROBAXIN) 500 mg tablet Take 1 tablet (500 mg total) by mouth 4 (four) times a day 30 tablet 1    Mirabegron ER (Myrbetriq) 50 MG TB24 every 24 hours (Patient not taking: Reported on 3/7/2024)      Multiple Vitamins-Minerals (multivitamin with minerals) tablet Take 1 tablet by mouth daily      naproxen (NAPROSYN) 500 mg tablet Take 1 tablet (500 mg total) by mouth 2 (two) times a day with meals (Patient not taking: Reported on 3/25/2024) 30 tablet 0    rosuvastatin (CRESTOR) 10 MG tablet Take 1 tablet (10 mg total) by mouth daily 90 tablet 1    sodium picosulfate, magnesium oxide, citric acid (Clenpiq) oral solution Use as directed as per written instructions from office (Patient not taking: Reported on 3/7/2024) 320 mL 0    Vibegron (Gemtesa) 75 MG TABS every 24 hours (Patient not taking: Reported on 3/7/2024)       No current facility-administered medications for this visit.       Past Surgical History:   Procedure Laterality Date     SECTION      HERNIA REPAIR  1969    HYSTERECTOMY      removed some of left ovary    VT CORRECTION HAMMERTOE Right 2023    Procedure: REPAIR HAMMERTOE 3RD TOE;  Surgeon: Lokesh Simmons DPM;  Location: [facility];   "Service: Podiatry    WA LAPS ABD PRTM&OMENTUM DX W/WO SPEC BR/WA SPX N/A 01/01/2023    Procedure: LAPAROSCOPY DIAGNOSTIC, LYSIS OF ADHESIONS;  Surgeon: Debbie Clark MD;  Location: [facility];  Service: General       Social History     Socioeconomic History    Marital status: /Civil Union     Spouse name: Not on file    Number of children: 4    Years of education: Not on file    Highest education level: Not on file   Occupational History    Not on file   Tobacco Use    Smoking status: Never    Smokeless tobacco: Never   Vaping Use    Vaping status: Never Used   Substance and Sexual Activity    Alcohol use: Never    Drug use: Never    Sexual activity: Not Currently     Partners: Male   Other Topics Concern    Not on file   Social History Narrative    Not on file     Social Determinants of Health     Financial Resource Strain: Not on file   Food Insecurity: Not on file   Transportation Needs: Not on file   Physical Activity: Not on file   Stress: Not on file   Social Connections: Not on file   Intimate Partner Violence: Not on file   Housing Stability: Not on file       Allergies   Allergen Reactions    Aspirin Swelling       Review of Systems  General- denies fever/chills  HEENT- denies hearing loss or sore throat  Eyes- denies eye pain or visual disturbances, denies red eyes  Respiratory- denies cough or SOB  Cardio- denies chest pain or palpitations  GI- denies abdominal pain  Endocrine- denies urinary frequency  Urinary- denies pain with urination  Musculoskeletal- Negative except noted above  Skin- denies rashes or wounds  Neurological- denies dizziness or headache  Psychiatric- denies anxiety or difficulty concentrating    Physical Exam  /77   Pulse 68   Ht 5' 3\" (1.6 m)   Wt 81.7 kg (180 lb 3.2 oz)   BMI 31.92 kg/m²     General/Constitutional: No apparent distress: well-nourished and well developed.  Lymphatic: No appreciable lymphadenopathy  Respiratory: Non-labored breathing  Vascular: " No edema, swelling or tenderness, except as noted in detailed exam.  Integumentary: No impressive skin lesions present, except as noted in detailed exam.  Psych: Normal mood and affect, oriented to person, place and time.  MSK: normal other than stated in HPI and exam  Gait & balance: no evidence of myelopathic gait, ambulates Independently     Lumbar spine range of motion:  -Forward flexion to 90  -Extension to neutral  -Lateral bend 25 right, 25 left  -Rotation 25 right, 25 left  There tenderness with palpation along lumbar paraspinal musculature, no midline tenderness     Neurologic:    Lower Extremity Motor Function    Right  Left    Iliopsoas  5/5  5/5    Quadriceps 5/5 5/5   Tibialis anterior  5/5  5/5    EHL  5/5  5/5    Gastroc. muscle  5/5  5/5    Heel rise  5/5  5/5    Toe rise  5/5  5/5      Sensory: light touch is intact to bilateral upper and lower extremities     Reflexes:    Right Left   Patellar 1+ 1+   Achilles 1+ 1+   Babinski neg neg     Other tests:  Straight Leg Raise: negative  Sharron SI: negative  HERMANN SI: negative  Greater troch: no tenderness   Internal/external hip ROM: intact, no pain   Flexion/extension knee ROM: intact, no pain   Vascular: WWP extremities, 2+DP bilateral      Diagnostic Tests   IMAGING: I have personally reviewed the images and these are my findings:  Lumbar Spine X-rays from 11/18/22: multi level lumbar spondylosis with loss of disc height most noted at L5-S1, osteophyte formation and facet hypertrophy, L4-5 spondylolisthesis with instability noted on flexion extension radiographs, no appreciated lytic/blastic lesions, no obvious instability    Lumbar Spine MRI from 5/10/24: Multilevel lumbar degenerative changes with L5-S1 disc base degeneration, L4-5 degenerative spondylolisthesis, right-sided L4-5 lateral recess stenosis and foraminal stenosis, and left sided L5-S1 foraminal stenosis, no significant central stenosis however there does appear to be partial reduction  "of her degenerative spondylolisthesis on supine imaging     Electronic Medical Records were reviewed including Radiology reports, pain mgmt notes, PT notes    Procedures, if performed today     None performed       Portions of the record may have been created with voice recognition software.  Occasional wrong word or \"sound a like\" substitutions may have occurred due to the inherent limitations of voice recognition software.  Read the chart carefully and recognize, using context, where substitutions have occurred.   "

## 2024-05-29 ENCOUNTER — HOSPITAL ENCOUNTER (EMERGENCY)
Facility: HOSPITAL | Age: 69
Discharge: HOME | End: 2024-05-30
Attending: EMERGENCY MEDICINE
Payer: COMMERCIAL

## 2024-05-29 DIAGNOSIS — K59.00 CONSTIPATION: ICD-10-CM

## 2024-05-29 DIAGNOSIS — R10.9 ABDOMINAL PAIN: Primary | ICD-10-CM

## 2024-05-29 LAB
ALBUMIN SERPL BCP-MCNC: 4.7 G/DL (ref 3.5–5)
ALP SERPL-CCNC: 80 U/L (ref 34–104)
ALT SERPL W P-5'-P-CCNC: 22 U/L (ref 7–52)
ANION GAP SERPL CALCULATED.3IONS-SCNC: 7 MMOL/L (ref 4–13)
AST SERPL W P-5'-P-CCNC: 18 U/L (ref 13–39)
BASOPHILS # BLD AUTO: 0.06 THOUSANDS/ÂΜL (ref 0–0.1)
BASOPHILS NFR BLD AUTO: 1 % (ref 0–1)
BILIRUB SERPL-MCNC: 0.32 MG/DL (ref 0.2–1)
BILIRUB UR QL STRIP: NEGATIVE
BUN SERPL-MCNC: 15 MG/DL (ref 5–25)
CALCIUM SERPL-MCNC: 9.5 MG/DL (ref 8.4–10.2)
CHLORIDE SERPL-SCNC: 102 MMOL/L (ref 96–108)
CLARITY UR: CLEAR
CO2 SERPL-SCNC: 31 MMOL/L (ref 21–32)
COLOR UR: NORMAL
CREAT SERPL-MCNC: 0.57 MG/DL (ref 0.6–1.3)
EOSINOPHIL # BLD AUTO: 0.3 THOUSAND/ÂΜL (ref 0–0.61)
EOSINOPHIL NFR BLD AUTO: 3 % (ref 0–6)
ERYTHROCYTE [DISTWIDTH] IN BLOOD BY AUTOMATED COUNT: 16.5 % (ref 11.6–15.1)
GFR SERPL CREATININE-BSD FRML MDRD: 95 ML/MIN/1.73SQ M
GLUCOSE SERPL-MCNC: 93 MG/DL (ref 65–140)
GLUCOSE UR STRIP-MCNC: NEGATIVE MG/DL
HCT VFR BLD AUTO: 41.5 % (ref 34.8–46.1)
HGB BLD-MCNC: 13.1 G/DL (ref 11.5–15.4)
HGB UR QL STRIP.AUTO: NEGATIVE
IMM GRANULOCYTES # BLD AUTO: 0.02 THOUSAND/UL (ref 0–0.2)
IMM GRANULOCYTES NFR BLD AUTO: 0 % (ref 0–2)
KETONES UR STRIP-MCNC: NEGATIVE MG/DL
LEUKOCYTE ESTERASE UR QL STRIP: NEGATIVE
LIPASE SERPL-CCNC: 43 U/L (ref 11–82)
LYMPHOCYTES # BLD AUTO: 2.82 THOUSANDS/ÂΜL (ref 0.6–4.47)
LYMPHOCYTES NFR BLD AUTO: 30 % (ref 14–44)
MCH RBC QN AUTO: 28.7 PG (ref 26.8–34.3)
MCHC RBC AUTO-ENTMCNC: 31.6 G/DL (ref 31.4–37.4)
MCV RBC AUTO: 91 FL (ref 82–98)
MONOCYTES # BLD AUTO: 0.77 THOUSAND/ÂΜL (ref 0.17–1.22)
MONOCYTES NFR BLD AUTO: 8 % (ref 4–12)
NEUTROPHILS # BLD AUTO: 5.41 THOUSANDS/ÂΜL (ref 1.85–7.62)
NEUTS SEG NFR BLD AUTO: 58 % (ref 43–75)
NITRITE UR QL STRIP: NEGATIVE
NRBC BLD AUTO-RTO: 0 /100 WBCS
PH UR STRIP.AUTO: 5.5 [PH]
PLATELET # BLD AUTO: 250 THOUSANDS/UL (ref 149–390)
PMV BLD AUTO: 10.6 FL (ref 8.9–12.7)
POTASSIUM SERPL-SCNC: 3.6 MMOL/L (ref 3.5–5.3)
PROT SERPL-MCNC: 7.6 G/DL (ref 6.4–8.4)
PROT UR STRIP-MCNC: NEGATIVE MG/DL
RBC # BLD AUTO: 4.56 MILLION/UL (ref 3.81–5.12)
SODIUM SERPL-SCNC: 140 MMOL/L (ref 135–147)
SP GR UR STRIP.AUTO: 1.01 (ref 1–1.03)
UROBILINOGEN UR STRIP-ACNC: <2 MG/DL
WBC # BLD AUTO: 9.38 THOUSAND/UL (ref 4.31–10.16)

## 2024-05-29 PROCEDURE — 85025 COMPLETE CBC W/AUTO DIFF WBC: CPT | Performed by: PHYSICIAN ASSISTANT

## 2024-05-29 PROCEDURE — 99284 EMERGENCY DEPT VISIT MOD MDM: CPT

## 2024-05-29 PROCEDURE — 99285 EMERGENCY DEPT VISIT HI MDM: CPT | Performed by: PHYSICIAN ASSISTANT

## 2024-05-29 PROCEDURE — 81003 URINALYSIS AUTO W/O SCOPE: CPT | Performed by: PHYSICIAN ASSISTANT

## 2024-05-29 PROCEDURE — 83690 ASSAY OF LIPASE: CPT | Performed by: PHYSICIAN ASSISTANT

## 2024-05-29 PROCEDURE — 80053 COMPREHEN METABOLIC PANEL: CPT | Performed by: PHYSICIAN ASSISTANT

## 2024-05-29 PROCEDURE — 36415 COLL VENOUS BLD VENIPUNCTURE: CPT | Performed by: PHYSICIAN ASSISTANT

## 2024-05-29 PROCEDURE — 96360 HYDRATION IV INFUSION INIT: CPT

## 2024-05-29 RX ADMIN — SODIUM CHLORIDE 1000 ML: 0.9 INJECTION, SOLUTION INTRAVENOUS at 23:23

## 2024-05-30 ENCOUNTER — APPOINTMENT (EMERGENCY)
Dept: CT IMAGING | Facility: HOSPITAL | Age: 69
End: 2024-05-30
Payer: COMMERCIAL

## 2024-05-30 ENCOUNTER — OFFICE VISIT (OUTPATIENT)
Dept: FAMILY MEDICINE CLINIC | Facility: CLINIC | Age: 69
End: 2024-05-30
Payer: COMMERCIAL

## 2024-05-30 VITALS
SYSTOLIC BLOOD PRESSURE: 165 MMHG | RESPIRATION RATE: 16 BRPM | HEART RATE: 60 BPM | OXYGEN SATURATION: 100 % | DIASTOLIC BLOOD PRESSURE: 77 MMHG | TEMPERATURE: 98.2 F

## 2024-05-30 VITALS
BODY MASS INDEX: 31.79 KG/M2 | HEART RATE: 62 BPM | WEIGHT: 179.4 LBS | OXYGEN SATURATION: 97 % | SYSTOLIC BLOOD PRESSURE: 140 MMHG | TEMPERATURE: 96.9 F | DIASTOLIC BLOOD PRESSURE: 90 MMHG | HEIGHT: 63 IN

## 2024-05-30 DIAGNOSIS — T50.905A MEDICATION ADVERSE EFFECT, INITIAL ENCOUNTER: ICD-10-CM

## 2024-05-30 DIAGNOSIS — I10 PRIMARY HYPERTENSION: Primary | ICD-10-CM

## 2024-05-30 DIAGNOSIS — I10 HYPERTENSION: ICD-10-CM

## 2024-05-30 DIAGNOSIS — R10.31 RIGHT LOWER QUADRANT ABDOMINAL PAIN: ICD-10-CM

## 2024-05-30 PROCEDURE — 74177 CT ABD & PELVIS W/CONTRAST: CPT

## 2024-05-30 PROCEDURE — 99213 OFFICE O/P EST LOW 20 MIN: CPT | Performed by: FAMILY MEDICINE

## 2024-05-30 PROCEDURE — 96361 HYDRATE IV INFUSION ADD-ON: CPT

## 2024-05-30 RX ORDER — ACETAMINOPHEN 325 MG/1
650 TABLET ORAL ONCE
Status: COMPLETED | OUTPATIENT
Start: 2024-05-30 | End: 2024-05-30

## 2024-05-30 RX ORDER — SENNOSIDES 8.6 MG
8.6 TABLET ORAL
Qty: 7 TABLET | Refills: 0 | Status: SHIPPED | OUTPATIENT
Start: 2024-05-30 | End: 2024-06-06

## 2024-05-30 RX ORDER — DOCUSATE SODIUM 100 MG/1
100 CAPSULE, LIQUID FILLED ORAL EVERY 12 HOURS
Qty: 60 CAPSULE | Refills: 0 | Status: SHIPPED | OUTPATIENT
Start: 2024-05-30

## 2024-05-30 RX ORDER — LOSARTAN POTASSIUM 50 MG/1
50 TABLET ORAL DAILY
Qty: 90 TABLET | Refills: 0 | Status: SHIPPED | OUTPATIENT
Start: 2024-05-30 | End: 2024-08-28

## 2024-05-30 RX ADMIN — ACETAMINOPHEN 650 MG: 325 TABLET, FILM COATED ORAL at 03:39

## 2024-05-30 RX ADMIN — IOHEXOL 100 ML: 350 INJECTION, SOLUTION INTRAVENOUS at 00:26

## 2024-05-30 NOTE — ED PROVIDER NOTES
History  Chief Complaint   Patient presents with    Abdominal Pain     Pt with abd pain on R side of abd.  Pt with hx of bowel obstruction.  Pt with constipation.   Pt reports whole body is numb.       Mylene Wolff is a 69 y.o. female with past medical history of multiple abdominal surgeries including hysterectomy, , and hernia repair presenting to the ER complaining of right-sided abdominal pain and chills which began today.  Patient reports that she had similar pain a couple of months ago and was diagnosed with an obstruction.  She reports that pain feels similar.  She reports that she has been constipated however is unable to tell when her last normal bowel movement was.  She denies any fevers, chest pain, shortness of breath, headache, visual changes, or weakness.  Patient reports that she was seen in the ER last week for leg swelling and numbness due to taking amlodipine however since then she has been switched off the medication and she reports significant improvement in leg swelling and numbness.  She currently denies any numbness or tingling in the extremities.        Prior to Admission Medications   Prescriptions Last Dose Informant Patient Reported? Taking?   Blood Pressure Monitoring (Adult Blood Pressure Cuff Lg) KIT  Self No No   Sig: Use in the morning   Patient not taking: Reported on 2023   Blood Pressure Monitoring (Blood Pressure Monitor/M Cuff) MISC  Self No No   Sig: Use in the morning   Patient not taking: Reported on 2023   Calcium-Magnesium-Vitamin D (CALCIUM MAGNESIUM PO)  Self Yes No   Sig: Take 2 capsules by mouth 3 (three) times a day   Diclofenac Sodium (VOLTAREN) 1 %  Self No No   Sig: Apply 2 g topically 4 (four) times a day   Mirabegron ER (Myrbetriq) 50 MG TB24  Self Yes No   Sig: every 24 hours   Patient not taking: Reported on 3/7/2024   Multiple Vitamins-Minerals (multivitamin with minerals) tablet  Self Yes No   Sig: Take 1 tablet by mouth daily    Vibegron (Gemtesa) 75 MG TABS  Self Yes No   Sig: every 24 hours   Patient not taking: Reported on 3/7/2024   atenolol (TENORMIN) 50 mg tablet   No No   Sig: Take 1 tablet (50 mg total) by mouth daily   benzonatate (TESSALON PERLES) 100 mg capsule  Self No No   Sig: Take 1 capsule (100 mg total) by mouth 3 (three) times a day as needed for cough   Patient not taking: Reported on 3/25/2024   cholecalciferol (VITAMIN D3) 1,000 units tablet  Self No No   Sig: Take 2 tablets (2,000 Units total) by mouth daily   lidocaine (LIDODERM) 5 %  Self No No   Sig: Apply 1 patch topically over 12 hours daily Remove & Discard patch within 12 hours or as directed by MD   Patient not taking: Reported on 3/7/2024   lidocaine (LIDODERM) 5 %   No No   Sig: Apply 1 patch topically over 12 hours daily for 4 days Remove & Discard patch within 12 hours or as directed by MD   lidocaine (Lidoderm) 5 %   No No   Sig: Apply 1 patch topically over 12 hours daily Remove & Discard patch within 12 hours or as directed by MD   Patient not taking: Reported on 3/25/2024   lidocaine (Lidoderm) 5 %   No No   Sig: Apply 1 patch topically over 12 hours daily for 10 days Remove & Discard patch within 12 hours or as directed by MD   losartan (COZAAR) 25 mg tablet   No No   Sig: Take 1 tablet (25 mg total) by mouth daily   meclizine (ANTIVERT) 25 mg tablet   No No   Sig: Take 1 tablet (25 mg total) by mouth 3 (three) times a day as needed for dizziness for up to 3 days   methocarbamol (ROBAXIN) 500 mg tablet   No No   Sig: Take 1 tablet (500 mg total) by mouth 4 (four) times a day   naproxen (NAPROSYN) 500 mg tablet  Self No No   Sig: Take 1 tablet (500 mg total) by mouth 2 (two) times a day with meals   Patient not taking: Reported on 3/25/2024   rosuvastatin (CRESTOR) 10 MG tablet   No No   Sig: Take 1 tablet (10 mg total) by mouth daily   sodium picosulfate, magnesium oxide, citric acid (Clenpiq) oral solution  Self No No   Sig: Use as directed as per  written instructions from office   Patient not taking: Reported on 3/7/2024      Facility-Administered Medications: None       Past Medical History:   Diagnosis Date    Allergic     Hammertoe of right foot     OR   reapir today 2023    Hypertension     Wears glasses        Past Surgical History:   Procedure Laterality Date     SECTION      HERNIA REPAIR  1969    HYSTERECTOMY      removed some of left ovary    FL CORRECTION HAMMERTOE Right 2023    Procedure: REPAIR HAMMERTOE 3RD TOE;  Surgeon: Lokesh Simmons DPM;  Location: [facility];  Service: Podiatry    FL LAPS ABD PRTM&OMENTUM DX W/WO SPEC BR/WA SPX N/A 2023    Procedure: LAPAROSCOPY DIAGNOSTIC, LYSIS OF ADHESIONS;  Surgeon: Debbie Clark MD;  Location: [facility];  Service: General       Family History   Problem Relation Age of Onset    Breast cancer Mother     Thrombosis Father     No Known Problems Sister     No Known Problems Sister     No Known Problems Sister     Hypertension Sister     Hypertension Brother     Hypertension Brother     Diabetes Maternal Grandmother      I have reviewed and agree with the history as documented.    E-Cigarette/Vaping    E-Cigarette Use Never User      E-Cigarette/Vaping Substances    Nicotine No     THC No     CBD No     Flavoring No     Other No     Unknown No      Social History     Tobacco Use    Smoking status: Never    Smokeless tobacco: Never   Vaping Use    Vaping status: Never Used   Substance Use Topics    Alcohol use: Never    Drug use: Never       Review of Systems   Constitutional:  Positive for chills. Negative for fever.   HENT:  Negative for ear pain and sore throat.    Eyes:  Negative for pain and visual disturbance.   Respiratory:  Negative for cough and shortness of breath.    Cardiovascular:  Negative for chest pain and palpitations.   Gastrointestinal:  Positive for abdominal pain. Negative for vomiting.   Genitourinary:  Negative for dysuria and hematuria.    Musculoskeletal:  Negative for arthralgias and back pain.   Skin:  Negative for color change and rash.   Neurological:  Negative for seizures and syncope.   All other systems reviewed and are negative.      Physical Exam  Physical Exam  Vitals and nursing note reviewed.   Constitutional:       General: She is not in acute distress.     Appearance: She is well-developed. She is not ill-appearing, toxic-appearing or diaphoretic.   HENT:      Head: Normocephalic and atraumatic.   Eyes:      Conjunctiva/sclera: Conjunctivae normal.   Cardiovascular:      Rate and Rhythm: Normal rate and regular rhythm.      Heart sounds: No murmur heard.  Pulmonary:      Effort: Pulmonary effort is normal. No respiratory distress.      Breath sounds: Normal breath sounds. No stridor. No wheezing, rhonchi or rales.   Abdominal:      Palpations: Abdomen is soft.      Tenderness: There is abdominal tenderness in the right lower quadrant and periumbilical area. There is right CVA tenderness. There is no left CVA tenderness or guarding.   Musculoskeletal:         General: No swelling.      Cervical back: Neck supple.   Skin:     General: Skin is warm and dry.      Capillary Refill: Capillary refill takes less than 2 seconds.   Neurological:      Mental Status: She is alert.   Psychiatric:         Mood and Affect: Mood normal.         Vital Signs  ED Triage Vitals   Temperature Pulse Respirations Blood Pressure SpO2   05/29/24 2150 05/29/24 2147 05/29/24 2147 05/29/24 2147 05/29/24 2147   98.2 °F (36.8 °C) 81 16 (!) 174/84 98 %      Temp Source Heart Rate Source Patient Position - Orthostatic VS BP Location FiO2 (%)   05/29/24 2150 -- 05/29/24 2147 05/29/24 2147 --   Oral  Sitting Right arm       Pain Score       05/29/24 2147       8           Vitals:    05/29/24 2147   BP: (!) 174/84   Pulse: 81   Patient Position - Orthostatic VS: Sitting         Visual Acuity      ED Medications  Medications   morphine injection 2 mg (has no  administration in time range)   sodium chloride 0.9 % bolus 1,000 mL (1,000 mL Intravenous New Bag 5/29/24 2323)   iohexol (OMNIPAQUE) 350 MG/ML injection (MULTI-DOSE) 100 mL (100 mL Intravenous Given 5/30/24 0026)       Diagnostic Studies  Results Reviewed       Procedure Component Value Units Date/Time    Comprehensive metabolic panel [[id number]]  (Abnormal) Collected: 05/29/24 2323    Lab Status: Final result Specimen: Blood from Arm, Right Updated: 05/29/24 2343     Sodium 140 mmol/L      Potassium 3.6 mmol/L      Chloride 102 mmol/L      CO2 31 mmol/L      ANION GAP 7 mmol/L      BUN 15 mg/dL      Creatinine 0.57 mg/dL      Glucose 93 mg/dL      Calcium 9.5 mg/dL      AST 18 U/L      ALT 22 U/L      Alkaline Phosphatase 80 U/L      Total Protein 7.6 g/dL      Albumin 4.7 g/dL      Total Bilirubin 0.32 mg/dL      eGFR 95 ml/min/1.73sq m     Narrative:      [employer] guidelines for Chronic Kidney Disease (CKD):     Stage 1 with normal or high GFR (GFR > 90 mL/min/1.73 square meters)    Stage 2 Mild CKD (GFR = 60-89 mL/min/1.73 square meters)    Stage 3A Moderate CKD (GFR = 45-59 mL/min/1.73 square meters)    Stage 3B Moderate CKD (GFR = 30-44 mL/min/1.73 square meters)    Stage 4 Severe CKD (GFR = 15-29 mL/min/1.73 square meters)    Stage 5 End Stage CKD (GFR <15 mL/min/1.73 square meters)  Note: GFR calculation is accurate only with a steady state creatinine    Lipase [[id number]]  (Normal) Collected: 05/29/24 2323    Lab Status: Final result Specimen: Blood from Arm, Right Updated: 05/29/24 2343     Lipase 43 u/L     UA w Reflex to Microscopic w Reflex to Culture [[id number]] Collected: 05/29/24 2323    Lab Status: Final result Specimen: Urine, Clean Catch Updated: 05/29/24 2333     Color, UA Light Yellow     Clarity, UA Clear     Specific Gravity, UA 1.014     pH, UA 5.5     Leukocytes, UA Negative     Nitrite, UA Negative     Protein, UA Negative mg/dl      Glucose, UA Negative mg/dl       Ketones, UA Negative mg/dl      Urobilinogen, UA <2.0 mg/dl      Bilirubin, UA Negative     Occult Blood, UA Negative    CBC and differential [[id number]]  (Abnormal) Collected: 05/29/24 2323    Lab Status: Final result Specimen: Blood from Arm, Right Updated: 05/29/24 2330     WBC 9.38 Thousand/uL      RBC 4.56 Million/uL      Hemoglobin 13.1 g/dL      Hematocrit 41.5 %      MCV 91 fL      MCH 28.7 pg      MCHC 31.6 g/dL      RDW 16.5 %      MPV 10.6 fL      Platelets 250 Thousands/uL      nRBC 0 /100 WBCs      Segmented % 58 %      Immature Grans % 0 %      Lymphocytes % 30 %      Monocytes % 8 %      Eosinophils Relative 3 %      Basophils Relative 1 %      Absolute Neutrophils 5.41 Thousands/µL      Absolute Immature Grans 0.02 Thousand/uL      Absolute Lymphocytes 2.82 Thousands/µL      Absolute Monocytes 0.77 Thousand/µL      Eosinophils Absolute 0.30 Thousand/µL      Basophils Absolute 0.06 Thousands/µL                    CT abdomen pelvis with contrast    (Results Pending)              Procedures  Procedures         ED Course                               SBIRT 22yo+      Flowsheet Row Most Recent Value   Initial Alcohol Screen: US AUDIT-C     1. How often do you have a drink containing alcohol? 0 Filed at: 05/29/2024 2323   2. How many drinks containing alcohol do you have on a typical day you are drinking?  0 Filed at: 05/29/2024 2323   3a. Male UNDER 65: How often do you have five or more drinks on one occasion? 0 Filed at: 05/29/2024 2323   3b. FEMALE Any Age, or MALE 65+: How often do you have 4 or more drinks on one occassion? 0 Filed at: 05/29/2024 2323   Audit-C Score 0 Filed at: 05/29/2024 2323   YULIANA: How many times in the past year have you...    Used an illegal drug or used a prescription medication for non-medical reasons? Never Filed at: 05/29/2024 2323                      Medical Decision Making  Mylene MacdonaldBernaarabella is a 69 y.o. female with past medical history of multiple abdominal  surgeries including hysterectomy, , and hernia repair presenting to the ER complaining of right-sided abdominal pain and chills which began today.  Associated constipation.  Triage note states that patient complaining of whole body numbness however patient reports this is not correct.  She reports that she did have lower extremity swelling and numbness last week which resolved when she stopped her amlodipine.  She was seen in the ER for this issue and when she stopped her amlodipine her symptoms resolved.  She currently denies any numbness or tingling or weakness in bilateral upper or lower extremities.  On exam patient is well-appearing and in no acute distress.  Vital signs within normal limits.  Physical examination reveals tenderness to palpation of the right lower quadrant and periumbilical region as well as CVA tenderness.  No significant guarding or masses noted on my examination.  I discussed with patient that we will order CT scan to evaluate for any acute intra-abdominal abnormalities and we will also check basic labs to rule out leukocytosis, anemia, electrolyte abnormalities, or LFT abnormalities.  Patient is understanding and agreeable with plan.    Discussed case with and signed out to Amita GOINS.    Amount and/or Complexity of Data Reviewed  Labs: ordered.  Radiology: ordered.    Risk  Prescription drug management.             Disposition  Final diagnoses:   None     ED Disposition       None          Follow-up Information    None         Patient's Medications   Discharge Prescriptions    No medications on file       No discharge procedures on file.    PDMP Review       None            ED Provider  Electronically Signed by             Aisha Cruz PA-C  24 [phone]

## 2024-05-30 NOTE — DISCHARGE INSTRUCTIONS
May use Colace and Senna for constipation. Monitor abdominal pain and return to ED for any worsening symptoms.

## 2024-05-30 NOTE — PROGRESS NOTES
Assessment/Plan:      1. Primary hypertension  Assessment & Plan:  Worsening  Previously on amlodipine 5mg bid but developed LE edema  She is on atenolol 50mg and losartan 25mg  Reports elevated blood pressures today in the ED.   Her bp as been in the 140-160/70-90  Will increase losartan to 50mg  2. Medication adverse effect, initial encounter  -     losartan (COZAAR) 50 mg tablet; Take 1 tablet (50 mg total) by mouth daily  3. Hypertension  Assessment & Plan:  Worsening  Previously on amlodipine 5mg bid but developed LE edema  She is on atenolol 50mg and losartan 25mg  Reports elevated blood pressures today in the ED.   Her bp as been in the 140-160/70-90  Will increase losartan to 50mg  Orders:  -     losartan (COZAAR) 50 mg tablet; Take 1 tablet (50 mg total) by mouth daily  4. Right lower quadrant abdominal pain  Assessment & Plan:  Unclear cause  She does report constipation   Reviewed ED visit on 5/29/2024: CT AP showed no acute abnormality. Lipase, CMP, and UA were unremarkable  Abdomen is nontender on palpation  Encouraged compliance with metamucil and monitor for change to bowel habits          Subjective:      Patient ID: Mylene Wolff is a 69 y.o. female presents today after ER visit last night for abdominal pain. CTR AP did not show acute abnormalities.     #[id number] used today    She is concern about her high blood pressure.     She also continues to complain of right lower abdominal pain that she was seen in the ED.    Hypertension  Associated symptoms include neck pain. Pertinent negatives include no chest pain, headaches, palpitations or shortness of breath.   Neck Pain   Pertinent negatives include no chest pain, fever, headaches, numbness or weakness.       The following portions of the patient's history were reviewed and updated as appropriate: allergies, current medications, past family history, past medical history, past social history, past surgical history,  "and problem list.    Review of Systems   Constitutional:  Negative for activity change, chills, diaphoresis and fever.   HENT:  Negative for ear pain, hearing loss, postnasal drip, rhinorrhea, sinus pressure, sinus pain, sneezing and sore throat.    Respiratory:  Negative for cough, chest tightness, shortness of breath and wheezing.    Cardiovascular:  Negative for chest pain, palpitations and leg swelling.   Gastrointestinal:  Positive for abdominal pain. Negative for blood in stool, constipation, diarrhea, nausea and vomiting.   Genitourinary:  Negative for dysuria, frequency, hematuria and urgency.   Musculoskeletal:  Positive for neck pain. Negative for arthralgias and myalgias.   Neurological:  Negative for dizziness, syncope, weakness, light-headedness, numbness and headaches.         Objective:      /90 (BP Location: Left arm, Patient Position: Sitting)   Pulse 62   Temp (!) 96.9 °F (36.1 °C) (Tympanic)   Ht 5' 3\" (1.6 m)   Wt 81.4 kg (179 lb 6.4 oz)   SpO2 97%   BMI 31.78 kg/m²          Physical Exam  Vitals reviewed.   Constitutional:       General: She is not in acute distress.     Appearance: She is well-developed. She is not diaphoretic.   HENT:      Head: Normocephalic and atraumatic.      Right Ear: External ear normal.      Left Ear: External ear normal.      Nose: Nose normal. No congestion.      Mouth/Throat:      Mouth: Mucous membranes are moist.      Pharynx: Oropharynx is clear. No oropharyngeal exudate.   Eyes:      General: No scleral icterus.        Right eye: No discharge.         Left eye: No discharge.      Conjunctiva/sclera: Conjunctivae normal.      Pupils: Pupils are equal, round, and reactive to light.   Neck:      Thyroid: No thyromegaly.      Vascular: No JVD.      Trachea: No tracheal deviation.   Cardiovascular:      Rate and Rhythm: Normal rate and regular rhythm.      Heart sounds: Normal heart sounds. No murmur heard.     No friction rub. No gallop.   Pulmonary:     "  Effort: Pulmonary effort is normal. No respiratory distress.      Breath sounds: Normal breath sounds. No wheezing or rales.   Chest:      Chest wall: No tenderness.   Abdominal:      General: Bowel sounds are normal. There is no distension.      Palpations: Abdomen is soft.      Tenderness: There is no abdominal tenderness. There is no right CVA tenderness, left CVA tenderness, guarding or rebound.   Musculoskeletal:         General: Normal range of motion.      Cervical back: Normal range of motion and neck supple. No tenderness.      Right lower leg: No edema.      Left lower leg: No edema.   Lymphadenopathy:      Cervical: No cervical adenopathy.   Skin:     General: Skin is warm and dry.   Neurological:      Mental Status: She is alert and oriented to person, place, and time. Mental status is at baseline.   Psychiatric:         Mood and Affect: Mood normal.         Behavior: Behavior normal.         Thought Content: Thought content normal.         Judgment: Judgment normal.

## 2024-05-30 NOTE — ED CARE HANDOFF
Emergency Department Sign Out Note        Sign out and transfer of care from Aisha Cruz PA-C. See Separate Emergency Department note.     The patient, Mylene Wolff, was evaluated by the previous provider for right-sided abdominal pain.    Workup Completed:  Results Reviewed       Procedure Component Value Units Date/Time    Comprehensive metabolic panel [[id number]]  (Abnormal) Collected: 05/29/24 2323    Lab Status: Final result Specimen: Blood from Arm, Right Updated: 05/29/24 2343     Sodium 140 mmol/L      Potassium 3.6 mmol/L      Chloride 102 mmol/L      CO2 31 mmol/L      ANION GAP 7 mmol/L      BUN 15 mg/dL      Creatinine 0.57 mg/dL      Glucose 93 mg/dL      Calcium 9.5 mg/dL      AST 18 U/L      ALT 22 U/L      Alkaline Phosphatase 80 U/L      Total Protein 7.6 g/dL      Albumin 4.7 g/dL      Total Bilirubin 0.32 mg/dL      eGFR 95 ml/min/1.73sq m     Narrative:      National Kidney Disease Foundation guidelines for Chronic Kidney Disease (CKD):     Stage 1 with normal or high GFR (GFR > 90 mL/min/1.73 square meters)    Stage 2 Mild CKD (GFR = 60-89 mL/min/1.73 square meters)    Stage 3A Moderate CKD (GFR = 45-59 mL/min/1.73 square meters)    Stage 3B Moderate CKD (GFR = 30-44 mL/min/1.73 square meters)    Stage 4 Severe CKD (GFR = 15-29 mL/min/1.73 square meters)    Stage 5 End Stage CKD (GFR <15 mL/min/1.73 square meters)  Note: GFR calculation is accurate only with a steady state creatinine    Lipase [[id number]]  (Normal) Collected: 05/29/24 2323    Lab Status: Final result Specimen: Blood from Arm, Right Updated: 05/29/24 2343     Lipase 43 u/L     UA w Reflex to Microscopic w Reflex to Culture [[id number]] Collected: 05/29/24 2323    Lab Status: Final result Specimen: Urine, Clean Catch Updated: 05/29/24 2333     Color, UA Light Yellow     Clarity, UA Clear     Specific Gravity, UA 1.014     pH, UA 5.5     Leukocytes, UA Negative     Nitrite, UA Negative     Protein, UA Negative mg/dl   "    Glucose, UA Negative mg/dl      Ketones, UA Negative mg/dl      Urobilinogen, UA <2.0 mg/dl      Bilirubin, UA Negative     Occult Blood, UA Negative    CBC and differential [[id number]]  (Abnormal) Collected: 05/29/24 2323    Lab Status: Final result Specimen: Blood from Arm, Right Updated: 05/29/24 2330     WBC 9.38 Thousand/uL      RBC 4.56 Million/uL      Hemoglobin 13.1 g/dL      Hematocrit 41.5 %      MCV 91 fL      MCH 28.7 pg      MCHC 31.6 g/dL      RDW 16.5 %      MPV 10.6 fL      Platelets 250 Thousands/uL      nRBC 0 /100 WBCs      Segmented % 58 %      Immature Grans % 0 %      Lymphocytes % 30 %      Monocytes % 8 %      Eosinophils Relative 3 %      Basophils Relative 1 %      Absolute Neutrophils 5.41 Thousands/µL      Absolute Immature Grans 0.02 Thousand/uL      Absolute Lymphocytes 2.82 Thousands/µL      Absolute Monocytes 0.77 Thousand/µL      Eosinophils Absolute 0.30 Thousand/µL      Basophils Absolute 0.06 Thousands/µL               ED Course / Workup Pending (followup):  CT abdomen pelvis with contrast   ED Interpretation by BUSTER Van (05/30 0313)   CT result: \"1. Mild fluid distention of small bowel loops without a focal point of obstruction, findings could reflect ileus or enteritis but partial small bowel obstruction is not completely excluded. 2. Normal appendix. . No acute inflammatory or obstructive process is identified. Incidental findings are described within the findings section.\"                                          ED Course as of 05/30/24 0401   Thu May 30, 2024   0242 Awaiting CT results. Patient denies any abdominal pain at this time.   0313 No obstruction visualized on CT     Procedures  Medical Decision Making  Amount and/or Complexity of Data Reviewed  Labs: ordered.  Radiology: ordered and independent interpretation performed.    Risk  OTC drugs.  Prescription drug management.      CT results discussed with patient and daughter. Senna and Colace " prescribed to assist with constipation. Strict ED return precautions reviewed at this time. Encouraged follow-up with PCP within a week and to return to ED for any worsening symptoms. Patient and daughter verbalize understanding of discharge instructions and follow-up care at this time.         Disposition  Final diagnoses:   Abdominal pain   Constipation     Time reflects when diagnosis was documented in both MDM as applicable and the Disposition within this note       Time User Action Codes Description Comment    5/30/2024  3:27 AM Amita Collins Add [R10.9] Abdominal pain     5/30/2024  3:28 AM Amita Collins Add [K59.00] Constipation           ED Disposition       ED Disposition   Discharge    Condition   Stable    Date/Time   Thu May 30, 2024  3:27 AM    Comment   Mylene Wolff discharge to home/self care.                   Follow-up Information       Follow up With Specialties Details Why Contact Info Additional Information    BUSTER Gunn Nurse Practitioner Call in 1 week  [address] [facility] [address]  [phone]       [facility] Emergency Department Emergency Medicine  If symptoms worsen [address] [facility] [id number]  [phone] [facility] Emergency Department, [address] [place], [place], [address]          Discharge Medication List as of 5/30/2024  3:29 AM        START taking these medications    Details   docusate sodium (COLACE) 100 mg capsule Take 1 capsule (100 mg total) by mouth every 12 (twelve) hours, Starting Thu 5/30/2024, Normal      senna (SENOKOT) 8.6 mg Take 1 tablet (8.6 mg total) by mouth daily at bedtime for 7 days, Starting Thu 5/30/2024, Until Thu 6/6/2024, Normal           CONTINUE these medications which have NOT CHANGED    Details   atenolol (TENORMIN) 50 mg tablet Take 1 tablet (50 mg total) by mouth daily, Starting Fri 3/8/2024, Normal      benzonatate (TESSALON PERLES) 100 mg capsule Take  1 capsule (100 mg total) by mouth 3 (three) times a day as needed for cough, Starting Tue 2/20/2024, Normal      Blood Pressure Monitoring (Adult Blood Pressure Cuff Lg) KIT Use in the morning, Starting Mon 6/26/2023, Normal      Blood Pressure Monitoring (Blood Pressure Monitor/M Cuff) MISC Use in the morning, Starting Mon 6/26/2023, Print      Calcium-Magnesium-Vitamin D (CALCIUM MAGNESIUM PO) Take 2 capsules by mouth 3 (three) times a day, Historical Med      cholecalciferol (VITAMIN D3) 1,000 units tablet Take 2 tablets (2,000 Units total) by mouth daily, Starting Wed 2/21/2024, Normal      Diclofenac Sodium (VOLTAREN) 1 % Apply 2 g topically 4 (four) times a day, Starting Thu 1/11/2024, Normal      !! lidocaine (LIDODERM) 5 % Apply 1 patch topically over 12 hours daily Remove & Discard patch within 12 hours or as directed by MD, Starting Thu 7/20/2023, Normal      !! lidocaine (Lidoderm) 5 % Apply 1 patch topically over 12 hours daily Remove & Discard patch within 12 hours or as directed by MD, Starting Wed 3/13/2024, Normal      losartan (COZAAR) 25 mg tablet Take 1 tablet (25 mg total) by mouth daily, Starting Mon 5/13/2024, Until Wed 6/12/2024, Normal      meclizine (ANTIVERT) 25 mg tablet Take 1 tablet (25 mg total) by mouth 3 (three) times a day as needed for dizziness for up to 3 days, Starting Fri 9/1/2023, Until Mon 9/4/2023 at 2359, Normal      methocarbamol (ROBAXIN) 500 mg tablet Take 1 tablet (500 mg total) by mouth 4 (four) times a day, Starting Fri 3/15/2024, Normal      Mirabegron ER (Myrbetriq) 50 MG TB24 every 24 hours, Starting Wed 8/30/2023, Historical Med      Multiple Vitamins-Minerals (multivitamin with minerals) tablet Take 1 tablet by mouth daily, Historical Med      naproxen (NAPROSYN) 500 mg tablet Take 1 tablet (500 mg total) by mouth 2 (two) times a day with meals, Starting Fri 11/10/2023, Normal      rosuvastatin (CRESTOR) 10 MG tablet Take 1 tablet (10 mg total) by mouth daily,  Starting Fri 5/3/2024, Normal      sodium picosulfate, magnesium oxide, citric acid (Clenpiq) oral solution Use as directed as per written instructions from office, Normal      Vibegron (Gemtesa) 75 MG TABS every 24 hours, Starting Thu 10/26/2023, Historical Med       !! - Potential duplicate medications found. Please discuss with provider.        No discharge procedures on file.       ED Provider  Electronically Signed by     BUSTER Van  05/30/24 [id number]

## 2024-05-31 PROBLEM — R10.31 RIGHT LOWER QUADRANT ABDOMINAL PAIN: Status: ACTIVE | Noted: 2024-05-31

## 2024-05-31 NOTE — ASSESSMENT & PLAN NOTE
Unclear cause  She does report constipation   Reviewed ED visit on 5/29/2024: CT AP showed no acute abnormality. Lipase, CMP, and UA were unremarkable  Abdomen is nontender on palpation  Encouraged compliance with metamucil and monitor for change to bowel habits

## 2024-06-05 DIAGNOSIS — I15.9 SECONDARY HYPERTENSION: Primary | ICD-10-CM

## 2024-06-05 RX ORDER — AMLODIPINE BESYLATE 5 MG/1
5 TABLET ORAL 2 TIMES DAILY
Qty: 180 TABLET | Refills: 1 | Status: SHIPPED | OUTPATIENT
Start: 2024-06-05

## 2024-06-13 ENCOUNTER — RA CDI HCC (OUTPATIENT)
Dept: OTHER | Facility: HOSPITAL | Age: 69
End: 2024-06-13

## 2024-06-17 DIAGNOSIS — I10 HYPERTENSION: ICD-10-CM

## 2024-06-17 DIAGNOSIS — T50.905A MEDICATION ADVERSE EFFECT, INITIAL ENCOUNTER: ICD-10-CM

## 2024-06-18 ENCOUNTER — OFFICE VISIT (OUTPATIENT)
Dept: DENTISTRY | Facility: CLINIC | Age: 69
End: 2024-06-18

## 2024-06-18 VITALS — DIASTOLIC BLOOD PRESSURE: 82 MMHG | TEMPERATURE: 97.8 F | SYSTOLIC BLOOD PRESSURE: 144 MMHG | HEART RATE: 80 BPM

## 2024-06-18 DIAGNOSIS — K04.5 SYMPTOMATIC PERIAPICAL PERIODONTITIS: ICD-10-CM

## 2024-06-18 DIAGNOSIS — K05.219 PERIODONTAL ABSCESS: Primary | ICD-10-CM

## 2024-06-18 PROCEDURE — D0140 LIMITED ORAL EVALUATION - PROBLEM FOCUSED: HCPCS | Performed by: DENTIST

## 2024-06-18 RX ORDER — LOSARTAN POTASSIUM 50 MG/1
50 TABLET ORAL DAILY
Qty: 90 TABLET | Refills: 0 | OUTPATIENT
Start: 2024-06-18 | End: 2024-09-16

## 2024-06-18 RX ORDER — AMOXICILLIN 500 MG/1
500 CAPSULE ORAL EVERY 8 HOURS SCHEDULED
Qty: 21 CAPSULE | Refills: 0 | Status: SHIPPED | OUTPATIENT
Start: 2024-06-18 | End: 2024-06-25

## 2024-06-18 NOTE — DENTAL PROCEDURE DETAILS
"Dental Emergency Limited Exam    Mylene Wolff 69 y.o. female presents with self to Re for Limited exam  PMH reviewed, no changes, ASA II. Significant medical history: see list. Significant allergies: see list. Significant medications: see list.  Pain scale: 5  Chief complaint: \"Throbbing pain of upper left back tooth. It needs to be extracted\".  HPI: patient reported long time history of dull ache of upper left back molar tooth #16. Tooth is with throbbing pain now and unable to chew on that side. Patient reported she will undergo spinal infusion procedure scheduled in 7/17/2024 and would like to take tooth #16 out before that.    Dental History: patient was seen on 3/25/2024. See note and chart.    Consent:  Discussed that limited exam focuses on problem area, and same day tx is not guaranteed.  Patient explained to if they wish to have anything else evaluated, they need to return to the practice at which they are a patient of record or receive a comprehensive exam.  Patient understands and consents.    Subjective history:    Onset: weeks   Provocation: Biting, Chewing, and Spontaneous   Quality: Throbbing   Region: UL   Severity: 5/10   Timing: constant    Objective clinical findings:   Oral cancer screening: No abnormalities detected   Extraoral exam:   - Facial Swelling: No.  Lymphadenopathy: No. TMD symptoms: No.   Intraoral exam:  Tooth #16 is shifted, non functional, super erupted and periodontally involved tooth. Severe bone loss and attachment loss. Deep infrabony bone pocket and defect around 9 mm. Degree II mobility. Local gum bleeding and  inflammation. Palption and percussion (+). Poor prognosis. Non restorable.     Radiographs: Current FMX 3/25/2024.       Assessment:  #16 Pulp Necrosis ; Symptomatic apical periodontitis, Chronic periodontitis.     Plan: Extraction of tooth #16.    Referrals: [facility] for Extraction of tooth #16.    Comprehensive care disposition:  Patient of records. Patient " reported she will be back for restorative treatment after her medical procedure of spinal infusion will be done in 7/17/2024.     Rx: Amoxicillin 500mg 21 caps, TID for 7 days, no refills. Patient denies allergy.     Patient dismissed ambulatory and alert.    NV1: Prophy with hyg.  NV2: Restorative care.

## 2024-06-20 ENCOUNTER — CONSULT (OUTPATIENT)
Dept: FAMILY MEDICINE CLINIC | Facility: CLINIC | Age: 69
End: 2024-06-20

## 2024-06-20 ENCOUNTER — HOSPITAL ENCOUNTER (OUTPATIENT)
Dept: RADIOLOGY | Facility: HOSPITAL | Age: 69
Discharge: HOME | End: 2024-06-20
Payer: COMMERCIAL

## 2024-06-20 VITALS
HEIGHT: 63 IN | BODY MASS INDEX: 31.93 KG/M2 | WEIGHT: 180.2 LBS | OXYGEN SATURATION: 98 % | TEMPERATURE: 97.6 F | DIASTOLIC BLOOD PRESSURE: 70 MMHG | SYSTOLIC BLOOD PRESSURE: 120 MMHG | HEART RATE: 66 BPM

## 2024-06-20 DIAGNOSIS — M43.06 LUMBAR SPONDYLOLYSIS: ICD-10-CM

## 2024-06-20 DIAGNOSIS — M43.16 SPONDYLOLISTHESIS AT L4-L5 LEVEL: ICD-10-CM

## 2024-06-20 DIAGNOSIS — I10 HYPERTENSION: ICD-10-CM

## 2024-06-20 DIAGNOSIS — T50.905A MEDICATION ADVERSE EFFECT, INITIAL ENCOUNTER: ICD-10-CM

## 2024-06-20 DIAGNOSIS — Z01.818 PREOP EXAMINATION: Primary | ICD-10-CM

## 2024-06-20 LAB
AORTIC VALVE DOPPLER RR INTERVAL: 1002 MS
Lab: 112 MS
PR INTERVAL: 224 MS
QRSD INTERVAL: 80 MS
QT INTERVAL: 416 MS
QTC INTERVAL: 416 MS

## 2024-06-20 PROCEDURE — 71046 X-RAY EXAM CHEST 2 VIEWS: CPT

## 2024-06-20 RX ORDER — LOSARTAN POTASSIUM 50 MG/1
50 TABLET ORAL DAILY
Qty: 90 TABLET | Refills: 0 | Status: SHIPPED | OUTPATIENT
Start: 2024-06-20 | End: 2024-09-18

## 2024-06-20 NOTE — PROGRESS NOTES
[facility] PRE-OPERATIVE EVALUATION  [facility] PHYS[facility]IAN [facility] - [facility]    NAME: Mylene Wolff  AGE: 69 y.o. SEX: female  : 1955     DATE: 2024    [facility] Pre-Operative Evaluation      Chief Complaint: Pre-operative Evaluation     Surgery: Navigated L4-S1 decompression with instrumented fusion, TLIF (Bilateral: Spine Lumbar)   Anticipated Date of Surgery: 24  Referring Provider: Claudette       History of Present Illness:     Mylene Wolff is a 69 y.o. female who presents to the office today for a preoperative consultation at the request of surgeon, Claudette, who plans on performing Navigated L4-S1 decompression with instrumented fusion, TLIF (Bilateral: Spine Lumbar)  on 24. Planned anesthesia is general. Patient has a bleeding risk of: no recent abnormal bleeding. Patient does not have objections to receiving blood products if needed. Current anti-platelet/anti-coagulation medications that the patient is prescribed includes:  none .      Assessment of Chronic Conditions:   - Hypertension: Well controlled on current medications       Assessment of Cardiac Risk:  Denies unstable or severe angina or MI in the last 6 weeks or history of stent placement in the last year   Denies decompensated heart failure (e.g. New onset heart failure, NYHA functional class IV heart failure, or worsening existing heart failure)  Denies significant arrhythmias such as high grade AV block, symptomatic ventricular arrhythmia, newly recognized ventricular tachycardia, supraventricular tachycardia with resting heart rate >100, or symptomatic bradycardia  Denies severe heart valve disease including aortic stenosis or symptomatic mitral stenosis     Exercise Capacity:  Able to walk 4 blocks without symptoms?: No  Able to walk 2 flights without symptoms?: No    Prior Anesthesia Reactions: No     Personal history of venous thromboembolic disease?  No    History of steroid use for >2 weeks within last year? No         Review of Systems:     Review of Systems   Constitutional:  Negative for chills and fever.   HENT:  Negative for ear pain and sore throat.    Eyes:  Negative for pain and visual disturbance.   Respiratory:  Negative for cough and shortness of breath.    Cardiovascular:  Negative for chest pain and palpitations.   Gastrointestinal:  Negative for abdominal pain and vomiting.   Genitourinary:  Negative for dysuria and hematuria.   Musculoskeletal:  Negative for arthralgias and back pain.   Skin:  Negative for color change and rash.   Neurological:  Negative for seizures and syncope.   Psychiatric/Behavioral:  Negative for decreased concentration. The patient is not nervous/anxious.    All other systems reviewed and are negative.      Current Problem List:     Patient Active Problem List   Diagnosis    Hypertension    Varicose veins of both lower extremities with pain    Lumbar radiculopathy    Hammertoe of right foot    Diverticulosis    Lumbar spondylosis    Trigger finger of left hand    Cervicalgia    Fall    Rib pain    Right lower quadrant abdominal pain       Allergies:     Allergies   Allergen Reactions    Aspirin Swelling       Current Medications:       Current Outpatient Medications:     amLODIPine (NORVASC) 5 mg tablet, TAKE 1 TABLET BY MOUTH TWICE A DAY, Disp: 180 tablet, Rfl: 1    amoxicillin (AMOXIL) 500 mg capsule, Take 1 capsule (500 mg total) by mouth every 8 (eight) hours for 7 days, Disp: 21 capsule, Rfl: 0    atenolol (TENORMIN) 50 mg tablet, Take 1 tablet (50 mg total) by mouth daily, Disp: 90 tablet, Rfl: 1    benzonatate (TESSALON PERLES) 100 mg capsule, Take 1 capsule (100 mg total) by mouth 3 (three) times a day as needed for cough (Patient not taking: Reported on 3/25/2024), Disp: 20 capsule, Rfl: 0    Blood Pressure Monitoring (Adult Blood Pressure Cuff Lg) KIT, Use in the morning (Patient not taking: Reported on  12/8/2023), Disp: 1 kit, Rfl: 0    Blood Pressure Monitoring (Blood Pressure Monitor/M Cuff) MISC, Use in the morning (Patient not taking: Reported on 12/8/2023), Disp: 1 each, Rfl: 0    Calcium-Magnesium-Vitamin D (CALCIUM MAGNESIUM PO), Take 2 capsules by mouth 3 (three) times a day, Disp: , Rfl:     cholecalciferol (VITAMIN D3) 1,000 units tablet, Take 2 tablets (2,000 Units total) by mouth daily, Disp: 60 tablet, Rfl: 5    Diclofenac Sodium (VOLTAREN) 1 %, Apply 2 g topically 4 (four) times a day, Disp: 100 g, Rfl: 0    docusate sodium (COLACE) 100 mg capsule, Take 1 capsule (100 mg total) by mouth every 12 (twelve) hours, Disp: 60 capsule, Rfl: 0    lidocaine (LIDODERM) 5 %, Apply 1 patch topically over 12 hours daily Remove & Discard patch within 12 hours or as directed by MD (Patient not taking: Reported on 3/7/2024), Disp: 5 patch, Rfl: 0    lidocaine (LIDODERM) 5 %, Apply 1 patch topically over 12 hours daily for 4 days Remove & Discard patch within 12 hours or as directed by MD (Patient not taking: Reported on 5/30/2024), Disp: 4 patch, Rfl: 0    lidocaine (Lidoderm) 5 %, Apply 1 patch topically over 12 hours daily Remove & Discard patch within 12 hours or as directed by MD (Patient not taking: Reported on 3/25/2024), Disp: 30 patch, Rfl: 0    lidocaine (Lidoderm) 5 %, Apply 1 patch topically over 12 hours daily for 10 days Remove & Discard patch within 12 hours or as directed by MD (Patient not taking: Reported on 5/30/2024), Disp: 10 patch, Rfl: 0    losartan (COZAAR) 50 mg tablet, Take 1 tablet (50 mg total) by mouth daily, Disp: 90 tablet, Rfl: 0    meclizine (ANTIVERT) 25 mg tablet, Take 1 tablet (25 mg total) by mouth 3 (three) times a day as needed for dizziness for up to 3 days, Disp: 9 tablet, Rfl: 0    methocarbamol (ROBAXIN) 500 mg tablet, Take 1 tablet (500 mg total) by mouth 4 (four) times a day (Patient not taking: Reported on 5/30/2024), Disp: 30 tablet, Rfl: 1    Mirabegron ER (Myrbetriq)  50 MG TB24, every 24 hours (Patient not taking: Reported on 3/7/2024), Disp: , Rfl:     Multiple Vitamins-Minerals (multivitamin with minerals) tablet, Take 1 tablet by mouth daily (Patient not taking: Reported on 2024), Disp: , Rfl:     naproxen (NAPROSYN) 500 mg tablet, Take 1 tablet (500 mg total) by mouth 2 (two) times a day with meals, Disp: 30 tablet, Rfl: 0    rosuvastatin (CRESTOR) 10 MG tablet, Take 1 tablet (10 mg total) by mouth daily, Disp: 90 tablet, Rfl: 1    senna (SENOKOT) 8.6 mg, Take 1 tablet (8.6 mg total) by mouth daily at bedtime for 7 days, Disp: 7 tablet, Rfl: 0    sodium picosulfate, magnesium oxide, citric acid (Clenpiq) oral solution, Use as directed as per written instructions from office (Patient not taking: Reported on 3/7/2024), Disp: 320 mL, Rfl: 0    Vibegron (Gemtesa) 75 MG TABS, every 24 hours (Patient not taking: Reported on 3/7/2024), Disp: , Rfl:     Past Medical History:       Past Medical History:   Diagnosis Date    Allergic     Hammertoe of right foot     OR   reapir today 2023    Hypertension     Wears glasses         Past Surgical History:   Procedure Laterality Date     SECTION      HERNIA REPAIR  1969    HYSTERECTOMY  1994    removed some of left ovary    PA CORRECTION HAMMERTOE Right 2023    Procedure: REPAIR HAMMERTOE 3RD TOE;  Surgeon: Lokesh Simmons DPM;  Location: [facility];  Service: Podiatry    PA LAPS ABD PRTM&OMENTUM DX W/WO SPEC BR/WA SPX N/A 2023    Procedure: LAPAROSCOPY DIAGNOSTIC, LYSIS OF ADHESIONS;  Surgeon: Debbie Clark MD;  Location: [facility];  Service: General        Family History   Problem Relation Age of Onset    Breast cancer Mother     Thrombosis Father     No Known Problems Sister     No Known Problems Sister     No Known Problems Sister     Hypertension Sister     Hypertension Brother     Hypertension Brother     Diabetes Maternal Grandmother         Social History     Socioeconomic History     Marital status: /Civil Union     Spouse name: Not on file    Number of children: 4    Years of education: Not on file    Highest education level: Not on file   Occupational History    Not on file   Tobacco Use    Smoking status: Never     Passive exposure: Never    Smokeless tobacco: Never   Vaping Use    Vaping status: Never Used   Substance and Sexual Activity    Alcohol use: Never    Drug use: Never    Sexual activity: Not Currently     Partners: Male   Other Topics Concern    Not on file   Social History Narrative    Not on file     Social Determinants of Health     Financial Resource Strain: Not on file   Food Insecurity: Not on file   Transportation Needs: Not on file   Physical Activity: Not on file   Stress: Not on file   Social Connections: Not on file   Intimate Partner Violence: Not on file   Housing Stability: Not on file        Physical Exam:     There were no vitals taken for this visit.    Physical Exam  Vitals and nursing note reviewed.   Constitutional:       Appearance: Normal appearance. She is normal weight.   HENT:      Head: Normocephalic.      Right Ear: Tympanic membrane, ear canal and external ear normal. There is no impacted cerumen.      Left Ear: Tympanic membrane, ear canal and external ear normal. There is no impacted cerumen.      Nose: Nose normal.      Mouth/Throat:      Mouth: Mucous membranes are moist.      Pharynx: Oropharynx is clear.   Eyes:      Extraocular Movements: Extraocular movements intact.      Conjunctiva/sclera: Conjunctivae normal.      Pupils: Pupils are equal, round, and reactive to light.   Cardiovascular:      Rate and Rhythm: Normal rate and regular rhythm.      Pulses: Normal pulses.      Heart sounds: Normal heart sounds.   Pulmonary:      Effort: Pulmonary effort is normal.      Breath sounds: Normal breath sounds.   Abdominal:      General: Bowel sounds are normal. There is no distension.      Palpations: Abdomen is soft.      Tenderness: There is no  abdominal tenderness.   Musculoskeletal:         General: Normal range of motion.      Cervical back: Normal range of motion.   Skin:     General: Skin is warm.      Capillary Refill: Capillary refill takes less than 2 seconds.   Neurological:      General: No focal deficit present.      Mental Status: She is alert and oriented to person, place, and time. Mental status is at baseline.   Psychiatric:         Mood and Affect: Mood normal.         Behavior: Behavior normal.         Thought Content: Thought content normal.         Judgment: Judgment normal.          Data:     Pre-operative work-up    Laboratory Results: I have personally reviewed the pertinent laboratory results/reports      EKG: I have personally reviewed pertinent reports.      Chest x-ray:  Ordered but not yet completed      Previous cardiopulmonary studies within the past year:  Echocardiogram: none  Cardiac Catheterization: none  Stress Test: none  Pulmonary Function Testing: none      Assessment & Recommendations:     No diagnosis found.    Pre-Op Evaluation Assessment  69 y.o. female with planned surgery: Navigated L4-S1 decompression with instrumented fusion, TLIF (Bilateral: Spine Lumbar) .    Known risk factors for perioperative complications: None.        Current medications which may produce withdrawal symptoms if withheld perioperatively: none.    Pre-Op Evaluation Plan  1. Further preoperative workup as follows:   - Chest x-ray  - ECG    2. Medication Management/Recommendations:   - None, continue medication regimen including morning of surgery, with sip of water  - Patient has been instructed to avoid herbs or non-directed vitamins the week prior to surgery to ensure no drug interactions with perioperative surgical and anesthetic medications.  - Patient should continue antihypertensive medications up through and including the day of surgery.   - Patient should continue beta-blocker medication up through and including the day of  surgery.  - Patient should continue his statin medication up through and including the day of surgery.  - Patient has been instructed to avoid aspirin containing medications or non-steroidal anti-inflammatory drugs for the week preceding surgery.    3. Prophylaxis for cardiac events with perioperative beta-blockers: not indicated.    4. Patient requires further consultation with: None    Clearance  Patient is CLEARED for surgery without any additional cardiac testing.     BUSTER Gunn  [address] [address]  Phone#  [phone]  Fax#  [phone]

## 2024-06-24 ENCOUNTER — TELEPHONE (OUTPATIENT)
Age: 69
End: 2024-06-24

## 2024-06-24 NOTE — TELEPHONE ENCOUNTER
Caller: Mylene    Doctor: Claudette    Reason for call: Patient is to have have a molar pulled in the office on 7/7 and wanted to know if she is able to do this or must it wait till after the back SX  She would also like to know how long after the SX she will be in the hospital and ifis asking for home health nurse to care for her at home  Call back#: [phone]

## 2024-06-26 NOTE — TELEPHONE ENCOUNTER
Spoke with patient utilizing the translation services. Pt is having her tooth extracted on 7/16, pt was informed that surgery will have to moved 6 wks after extraction. Pt's surgery was moved to 8/29 and PO appt was moved accordingly

## 2024-07-16 ENCOUNTER — TELEPHONE (OUTPATIENT)
Age: 69
End: 2024-07-16

## 2024-07-16 ENCOUNTER — HOSPITAL ENCOUNTER (EMERGENCY)
Facility: HOSPITAL | Age: 69
Discharge: HOME | End: 2024-07-16
Attending: EMERGENCY MEDICINE
Payer: COMMERCIAL

## 2024-07-16 VITALS
RESPIRATION RATE: 20 BRPM | OXYGEN SATURATION: 100 % | HEART RATE: 62 BPM | SYSTOLIC BLOOD PRESSURE: 153 MMHG | BODY MASS INDEX: 32.17 KG/M2 | TEMPERATURE: 97.7 F | DIASTOLIC BLOOD PRESSURE: 72 MMHG | WEIGHT: 181.6 LBS

## 2024-07-16 DIAGNOSIS — I10 HYPERTENSION: Primary | ICD-10-CM

## 2024-07-16 LAB
ALBUMIN SERPL BCG-MCNC: 4.6 G/DL (ref 3.5–5)
ALP SERPL-CCNC: 93 U/L (ref 34–104)
ALT SERPL W P-5'-P-CCNC: 23 U/L (ref 7–52)
ANION GAP SERPL CALCULATED.3IONS-SCNC: 4 MMOL/L (ref 4–13)
APTT PPP: 29 SECONDS (ref 23–37)
AST SERPL W P-5'-P-CCNC: 18 U/L (ref 13–39)
ATRIAL RATE: 62 BPM
BASOPHILS # BLD AUTO: 0.04 THOUSANDS/ÂΜL (ref 0–0.1)
BASOPHILS NFR BLD AUTO: 1 % (ref 0–1)
BILIRUB SERPL-MCNC: 0.63 MG/DL (ref 0.2–1)
BILIRUB UR QL STRIP: NEGATIVE
BUN SERPL-MCNC: 12 MG/DL (ref 5–25)
CALCIUM SERPL-MCNC: 9.8 MG/DL (ref 8.4–10.2)
CARDIAC TROPONIN I PNL SERPL HS: 4 NG/L
CHLORIDE SERPL-SCNC: 102 MMOL/L (ref 96–108)
CLARITY UR: CLEAR
CO2 SERPL-SCNC: 32 MMOL/L (ref 21–32)
COLOR UR: YELLOW
CREAT SERPL-MCNC: 0.65 MG/DL (ref 0.6–1.3)
EOSINOPHIL # BLD AUTO: 0.22 THOUSAND/ÂΜL (ref 0–0.61)
EOSINOPHIL NFR BLD AUTO: 3 % (ref 0–6)
ERYTHROCYTE [DISTWIDTH] IN BLOOD BY AUTOMATED COUNT: 15.6 % (ref 11.6–15.1)
GFR SERPL CREATININE-BSD FRML MDRD: 91 ML/MIN/1.73SQ M
GLUCOSE SERPL-MCNC: 94 MG/DL (ref 65–140)
GLUCOSE UR STRIP-MCNC: NEGATIVE MG/DL
HCT VFR BLD AUTO: 46.9 % (ref 34.8–46.1)
HGB BLD-MCNC: 14.9 G/DL (ref 11.5–15.4)
HGB UR QL STRIP.AUTO: NEGATIVE
IMM GRANULOCYTES # BLD AUTO: 0.02 THOUSAND/UL (ref 0–0.2)
IMM GRANULOCYTES NFR BLD AUTO: 0 % (ref 0–2)
INR PPP: 1.01 (ref 0.84–1.19)
KETONES UR STRIP-MCNC: NEGATIVE MG/DL
LEUKOCYTE ESTERASE UR QL STRIP: NEGATIVE
LYMPHOCYTES # BLD AUTO: 2.26 THOUSANDS/ÂΜL (ref 0.6–4.47)
LYMPHOCYTES NFR BLD AUTO: 29 % (ref 14–44)
MAGNESIUM SERPL-MCNC: 2.2 MG/DL (ref 1.9–2.7)
MCH RBC QN AUTO: 28.8 PG (ref 26.8–34.3)
MCHC RBC AUTO-ENTMCNC: 31.8 G/DL (ref 31.4–37.4)
MCV RBC AUTO: 91 FL (ref 82–98)
MONOCYTES # BLD AUTO: 0.52 THOUSAND/ÂΜL (ref 0.17–1.22)
MONOCYTES NFR BLD AUTO: 7 % (ref 4–12)
NEUTROPHILS # BLD AUTO: 4.74 THOUSANDS/ÂΜL (ref 1.85–7.62)
NEUTS SEG NFR BLD AUTO: 60 % (ref 43–75)
NITRITE UR QL STRIP: NEGATIVE
NRBC BLD AUTO-RTO: 0 /100 WBCS
P AXIS: 61 DEGREES
PH UR STRIP.AUTO: 7 [PH] (ref 4.5–8)
PLATELET # BLD AUTO: 242 THOUSANDS/UL (ref 149–390)
PMV BLD AUTO: 11.6 FL (ref 8.9–12.7)
POTASSIUM SERPL-SCNC: 3.8 MMOL/L (ref 3.5–5.3)
PR INTERVAL: 220 MS
PROT SERPL-MCNC: 7.8 G/DL (ref 6.4–8.4)
PROT UR STRIP-MCNC: NEGATIVE MG/DL
PROTHROMBIN TIME: 13.5 SECONDS (ref 11.6–14.5)
QRS AXIS: 19 DEGREES
QRSD INTERVAL: 74 MS
QT INTERVAL: 422 MS
QTC INTERVAL: 428 MS
RBC # BLD AUTO: 5.17 MILLION/UL (ref 3.81–5.12)
SODIUM SERPL-SCNC: 138 MMOL/L (ref 135–147)
SP GR UR STRIP.AUTO: 1.01 (ref 1–1.03)
T WAVE AXIS: 21 DEGREES
UROBILINOGEN UR QL STRIP.AUTO: 0.2 E.U./DL
VENTRICULAR RATE: 62 BPM
WBC # BLD AUTO: 7.8 THOUSAND/UL (ref 4.31–10.16)

## 2024-07-16 PROCEDURE — 99284 EMERGENCY DEPT VISIT MOD MDM: CPT

## 2024-07-16 PROCEDURE — 81003 URINALYSIS AUTO W/O SCOPE: CPT

## 2024-07-16 PROCEDURE — 85025 COMPLETE CBC W/AUTO DIFF WBC: CPT | Performed by: EMERGENCY MEDICINE

## 2024-07-16 PROCEDURE — 85610 PROTHROMBIN TIME: CPT | Performed by: EMERGENCY MEDICINE

## 2024-07-16 PROCEDURE — 96374 THER/PROPH/DIAG INJ IV PUSH: CPT

## 2024-07-16 PROCEDURE — 84484 ASSAY OF TROPONIN QUANT: CPT | Performed by: EMERGENCY MEDICINE

## 2024-07-16 PROCEDURE — 93010 ELECTROCARDIOGRAM REPORT: CPT | Performed by: STUDENT IN AN ORGANIZED HEALTH CARE EDUCATION/TRAINING PROGRAM

## 2024-07-16 PROCEDURE — 80053 COMPREHEN METABOLIC PANEL: CPT | Performed by: EMERGENCY MEDICINE

## 2024-07-16 PROCEDURE — 93005 ELECTROCARDIOGRAM TRACING: CPT

## 2024-07-16 PROCEDURE — 85730 THROMBOPLASTIN TIME PARTIAL: CPT | Performed by: EMERGENCY MEDICINE

## 2024-07-16 PROCEDURE — 83735 ASSAY OF MAGNESIUM: CPT | Performed by: EMERGENCY MEDICINE

## 2024-07-16 PROCEDURE — 36415 COLL VENOUS BLD VENIPUNCTURE: CPT

## 2024-07-16 PROCEDURE — 99285 EMERGENCY DEPT VISIT HI MDM: CPT | Performed by: EMERGENCY MEDICINE

## 2024-07-16 RX ORDER — HYDRALAZINE HYDROCHLORIDE 20 MG/ML
5 INJECTION INTRAMUSCULAR; INTRAVENOUS ONCE
Status: COMPLETED | OUTPATIENT
Start: 2024-07-16 | End: 2024-07-16

## 2024-07-16 RX ADMIN — HYDRALAZINE HYDROCHLORIDE 5 MG: 20 INJECTION, SOLUTION INTRAMUSCULAR; INTRAVENOUS at 14:20

## 2024-07-16 NOTE — ED PROVIDER NOTES
History  Chief Complaint   Patient presents with    Hypertension     Pt c/o of HTN with a headache (180s) for 2 days. Pt also c/o on going leg numbness. Takes HTN meds as prescribed.     Patient is a 69-year-old female here with daughter who both speak Portuguese.  Used [facility]  services.  Patient has a history of hypertension coming in today stating that she was at her dentist office and sent her in because her blood pressure was too high.  She was there to get her molar removed.  She has had headaches only when she goes out into the heat but resolves when she comes indoors in the air conditioning.  She has no falls or injury.  She is been taking her medications as prescribed.  She denies any chest pain, palpitations, syncope, lower extremity edema.  She denies any shortness of breath or dyspnea on exertion.  She has no fevers, chills, nausea, vomiting, diarrhea, abdominal pain.  Patient has also reported that she has numbness in both her feet states been ongoing for years due to her sciatica and her PCP is aware of this.  This is not new with      History provided by:  Medical records, patient and relative   used: Yes (Armand [id number])    Hypertension  Severity:  Unable to specify  Onset quality:  Unable to specify  Timing:  Unable to specify  Progression:  Unable to specify  Context: normal sodium, not caffeine, not drug abuse, not herbal remedies, not medication change, not noncompliance, not OTC medications used and not stress    Relieved by:  Nothing  Worsened by:  Nothing  Ineffective treatments:  None tried  Associated symptoms: no abdominal pain, no anxiety, no blurred vision, no chest pain, no confusion, no dizziness, no ear pain, no epistaxis, no fatigue, no fever, no headaches, no hematuria, no hypokalemia, no loss of consciousness, no nausea, no neck pain, no palpitations, no peripheral edema, no shortness of breath, no syncope, no tinnitus, not vomiting and no weakness     Risk factors: obesity    Risk factors: no alcohol use, no cardiac disease, no cocaine use, no decongestant use, no diabetes, no family history of hypertension, no kidney disease, no prior aneurysm, no prior stroke, no PVD and no tobacco use        Prior to Admission Medications   Prescriptions Last Dose Informant Patient Reported? Taking?   Blood Pressure Monitoring (Adult Blood Pressure Cuff Lg) KIT  Self No No   Sig: Use in the morning   Patient not taking: Reported on 12/8/2023   Blood Pressure Monitoring (Blood Pressure Monitor/M Cuff) MISC  Self No No   Sig: Use in the morning   Patient not taking: Reported on 12/8/2023   Calcium-Magnesium-Vitamin D (CALCIUM MAGNESIUM PO)  Self Yes No   Sig: Take 2 capsules by mouth 3 (three) times a day   Diclofenac Sodium (VOLTAREN) 1 %  Self No No   Sig: Apply 2 g topically 4 (four) times a day   Mirabegron ER (Myrbetriq) 50 MG TB24  Self Yes No   Sig: every 24 hours   Patient not taking: Reported on 3/7/2024   Multiple Vitamins-Minerals (multivitamin with minerals) tablet  Self Yes No   Sig: Take 1 tablet by mouth daily   Patient not taking: Reported on 5/30/2024   Vibegron (Gemtesa) 75 MG TABS  Self Yes No   Sig: every 24 hours   Patient not taking: Reported on 3/7/2024   amLODIPine (NORVASC) 5 mg tablet   No No   Sig: TAKE 1 TABLET BY MOUTH TWICE A DAY   atenolol (TENORMIN) 50 mg tablet  Self No No   Sig: Take 1 tablet (50 mg total) by mouth daily   benzonatate (TESSALON PERLES) 100 mg capsule  Self No No   Sig: Take 1 capsule (100 mg total) by mouth 3 (three) times a day as needed for cough   Patient not taking: Reported on 3/25/2024   cholecalciferol (VITAMIN D3) 1,000 units tablet  Self No No   Sig: Take 2 tablets (2,000 Units total) by mouth daily   docusate sodium (COLACE) 100 mg capsule  Self No No   Sig: Take 1 capsule (100 mg total) by mouth every 12 (twelve) hours   lidocaine (LIDODERM) 5 %  Self No No   Sig: Apply 1 patch topically over 12 hours daily Remove &  Discard patch within 12 hours or as directed by MD   Patient not taking: Reported on 3/7/2024   lidocaine (LIDODERM) 5 %   No No   Sig: Apply 1 patch topically over 12 hours daily for 4 days Remove & Discard patch within 12 hours or as directed by MD   Patient not taking: Reported on 2024   lidocaine (Lidoderm) 5 %  Self No No   Sig: Apply 1 patch topically over 12 hours daily Remove & Discard patch within 12 hours or as directed by MD   Patient not taking: Reported on 3/25/2024   lidocaine (Lidoderm) 5 %   No No   Sig: Apply 1 patch topically over 12 hours daily for 10 days Remove & Discard patch within 12 hours or as directed by MD   Patient not taking: Reported on 2024   losartan (COZAAR) 50 mg tablet   No No   Sig: Take 1 tablet (50 mg total) by mouth daily   meclizine (ANTIVERT) 25 mg tablet   No No   Sig: Take 1 tablet (25 mg total) by mouth 3 (three) times a day as needed for dizziness for up to 3 days   methocarbamol (ROBAXIN) 500 mg tablet  Self No No   Sig: Take 1 tablet (500 mg total) by mouth 4 (four) times a day   Patient not taking: Reported on 2024   naproxen (NAPROSYN) 500 mg tablet  Self No No   Sig: Take 1 tablet (500 mg total) by mouth 2 (two) times a day with meals   rosuvastatin (CRESTOR) 10 MG tablet  Self No No   Sig: Take 1 tablet (10 mg total) by mouth daily   senna (SENOKOT) 8.6 mg  Self No No   Sig: Take 1 tablet (8.6 mg total) by mouth daily at bedtime for 7 days   sodium picosulfate, magnesium oxide, citric acid (Clenpiq) oral solution  Self No No   Sig: Use as directed as per written instructions from office   Patient not taking: Reported on 3/7/2024      Facility-Administered Medications: None     Past Medical History:   Diagnosis Date    Allergic     Hammertoe of right foot     OR   reapir today 2023    Hypertension     Wears glasses        Past Surgical History:   Procedure Laterality Date     SECTION      HERNIA REPAIR  1969    HYSTERECTOMY       removed some of left ovary    ME CORRECTION HAMMERTOE Right 07/21/2023    Procedure: REPAIR HAMMERTOE 3RD TOE;  Surgeon: Lokesh Simmons DPM;  Location: [facility];  Service: Podiatry    ME LAPS ABD PRTM&OMENTUM DX W/WO SPEC BR/WA SPX N/A 01/01/2023    Procedure: LAPAROSCOPY DIAGNOSTIC, LYSIS OF ADHESIONS;  Surgeon: Debbie Clark MD;  Location: [facility];  Service: General       Family History   Problem Relation Age of Onset    Breast cancer Mother     Thrombosis Father     No Known Problems Sister     No Known Problems Sister     No Known Problems Sister     Hypertension Sister     Hypertension Brother     Hypertension Brother     Diabetes Maternal Grandmother      I have reviewed and agree with the history as documented.    E-Cigarette/Vaping    E-Cigarette Use Never User      E-Cigarette/Vaping Substances    Nicotine No     THC No     CBD No     Flavoring No     Other No     Unknown No      Social History     Tobacco Use    Smoking status: Never     Passive exposure: Never    Smokeless tobacco: Never   Vaping Use    Vaping status: Never Used   Substance Use Topics    Alcohol use: Never    Drug use: Never       Review of Systems   Constitutional: Negative.  Negative for chills, fatigue and fever.   HENT: Negative.  Negative for ear pain, nosebleeds, sore throat and tinnitus.    Eyes: Negative.  Negative for blurred vision, pain and visual disturbance.   Respiratory: Negative.  Negative for cough and shortness of breath.    Cardiovascular: Negative.  Negative for chest pain, palpitations and syncope.   Gastrointestinal: Negative.  Negative for abdominal pain, nausea and vomiting.   Genitourinary: Negative.  Negative for dysuria and hematuria.   Musculoskeletal: Negative.  Negative for arthralgias, back pain and neck pain.   Skin:  Negative for color change and rash.   Neurological: Negative.  Negative for dizziness, seizures, loss of consciousness, syncope, weakness and headaches.   Hematological:  Negative.    Psychiatric/Behavioral: Negative.  Negative for confusion. The patient is not nervous/anxious.    All other systems reviewed and are negative.      Physical Exam  Physical Exam  Vitals and nursing note reviewed.   Constitutional:       General: She is not in acute distress.     Appearance: She is well-developed.   HENT:      Head: Normocephalic and atraumatic.      Comments: Patient maintaining airway and secretions. No stridor . No brawniness under tongue.          Right Ear: External ear normal.      Left Ear: External ear normal. There is no impacted cerumen.      Nose: Nose normal.      Mouth/Throat:      Mouth: Mucous membranes are moist.   Eyes:      Extraocular Movements: Extraocular movements intact.      Conjunctiva/sclera: Conjunctivae normal.      Pupils: Pupils are equal, round, and reactive to light.   Neck:      Trachea: Trachea normal.   Cardiovascular:      Rate and Rhythm: Normal rate and regular rhythm.      Pulses:           Radial pulses are 2+ on the right side and 2+ on the left side.        Dorsalis pedis pulses are 2+ on the right side and 2+ on the left side.      Heart sounds: Normal heart sounds, S1 normal and S2 normal. No murmur heard.  Pulmonary:      Effort: Pulmonary effort is normal. No respiratory distress.      Breath sounds: Normal breath sounds.   Abdominal:      Palpations: Abdomen is soft.      Tenderness: There is no abdominal tenderness.   Musculoskeletal:         General: No swelling.      Cervical back: Normal range of motion and neck supple.      Right lower leg: No edema.      Left lower leg: No edema.   Lymphadenopathy:      Cervical: No cervical adenopathy.   Skin:     General: Skin is warm and dry.      Capillary Refill: Capillary refill takes less than 2 seconds.   Neurological:      General: No focal deficit present.      Mental Status: She is alert and oriented to person, place, and time.      GCS: GCS eye subscore is 4. GCS verbal subscore is 5. GCS  motor subscore is 6.      Cranial Nerves: Cranial nerves 2-12 are intact.      Sensory: Sensation is intact.      Motor: Motor function is intact.      Coordination: Coordination is intact.      Comments: No slurred speech.  No facial asymmetry.  No tongue deviation.  Patient to move bilateral upper extremities and lower extremity spontaneously each other independently and pain-free.   Psychiatric:         Mood and Affect: Mood normal.         Behavior: Behavior normal.         Vital Signs  ED Triage Vitals   Temperature Pulse Respirations Blood Pressure SpO2   07/16/24 1308 07/16/24 1311 07/16/24 1308 07/16/24 1308 07/16/24 1308   97.7 °F (36.5 °C) 62 20 (!) 177/82 98 %      Temp Source Heart Rate Source Patient Position - Orthostatic VS BP Location FiO2 (%)   07/16/24 1308 07/16/24 1308 07/16/24 1308 07/16/24 1308 --   Oral Monitor Sitting Right arm       Pain Score       07/16/24 1316       4           Vitals:    07/16/24 1330 07/16/24 1419 07/16/24 1445 07/16/24 1515   BP: (!) 179/84 152/67 129/60 153/72   Pulse: 57 59 61 62   Patient Position - Orthostatic VS: Lying Lying Lying Lying         Visual Acuity      ED Medications  Medications   hydrALAZINE (APRESOLINE) injection 5 mg (5 mg Intravenous Given 7/16/24 1420)       Diagnostic Studies  Results Reviewed       Procedure Component Value Units Date/Time    Protime-INR [[id number]]  (Normal) Collected: 07/16/24 1353    Lab Status: Final result Specimen: Blood from Arm, Right Updated: 07/16/24 1422     Protime 13.5 seconds      INR 1.01    APTT [[id number]]  (Normal) Collected: 07/16/24 1353    Lab Status: Final result Specimen: Blood from Arm, Right Updated: 07/16/24 1422     PTT 29 seconds     Comprehensive metabolic panel [[id number]] Collected: 07/16/24 1341    Lab Status: Final result Specimen: Blood from Arm, Right Updated: 07/16/24 1414     Sodium 138 mmol/L      Potassium 3.8 mmol/L      Chloride 102 mmol/L      CO2 32 mmol/L      ANION GAP 4 mmol/L       BUN 12 mg/dL      Creatinine 0.65 mg/dL      Glucose 94 mg/dL      Calcium 9.8 mg/dL      AST 18 U/L      ALT 23 U/L      Alkaline Phosphatase 93 U/L      Total Protein 7.8 g/dL      Albumin 4.6 g/dL      Total Bilirubin 0.63 mg/dL      eGFR 91 ml/min/1.73sq m     Narrative:      National Kidney Disease Foundation guidelines for Chronic Kidney Disease (CKD):     Stage 1 with normal or high GFR (GFR > 90 mL/min/1.73 square meters)    Stage 2 Mild CKD (GFR = 60-89 mL/min/1.73 square meters)    Stage 3A Moderate CKD (GFR = 45-59 mL/min/1.73 square meters)    Stage 3B Moderate CKD (GFR = 30-44 mL/min/1.73 square meters)    Stage 4 Severe CKD (GFR = 15-29 mL/min/1.73 square meters)    Stage 5 End Stage CKD (GFR <15 mL/min/1.73 square meters)  Note: GFR calculation is accurate only with a steady state creatinine    Magnesium [[id number]]  (Normal) Collected: 07/16/24 1341    Lab Status: Final result Specimen: Blood from Arm, Right Updated: 07/16/24 1414     Magnesium 2.2 mg/dL     HS Troponin 0hr (reflex protocol) [[id number]]  (Normal) Collected: 07/16/24 1341    Lab Status: Final result Specimen: Blood from Arm, Right Updated: 07/16/24 1408     hs TnI 0hr 4 ng/L     Urine Macroscopic, POC [[id number]] Collected: 07/16/24 1359    Lab Status: Final result Specimen: Urine Updated: 07/16/24 1400     Color, UA Yellow     Clarity, UA Clear     pH, UA 7.0     Leukocytes, UA Negative     Nitrite, UA Negative     Protein, UA Negative mg/dl      Glucose, UA Negative mg/dl      Ketones, UA Negative mg/dl      Urobilinogen, UA 0.2 E.U./dl      Bilirubin, UA Negative     Occult Blood, UA Negative     Specific Gravity, UA 1.015    Narrative:      CLINITEK RESULT    CBC and differential [[id number]]  (Abnormal) Collected: 07/16/24 1341    Lab Status: Final result Specimen: Blood from Arm, Right Updated: 07/16/24 1349     WBC 7.80 Thousand/uL      RBC 5.17 Million/uL      Hemoglobin 14.9 g/dL      Hematocrit 46.9 %      MCV 91 fL      MCH  "28.8 pg      MCHC 31.8 g/dL      RDW 15.6 %      MPV 11.6 fL      Platelets 242 Thousands/uL      nRBC 0 /100 WBCs      Segmented % 60 %      Immature Grans % 0 %      Lymphocytes % 29 %      Monocytes % 7 %      Eosinophils Relative 3 %      Basophils Relative 1 %      Absolute Neutrophils 4.74 Thousands/µL      Absolute Immature Grans 0.02 Thousand/uL      Absolute Lymphocytes 2.26 Thousands/µL      Absolute Monocytes 0.52 Thousand/µL      Eosinophils Absolute 0.22 Thousand/µL      Basophils Absolute 0.04 Thousands/µL                    No orders to display              Procedures  ECG 12 Lead Documentation Only    Date/Time: 7/16/2024 2:03 PM    Performed by: Teresa Currie DO  Authorized by: Teresa Currie DO    ECG reviewed by me, the ED Provider: yes    Patient location:  ED  Previous ECG:     Previous ECG:  Compared to current    Comparison ECG info:  8/2023  Rate:     ECG rate:  62    ECG rate assessment: normal    Rhythm:     Rhythm: sinus rhythm    Ectopy:     Ectopy: none    QRS:     QRS axis:  Normal    QRS intervals:  Normal  Conduction:     Conduction: normal    ST segments:     ST segments:  Non-specific  T waves:     T waves: non-specific    Comments:      Normal axis   ms           ED Course  ED Course as of 07/16/24 1635   Tue Jul 16, 2024   1400 Patient is a 69-year-old female here with daughter bedside coming in today for elevated blood pressure.  On exam she is resting in bed in no acute distress hemodynamically stable and neuro intact.  Will check EKG, cardiac workup.  Patient has no headache at this time and states that her feet have always been numb for several years    Disclosure: Voice to text software was used in the preparation of this document and could have resulted in translational errors.      Occasional wrong word or \"sound a like\" substitutions may have occurred due to the inherent limitations of voice recognition software.  Read the chart carefully and recognize, using " context, where substitutions have occurred.       I have independently reviewed external records are available to me to the level of detail possible within the time constraints of my patient care responsibilities in the ED.       1436 Labs reviewed and without actionable derangement    Patient without any evidence of endorgan damage or ischemia.  Patient will be given hydralazine and reassess.  Patient's troponin 4 with a heart score 3 but patient has had no chest pain, shortness of breath or anginal equivalents.   1454 Patient's blood pressure markedly improved after medications.  Will plan for DC home as there is no evidence again of endorgan damage.  Patient and family were asking for referral to cardiology.  Will place this for them.  Discussed with patient and daughter on initial exam to continue blood pressure medications, low-salt and low-fat diet as well as to do appointment for her mole removal.  She states this is scheduled for August.  Encouraged her to take her blood pressure at home to trend to follow-up with PCP               HEART Risk Score      Flowsheet Row Most Recent Value   Heart Score Risk Calculator    History 0 Filed at: 07/16/2024 1435   ECG 0 Filed at: 07/16/2024 1435   Age 2 Filed at: 07/16/2024 1435   Risk Factors 1 Filed at: 07/16/2024 1435   Troponin 0 Filed at: 07/16/2024 1435   HEART Score 3 Filed at: 07/16/2024 1435                          SBIRT 22yo+      Flowsheet Row Most Recent Value   Initial Alcohol Screen: US AUDIT-C     1. How often do you have a drink containing alcohol? 0 Filed at: 07/16/2024 1314   2. How many drinks containing alcohol do you have on a typical day you are drinking?  0 Filed at: 07/16/2024 1314   3b. FEMALE Any Age, or MALE 65+: How often do you have 4 or more drinks on one occassion? 0 Filed at: 07/16/2024 1314   Audit-C Score 0 Filed at: 07/16/2024 1314   YULIANA: How many times in the past year have you...    Used an illegal drug or used a prescription  medication for non-medical reasons? Never Filed at: 07/16/2024 1314                      Medical Decision Making  Amount and/or Complexity of Data Reviewed  Independent Historian: guardian     Details: Daughter at bedside  External Data Reviewed: notes.     Details: Patient was on Norvasc 5 mg twice daily but had bilateral lower extremity edema and changed to atenolol 50 mg and losartan 25 mg.  However on 5/30/2024 they did increase her losartan to 50 mg for persistently elevated blood pressure  Labs: ordered. Decision-making details documented in ED Course.     Details: No anemia, thrombocytopenia or leukocytosis  No acute kidney injury or electrolyte dysfunction  Troponin 4 with heart score of 3  UA clear  Coags without acute findings  ECG/medicine tests: ordered and independent interpretation performed. Decision-making details documented in ED Course.     Details: No ischemia or arrhythmia    Risk  Prescription drug management.                 Disposition  Final diagnoses:   Hypertension     Time reflects when diagnosis was documented in both MDM as applicable and the Disposition within this note       Time User Action Codes Description Comment    7/16/2024  2:56 PM Teresa Currie Add [I10] Hypertension           ED Disposition       ED Disposition   Discharge    Condition   Stable    Date/Time   Tue Jul 16, 2024 1456    Comment   Mylene Wolff discharge to home/self care.                   Follow-up Information       Follow up With Specialties Details Why Contact Info Additional Information    BU[facility] Nurse Practitioner Schedule an appointment as soon as possible for a visit in 1 week  [address] [facility] [address]  [phone]       [facility] Cardiology Schedule an appointment as soon as possible for a visit in 1 week  [address] [facility] [id number] [phone] [facility], [address] [place] [address],  [id number]    [phone]            Discharge Medication List as of 7/16/2024  3:00 PM        CONTINUE these medications which have NOT CHANGED    Details   amLODIPine (NORVASC) 5 mg tablet TAKE 1 TABLET BY MOUTH TWICE A DAY, Starting Wed 6/5/2024, Normal      atenolol (TENORMIN) 50 mg tablet Take 1 tablet (50 mg total) by mouth daily, Starting Fri 3/8/2024, Normal      benzonatate (TESSALON PERLES) 100 mg capsule Take 1 capsule (100 mg total) by mouth 3 (three) times a day as needed for cough, Starting Tue 2/20/2024, Normal      Blood Pressure Monitoring (Adult Blood Pressure Cuff Lg) KIT Use in the morning, Starting Mon 6/26/2023, Normal      Blood Pressure Monitoring (Blood Pressure Monitor/M Cuff) MISC Use in the morning, Starting Mon 6/26/2023, Print      Calcium-Magnesium-Vitamin D (CALCIUM MAGNESIUM PO) Take 2 capsules by mouth 3 (three) times a day, Historical Med      cholecalciferol (VITAMIN D3) 1,000 units tablet Take 2 tablets (2,000 Units total) by mouth daily, Starting Wed 2/21/2024, Normal      Diclofenac Sodium (VOLTAREN) 1 % Apply 2 g topically 4 (four) times a day, Starting Thu 1/11/2024, Normal      docusate sodium (COLACE) 100 mg capsule Take 1 capsule (100 mg total) by mouth every 12 (twelve) hours, Starting Thu 5/30/2024, Normal      !! lidocaine (LIDODERM) 5 % Apply 1 patch topically over 12 hours daily Remove & Discard patch within 12 hours or as directed by MD, Starting Thu 7/20/2023, Normal      !! lidocaine (Lidoderm) 5 % Apply 1 patch topically over 12 hours daily Remove & Discard patch within 12 hours or as directed by MD, Starting Wed 3/13/2024, Normal      losartan (COZAAR) 50 mg tablet Take 1 tablet (50 mg total) by mouth daily, Starting Thu 6/20/2024, Until Wed 9/18/2024, Normal      meclizine (ANTIVERT) 25 mg tablet Take 1 tablet (25 mg total) by mouth 3 (three) times a day as needed for dizziness for up to 3 days, Starting Fri 9/1/2023, Until Mon 9/4/2023 at 2359, Normal       methocarbamol (ROBAXIN) 500 mg tablet Take 1 tablet (500 mg total) by mouth 4 (four) times a day, Starting Fri 3/15/2024, Normal      Mirabegron ER (Myrbetriq) 50 MG TB24 every 24 hours, Starting Wed 8/30/2023, Historical Med      Multiple Vitamins-Minerals (multivitamin with minerals) tablet Take 1 tablet by mouth daily, Historical Med      naproxen (NAPROSYN) 500 mg tablet Take 1 tablet (500 mg total) by mouth 2 (two) times a day with meals, Starting Fri 11/10/2023, Normal      rosuvastatin (CRESTOR) 10 MG tablet Take 1 tablet (10 mg total) by mouth daily, Starting Fri 5/3/2024, Normal      senna (SENOKOT) 8.6 mg Take 1 tablet (8.6 mg total) by mouth daily at bedtime for 7 days, Starting Thu 5/30/2024, Until Thu 6/6/2024, Normal      sodium picosulfate, magnesium oxide, citric acid (Clenpiq) oral solution Use as directed as per written instructions from office, Normal      Vibegron (Gemtesa) 75 MG TABS every 24 hours, Starting Thu 10/26/2023, Historical Med       !! - Potential duplicate medications found. Please discuss with provider.              PDMP Review       None            ED Provider  Electronically Signed by             Teresa Currie DO  07/16/24 [id number]

## 2024-07-16 NOTE — DISCHARGE INSTRUCTIONS
[place] se ha comentado, se hizo lillian derivación a cardiología a petición suya.  Si no tiene noticias de ellos al final de la semana, llame al número anterior para hacer lillian giana    Llame a ferreira médico de cabecera antes de ferreira giana con el dentista para hablar sobre el control continuo de la presión arterial / medicación    Se le marko lillian receta para obtener un monitor de presión arterial en casa.

## 2024-07-16 NOTE — TELEPHONE ENCOUNTER
Pt called, was seen in the ED today 7/16 for high blood pressure and was referred to cardiology. She is requesting that her PCP/other provider call the cardiology office today, at [phone] and get her an appointment today to see a cardiologist.  Patient is requesting a return call today with an update. Did explain to pt that cardiology hours are 8am - 5pm. Pt still requested a call, would like to be seen by a cardiologist today if possible.  Please advise.   assisted with the call.  Womadeleine  [id number]

## 2024-07-18 ENCOUNTER — TELEPHONE (OUTPATIENT)
Age: 69
End: 2024-07-18

## 2024-07-18 ENCOUNTER — CONSULT (OUTPATIENT)
Dept: CARDIOLOGY CLINIC | Facility: CLINIC | Age: 69
End: 2024-07-18
Payer: COMMERCIAL

## 2024-07-18 VITALS
DIASTOLIC BLOOD PRESSURE: 90 MMHG | WEIGHT: 183 LBS | SYSTOLIC BLOOD PRESSURE: 130 MMHG | HEART RATE: 59 BPM | BODY MASS INDEX: 32.43 KG/M2 | HEIGHT: 63 IN

## 2024-07-18 DIAGNOSIS — E78.00 PURE HYPERCHOLESTEROLEMIA: Primary | ICD-10-CM

## 2024-07-18 DIAGNOSIS — I10 HYPERTENSION: ICD-10-CM

## 2024-07-18 PROCEDURE — 93000 ELECTROCARDIOGRAM COMPLETE: CPT | Performed by: INTERNAL MEDICINE

## 2024-07-18 PROCEDURE — 99204 OFFICE O/P NEW MOD 45 MIN: CPT | Performed by: INTERNAL MEDICINE

## 2024-07-18 RX ORDER — LOSARTAN POTASSIUM 50 MG/1
50 TABLET ORAL DAILY
COMMUNITY

## 2024-07-18 NOTE — PROGRESS NOTES
Outpatient Consultation - General Cardiology   Mylene Wolff 69 y.o. female   MRN: [id number]  Encounter: [id number]      PCP: BUSTER Gunn      History of Present Illness   Physician Requesting Consult: Consults   Reason for Consult / Principal Problem: HTN    : [id number] utilized for the conversation    HPI: Mylene Wolff is a 69 y.o. 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 She has a history of HTN, Hysterectomy and hernia repair.      She has been seen in the emergency department multiple times over the past few months related to hypertension and abdominal pain.  On 5/13/2024 she went to the ER for lower extremity swelling which she contributed to her amlodipine 5 mg twice daily that she was taking for her blood pressure control.  She was switched to losartan 25 mg daily. Her blood pressure at the time was 146/85.  Follow-up in the ER for abdominal pain blood pressure was noted to be 174/84 and follow-up with her PCP later that day her losartan was increased to 50 mg daily in addition to her atenolol 50 mg daily.  She is subsequently seen in the ER 7/16/2024 after being seen at the dentist for tooth extraction and told to present to the ER for elevated blood pressure.  Blood pressure at this time noted to be 177/82 and at that time she endorsed headache for 2 days.    Today she feels well with no acute complaints. She takes her medications as prescribed daily. She has a lot of neck and back pain but does not take NSAIDs. She denies chest pain and palpitations, SOB, lower extremity swelling PND, orthopnea. She does check her BP at home and it mainly runs 120s and 130s with occasionally being in the 140s.    Family History:  Tobacco use: never  Alcohol use: denies  Drug use: denies  Exercise: she does activity at work but no exercise routine.  Diet: does not eat a  lot of salt. Prepares her food for herselt, lots of fish and vegitables.    Reviewed Prior Cardiac studies:      Review of Systems  Review of system was conducted and was negative except for as stated in the HPI.      Historical Information   Past Medical History:   Diagnosis Date    Allergic     Hammertoe of right foot     OR   reapir today 2023    Hypertension     Wears glasses      Past Surgical History:   Procedure Laterality Date     SECTION      HERNIA REPAIR  1969    HYSTERECTOMY      removed some of left ovary    CO CORRECTION HAMMERTOE Right 2023    Procedure: REPAIR HAMMERTOE 3RD TOE;  Surgeon: Lokesh Simmons DPM;  Location: [facility];  Service: Podiatry    CO LAPS ABD PRTM&OMENTUM DX W/WO SPEC BR/WA SPX N/A 2023    Procedure: LAPAROSCOPY DIAGNOSTIC, LYSIS OF ADHESIONS;  Surgeon: Debbie Clark MD;  Location: [facility];  Service: General     Social History     Substance and Sexual Activity   Alcohol Use Never     Social History     Substance and Sexual Activity   Drug Use Never     Social History     Tobacco Use   Smoking Status Never    Passive exposure: Never   Smokeless Tobacco Never     Family History: non-contributory    Meds/Allergies   Home Medications:   Current Outpatient Medications:     atenolol (TENORMIN) 50 mg tablet, Take 1 tablet (50 mg total) by mouth daily, Disp: 90 tablet, Rfl: 1    cholecalciferol (VITAMIN D3) 1,000 units tablet, Take 2 tablets (2,000 Units total) by mouth daily, Disp: 60 tablet, Rfl: 5    losartan (COZAAR) 50 mg tablet, Take 1 tablet (50 mg total) by mouth daily, Disp: 90 tablet, Rfl: 0    losartan (COZAAR) 50 mg tablet, Take 50 mg by mouth daily, Disp: , Rfl:     rosuvastatin (CRESTOR) 10 MG tablet, Take 1 tablet (10 mg total) by mouth daily, Disp: 90 tablet, Rfl: 1    Blood Pressure Monitoring (Adult Blood Pressure Cuff Lg) KIT, Use in the morning (Patient not taking: Reported on 2023), Disp: 1 kit, Rfl: 0     "Diclofenac Sodium (VOLTAREN) 1 %, Apply 2 g topically 4 (four) times a day, Disp: 100 g, Rfl: 0    meclizine (ANTIVERT) 25 mg tablet, Take 1 tablet (25 mg total) by mouth 3 (three) times a day as needed for dizziness for up to 3 days, Disp: 9 tablet, Rfl: 0    Mirabegron ER (Myrbetriq) 50 MG TB24, every 24 hours (Patient not taking: Reported on 3/7/2024), Disp: , Rfl:     senna (SENOKOT) 8.6 mg, Take 1 tablet (8.6 mg total) by mouth daily at bedtime for 7 days, Disp: 7 tablet, Rfl: 0    Allergies   Allergen Reactions    Aspirin Swelling         Objective   Vitals: Blood pressure 130/90, pulse 59, height 5' 3\" (1.6 m), weight 83 kg (183 lb).      Physical Exam  GEN: Mylene Wolff appears well, alert and oriented x 3, pleasant and cooperative   HEENT:  Normocephalic, atraumatic, anicteric, moist mucous membranes  NECK: No JVD or carotid bruits   HEART: regular rhythm, regular rate, normal S1 and S2, no murmurs, clicks, gallops or rubs   LUNGS: Clear to auscultation bilaterally; no wheezes, rales, or rhonchi; respiration nonlabored   ABDOMEN:  Normoactive bowel sounds, soft, no tenderness, no distention  EXTREMITIES: peripheral pulses palpable; no edema  NEURO: no gross focal findings; cranial nerves grossly intact   SKIN:  Dry, intact, warm to touch    Lab Results: I have personally reviewed pertinent lab results.    Lab Results   Component Value Date    HSTNI0 4 07/16/2024    HSTNI2 3 08/17/2023     No results found for: \"NTBNP\"  Lab Results   Component Value Date    TRIG 83 04/27/2023    HDL 69 04/27/2023    LDLCALC 149 (H) 04/27/2023     Lab Results   Component Value Date    K 3.8 07/16/2024    CO2 32 07/16/2024     07/16/2024    BUN 12 07/16/2024    CREATININE 0.65 07/16/2024    ALT 23 07/16/2024    AST 18 07/16/2024     Lab Results   Component Value Date    WBC 7.80 07/16/2024    HGB 14.9 07/16/2024    HCT 46.9 (H) 07/16/2024    MCV 91 07/16/2024     07/16/2024     Lab Results   Component " "Value Date    INR 1.01 07/16/2024     No results found for: \"HGBA1C\"     Imaging: I have personally reviewed pertinent reports.        EKG:   Date: 7/18/24  Interpretation: sinus bradycardia with 1st degree AVB    Previous STRESS TEST:  No results found for this or any previous visit.       Previous Cath/PCI:  No results found for this or any previous visit.      ECHO:  No results found for this or any previous visit.      MARLO:  No results found for this or any previous visit.      CMR:  No results found for this or any previous visit.      HOLTER  No results found for this or any previous visit.      The 10-year ASCVD risk score (Liana NIETO, et al., 2019) is: 10.5%    Values used to calculate the score:      Age: 69 years      Sex: Female      Is Non- : No      Diabetic: No      Tobacco smoker: No      Systolic Blood Pressure: 130 mmHg      Is BP treated: Yes      HDL Cholesterol: 69 mg/dL      Total Cholesterol: 235 mg/dL      Assessment & Plan     Assessment/Plan:    HTN  Blood pressure currently well-controlled.  She has had elevated blood pressures into the 180s even 190.  I am uncertain why her blood pressure spiked during these occasions.  She appears to control her risk factors well, follows a low-salt diet takes her medications regularly.  Outside the ER her blood pressures appear to be well-controlled.  She does not have symptoms consistent with pheochromocytoma.  -Currently well-controlled on atenolol 50 mg daily and losartan 50 mg daily    1st degree AV block  Noted on EKG today but is not new.  Present also in 2023.  No symptoms of bradycardia.    HLD  Lipid panel 4/27/23: , TG 83, HDL 69, .  No repeat cholesterol panel.  - Currently on rosuvastatin 10 mg daily    Plan:  - repeat lipid panel to evaluate where LDL is.  LDL goal less than 100.  - follow up with PCP  - if BP goes above 160 she can take an additional dose of losartan and can take an extra half dose " losartan prior to dental appointment.  - I can call her with lab work follow up but she can continue to follow with PCP  - can follow up with cardiology PRN  - use tyelol for neck and back pain, avoid NSAIDS as this can increase BP      Case discussed and reviewed with Dr. Bridges who agrees with my assessment and plan.    Thank you for involving us in the care of your patient.      Jason Reed, DO  Cardiology Fellow   PGY-5      ==========================================================================================    Epic/ Allscripts/Care Everywhere records reviewed    ** Please Note: Fluency DirectDictation voice to text software may have been used in the creation of this document. **

## 2024-07-18 NOTE — TELEPHONE ENCOUNTER
Caller: Self    Doctor: Claudette    Reason for call: Patient has a few sessions with chiropractor and back feels better. She scheduled an appt for a reevaluation to see if surgery is still necessary. Wants to know if provider wants her to get any imaging prior?    **Used a     Call back#: [phone]

## 2024-07-19 ENCOUNTER — APPOINTMENT (OUTPATIENT)
Dept: LAB | Facility: HOSPITAL | Age: 69
End: 2024-07-19
Payer: COMMERCIAL

## 2024-07-19 DIAGNOSIS — E78.00 PURE HYPERCHOLESTEROLEMIA: ICD-10-CM

## 2024-07-19 LAB
CHOLEST SERPL-MCNC: 167 MG/DL
HDLC SERPL-MCNC: 63 MG/DL
LDLC SERPL CALC-MCNC: 87 MG/DL (ref 0–100)
NONHDLC SERPL-MCNC: 104 MG/DL
TRIGL SERPL-MCNC: 87 MG/DL

## 2024-07-19 PROCEDURE — 36415 COLL VENOUS BLD VENIPUNCTURE: CPT

## 2024-07-19 PROCEDURE — 80061 LIPID PANEL: CPT

## 2024-07-23 ENCOUNTER — TELEPHONE (OUTPATIENT)
Age: 69
End: 2024-07-23

## 2024-07-23 NOTE — TELEPHONE ENCOUNTER
Pt called in stating she had completed blood work for cardiologist and would like results. Informed patient she would have to call cardio to speak with them regarding results.  [id number] was used during this call.

## 2024-07-23 NOTE — TELEPHONE ENCOUNTER
"Spoke with patient with assistance of  Becka, relayed results of cholesterol as per Dr Reed:       \"Sent patient my chart message. Cholesterol is well controlled. \"    Verbalized understanding.  "

## 2024-07-24 ENCOUNTER — OFFICE VISIT (OUTPATIENT)
Dept: OBGYN CLINIC | Facility: HOSPITAL | Age: 69
End: 2024-07-24
Payer: COMMERCIAL

## 2024-07-24 ENCOUNTER — HOSPITAL ENCOUNTER (OUTPATIENT)
Dept: RADIOLOGY | Facility: HOSPITAL | Age: 69
Discharge: HOME | End: 2024-07-24
Attending: ORTHOPAEDIC SURGERY
Payer: COMMERCIAL

## 2024-07-24 VITALS — HEIGHT: 63 IN | BODY MASS INDEX: 32.42 KG/M2 | WEIGHT: 182.98 LBS

## 2024-07-24 DIAGNOSIS — Z01.818 PRE-OP EVALUATION: ICD-10-CM

## 2024-07-24 DIAGNOSIS — M43.16 SPONDYLOLISTHESIS AT L4-L5 LEVEL: Primary | ICD-10-CM

## 2024-07-24 PROCEDURE — 99212 OFFICE O/P EST SF 10 MIN: CPT | Performed by: ORTHOPAEDIC SURGERY

## 2024-07-24 PROCEDURE — 1160F RVW MEDS BY RX/DR IN RCRD: CPT | Performed by: ORTHOPAEDIC SURGERY

## 2024-07-24 PROCEDURE — 72082 X-RAY EXAM ENTIRE SPI 2/3 VW: CPT

## 2024-07-24 PROCEDURE — 1159F MED LIST DOCD IN RCRD: CPT | Performed by: ORTHOPAEDIC SURGERY

## 2024-07-29 NOTE — PROGRESS NOTES
Assessment & Plan/Medical Decision Makin y.o. female with Back Pain and Bilateral Radicular Leg Pain and imaging findings most notable for lumbar spondylosis , L4-5 spondylolisthesis, foraminal stenosis L4-5 and L5-S1        The clinical, physical and imaging findings were reviewed with the patient.  Mylene has a constellation of findings consistent with Lumbar Radiculopathy in the setting of lumbar degenerative disease, L4-5 spondylolisthesis with severe right sided foraminal and lateral recess stenosis and L5-S1 disc degeneration with left sided foraminal stenosis.  Ongoing low back and bilateral lower extremity pain. Reports pain and symptoms interfere with her daily activities and functioning.  She has tried oral medications, physical therapy, and interventional spine procedures with only temporary improvement.       We discussed the differential diagnosis including extremity/musculoskeltal pathology, peripheral nerve compression, vascular claudication etc. Mylene's symptoms, exam findings and imaging are most consistent with lumbar radiculopathy.  In my opinion, no further diagnostic work-up (EMG, etc) is warranted at this time.  Discussed treatment options.  Reviewed the role of further non operative treatment such as physical therapy, activity modification, medication management and interventional spine procedures.  Given that her symptoms have persisted despite these conservative interventions, recommend consideration of surgical intervention.  Mylene would like to proceed with lumbar surgery.       We discussed surgical options.  In my opinion, would be posterior lumbar decompression L4-S1 and posterior lumbar fusion with instrumentation L3-S1, with transforaminal lumbar interbody fusion to address mechanical back pain, unstable spondylolisthesis, severe foraminal stenosis.  We did discuss updated plan to include L3 in fusion due to coronal imbalance.  We reviewed the options - such as instrumented  vs non-instrumented, posterolateral vs interbody, local autograft vs ICBG vs allograft +/- biologic/graft extender/graft substitute supplementation for obtaining solid arthrodesis as well as the associated risk/benefit profiles and fusion efficacy as well as the FDA status of the instrumentation and products.  We also discussed the role of decompression surgery alone, but given instability and ongoing mechanical back pain we reviewed the role of fusion.  After discussion with the patient will plan on posterolateral instrumented +/- TLIF with use of local autograft and allograft with graft extender/substitute supplementation.  Explained at length the rationale for surgical invention and postoperative expectations.  We discussed and Mylene expressed her understanding, that in general, spine surgery is more predictive in improving extremity/radicular discomfort rather than axial spine pain, and arresting the progression of spinal cord/nerve dysfunction rather than improving.  Will utilize LSO brace immobilization during post operative recovery to help reduce pain by restricting mobility of the trunk and facilitate healing.      I reviewed with the patient possible risks of surgery which included the risk of infection, blood loss, risk of damage to adjacent nerves, vessels organs, risk of spinal fluid leak and meningitis, risk of chronic pain, incomplete resolution of symptoms, instability, adjacent segment degeneration & disease, risk of nonunion and instrumentation failure, disease transmission, need for further surgery, DVT, pulmonary embolism, blindness, paralysis and even death, as well as other risk on consent form.   We also discussed patient specific risks and mitigation including advanced age, hypertension, osteopenia and associated iglesia- and post- operative related risks.       We reviewed infection prevention.  Reviewed medications; instructed patient medications to stop before surgery (i.e. blood thinners,  NSAIDs, DMARDs and vitamins)     Also reviewed with patient our narcotic policy.  We will provide medications up to 60 days postop.  In the event patient requires more medications, will need to consult their PCP and/or see a pain management physician.     I have queried the [place] drug [place] database for Mylene to better assist in clinical decision making regarding prescriptions for controlled medications.  After querying the database and considering the clinical picture I have determined that he is a candidate for a prescription for control medication in the initial postoperative period.     All questions were answered.  Patient verbalized understanding.  Informed consent was obtained. Consent form was signed.  Mylene had no further questions.  The entirety of the evaluation and consent was done with certified Iraqi interpretor present.       She will recieve a pre-operative chest Xray and scoliosis series xrays. Orders for preoperative labs (CBC, CMP, PTT, INR, type & screen, and ECG) were also placed at today's visit. Discussed pre-operative clearances, medical optimization and risk stratification.  Mylene needs pre-operative clearance from PCP and obtain PATs.      Pre operative patient reported outcome measures were obtained.      Patient instructed to return to office/ER sooner if symptoms are not improving, getting worse, or new worrisome/neurologic symptoms arise.     Subjective:      Chief Complaint: Back Pain    HPI:  Mylene Wolff is a 69 y.o. female presenting for follow up visit with chief complaint of back and bilateral radicular leg pain.  Please see detailed notes from 11/18/22, 2/24/23, 3/24/23, 6/2/23, 12/1/23, 2/2/24 and 5/3/24 for complete history and attempted non operative treatments .  Mylene obtained updated MRI and is here today for surgical discussion.  Dexa scan from 2023 indicates osteopenia and has continued with calcium and vitamin D as prescribed by  PCP.    Conservative therapy includes the following:   Medications: robaxin, steroids, naproxen, lidocaine patches    Injections: multiple injections with Dr. Hanks     Physical Therapy: completed, with benefit initially but non sustained   Chiropractic Medicine: has not attempted  Accupunture/Massage Therapy: has not attempted   These therapeutic modalities were ineffective at providing sustained pain relief/functional improvement.     Nicotine dependent: no    Objective:     Family History   Problem Relation Age of Onset    Breast cancer Mother     Thrombosis Father     No Known Problems Sister     No Known Problems Sister     No Known Problems Sister     Hypertension Sister     Hypertension Brother     Hypertension Brother     Diabetes Maternal Grandmother        Past Medical History:   Diagnosis Date    Allergic     Hammertoe of right foot     OR   reapir today 7/21/2023    Hypertension     Wears glasses        Current Outpatient Medications   Medication Sig Dispense Refill    atenolol (TENORMIN) 50 mg tablet Take 1 tablet (50 mg total) by mouth daily 90 tablet 1    Blood Pressure Monitoring (Adult Blood Pressure Cuff Lg) KIT Use in the morning (Patient not taking: Reported on 12/8/2023) 1 kit 0    cholecalciferol (VITAMIN D3) 1,000 units tablet Take 2 tablets (2,000 Units total) by mouth daily 60 tablet 5    Diclofenac Sodium (VOLTAREN) 1 % Apply 2 g topically 4 (four) times a day 100 g 0    losartan (COZAAR) 50 mg tablet Take 1 tablet (50 mg total) by mouth daily 90 tablet 0    losartan (COZAAR) 50 mg tablet Take 50 mg by mouth daily      meclizine (ANTIVERT) 25 mg tablet Take 1 tablet (25 mg total) by mouth 3 (three) times a day as needed for dizziness for up to 3 days 9 tablet 0    Mirabegron ER (Myrbetriq) 50 MG TB24 every 24 hours (Patient not taking: Reported on 3/7/2024)      rosuvastatin (CRESTOR) 10 MG tablet Take 1 tablet (10 mg total) by mouth daily 90 tablet 1    senna (SENOKOT) 8.6 mg Take 1  tablet (8.6 mg total) by mouth daily at bedtime for 7 days 7 tablet 0     No current facility-administered medications for this visit.       Past Surgical History:   Procedure Laterality Date     SECTION      HERNIA REPAIR  1969    HYSTERECTOMY      removed some of left ovary    DC CORRECTION HAMMERTOE Right 2023    Procedure: REPAIR HAMMERTOE 3RD TOE;  Surgeon: Lokesh Simmons DPM;  Location: [facility];  Service: Podiatry    DC LAPS ABD PRTM&OMENTUM DX W/WO SPEC BR/WA SPX N/A 2023    Procedure: LAPAROSCOPY DIAGNOSTIC, LYSIS OF ADHESIONS;  Surgeon: Debbie Calrk MD;  Location: [facility] OR;  Service: General       Social History     Socioeconomic History    Marital status: /Civil Union     Spouse name: Not on file    Number of children: 4    Years of education: Not on file    Highest education level: Not on file   Occupational History    Not on file   Tobacco Use    Smoking status: Never     Passive exposure: Never    Smokeless tobacco: Never   Vaping Use    Vaping status: Never Used   Substance and Sexual Activity    Alcohol use: Never    Drug use: Never    Sexual activity: Not Currently     Partners: Male   Other Topics Concern    Not on file   Social History Narrative    Not on file     Social Determinants of Health     Financial Resource Strain: Not on file   Food Insecurity: Not on file   Transportation Needs: Not on file   Physical Activity: Not on file   Stress: Not on file   Social Connections: Not on file   Intimate Partner Violence: Not on file   Housing Stability: Not on file       Allergies   Allergen Reactions    Aspirin Swelling       Review of Systems  General- denies fever/chills  HEENT- denies hearing loss or sore throat  Eyes- denies eye pain or visual disturbances, denies red eyes  Respiratory- denies cough or SOB  Cardio- denies chest pain or palpitations  GI- denies abdominal pain  Endocrine- denies urinary frequency  Urinary- denies pain with  "urination  Musculoskeletal- Negative except noted above  Skin- denies rashes or wounds  Neurological- denies dizziness or headache  Psychiatric- denies anxiety or difficulty concentrating    Physical Exam  Ht 5' 3\" (1.6 m)   Wt 83 kg (182 lb 15.7 oz)   BMI 32.41 kg/m²     General/Constitutional: No apparent distress: well-nourished and well developed.  Lymphatic: No appreciable lymphadenopathy  Respiratory: Non-labored breathing  Vascular: No edema, swelling or tenderness, except as noted in detailed exam.  Integumentary: No impressive skin lesions present, except as noted in detailed exam.  Psych: Normal mood and affect, oriented to person, place and time.  MSK: normal other than stated in HPI and exam  Gait & balance: no evidence of myelopathic gait, ambulates Independently     Lumbar spine range of motion:  -Forward flexion to 90  -Extension to neutral  -Lateral bend 25 right, 25 left  -Rotation 25 right, 25 left  There tenderness with palpation along lumbar paraspinal musculature, no midline tenderness     Neurologic:    Lower Extremity Motor Function    Right  Left    Iliopsoas  5/5  5/5    Quadriceps 5/5 5/5   Tibialis anterior  5/5  5/5    EHL  5/5  5/5    Gastroc. muscle  5/5  5/5    Heel rise  5/5  5/5    Toe rise  5/5  5/5      Sensory: light touch is intact to bilateral upper and lower extremities     Reflexes:    Right Left   Patellar 1+ 1+   Achilles 1+ 1+   Babinski neg neg     Other tests:  Straight Leg Raise: negative  Sharron SI: negative  HERMANN SI: negative  Greater troch: no tenderness   Internal/external hip ROM: intact, no pain   Flexion/extension knee ROM: intact, no pain   Vascular: WWP extremities, 2+DP bilateral      Diagnostic Tests   IMAGING: I have personally reviewed the images and these are my findings:  Lumbar Spine X-rays from 11/18/22: multi level lumbar spondylosis with loss of disc height most noted at L5-S1, osteophyte formation and facet hypertrophy, L4-5 spondylolisthesis with " "instability noted on flexion extension radiographs, no appreciated lytic/blastic lesions, no obvious instability    Lumbar Spine MRI from 5/10/24: Multilevel lumbar degenerative changes with L5-S1 disc base degeneration, L4-5 degenerative spondylolisthesis, right-sided L4-5 lateral recess stenosis and foraminal stenosis, and left sided L5-S1 foraminal stenosis, no significant central stenosis however there does appear to be partial reduction of her degenerative spondylolisthesis on supine imaging     Scoliosis series from 7/24/24: multi level disc degeneration, lumbar coronal asymmetry with overall preserved balance, slightly positive sagittal balance SVA        Electronic Medical Records were reviewed including Radiology reports, pain mgmt notes, PT notes    Procedures, if performed today     None performed       Portions of the record may have been created with voice recognition software.  Occasional wrong word or \"sound a like\" substitutions may have occurred due to the inherent limitations of voice recognition software.  Read the chart carefully and recognize, using context, where substitutions have occurred.   "

## 2024-08-08 ENCOUNTER — CONSULT (OUTPATIENT)
Dept: FAMILY MEDICINE CLINIC | Facility: CLINIC | Age: 69
End: 2024-08-08
Payer: COMMERCIAL

## 2024-08-08 VITALS
TEMPERATURE: 97.6 F | WEIGHT: 184 LBS | DIASTOLIC BLOOD PRESSURE: 90 MMHG | SYSTOLIC BLOOD PRESSURE: 120 MMHG | HEART RATE: 71 BPM | OXYGEN SATURATION: 97 % | BODY MASS INDEX: 32.6 KG/M2 | HEIGHT: 63 IN

## 2024-08-08 DIAGNOSIS — Z01.818 PREOP EXAMINATION: Primary | ICD-10-CM

## 2024-08-08 DIAGNOSIS — I10 PRIMARY HYPERTENSION: ICD-10-CM

## 2024-08-08 DIAGNOSIS — M79.18 MUSCLE PAIN, LUMBAR: ICD-10-CM

## 2024-08-08 DIAGNOSIS — E55.9 VITAMIN D DEFICIENCY: ICD-10-CM

## 2024-08-08 DIAGNOSIS — M43.16 SPONDYLOLISTHESIS AT L4-L5 LEVEL: ICD-10-CM

## 2024-08-08 PROCEDURE — 99214 OFFICE O/P EST MOD 30 MIN: CPT

## 2024-08-08 RX ORDER — CHOLECALCIFEROL (VITAMIN D3) 25 MCG
2000 TABLET ORAL DAILY
Qty: 60 TABLET | Refills: 5 | Status: SHIPPED | OUTPATIENT
Start: 2024-08-08

## 2024-08-08 NOTE — PROGRESS NOTES
[facility] PRE-OPERATIVE EVALUATION  [facility] PHYSICIAN GROUP - [facility]    NAME: Mylene Wolff  AGE: 69 y.o. SEX: female  : 1955     DATE: 2024    [facility] Pre-Operative Evaluation      Chief Complaint: Pre-operative Evaluation     Surgery: Navigated L3-S1 decompression with instrumented fusion, TLIF. L4-S1 decompression/laminectomy (Bilateral: Spine Lumbar)   Anticipated Date of Surgery: 2024  Referring Provider: Ephraim Wilkins MD      History of Present Illness:     Mylene Wolff is a 69 y.o. female who presents to the office today for a preoperative consultation at the request of surgeon, Dr. Wilkins, who plans on performing Navigated L3-S1 decompression with instrumented fusion, TLIF. L4-S1 decompression/laminectomy on 2024. Planned anesthesia is general. Patient has a bleeding risk of: no recent abnormal bleeding. Patient does not have objections to receiving blood products if needed. Current anti-platelet/anti-coagulation medications that the patient is prescribed includes:  None .      Assessment of Chronic Conditions:   - Hypertension: Well controlled   - AV heart block without symptoms      Assessment of Cardiac Risk:  Denies unstable or severe angina or MI in the last 6 weeks or history of stent placement in the last year   Denies decompensated heart failure (e.g. New onset heart failure, NYHA functional class IV heart failure, or worsening existing heart failure)  Denies significant arrhythmias such as high grade AV block, symptomatic ventricular arrhythmia, newly recognized ventricular tachycardia, supraventricular tachycardia with resting heart rate >100, or symptomatic bradycardia  Denies severe heart valve disease including aortic stenosis or symptomatic mitral stenosis     Exercise Capacity:  Able to walk 4 blocks without symptoms?: Yes  Able to walk 2 flights without symptoms?: Yes    Prior Anesthesia  Reactions: No     Personal history of venous thromboembolic disease? No    History of steroid use for >2 weeks within last year? No         Review of Systems:     Review of Systems   Constitutional:  Negative for chills and fever.   HENT:  Negative for ear pain and sore throat.    Eyes:  Negative for pain and visual disturbance.   Respiratory:  Negative for cough and shortness of breath.    Cardiovascular:  Negative for chest pain and palpitations.   Gastrointestinal:  Negative for abdominal pain and vomiting.   Genitourinary:  Negative for dysuria and hematuria.   Musculoskeletal:  Negative for arthralgias and back pain.   Skin:  Negative for color change and rash.   Neurological:  Negative for seizures and syncope.   All other systems reviewed and are negative.      Current Problem List:     Patient Active Problem List   Diagnosis   • Hypertension   • Varicose veins of both lower extremities with pain   • Lumbar radiculopathy   • Hammertoe of right foot   • Diverticulosis   • Lumbar spondylosis   • Trigger finger of left hand   • Cervicalgia   • Fall   • Rib pain   • Right lower quadrant abdominal pain   • Lumbar spondylolysis   • Spondylolisthesis at L4-L5 level   • Pure hypercholesterolemia   • Preop examination       Allergies:     Allergies   Allergen Reactions   • Aspirin Swelling       Current Medications:       Current Outpatient Medications:   •  atenolol (TENORMIN) 50 mg tablet, Take 1 tablet (50 mg total) by mouth daily, Disp: 90 tablet, Rfl: 1  •  Blood Pressure Monitoring (Adult Blood Pressure Cuff Lg) KIT, Use in the morning, Disp: 1 kit, Rfl: 0  •  cholecalciferol (VITAMIN D3) 1,000 units tablet, Take 2 tablets (2,000 Units total) by mouth daily, Disp: 60 tablet, Rfl: 5  •  Diclofenac Sodium (VOLTAREN) 1 %, Apply 2 g topically 4 (four) times a day, Disp: 100 g, Rfl: 0  •  losartan (COZAAR) 50 mg tablet, Take 1 tablet (50 mg total) by mouth daily, Disp: 90 tablet, Rfl: 0  •  losartan (COZAAR) 50 mg  tablet, Take 50 mg by mouth daily, Disp: , Rfl:   •  Mirabegron ER (Myrbetriq) 50 MG TB24, every 24 hours, Disp: , Rfl:   •  rosuvastatin (CRESTOR) 10 MG tablet, Take 1 tablet (10 mg total) by mouth daily, Disp: 90 tablet, Rfl: 1  •  meclizine (ANTIVERT) 25 mg tablet, Take 1 tablet (25 mg total) by mouth 3 (three) times a day as needed for dizziness for up to 3 days, Disp: 9 tablet, Rfl: 0  •  senna (SENOKOT) 8.6 mg, Take 1 tablet (8.6 mg total) by mouth daily at bedtime for 7 days, Disp: 7 tablet, Rfl: 0    Past Medical History:       Past Medical History:   Diagnosis Date   • Allergic    • Hammertoe of right foot     OR   reapir today 2023   • Hypertension    • Wears glasses         Past Surgical History:   Procedure Laterality Date   •  SECTION     • HERNIA REPAIR  1969   • HYSTERECTOMY      removed some of left ovary   • AZ CORRECTION HAMMERTOE Right 2023    Procedure: REPAIR HAMMERTOE 3RD TOE;  Surgeon: Lokesh Simmons DPM;  Location: [facility];  Service: Podiatry   • AZ LAPS ABD PRTM&OMENTUM DX W/WO SPEC BR/WA SPX N/A 2023    Procedure: LAPAROSCOPY DIAGNOSTIC, LYSIS OF ADHESIONS;  Surgeon: Debbie Clark MD;  Location: [facility] OR;  Service: General        Family History   Problem Relation Age of Onset   • Breast cancer Mother    • Thrombosis Father    • No Known Problems Sister    • No Known Problems Sister    • No Known Problems Sister    • Hypertension Sister    • Hypertension Brother    • Hypertension Brother    • Diabetes Maternal Grandmother         Social History     Socioeconomic History   • Marital status: /Civil Union     Spouse name: Not on file   • Number of children: 4   • Years of education: Not on file   • Highest education level: Not on file   Occupational History   • Not on file   Tobacco Use   • Smoking status: Never     Passive exposure: Never   • Smokeless tobacco: Never   Vaping Use   • Vaping status: Never Used   Substance and Sexual  "Activity   • Alcohol use: Never   • Drug use: Never   • Sexual activity: Not Currently     Partners: Male   Other Topics Concern   • Not on file   Social History Narrative   • Not on file     Social Determinants of Health     Financial Resource Strain: Not on file   Food Insecurity: Not on file   Transportation Needs: Not on file   Physical Activity: Not on file   Stress: Not on file   Social Connections: Not on file   Intimate Partner Violence: Not on file   Housing Stability: Not on file        Physical Exam:     /90 (BP Location: Left arm, Patient Position: Sitting, Cuff Size: Standard)   Pulse 71   Temp 97.6 °F (36.4 °C) (Temporal)   Ht 5' 3\" (1.6 m)   Wt 83.5 kg (184 lb)   SpO2 97%   BMI 32.59 kg/m²     Physical Exam  Constitutional:       Appearance: Normal appearance.   HENT:      Head: Normocephalic and atraumatic.   Eyes:      Extraocular Movements: Extraocular movements intact.      Pupils: Pupils are equal, round, and reactive to light.   Cardiovascular:      Rate and Rhythm: Normal rate and regular rhythm.      Pulses: Normal pulses.      Heart sounds: Normal heart sounds.   Pulmonary:      Effort: Pulmonary effort is normal. No respiratory distress.      Breath sounds: Normal breath sounds. No stridor. No wheezing, rhonchi or rales.   Abdominal:      General: Bowel sounds are normal. There is no distension.      Palpations: Abdomen is soft.      Tenderness: There is no abdominal tenderness.   Skin:     General: Skin is warm.      Capillary Refill: Capillary refill takes less than 2 seconds.   Neurological:      General: No focal deficit present.      Mental Status: She is alert and oriented to person, place, and time.   Psychiatric:         Mood and Affect: Mood normal.         Behavior: Behavior normal.          Data:     Pre-operative work-up    Laboratory Results: I have personally reviewed the pertinent laboratory results/reports      EKG: I have personally reviewed pertinent reports.  "     Chest x-ray:  NA      Previous cardiopulmonary studies within the past year:  Echocardiogram: NA  Cardiac Catheterization: NA  Stress Test: NA  Pulmonary Function Testing: NA      Assessment & Recommendations:     1. Preop examination        2. Primary hypertension        3. Spondylolisthesis at L4-L5 level            Pre-Op Evaluation Assessment  69 y.o. female with planned surgery: Navigated L3-S1 decompression with instrumented fusion, TLIF. L4-S1 decompression/laminectomy (Bilateral: Spine Lumbar) .    Known risk factors for perioperative complications:  Hypertension .        Current medications which may produce withdrawal symptoms if withheld perioperatively: None.    Pre-Op Evaluation Plan  1. Further preoperative workup as follows:   - None; no further preoperative work-up is required    2. Medication Management/Recommendations:   - None, continue medication regimen including morning of surgery, with sip of water    3. Prophylaxis for cardiac events with perioperative beta-blockers: not indicated.    4. Patient requires further consultation with: N/A    Clearance  Patient is optimized for surgery at this time.         Clemente Roblero MD  [address] [address]  Phone#  [phone]  Fax#  [phone]

## 2024-08-20 ENCOUNTER — LAB REQUISITION (OUTPATIENT)
Dept: LAB | Facility: HOSPITAL | Age: 69
End: 2024-08-20
Payer: COMMERCIAL

## 2024-08-20 ENCOUNTER — APPOINTMENT (OUTPATIENT)
Dept: LAB | Facility: HOSPITAL | Age: 69
End: 2024-08-20
Payer: COMMERCIAL

## 2024-08-20 DIAGNOSIS — M43.16 SPONDYLOLISTHESIS AT L4-L5 LEVEL: ICD-10-CM

## 2024-08-20 DIAGNOSIS — Z01.818 ENCOUNTER FOR OTHER PREPROCEDURAL EXAMINATION: ICD-10-CM

## 2024-08-20 DIAGNOSIS — M47.816 LUMBAR SPONDYLOSIS: ICD-10-CM

## 2024-08-20 DIAGNOSIS — Z01.818 PRE-OP EVALUATION: Primary | ICD-10-CM

## 2024-08-20 LAB
ABO GROUP BLD: NORMAL
ALBUMIN SERPL BCG-MCNC: 4.6 G/DL (ref 3.5–5)
ALP SERPL-CCNC: 84 U/L (ref 34–104)
ALT SERPL W P-5'-P-CCNC: 16 U/L (ref 7–52)
ANION GAP SERPL CALCULATED.3IONS-SCNC: 7 MMOL/L (ref 4–13)
APTT PPP: 30 SECONDS (ref 23–34)
AST SERPL W P-5'-P-CCNC: 15 U/L (ref 13–39)
BASOPHILS # BLD AUTO: 0.04 THOUSANDS/ÂΜL (ref 0–0.1)
BASOPHILS NFR BLD AUTO: 1 % (ref 0–1)
BILIRUB SERPL-MCNC: 0.59 MG/DL (ref 0.2–1)
BLD GP AB SCN SERPL QL: NEGATIVE
BUN SERPL-MCNC: 14 MG/DL (ref 5–25)
CALCIUM SERPL-MCNC: 10 MG/DL (ref 8.4–10.2)
CHLORIDE SERPL-SCNC: 102 MMOL/L (ref 96–108)
CO2 SERPL-SCNC: 32 MMOL/L (ref 21–32)
CREAT SERPL-MCNC: 0.64 MG/DL (ref 0.6–1.3)
EOSINOPHIL # BLD AUTO: 0.33 THOUSAND/ÂΜL (ref 0–0.61)
EOSINOPHIL NFR BLD AUTO: 4 % (ref 0–6)
ERYTHROCYTE [DISTWIDTH] IN BLOOD BY AUTOMATED COUNT: 15.3 % (ref 11.6–15.1)
GFR SERPL CREATININE-BSD FRML MDRD: 91 ML/MIN/1.73SQ M
GLUCOSE P FAST SERPL-MCNC: 99 MG/DL (ref 65–99)
HCT VFR BLD AUTO: 44 % (ref 34.8–46.1)
HGB BLD-MCNC: 13.6 G/DL (ref 11.5–15.4)
IMM GRANULOCYTES # BLD AUTO: 0.03 THOUSAND/UL (ref 0–0.2)
IMM GRANULOCYTES NFR BLD AUTO: 0 % (ref 0–2)
INR PPP: 0.95 (ref 0.85–1.19)
LYMPHOCYTES # BLD AUTO: 2.63 THOUSANDS/ÂΜL (ref 0.6–4.47)
LYMPHOCYTES NFR BLD AUTO: 33 % (ref 14–44)
MCH RBC QN AUTO: 28.7 PG (ref 26.8–34.3)
MCHC RBC AUTO-ENTMCNC: 30.9 G/DL (ref 31.4–37.4)
MCV RBC AUTO: 93 FL (ref 82–98)
MONOCYTES # BLD AUTO: 0.67 THOUSAND/ÂΜL (ref 0.17–1.22)
MONOCYTES NFR BLD AUTO: 9 % (ref 4–12)
NEUTROPHILS # BLD AUTO: 4.22 THOUSANDS/ÂΜL (ref 1.85–7.62)
NEUTS SEG NFR BLD AUTO: 53 % (ref 43–75)
NRBC BLD AUTO-RTO: 0 /100 WBCS
PLATELET # BLD AUTO: 230 THOUSANDS/UL (ref 149–390)
PMV BLD AUTO: 11.6 FL (ref 8.9–12.7)
POTASSIUM SERPL-SCNC: 4.2 MMOL/L (ref 3.5–5.3)
PROT SERPL-MCNC: 7.3 G/DL (ref 6.4–8.4)
PROTHROMBIN TIME: 13 SECONDS (ref 12.3–15)
RBC # BLD AUTO: 4.74 MILLION/UL (ref 3.81–5.12)
RH BLD: POSITIVE
SODIUM SERPL-SCNC: 141 MMOL/L (ref 135–147)
SPECIMEN EXPIRATION DATE: NORMAL
WBC # BLD AUTO: 7.92 THOUSAND/UL (ref 4.31–10.16)

## 2024-08-20 PROCEDURE — 85610 PROTHROMBIN TIME: CPT

## 2024-08-20 PROCEDURE — 80053 COMPREHEN METABOLIC PANEL: CPT

## 2024-08-20 PROCEDURE — 36415 COLL VENOUS BLD VENIPUNCTURE: CPT

## 2024-08-20 PROCEDURE — 85025 COMPLETE CBC W/AUTO DIFF WBC: CPT

## 2024-08-20 PROCEDURE — 86900 BLOOD TYPING SEROLOGIC ABO: CPT | Performed by: ORTHOPAEDIC SURGERY

## 2024-08-20 PROCEDURE — 86850 RBC ANTIBODY SCREEN: CPT | Performed by: ORTHOPAEDIC SURGERY

## 2024-08-20 PROCEDURE — 86901 BLOOD TYPING SEROLOGIC RH(D): CPT | Performed by: ORTHOPAEDIC SURGERY

## 2024-08-20 PROCEDURE — 85730 THROMBOPLASTIN TIME PARTIAL: CPT

## 2024-08-23 ENCOUNTER — TELEPHONE (OUTPATIENT)
Age: 69
End: 2024-08-23

## 2024-08-23 NOTE — TELEPHONE ENCOUNTER
Caller: Patient/    Doctor: Claudette    Reason for call: Patient stated she was unaware of her appt today for PAT. Questioned if that could be rescheduled?    Call back#: [phone] requires

## 2024-08-23 NOTE — PRE-PROCEDURE INSTRUCTIONS
Pre-Surgery Instructions:   Medication Instructions    atenolol (TENORMIN) 50 mg tablet Take day of surgery.    cholecalciferol (VITAMIN D3) 1,000 units tablet Stop taking 7 days prior to surgery.    Cranberry 125 MG TABS Stop taking 7 days prior to surgery.    Diclofenac Sodium (VOLTAREN) 1 % Stop taking 4 days prior to surgery.    losartan (COZAAR) 50 mg tablet Hold day of surgery.    Mirabegron ER (Myrbetriq) 50 MG TB24 Hold day of surgery.    rosuvastatin (CRESTOR) 10 MG tablet Take day of surgery.    Medication instructions for day surgery reviewed. Please use only a sip of water to take your instructed medications. Avoid all over the counter vitamins, supplements and NSAIDS for one week prior to surgery per anesthesia guidelines. Tylenol is ok to take as needed.     You will receive a call one business day prior to surgery with an arrival time and hospital directions. If your surgery is scheduled on a Monday, the hospital will be calling you on the Friday prior to your surgery. If you have not heard from anyone by 8pm, please call the hospital supervisor through the hospital  at [phone]. ([place] [phone] or [place] [phone]).    Do not eat or drink anything after midnight the night before your surgery, including candy, mints, lifesavers, or chewing gum. Do not drink alcohol 24hrs before your surgery. Try not to smoke at least 24hrs before your surgery.       Follow the pre surgery showering instructions as listed in the “My Surgical Experience Booklet” or otherwise provided by your surgeon's office. Do not use a blade to shave the surgical area 1 week before surgery. It is okay to use a clean electric clippers up to 24 hours before surgery. Do not apply any lotions, creams, including makeup, cologne, deodorant, or perfumes after showering on the day of your surgery. Do not use dry shampoo, hair spray, hair gel, or any type of hair products.     No contact lenses, eye make-up, or  artificial eyelashes. Remove nail polish, including gel polish, and any artificial, gel, or acrylic nails if possible. Remove all jewelry including rings and body piercing jewelry.     Wear causal clothing that is easy to take on and off. Consider your type of surgery.    Keep any valuables, jewelry, piercings at home. Please bring any specially ordered equipment (sling, braces) if indicated.    Arrange for a responsible person to drive you to and from the hospital on the day of your surgery. Please confirm the visitor policy for the day of your procedure when you receive your phone call with an arrival time.     Call the surgeon's office with any new illnesses, exposures, or additional questions prior to surgery.    Please reference your “My Surgical Experience Booklet” for additional information to prepare for your upcoming surgery.

## 2024-08-28 ENCOUNTER — ANESTHESIA EVENT (OUTPATIENT)
Dept: PERIOP | Facility: HOSPITAL | Age: 69
DRG: 460 | End: 2024-08-28
Payer: COMMERCIAL

## 2024-08-28 ENCOUNTER — TELEPHONE (OUTPATIENT)
Age: 69
End: 2024-08-28

## 2024-08-28 NOTE — TELEPHONE ENCOUNTER
Caller: Patient     Doctor: Dr. Wilkins    Reason for call: Patient is scheduled for surgery tomorrow with Dr. Wilkins and she is calling to verify that all pre-op testing was viewed and she is clear for surgery.  She can be reached at the number below.      Call back#: [phone]

## 2024-08-28 NOTE — TELEPHONE ENCOUNTER
Spoke to patient utilizing interpretation services. Patient is aware that she is good to proceed with surgery tomorrow. Pt is aware that she will be receiving a call tonight between the hours of 2pm - 8pm with her arrival time for tomorrow.

## 2024-08-29 ENCOUNTER — HOSPITAL ENCOUNTER (INPATIENT)
Facility: HOSPITAL | Age: 69
LOS: 3 days | Discharge: HOME HEALTH | DRG: 460 | End: 2024-09-03
Attending: ORTHOPAEDIC SURGERY | Admitting: ORTHOPAEDIC SURGERY
Payer: COMMERCIAL

## 2024-08-29 ENCOUNTER — ANESTHESIA (OUTPATIENT)
Dept: PERIOP | Facility: HOSPITAL | Age: 69
DRG: 460 | End: 2024-08-29
Payer: COMMERCIAL

## 2024-08-29 ENCOUNTER — HOSPITAL ENCOUNTER (OUTPATIENT)
Dept: RADIOLOGY | Facility: HOSPITAL | Age: 69
Setting detail: OUTPATIENT SURGERY
Discharge: HOME | DRG: 460 | End: 2024-08-29
Payer: COMMERCIAL

## 2024-08-29 DIAGNOSIS — E78.00 PURE HYPERCHOLESTEROLEMIA: ICD-10-CM

## 2024-08-29 DIAGNOSIS — Z98.1 S/P LUMBAR FUSION: Primary | ICD-10-CM

## 2024-08-29 DIAGNOSIS — M43.16 SPONDYLOLISTHESIS AT L4-L5 LEVEL: ICD-10-CM

## 2024-08-29 DIAGNOSIS — M43.06 LUMBAR SPONDYLOLYSIS: ICD-10-CM

## 2024-08-29 DIAGNOSIS — I10 PRIMARY HYPERTENSION: ICD-10-CM

## 2024-08-29 LAB
ABO GROUP BLD: NORMAL
BASE EXCESS BLDA CALC-SCNC: -2 MMOL/L (ref -2–3)
CA-I BLD-SCNC: 1.16 MMOL/L (ref 1.12–1.32)
GLUCOSE SERPL-MCNC: 134 MG/DL (ref 65–140)
GLUCOSE SERPL-MCNC: 135 MG/DL (ref 65–140)
HCO3 BLDA-SCNC: 23.3 MMOL/L (ref 22–28)
HCT VFR BLD CALC: 35 % (ref 34.8–46.1)
HGB BLDA-MCNC: 11.9 G/DL (ref 11.5–15.4)
PCO2 BLD: 25 MMOL/L (ref 21–32)
PCO2 BLD: 40.8 MM HG (ref 36–44)
PH BLD: 7.36 [PH] (ref 7.35–7.45)
PO2 BLD: 98 MM HG (ref 75–129)
POTASSIUM BLD-SCNC: 3.5 MMOL/L (ref 3.5–5.3)
RH BLD: POSITIVE
SAO2 % BLD FROM PO2: 97 % (ref 60–85)
SODIUM BLD-SCNC: 140 MMOL/L (ref 136–145)
SPECIMEN SOURCE: ABNORMAL

## 2024-08-29 PROCEDURE — C1713 ANCHOR/SCREW BN/BN,TIS/BN: HCPCS | Performed by: ORTHOPAEDIC SURGERY

## 2024-08-29 PROCEDURE — 20930 SP BONE ALGRFT MORSEL ADD-ON: CPT | Performed by: ORTHOPAEDIC SURGERY

## 2024-08-29 PROCEDURE — 84295 ASSAY OF SERUM SODIUM: CPT

## 2024-08-29 PROCEDURE — 82330 ASSAY OF CALCIUM: CPT

## 2024-08-29 PROCEDURE — 0SG30AJ FUSION OF LUMBOSACRAL JOINT WITH INTERBODY FUSION DEVICE, POSTERIOR APPROACH, ANTERIOR COLUMN, OPEN APPROACH: ICD-10-PCS | Performed by: ORTHOPAEDIC SURGERY

## 2024-08-29 PROCEDURE — NC001 PR NO CHARGE: Performed by: ORTHOPAEDIC SURGERY

## 2024-08-29 PROCEDURE — 0SG00AJ FUSION OF LUMBAR VERTEBRAL JOINT WITH INTERBODY FUSION DEVICE, POSTERIOR APPROACH, ANTERIOR COLUMN, OPEN APPROACH: ICD-10-PCS | Performed by: ORTHOPAEDIC SURGERY

## 2024-08-29 PROCEDURE — 01NB0ZZ RELEASE LUMBAR NERVE, OPEN APPROACH: ICD-10-PCS | Performed by: ORTHOPAEDIC SURGERY

## 2024-08-29 PROCEDURE — 63052 LAM FACETC/FRMT ARTHRD LUM 1: CPT | Performed by: ORTHOPAEDIC SURGERY

## 2024-08-29 PROCEDURE — 82947 ASSAY GLUCOSE BLOOD QUANT: CPT

## 2024-08-29 PROCEDURE — 8E0WXBZ COMPUTER ASSISTED PROCEDURE OF TRUNK REGION: ICD-10-PCS | Performed by: ORTHOPAEDIC SURGERY

## 2024-08-29 PROCEDURE — 63053 LAM FACTC/FRMT ARTHRD LUM EA: CPT | Performed by: ORTHOPAEDIC SURGERY

## 2024-08-29 PROCEDURE — 82948 REAGENT STRIP/BLOOD GLUCOSE: CPT

## 2024-08-29 PROCEDURE — 22614 ARTHRD PST TQ 1NTRSPC EA ADD: CPT | Performed by: ORTHOPAEDIC SURGERY

## 2024-08-29 PROCEDURE — 63047 LAM FACETEC & FORAMOT LUMBAR: CPT | Performed by: ORTHOPAEDIC SURGERY

## 2024-08-29 PROCEDURE — 99222 1ST HOSP IP/OBS MODERATE 55: CPT | Performed by: INTERNAL MEDICINE

## 2024-08-29 PROCEDURE — 20936 SP BONE AGRFT LOCAL ADD-ON: CPT | Performed by: ORTHOPAEDIC SURGERY

## 2024-08-29 PROCEDURE — 22842 INSERT SPINE FIXATION DEVICE: CPT | Performed by: ORTHOPAEDIC SURGERY

## 2024-08-29 PROCEDURE — 72100 X-RAY EXAM L-S SPINE 2/3 VWS: CPT

## 2024-08-29 PROCEDURE — 22853 INSJ BIOMECHANICAL DEVICE: CPT | Performed by: ORTHOPAEDIC SURGERY

## 2024-08-29 PROCEDURE — 82803 BLOOD GASES ANY COMBINATION: CPT

## 2024-08-29 PROCEDURE — 22634 ARTHRD CMBN 1NTRSPC EA ADDL: CPT | Performed by: ORTHOPAEDIC SURGERY

## 2024-08-29 PROCEDURE — 85014 HEMATOCRIT: CPT

## 2024-08-29 PROCEDURE — 01NR0ZZ RELEASE SACRAL NERVE, OPEN APPROACH: ICD-10-PCS | Performed by: ORTHOPAEDIC SURGERY

## 2024-08-29 PROCEDURE — 84132 ASSAY OF SERUM POTASSIUM: CPT

## 2024-08-29 PROCEDURE — 22633 ARTHRD CMBN 1NTRSPC LUMBAR: CPT | Performed by: ORTHOPAEDIC SURGERY

## 2024-08-29 DEVICE — SCREW 55840006540 5.5/6 MAS 6.5X40 CC
Type: IMPLANTABLE DEVICE | Site: SPINE LUMBAR | Status: FUNCTIONAL
Brand: CD HORIZON® SPINAL SYSTEM

## 2024-08-29 DEVICE — SPACER 3992210 22MM X 10MM
Type: IMPLANTABLE DEVICE | Site: SPINE LUMBAR | Status: FUNCTIONAL
Brand: CAPSTONE PTC™ SPINAL SYSTEM

## 2024-08-29 DEVICE — GRAFT DBF WITH DELIVERY TUBES 12ML: Type: IMPLANTABLE DEVICE | Site: SPINE LUMBAR | Status: FUNCTIONAL

## 2024-08-29 DEVICE — I-FACTOR PUTTY, 1.0CC SYRINGE
Type: IMPLANTABLE DEVICE | Site: SPINE LUMBAR | Status: FUNCTIONAL
Brand: I-FACTOR PEPTIDE ENHANCED BONE GRAFT

## 2024-08-29 RX ORDER — PROPOFOL 10 MG/ML
INJECTION, EMULSION INTRAVENOUS AS NEEDED
Status: DISCONTINUED | OUTPATIENT
Start: 2024-08-29 | End: 2024-08-29

## 2024-08-29 RX ORDER — HYDROMORPHONE HCL/PF 1 MG/ML
SYRINGE (ML) INJECTION AS NEEDED
Status: DISCONTINUED | OUTPATIENT
Start: 2024-08-29 | End: 2024-08-29

## 2024-08-29 RX ORDER — SENNOSIDES 8.6 MG
1 TABLET ORAL DAILY
Status: DISCONTINUED | OUTPATIENT
Start: 2024-08-30 | End: 2024-08-30

## 2024-08-29 RX ORDER — OXYCODONE HYDROCHLORIDE 5 MG/1
5 TABLET ORAL EVERY 4 HOURS PRN
Status: DISCONTINUED | OUTPATIENT
Start: 2024-08-29 | End: 2024-09-03 | Stop reason: HOSPADM

## 2024-08-29 RX ORDER — SODIUM CHLORIDE 9 MG/ML
INJECTION, SOLUTION INTRAVENOUS CONTINUOUS PRN
Status: DISCONTINUED | OUTPATIENT
Start: 2024-08-29 | End: 2024-08-29

## 2024-08-29 RX ORDER — DIPHENHYDRAMINE HYDROCHLORIDE 50 MG/ML
12.5 INJECTION INTRAMUSCULAR; INTRAVENOUS ONCE AS NEEDED
Status: DISCONTINUED | OUTPATIENT
Start: 2024-08-29 | End: 2024-08-29 | Stop reason: HOSPADM

## 2024-08-29 RX ORDER — MIDAZOLAM HYDROCHLORIDE 2 MG/2ML
INJECTION, SOLUTION INTRAMUSCULAR; INTRAVENOUS AS NEEDED
Status: DISCONTINUED | OUTPATIENT
Start: 2024-08-29 | End: 2024-08-29

## 2024-08-29 RX ORDER — HYDROMORPHONE HCL IN WATER/PF 6 MG/30 ML
0.2 PATIENT CONTROLLED ANALGESIA SYRINGE INTRAVENOUS
Status: DISCONTINUED | OUTPATIENT
Start: 2024-08-29 | End: 2024-08-29 | Stop reason: HOSPADM

## 2024-08-29 RX ORDER — ONDANSETRON 2 MG/ML
4 INJECTION INTRAMUSCULAR; INTRAVENOUS EVERY 6 HOURS PRN
Status: DISCONTINUED | OUTPATIENT
Start: 2024-08-29 | End: 2024-09-03 | Stop reason: HOSPADM

## 2024-08-29 RX ORDER — LIDOCAINE HYDROCHLORIDE 10 MG/ML
INJECTION, SOLUTION EPIDURAL; INFILTRATION; INTRACAUDAL; PERINEURAL AS NEEDED
Status: DISCONTINUED | OUTPATIENT
Start: 2024-08-29 | End: 2024-08-29

## 2024-08-29 RX ORDER — CEFAZOLIN SODIUM 2 G/50ML
2000 SOLUTION INTRAVENOUS EVERY 8 HOURS
Status: COMPLETED | OUTPATIENT
Start: 2024-08-30 | End: 2024-08-30

## 2024-08-29 RX ORDER — MAGNESIUM HYDROXIDE/ALUMINUM HYDROXICE/SIMETHICONE 120; 1200; 1200 MG/30ML; MG/30ML; MG/30ML
30 SUSPENSION ORAL EVERY 6 HOURS PRN
Status: DISCONTINUED | OUTPATIENT
Start: 2024-08-29 | End: 2024-09-03 | Stop reason: HOSPADM

## 2024-08-29 RX ORDER — TRANEXAMIC ACID 100 MG/ML
INJECTION, SOLUTION INTRAVENOUS AS NEEDED
Status: DISCONTINUED | OUTPATIENT
Start: 2024-08-29 | End: 2024-08-29

## 2024-08-29 RX ORDER — SODIUM CHLORIDE, SODIUM LACTATE, POTASSIUM CHLORIDE, CALCIUM CHLORIDE 600; 310; 30; 20 MG/100ML; MG/100ML; MG/100ML; MG/100ML
INJECTION, SOLUTION INTRAVENOUS CONTINUOUS PRN
Status: DISCONTINUED | OUTPATIENT
Start: 2024-08-29 | End: 2024-08-29

## 2024-08-29 RX ORDER — SUCCINYLCHOLINE/SOD CL,ISO/PF 100 MG/5ML
SYRINGE (ML) INTRAVENOUS AS NEEDED
Status: DISCONTINUED | OUTPATIENT
Start: 2024-08-29 | End: 2024-08-29

## 2024-08-29 RX ORDER — ATENOLOL 50 MG/1
50 TABLET ORAL DAILY
Status: DISCONTINUED | OUTPATIENT
Start: 2024-08-30 | End: 2024-09-03 | Stop reason: HOSPADM

## 2024-08-29 RX ORDER — DEXAMETHASONE SODIUM PHOSPHATE 10 MG/ML
INJECTION, SOLUTION INTRAMUSCULAR; INTRAVENOUS AS NEEDED
Status: DISCONTINUED | OUTPATIENT
Start: 2024-08-29 | End: 2024-08-29

## 2024-08-29 RX ORDER — VANCOMYCIN HYDROCHLORIDE 1 G/20ML
INJECTION, POWDER, LYOPHILIZED, FOR SOLUTION INTRAVENOUS AS NEEDED
Status: DISCONTINUED | OUTPATIENT
Start: 2024-08-29 | End: 2024-08-29 | Stop reason: HOSPADM

## 2024-08-29 RX ORDER — ROCURONIUM BROMIDE 10 MG/ML
INJECTION, SOLUTION INTRAVENOUS AS NEEDED
Status: DISCONTINUED | OUTPATIENT
Start: 2024-08-29 | End: 2024-08-29

## 2024-08-29 RX ORDER — CALCIUM CARBONATE 500 MG/1
1000 TABLET, CHEWABLE ORAL DAILY PRN
Status: DISCONTINUED | OUTPATIENT
Start: 2024-08-29 | End: 2024-09-03 | Stop reason: HOSPADM

## 2024-08-29 RX ORDER — DOCUSATE SODIUM 100 MG/1
100 CAPSULE, LIQUID FILLED ORAL 2 TIMES DAILY
Status: DISCONTINUED | OUTPATIENT
Start: 2024-08-30 | End: 2024-09-03 | Stop reason: HOSPADM

## 2024-08-29 RX ORDER — METHOCARBAMOL 500 MG/1
500 TABLET, FILM COATED ORAL EVERY 6 HOURS SCHEDULED
Status: DISCONTINUED | OUTPATIENT
Start: 2024-08-30 | End: 2024-09-03 | Stop reason: HOSPADM

## 2024-08-29 RX ORDER — CHLORHEXIDINE GLUCONATE ORAL RINSE 1.2 MG/ML
15 SOLUTION DENTAL ONCE
Status: COMPLETED | OUTPATIENT
Start: 2024-08-29 | End: 2024-08-29

## 2024-08-29 RX ORDER — ONDANSETRON 2 MG/ML
INJECTION INTRAMUSCULAR; INTRAVENOUS AS NEEDED
Status: DISCONTINUED | OUTPATIENT
Start: 2024-08-29 | End: 2024-08-29

## 2024-08-29 RX ORDER — BUPIVACAINE HYDROCHLORIDE 2.5 MG/ML
INJECTION, SOLUTION EPIDURAL; INFILTRATION; INTRACAUDAL AS NEEDED
Status: DISCONTINUED | OUTPATIENT
Start: 2024-08-29 | End: 2024-08-29 | Stop reason: HOSPADM

## 2024-08-29 RX ORDER — OXYCODONE HYDROCHLORIDE 10 MG/1
10 TABLET ORAL EVERY 4 HOURS PRN
Status: DISCONTINUED | OUTPATIENT
Start: 2024-08-29 | End: 2024-09-03 | Stop reason: HOSPADM

## 2024-08-29 RX ORDER — FENTANYL CITRATE/PF 50 MCG/ML
25 SYRINGE (ML) INJECTION
Status: DISCONTINUED | OUTPATIENT
Start: 2024-08-29 | End: 2024-08-29 | Stop reason: HOSPADM

## 2024-08-29 RX ORDER — ACETAMINOPHEN 325 MG/1
650 TABLET ORAL EVERY 6 HOURS SCHEDULED
Status: DISCONTINUED | OUTPATIENT
Start: 2024-08-30 | End: 2024-09-03 | Stop reason: HOSPADM

## 2024-08-29 RX ORDER — PROPOFOL 10 MG/ML
INJECTION, EMULSION INTRAVENOUS CONTINUOUS PRN
Status: DISCONTINUED | OUTPATIENT
Start: 2024-08-29 | End: 2024-08-29

## 2024-08-29 RX ORDER — MAGNESIUM HYDROXIDE 1200 MG/15ML
LIQUID ORAL AS NEEDED
Status: DISCONTINUED | OUTPATIENT
Start: 2024-08-29 | End: 2024-08-29 | Stop reason: HOSPADM

## 2024-08-29 RX ORDER — FENTANYL CITRATE 50 UG/ML
INJECTION, SOLUTION INTRAMUSCULAR; INTRAVENOUS AS NEEDED
Status: DISCONTINUED | OUTPATIENT
Start: 2024-08-29 | End: 2024-08-29

## 2024-08-29 RX ORDER — ONDANSETRON 2 MG/ML
4 INJECTION INTRAMUSCULAR; INTRAVENOUS ONCE AS NEEDED
Status: DISCONTINUED | OUTPATIENT
Start: 2024-08-29 | End: 2024-08-29 | Stop reason: HOSPADM

## 2024-08-29 RX ORDER — PRAVASTATIN SODIUM 80 MG/1
80 TABLET ORAL
Status: DISCONTINUED | OUTPATIENT
Start: 2024-08-30 | End: 2024-09-03 | Stop reason: HOSPADM

## 2024-08-29 RX ORDER — CEFAZOLIN SODIUM 2 G/50ML
2000 SOLUTION INTRAVENOUS ONCE
Status: COMPLETED | OUTPATIENT
Start: 2024-08-29 | End: 2024-08-29

## 2024-08-29 RX ORDER — HEPARIN SODIUM 5000 [USP'U]/ML
5000 INJECTION, SOLUTION INTRAVENOUS; SUBCUTANEOUS EVERY 8 HOURS SCHEDULED
Status: DISCONTINUED | OUTPATIENT
Start: 2024-08-30 | End: 2024-09-03 | Stop reason: HOSPADM

## 2024-08-29 RX ADMIN — FENTANYL CITRATE 25 MCG: 50 INJECTION INTRAMUSCULAR; INTRAVENOUS at 23:20

## 2024-08-29 RX ADMIN — ONDANSETRON 4 MG: 2 INJECTION INTRAMUSCULAR; INTRAVENOUS at 21:23

## 2024-08-29 RX ADMIN — LIDOCAINE HYDROCHLORIDE 50 MG: 10 INJECTION, SOLUTION EPIDURAL; INFILTRATION; INTRACAUDAL; PERINEURAL at 14:37

## 2024-08-29 RX ADMIN — PROPOFOL 150 MG: 10 INJECTION, EMULSION INTRAVENOUS at 14:37

## 2024-08-29 RX ADMIN — CEFAZOLIN SODIUM 2000 MG: 2 SOLUTION INTRAVENOUS at 20:11

## 2024-08-29 RX ADMIN — DEXAMETHASONE SODIUM PHOSPHATE 10 MG: 10 INJECTION, SOLUTION INTRAMUSCULAR; INTRAVENOUS at 15:27

## 2024-08-29 RX ADMIN — SODIUM CHLORIDE, SODIUM LACTATE, POTASSIUM CHLORIDE, AND CALCIUM CHLORIDE: .6; .31; .03; .02 INJECTION, SOLUTION INTRAVENOUS at 15:05

## 2024-08-29 RX ADMIN — FENTANYL CITRATE 50 MCG: 50 INJECTION INTRAMUSCULAR; INTRAVENOUS at 14:37

## 2024-08-29 RX ADMIN — SODIUM CHLORIDE: 0.9 INJECTION, SOLUTION INTRAVENOUS at 14:40

## 2024-08-29 RX ADMIN — CEFAZOLIN SODIUM 2000 MG: 2 SOLUTION INTRAVENOUS at 15:12

## 2024-08-29 RX ADMIN — FENTANYL CITRATE 25 MCG: 50 INJECTION INTRAMUSCULAR; INTRAVENOUS at 21:41

## 2024-08-29 RX ADMIN — PROPOFOL 50 MG: 10 INJECTION, EMULSION INTRAVENOUS at 14:45

## 2024-08-29 RX ADMIN — FENTANYL CITRATE 50 MCG: 50 INJECTION INTRAMUSCULAR; INTRAVENOUS at 15:08

## 2024-08-29 RX ADMIN — FENTANYL CITRATE 50 MCG: 50 INJECTION INTRAMUSCULAR; INTRAVENOUS at 16:30

## 2024-08-29 RX ADMIN — PROPOFOL 100 MCG/KG/MIN: 10 INJECTION, EMULSION INTRAVENOUS at 15:01

## 2024-08-29 RX ADMIN — DEXMEDETOMIDINE HYDROCHLORIDE 0.4 MCG/KG/HR: 100 INJECTION, SOLUTION INTRAVENOUS at 15:01

## 2024-08-29 RX ADMIN — HYDROMORPHONE HYDROCHLORIDE 0.5 MG: 1 INJECTION, SOLUTION INTRAMUSCULAR; INTRAVENOUS; SUBCUTANEOUS at 15:28

## 2024-08-29 RX ADMIN — CHLORHEXIDINE GLUCONATE 0.12% ORAL RINSE 15 ML: 1.2 LIQUID ORAL at 09:53

## 2024-08-29 RX ADMIN — SODIUM CHLORIDE, SODIUM LACTATE, POTASSIUM CHLORIDE, AND CALCIUM CHLORIDE: .6; .31; .03; .02 INJECTION, SOLUTION INTRAVENOUS at 14:20

## 2024-08-29 RX ADMIN — TRANEXAMIC ACID 1 G: 1 INJECTION, SOLUTION INTRAVENOUS at 15:36

## 2024-08-29 RX ADMIN — SUGAMMADEX 200 MG: 100 INJECTION, SOLUTION INTRAVENOUS at 16:21

## 2024-08-29 RX ADMIN — Medication 100 MG: at 14:37

## 2024-08-29 RX ADMIN — ROCURONIUM BROMIDE 30 MG: 10 INJECTION, SOLUTION INTRAVENOUS at 15:33

## 2024-08-29 RX ADMIN — MIDAZOLAM 2 MG: 1 INJECTION INTRAMUSCULAR; INTRAVENOUS at 14:39

## 2024-08-29 RX ADMIN — FENTANYL CITRATE 25 MCG: 50 INJECTION INTRAMUSCULAR; INTRAVENOUS at 21:35

## 2024-08-29 NOTE — CONSULTS
[facility]  Consult  Name: Mylene Wolff 69 y.o. female I MRN: [id number]  Unit/Bed#: -01 I Date of Admission: 8/29/2024   Date of Service: 8/30/2024 I Hospital Day: 0    Consults    Assessment & Plan   S/P lumbar fusion  Assessment & Plan  Failed outpatient conservative measures  Elected to undergo lumbar decompression and fusion today  Currently no post operative issues noted  DVT prophylaxis in place  Orthopedics managing pain      Pure hypercholesterolemia  Assessment & Plan  Continue statin    Hypertension  Assessment & Plan  Continue atenolol with appropriate adjusted parameters  Hold losartan to avoid DAVINA in the post operative setting  OK to resume in 48 hours if bmp is stable or on DC  Add hydralazine as needed for SBP >160                 Subjective/ HPI: Mylene Wolff was seen and examined. Hx of Lumbarpain failed out patient conservative measures. Elected to undergo surgical intervention. We are asked to see patient for post op management of underlying medical co-morbidities as outlined above. Pt did well intra and post operatively with good hemodynamics. Pt currently comfortable and without any reported post op nausea.             ROS:   A 10 point ROS was performed; negative except as noted above.     Past medical history    Past Medical History:   Diagnosis Date    Allergic     Hammertoe of right foot     OR   reapir today 7/21/2023    Hyperlipidemia     Hypertension     Kidney stone     Wears glasses        Social History:    Substance Use History:   Social History     Substance and Sexual Activity   Alcohol Use Not Currently     Social History     Tobacco Use   Smoking Status Never    Passive exposure: Never   Smokeless Tobacco Never     Social History     Substance and Sexual Activity   Drug Use Not Currently       Family History:    Family History   Problem Relation Age of Onset    Breast cancer Mother     Thrombosis Father     No  Known Problems Sister     No Known Problems Sister     No Known Problems Sister     Hypertension Sister     Hypertension Brother     Hypertension Brother     Diabetes Maternal Grandmother          Review of Scheduled Meds:  Current Facility-Administered Medications   Medication Dose Route Frequency Provider Last Rate    acetaminophen  650 mg Oral Q6H [facility] Winter Benjamin PA-C      aluminum-magnesium hydroxide-simethicone  30 mL Oral Q6H PRN Winter Benjamin PA-C      atenolol  50 mg Oral Daily Winter Benjamin PA-C      calcium carbonate  1,000 mg Oral Daily PRN Winter Benjamin PA-C      cefazolin  2,000 mg Intravenous Q8H Winter Benjamin PA-C 2,000 mg (08/30/24 0530)    docusate sodium  100 mg Oral BID Winter Benjamin PA-C      heparin (porcine)  5,000 Units Subcutaneous Q8H [facility] Winter Benjamin PA-C      methocarbamol  500 mg Oral Q6H [facility] Winter Benjamin PA-C      ondansetron  4 mg Intravenous Q6H PRN Winter Benjamin PA-C      oxyCODONE  10 mg Oral Q4H PRN Winter Benjamin PA-C      oxyCODONE  5 mg Oral Q4H PRN Winter Benjamin PA-C      pravastatin  80 mg Oral Daily With Dinner Winter Benjamin PA-C      senna  1 tablet Oral Daily Winter Benjamin PA-C         Labs:     Results from last 7 days   Lab Units 08/30/24  0508 08/29/24  1706   WBC Thousand/uL 13.71*  --    HEMOGLOBIN g/dL 11.8  --    I STAT HEMOGLOBIN g/dl  --  11.9   HEMATOCRIT % 37.5  --    HEMATOCRIT, ISTAT %  --  35   PLATELETS Thousands/uL 208  --      Results from last 7 days   Lab Units 08/30/24  0508 08/29/24  1706   SODIUM mmol/L 139  --    POTASSIUM mmol/L 4.2  --    CHLORIDE mmol/L 105  --    CO2 mmol/L 26  --    CO2, I-STAT mmol/L  --  25   BUN mg/dL 11  --    CREATININE mg/dL 0.51*  --    GLUCOSE, ISTAT mg/dl  --  134   CALCIUM mg/dL 8.4  --                   Results from last 7 days   Lab Units 08/29/24  2152   POC GLUCOSE mg/dl 135       No  "results found for: \"BLOODCX\", \"URINECX\", \"WOUNDCULT\", \"SPUTUMCULTUR\"    Input and Output Summary (last 24 hours):       Intake/Output Summary (Last 24 hours) at 2024 0715  Last data filed at 2024 0634  Gross per 24 hour   Intake 2900 ml   Output 1510 ml   Net 1390 ml       Imaging:     XR spine lumbar 2 or 3 views injury    (Results Pending)       *Labs /Radiology studiesLabs reviewed  *Medications reviewed and reconciled as needed  *Please refer to order section for additional ordered labs studies  *Case discussed with primary attending during morning huddle case rounds    Vitals:   Temp (24hrs), Av.1 °F (36.7 °C), Min:97.4 °F (36.3 °C), Max:98.9 °F (37.2 °C)    Temp:  [97.4 °F (36.3 °C)-98.9 °F (37.2 °C)] 98.8 °F (37.1 °C)  HR:  [62-76] 76  Resp:  [13-20] 20  BP: (115-182)/(58-90) 124/73  SpO2:  [91 %-100 %] 94 %  Body mass index is 30.9 kg/m².     Physical Exam:   General Appearance: no distress, conversive, Lumbar dressing/drain in place  HEENT: PERRLA, conjuctiva normal; oropharynx clear; mucous membranes moist;   Neck:  Supple, no lymphadenopathy or thyromegaly  Lungs: clear bilaterally, normal respiratory effort, no retractions, expiratory effort normal  CV: S1 S2 regular, no murmurs rubs or galops   ABD: soft non tender, +BS x4  EXT: DP pulses intact, no lymphadenopathy, no edema   Skin: normal turgor, normal texture, no rash  Psych: affect normal, mood normal  Neuro: AAOx3          Invasive Devices       Peripheral Intravenous Line  Duration             Peripheral IV 24 Dorsal (posterior);Right Forearm <1 day    Peripheral IV 24 Left Hand <1 day              Drain  Duration             Closed/Suction Drain Inferior;Left Back Bulb 19 Fr. <1 day                       Code Status: Prior  Current Length of Stay: 0 day(s)    Total floor / unit time spent today 30 minutes  Coordination of patient's care was performed in conjunction with primary service. Time invested included review of " patient's labs, vitals, and management of their comorbidities with continued monitoring, examination of patient as well as answering patient questions, documenting her findings and creating progress note in electronic medical record,  ordering appropriate diagnostic testing. In addition, this patient was discussed with medical team including physician and advanced extenders.           ** Please Note: Fluency Direct voice to text software may have been used in the creation of this document. Audio transcription errors may occur**

## 2024-08-29 NOTE — ANESTHESIA PREPROCEDURE EVALUATION
Procedure:  Navigated L3-S1 decompression with instrumented fusion, TLIF. L4-S1 decompression/laminectomy (Bilateral: Spine Lumbar)    Relevant Problems   CARDIO   (+) Hypertension   (+) Pure hypercholesterolemia   (+) Rib pain      MUSCULOSKELETAL   (+) Lumbar spondylosis        Physical Exam    Airway    Mallampati score: III  TM Distance: >3 FB  Neck ROM: full     Dental   No notable dental hx     Cardiovascular  Cardiovascular exam normal    Pulmonary  Pulmonary exam normal     Other Findings  post-pubertal.      Anesthesia Plan  ASA Score- 2     Anesthesia Type- general with ASA Monitors.         Additional Monitors:     Airway Plan: ETT.           Plan Factors-Exercise tolerance (METS): >4 METS.    Chart reviewed. EKG reviewed.  Existing labs reviewed. Patient summary reviewed.    Patient is not a current smoker.              Induction- intravenous.    Postoperative Plan- Plan for postoperative opioid use.         Informed Consent- Anesthetic plan and risks discussed with patient.  I personally reviewed this patient with the CRNA. Discussed and agreed on the Anesthesia Plan with the CRNA..

## 2024-08-29 NOTE — DISCHARGE INSTR - AVS FIRST PAGE
[facility]  Post-op Information and Instructions  Lumbar Fusion    1. Follow-up  - You should return to the office for your follow-up appointment approximately 2 weeks post-op. This should have been scheduled prior to your surgery.  If you do not have an appointment scheduled, please call [phone] to do so as soon as possible.  - At the first post op appointment we will check your incision, remove any staples, and make sure that you are healing well.  - X-rays will be taken at this appointment to evaluate your hardware.  - If you need any refills on your pain medications, this will be addressed at your follow-up appointment.  - You can expect to have post-op appointments at 2 weeks, 6 weeks, 3 months, 6 months and 1 year after surgery.  After that time, we will continue to follow you yearly to monitor your fusion.    2. Incision Care:  - You will have a surgical dressing over the area. Keep dressing intact and incision dry until 2 week post-op visit. The surgical dressing is placed in a sterile environment and is important to allow the wound to begin healing in a sterile environment.  - You have staples closing your incision and also sutures that are buried beneath the skin and will dissolve with time. The staples will be removed at your follow-up visit, please do not remove them prior to your follow-up appointment. The incision may be raised initially, although this will improve as the sutures dissolve.  - You may shower on the day that your dressing is removed at 2 week post-op visit. After 2 weeks, your incision may get wet in the shower, but DO NOT submerge your incision (bath, hot tub, pool, lake, etc.) until you have been cleared to do so. You may gently wash it with soap and water. Do not scrub the incision. Air-dry the incision or pat dry with clean, fresh towel before reapplying the dry dressing.  - If the dressing falls off prior to 2 week post-op visit, please keep the incision  covered with a non-adhesive dressing. Dressing material can be purchased over the counter at any drug store. Materials include: square gauze, ABD dressing, and skin tape. Place dry dressing over dry incision and tape the sides.  - Please do not apply any ointments or salves to the incision unless instructed to do so.  - If you notice any drainage, foul odor, increased redness, swelling or pain of the incision, please call our office immediately.  These may be signs of an infection and should be evaluated.    3. Medications  - You will be given a prescription for pain medication when you are discharged from the hospital.  - It is recommended that you take the medication as prescribed for the first 24-48 hours after surgery, even if your pain is minimal.  It is much easier to control your pain when you stay on top of the medications than if you wait to take them until your pain is severe.  - After the first few days, you should try to take the pain medication less often, and only when needed.  - You may take Tylenol in place of, not in addition to, your pain medication if you wish.  - DO NOT take NSAIDs (Advil, Aleve, Ibuprofen, Motrin, Naproxen, etc.) for 3 months following your surgery.  This may impede the healing of your fusion.  - DO NOT drive while you are taking narcotic pain medications.  - If there is an issue with any of your discharge medications, please call our office at [phone] to discuss.  - It is very common for surgery and narcotic pain medications to cause constipation.  It is very important that you eat a high fiber diet, drink lots of water, and also consider taking a stool softener while taking pain medications to help with this.  It is not uncommon for you to go several days without a bowel movement after surgery.  If you are constipated and it is accompanied by severe abdominal pain, nausea and vomiting, inability to keep food or drink down, and/or a fever, please call our office as these  may be signs of a more serious medical condition.    4. Activity:  - You should have been fitted for a lumbar brace. You may remove this for hygiene (showering, etc.), and for sleeping. The brace should otherwise be worn for the first 3 months post-operatively. If you have any issues with the fit of your brace, you can call the office and we can have you come in to see our brace fitter.  - You should avoid any bending, twisting. If you must bend over, please use your knees.  - You CANNOT lift greater than 5lbs, or a gallon of milk, until instructed.  - You should try to avoid sitting for greater than 20 minutes at a time as this will cause your muscles to stiffen and spasm, making you more uncomfortable.  - You will start PT (physical therapy) around 3 months after surgery.  - You may resume your normal daily activities as tolerated.  - Walking is the best exercise and you should try to walk up to 1 mile throughout the day as you are recovering.  - You should NOT smoke following spinal fusion as this will impede your healing.    5. Diet:  - It is important to eat a well balanced diet to help your body heal.  - You should make sure that you are drinking plenty of fluids - water is best    6. Return to Work:  - You can expect to be out of work for at least 6 weeks up to 3 months.  We will discuss your return to work status at your post-op appointments, but please do not hesitate to call if you have any questions or concerns.  - Please make sure that any short-term disability paperwork is dropped off at the .    Please call our office if you have any of the following after surgery:  - Persistent fever greater than 100.5 degrees  - Foul odor, drainage, redness, swelling or increased pain around your incision site  - A headache that is worse with standing or sitting, and is alleviated with lying flat on your back.  - New onset arm or leg weakness, numbness or tingling  - Significantly increased pain that is not  alleviated with pain medications, rest and ice  - New-onset calf pain    If you experience any chest pain or shortness of breath after you are discharged, call 911 immediately.

## 2024-08-29 NOTE — ANESTHESIA PROCEDURE NOTES
"Arterial Line Insertion    Performed by: Lucrecia Rodas  Authorized by: Lucrecia Rodas  Consent: Written consent obtained.  Risks and benefits: risks, benefits and alternatives were discussed  Consent given by: patient  Patient understanding: patient states understanding of the procedure being performed  Patient consent: the patient's understanding of the procedure matches consent given  Procedure consent: procedure consent matches procedure scheduled  Relevant documents: relevant documents present and verified  Patient identity confirmed: verbally with patient and arm band  Time out: Immediately prior to procedure a \"time out\" was called to verify the correct patient, procedure, equipment, support staff and site/side marked as required.  Indications: hemodynamic monitoring  Orientation:  Left  Location: radial artery  Sedation:  Patient sedated: yes  Vitals: Vital signs were monitored during sedation.    Procedure Details:  Martin's test normal: yes  Needle gauge: 20  Number of attempts: 2    Post-procedure:  Post-procedure: dressing applied  Waveform: good waveform  Post-procedure CNS: unchanged          "

## 2024-08-29 NOTE — ASSESSMENT & PLAN NOTE
Continue atenolol with appropriate adjusted parameters  Hold losartan to avoid DAVINA in the post operative setting  OK to resume in 48 hours if bmp is stable or on DC  Add hydralazine as needed for SBP >160

## 2024-08-29 NOTE — CONSULTS
[facility]  Consult  Name: Mylene Wolff 69 y.o. female I MRN: [id number]  Unit/Bed#: OR POOL I Date of Admission: 8/29/2024   Date of Service: 8/29/2024  Hospital Day: 0    Consults    Assessment & Plan   S/P lumbar fusion  Assessment & Plan  Failed outpatient conservative measures  Elected to undergo lumbar decompression and fusion today  Currently no post operative issues noted  DVT prophylaxis in place  Orthopedics managing pain      Hypertension  Assessment & Plan  Continue atenolol with appropriate adjusted parameters  Hold losartan to avoid DAVINA in the post operative setting  OK to resume in 48 hours if bmp is stable or on DC  Add hydralazine as needed for SBP >160      Pure hypercholesterolemia  Assessment & Plan  Continue statin               Subjective/ HPI: Mylene Wolff was seen and examined. Hx of Lumbarpain failed out patient conservative measures. Elected to undergo surgical intervention. We are asked to see patient for post op management of underlying medical co-morbidities as outlined above. Pt did well intra and post operatively with good hemodynamics. Pt currently comfortable and without any reported post op nausea.             ROS:   A 10 point ROS was performed; negative except as noted above.     Past medical history    Past Medical History:   Diagnosis Date    Allergic     Hammertoe of right foot     OR   reapir today 7/21/2023    Hyperlipidemia     Hypertension     Kidney stone     Wears glasses        Social History:    Substance Use History:   Social History     Substance and Sexual Activity   Alcohol Use Never     Social History     Tobacco Use   Smoking Status Never    Passive exposure: Never   Smokeless Tobacco Never     Social History     Substance and Sexual Activity   Drug Use Never       Family History:    Family History   Problem Relation Age of Onset    Breast cancer Mother     Thrombosis Father     No Known Problems  "Sister     No Known Problems Sister     No Known Problems Sister     Hypertension Sister     Hypertension Brother     Hypertension Brother     Diabetes Maternal Grandmother          Review of Scheduled Meds:  Current Facility-Administered Medications   Medication Dose Route Frequency Provider Last Rate    cefazolin  2,000 mg Intravenous Once Winter Benjamin PA-C         Labs:               Invalid input(s): \"LABGLOM\", \"CMP\"                   No results found for: \"BLOODCX\", \"URINECX\", \"WOUNDCULT\", \"SPUTUMCULTUR\"    Input and Output Summary (last 24 hours):     No intake or output data in the 24 hours ending 24 1053    Imaging:     No orders to display       *Labs /Radiology studiesLabs reviewed  *Medications reviewed and reconciled as needed  *Please refer to order section for additional ordered labs studies  *Case discussed with primary attending during morning huddle case rounds    Vitals:   Temp (24hrs), Av.4 °F (36.3 °C), Min:97.4 °F (36.3 °C), Max:97.4 °F (36.3 °C)    Temp:  [97.4 °F (36.3 °C)] 97.4 °F (36.3 °C)  HR:  [68] 68  BP: (182)/(90) 182/90  SpO2:  [98 %] 98 %  Body mass index is 30.9 kg/m².     Physical Exam:   General Appearance: no distress, conversive, Lumbar dressing in place  HEENT: PERRLA, conjuctiva normal; oropharynx clear; mucous membranes moist;   Neck:  Supple, no lymphadenopathy or thyromegaly  Lungs: clear bilaterally, normal respiratory effort, no retractions, expiratory effort normal  CV: S1 S2 regular, no murmurs rubs or galops   ABD: soft non tender, +BS x4  EXT: DP pulses intact, no lymphadenopathy, no edema   Skin: normal turgor, normal texture, no rash  Psych: affect normal, mood normal  Neuro: AAOx3          Invasive Devices       None                      Code Status: Prior  Current Length of Stay: 0 day(s)    Total floor / unit time spent today 30 minutes  Coordination of patient's care was performed in conjunction with primary service. Time invested included " review of patient's labs, vitals, and management of their comorbidities with continued monitoring, examination of patient as well as answering patient questions, documenting her findings and creating progress note in electronic medical record,  ordering appropriate diagnostic testing. In addition, this patient was discussed with medical team including physician and advanced extenders.           ** Please Note: Fluency Direct voice to text software may have been used in the creation of this document. Audio transcription errors may occur**

## 2024-08-29 NOTE — H&P
24 H&P Update  See detailed H&P office note from 24.    H&P reviewed. After examining the patient I find no significant changes in the patients condition since the H&P had been written.  Continues with back and radicular pain and ambulatory dysfunction.  Daughter Janelle at bedside - Imaging, clinical findings and procedure reviewed with patient.  Plan to proceed with decompression and fusion surgery.       General: appears well, no acute distress  Respiratory: No SOB, no abnormal effort   Abdomen: Soft. No tenderness.    Cardiac: RRR, warm & well perfused    Assessment & Plan/Medical Decision Makin y.o. female with Back Pain and Bilateral Radicular Leg Pain and imaging findings most notable for lumbar spondylosis , L4-5 spondylolisthesis, foraminal stenosis L4-5 and L5-S1        The clinical, physical and imaging findings were reviewed with the patient.  Mylene has a constellation of findings consistent with Lumbar Radiculopathy in the setting of lumbar degenerative disease, L4-5 spondylolisthesis with severe right sided foraminal and lateral recess stenosis and L5-S1 disc degeneration with left sided foraminal stenosis.  Ongoing low back and bilateral lower extremity pain. Reports pain and symptoms interfere with her daily activities and functioning.  She has tried oral medications, physical therapy, and interventional spine procedures with only temporary improvement.       We discussed the differential diagnosis including extremity/musculoskeltal pathology, peripheral nerve compression, vascular claudication etc. Mylene's symptoms, exam findings and imaging are most consistent with lumbar radiculopathy.  In my opinion, no further diagnostic work-up (EMG, etc) is warranted at this time.  Discussed treatment options.  Reviewed the role of further non operative treatment such as physical therapy, activity modification, medication management and interventional spine procedures.  Given that her  symptoms have persisted despite these conservative interventions, recommend consideration of surgical intervention.  Mylene would like to proceed with lumbar surgery.       We discussed surgical options.  In my opinion, would be posterior lumbar decompression L4-S1 and posterior lumbar fusion with instrumentation L3-S1, with transforaminal lumbar interbody fusion to address mechanical back pain, unstable spondylolisthesis, severe foraminal stenosis.  We did discuss updated plan to include L3 in fusion due to coronal imbalance.  We reviewed the options - such as instrumented vs non-instrumented, posterolateral vs interbody, local autograft vs ICBG vs allograft +/- biologic/graft extender/graft substitute supplementation for obtaining solid arthrodesis as well as the associated risk/benefit profiles and fusion efficacy as well as the FDA status of the instrumentation and products.  We also discussed the role of decompression surgery alone, but given instability and ongoing mechanical back pain we reviewed the role of fusion.  After discussion with the patient will plan on posterolateral instrumented +/- TLIF with use of local autograft and allograft with graft extender/substitute supplementation.  Explained at length the rationale for surgical invention and postoperative expectations.  We discussed and Mylene expressed her understanding, that in general, spine surgery is more predictive in improving extremity/radicular discomfort rather than axial spine pain, and arresting the progression of spinal cord/nerve dysfunction rather than improving.  Will utilize LSO brace immobilization during post operative recovery to help reduce pain by restricting mobility of the trunk and facilitate healing.      I reviewed with the patient possible risks of surgery which included the risk of infection, blood loss, risk of damage to adjacent nerves, vessels organs, risk of spinal fluid leak and meningitis, risk of chronic pain,  incomplete resolution of symptoms, instability, adjacent segment degeneration & disease, risk of nonunion and instrumentation failure, disease transmission, need for further surgery, DVT, pulmonary embolism, blindness, paralysis and even death, as well as other risk on consent form.   We also discussed patient specific risks and mitigation including advanced age, hypertension, osteopenia and associated iglesia- and post- operative related risks.       We reviewed infection prevention.  Reviewed medications; instructed patient medications to stop before surgery (i.e. blood thinners, NSAIDs, DMARDs and vitamins)     Also reviewed with patient our narcotic policy.  We will provide medications up to 60 days postop.  In the event patient requires more medications, will need to consult their PCP and/or see a pain management physician.     I have queried the [place] database for Mylene to better assist in clinical decision making regarding prescriptions for controlled medications.  After querying the database and considering the clinical picture I have determined that he is a candidate for a prescription for control medication in the initial postoperative period.     All questions were answered.  Patient verbalized understanding.  Informed consent was obtained. Consent form was signed.  Mylene had no further questions.  The entirety of the evaluation and consent was done with certified Kittitian interpretor present.       She will recieve a pre-operative chest Xray and scoliosis series xrays. Orders for preoperative labs (CBC, CMP, PTT, INR, type & screen, and ECG) were also placed at today's visit. Discussed pre-operative clearances, medical optimization and risk stratification.  Mylene needs pre-operative clearance from PCP and obtain PATs.      Pre operative patient reported outcome measures were obtained.      Patient instructed to return to office/ER sooner if symptoms are not improving, getting  worse, or new worrisome/neurologic symptoms arise.     Subjective:      Chief Complaint: Back Pain    HPI:  Mylene Wolff is a 69 y.o. female presenting for follow up visit with chief complaint of back and bilateral radicular leg pain.  Please see detailed notes from 22, 23, 3/24/23, 23, 23, 24 and 5/3/24 for complete history and attempted non operative treatments .  Mylene obtained updated MRI and is here today for surgical discussion.  Dexa scan from  indicates osteopenia and has continued with calcium and vitamin D as prescribed by PCP.    Conservative therapy includes the following:   Medications: robaxin, steroids, naproxen, lidocaine patches    Injections: multiple injections with Dr. Hanks     Physical Therapy: completed, with benefit initially but non sustained   Chiropractic Medicine: has not attempted  Accupunture/Massage Therapy: has not attempted   These therapeutic modalities were ineffective at providing sustained pain relief/functional improvement.     Nicotine dependent: no    Objective:     Family History   Problem Relation Age of Onset    Breast cancer Mother     Thrombosis Father     No Known Problems Sister     No Known Problems Sister     No Known Problems Sister     Hypertension Sister     Hypertension Brother     Hypertension Brother     Diabetes Maternal Grandmother        Past Medical History:   Diagnosis Date    Allergic     Hammertoe of right foot     OR   reapir today 2023    Hyperlipidemia     Hypertension     Kidney stone     Wears glasses        Current Facility-Administered Medications   Medication Dose Route Frequency Provider Last Rate Last Admin    ceFAZolin (ANCEF) IVPB (premix in dextrose) 2,000 mg 50 mL  2,000 mg Intravenous Once Winter Benjamin PA-C           Past Surgical History:   Procedure Laterality Date     SECTION      HERNIA REPAIR  1969    HYSTERECTOMY      removed some of left ovary    [place]  "CORRECTION HAMMERTOE Right 07/21/2023    Procedure: REPAIR HAMMERTOE 3RD TOE;  Surgeon: Lokesh Simmons DPM;  Location: [facility];  Service: Podiatry    RI LAPS ABD PRTM&OMENTUM DX W/WO SPEC BR/WA SPX N/A 01/01/2023    Procedure: LAPAROSCOPY DIAGNOSTIC, LYSIS OF ADHESIONS;  Surgeon: Debbie Clark MD;  Location: [facility];  Service: General       Social History     Socioeconomic History    Marital status: /Civil Union     Spouse name: Not on file    Number of children: 4    Years of education: Not on file    Highest education level: Not on file   Occupational History    Not on file   Tobacco Use    Smoking status: Never     Passive exposure: Never    Smokeless tobacco: Never   Vaping Use    Vaping status: Never Used   Substance and Sexual Activity    Alcohol use: Never    Drug use: Never    Sexual activity: Not Currently     Partners: Male   Other Topics Concern    Not on file   Social History Narrative    Not on file     Social Determinants of Health     Financial Resource Strain: Not on file   Food Insecurity: Not on file   Transportation Needs: Not on file   Physical Activity: Not on file   Stress: Not on file   Social Connections: Not on file   Intimate Partner Violence: Not on file   Housing Stability: Not on file       Allergies   Allergen Reactions    Aspirin Swelling       Review of Systems  General- denies fever/chills  HEENT- denies hearing loss or sore throat  Eyes- denies eye pain or visual disturbances, denies red eyes  Respiratory- denies cough or SOB  Cardio- denies chest pain or palpitations  GI- denies abdominal pain  Endocrine- denies urinary frequency  Urinary- denies pain with urination  Musculoskeletal- Negative except noted above  Skin- denies rashes or wounds  Neurological- denies dizziness or headache  Psychiatric- denies anxiety or difficulty concentrating    Physical Exam  BP (!) 182/90   Pulse 68   Temp (!) 97.4 °F (36.3 °C) (Temporal)   Ht 5' 4\" (1.626 m)   Wt 81.6 " kg (180 lb)   SpO2 98%   BMI 30.90 kg/m²     General/Constitutional: No apparent distress: well-nourished and well developed.  Lymphatic: No appreciable lymphadenopathy  Respiratory: Non-labored breathing  Vascular: No edema, swelling or tenderness, except as noted in detailed exam.  Integumentary: No impressive skin lesions present, except as noted in detailed exam.  Psych: Normal mood and affect, oriented to person, place and time.  MSK: normal other than stated in HPI and exam  Gait & balance: no evidence of myelopathic gait, ambulates Independently     Lumbar spine range of motion:  -Forward flexion to 90  -Extension to neutral  -Lateral bend 25 right, 25 left  -Rotation 25 right, 25 left  There tenderness with palpation along lumbar paraspinal musculature, no midline tenderness     Neurologic:    Lower Extremity Motor Function    Right  Left    Iliopsoas  5/5  5/5    Quadriceps 5/5 5/5   Tibialis anterior  5/5  5/5    EHL  5/5  5/5    Gastroc. muscle  5/5  5/5    Heel rise  5/5  5/5    Toe rise  5/5  5/5      Sensory: light touch is intact to bilateral upper and lower extremities     Reflexes:    Right Left   Patellar 1+ 1+   Achilles 1+ 1+   Babinski neg neg     Other tests:  Straight Leg Raise: negative  Sharron SI: negative  HERMANN SI: negative  Greater troch: no tenderness   Internal/external hip ROM: intact, no pain   Flexion/extension knee ROM: intact, no pain   Vascular: WWP extremities, 2+DP bilateral      Diagnostic Tests   IMAGING: I have personally reviewed the images and these are my findings:  Lumbar Spine X-rays from 11/18/22: multi level lumbar spondylosis with loss of disc height most noted at L5-S1, osteophyte formation and facet hypertrophy, L4-5 spondylolisthesis with instability noted on flexion extension radiographs, no appreciated lytic/blastic lesions, no obvious instability    Lumbar Spine MRI from 5/10/24: Multilevel lumbar degenerative changes with L5-S1 disc base degeneration, L4-5  "degenerative spondylolisthesis, right-sided L4-5 lateral recess stenosis and foraminal stenosis, and left sided L5-S1 foraminal stenosis, no significant central stenosis however there does appear to be partial reduction of her degenerative spondylolisthesis on supine imaging     Scoliosis series from 7/24/24: multi level disc degeneration, lumbar coronal asymmetry with overall preserved balance, slightly positive sagittal balance SVA        Electronic Medical Records were reviewed including Radiology reports, pain mgmt notes, PT notes    Procedures, if performed today     None performed       Portions of the record may have been created with voice recognition software.  Occasional wrong word or \"sound a like\" substitutions may have occurred due to the inherent limitations of voice recognition software.  Read the chart carefully and recognize, using context, where substitutions have occurred.   "

## 2024-08-29 NOTE — ASSESSMENT & PLAN NOTE
Failed outpatient conservative measures  Elected to undergo lumbar decompression and fusion today  Currently no post operative issues noted  DVT prophylaxis in place  Orthopedics managing pain

## 2024-08-30 ENCOUNTER — APPOINTMENT (OUTPATIENT)
Dept: RADIOLOGY | Facility: HOSPITAL | Age: 69
DRG: 460 | End: 2024-08-30
Payer: COMMERCIAL

## 2024-08-30 LAB
ANION GAP SERPL CALCULATED.3IONS-SCNC: 8 MMOL/L (ref 4–13)
BASOPHILS # BLD AUTO: 0.01 THOUSANDS/ÂΜL (ref 0–0.1)
BASOPHILS NFR BLD AUTO: 0 % (ref 0–1)
BUN SERPL-MCNC: 11 MG/DL (ref 5–25)
CALCIUM SERPL-MCNC: 8.4 MG/DL (ref 8.4–10.2)
CHLORIDE SERPL-SCNC: 105 MMOL/L (ref 96–108)
CO2 SERPL-SCNC: 26 MMOL/L (ref 21–32)
CREAT SERPL-MCNC: 0.51 MG/DL (ref 0.6–1.3)
DME PARACHUTE DELIVERY DATE ACTUAL: NORMAL
DME PARACHUTE DELIVERY DATE REQUESTED: NORMAL
DME PARACHUTE ITEM DESCRIPTION: NORMAL
DME PARACHUTE ITEM DESCRIPTION: NORMAL
DME PARACHUTE ORDER STATUS: NORMAL
DME PARACHUTE SUPPLIER NAME: NORMAL
DME PARACHUTE SUPPLIER PHONE: NORMAL
EOSINOPHIL # BLD AUTO: 0 THOUSAND/ÂΜL (ref 0–0.61)
EOSINOPHIL NFR BLD AUTO: 0 % (ref 0–6)
ERYTHROCYTE [DISTWIDTH] IN BLOOD BY AUTOMATED COUNT: 15.1 % (ref 11.6–15.1)
GFR SERPL CREATININE-BSD FRML MDRD: 99 ML/MIN/1.73SQ M
GLUCOSE P FAST SERPL-MCNC: 139 MG/DL (ref 65–99)
GLUCOSE SERPL-MCNC: 139 MG/DL (ref 65–140)
HCT VFR BLD AUTO: 37.5 % (ref 34.8–46.1)
HGB BLD-MCNC: 11.8 G/DL (ref 11.5–15.4)
IMM GRANULOCYTES # BLD AUTO: 0.08 THOUSAND/UL (ref 0–0.2)
IMM GRANULOCYTES NFR BLD AUTO: 1 % (ref 0–2)
LYMPHOCYTES # BLD AUTO: 0.89 THOUSANDS/ÂΜL (ref 0.6–4.47)
LYMPHOCYTES NFR BLD AUTO: 7 % (ref 14–44)
MCH RBC QN AUTO: 28.4 PG (ref 26.8–34.3)
MCHC RBC AUTO-ENTMCNC: 31.5 G/DL (ref 31.4–37.4)
MCV RBC AUTO: 90 FL (ref 82–98)
MONOCYTES # BLD AUTO: 0.69 THOUSAND/ÂΜL (ref 0.17–1.22)
MONOCYTES NFR BLD AUTO: 5 % (ref 4–12)
NEUTROPHILS # BLD AUTO: 12.04 THOUSANDS/ÂΜL (ref 1.85–7.62)
NEUTS SEG NFR BLD AUTO: 87 % (ref 43–75)
NRBC BLD AUTO-RTO: 0 /100 WBCS
PLATELET # BLD AUTO: 208 THOUSANDS/UL (ref 149–390)
PMV BLD AUTO: 11.9 FL (ref 8.9–12.7)
POTASSIUM SERPL-SCNC: 4.2 MMOL/L (ref 3.5–5.3)
RBC # BLD AUTO: 4.15 MILLION/UL (ref 3.81–5.12)
SODIUM SERPL-SCNC: 139 MMOL/L (ref 135–147)
WBC # BLD AUTO: 13.71 THOUSAND/UL (ref 4.31–10.16)

## 2024-08-30 PROCEDURE — 99024 POSTOP FOLLOW-UP VISIT: CPT | Performed by: ORTHOPAEDIC SURGERY

## 2024-08-30 PROCEDURE — 97530 THERAPEUTIC ACTIVITIES: CPT

## 2024-08-30 PROCEDURE — 97163 PT EVAL HIGH COMPLEX 45 MIN: CPT

## 2024-08-30 PROCEDURE — 85025 COMPLETE CBC W/AUTO DIFF WBC: CPT

## 2024-08-30 PROCEDURE — 97535 SELF CARE MNGMENT TRAINING: CPT

## 2024-08-30 PROCEDURE — 97167 OT EVAL HIGH COMPLEX 60 MIN: CPT

## 2024-08-30 PROCEDURE — 99232 SBSQ HOSP IP/OBS MODERATE 35: CPT | Performed by: INTERNAL MEDICINE

## 2024-08-30 PROCEDURE — 72100 X-RAY EXAM L-S SPINE 2/3 VWS: CPT

## 2024-08-30 PROCEDURE — 80048 BASIC METABOLIC PNL TOTAL CA: CPT

## 2024-08-30 RX ORDER — SENNOSIDES 8.6 MG
650 CAPSULE ORAL EVERY 8 HOURS PRN
Qty: 30 TABLET | Refills: 0 | Status: SHIPPED | OUTPATIENT
Start: 2024-08-30

## 2024-08-30 RX ORDER — POLYETHYLENE GLYCOL 3350 17 G/17G
17 POWDER, FOR SOLUTION ORAL DAILY
Status: DISCONTINUED | OUTPATIENT
Start: 2024-08-31 | End: 2024-09-03 | Stop reason: HOSPADM

## 2024-08-30 RX ORDER — METHOCARBAMOL 500 MG/1
500 TABLET, FILM COATED ORAL 3 TIMES DAILY PRN
Qty: 30 TABLET | Refills: 0 | Status: SHIPPED | OUTPATIENT
Start: 2024-08-30 | End: 2024-09-09

## 2024-08-30 RX ORDER — OXYCODONE HYDROCHLORIDE 5 MG/1
5 TABLET ORAL EVERY 4 HOURS PRN
Qty: 30 TABLET | Refills: 0 | Status: SHIPPED | OUTPATIENT
Start: 2024-08-30 | End: 2024-09-09

## 2024-08-30 RX ORDER — SENNOSIDES 8.6 MG
2 TABLET ORAL DAILY
Status: DISCONTINUED | OUTPATIENT
Start: 2024-08-31 | End: 2024-09-03 | Stop reason: HOSPADM

## 2024-08-30 RX ADMIN — CEFAZOLIN SODIUM 2000 MG: 2 SOLUTION INTRAVENOUS at 11:09

## 2024-08-30 RX ADMIN — HEPARIN SODIUM 5000 UNITS: 5000 INJECTION INTRAVENOUS; SUBCUTANEOUS at 13:23

## 2024-08-30 RX ADMIN — HEPARIN SODIUM 5000 UNITS: 5000 INJECTION INTRAVENOUS; SUBCUTANEOUS at 21:52

## 2024-08-30 RX ADMIN — ACETAMINOPHEN 650 MG: 325 TABLET ORAL at 17:03

## 2024-08-30 RX ADMIN — OXYCODONE HYDROCHLORIDE 10 MG: 10 TABLET ORAL at 21:52

## 2024-08-30 RX ADMIN — ACETAMINOPHEN 650 MG: 325 TABLET ORAL at 05:31

## 2024-08-30 RX ADMIN — OXYCODONE HYDROCHLORIDE 10 MG: 10 TABLET ORAL at 11:13

## 2024-08-30 RX ADMIN — ATENOLOL 50 MG: 50 TABLET ORAL at 08:50

## 2024-08-30 RX ADMIN — SENNOSIDES 8.6 MG: 8.6 TABLET, FILM COATED ORAL at 08:50

## 2024-08-30 RX ADMIN — ACETAMINOPHEN 650 MG: 325 TABLET ORAL at 11:09

## 2024-08-30 RX ADMIN — METHOCARBAMOL 500 MG: 500 TABLET ORAL at 11:09

## 2024-08-30 RX ADMIN — DOCUSATE SODIUM 100 MG: 100 CAPSULE, LIQUID FILLED ORAL at 17:03

## 2024-08-30 RX ADMIN — OXYCODONE HYDROCHLORIDE 5 MG: 5 TABLET ORAL at 01:54

## 2024-08-30 RX ADMIN — DOCUSATE SODIUM 100 MG: 100 CAPSULE, LIQUID FILLED ORAL at 08:50

## 2024-08-30 RX ADMIN — HEPARIN SODIUM 5000 UNITS: 5000 INJECTION INTRAVENOUS; SUBCUTANEOUS at 05:31

## 2024-08-30 RX ADMIN — OXYCODONE HYDROCHLORIDE 10 MG: 10 TABLET ORAL at 06:31

## 2024-08-30 RX ADMIN — CEFAZOLIN SODIUM 2000 MG: 2 SOLUTION INTRAVENOUS at 05:30

## 2024-08-30 RX ADMIN — PRAVASTATIN SODIUM 80 MG: 80 TABLET ORAL at 17:03

## 2024-08-30 RX ADMIN — METHOCARBAMOL 500 MG: 500 TABLET ORAL at 05:31

## 2024-08-30 RX ADMIN — METHOCARBAMOL 500 MG: 500 TABLET ORAL at 17:03

## 2024-08-30 NOTE — INTERIM OP NOTE
Navigated L3-S1 decompression with instrumented fusion, TLIF. L4-S1 decompression/laminectomy  Postoperative Note  PATIENT NAME: Mylene Wolff  : 1955  MRN: [id number]  [facility] OR ROOM 18    Surgery Date: 2024    Preop Diagnosis:  Spondylolisthesis at L4-L5 level [M43.16]  Lumbar spondylosis [M47.816]    Post-Op Diagnosis Codes:     * Spondylolisthesis at L4-L5 level [M43.16]     * Lumbar spondylosis [M47.816]    Procedure(s) (LRB):  Navigated L3-S1 decompression with instrumented fusion, TLIF. L4-S1 decompression/laminectomy (Bilateral)    Surgeons and Role:     * Ephraim Wilkins MD - Primary     * Winter Benjamin PA-C - Assisting    Specimens:  * No specimens in log *    Estimated Blood Loss:   50 mL    Anesthesia Type:   General     Findings:   Severe facet hypertrophy, instability, spinal stenosis     Complications:   None      SIGNATURE: Ephraim Wilkins MD   DATE: 2024   TIME: 9:48 PM

## 2024-08-30 NOTE — CASE MANAGEMENT
Case Management Assessment & Discharge Planning Note    Patient name Mylene Wolff  Location /-01 MRN [id number]  : 1955 Date 2024       Current Admission Date: 2024  Current Admission Diagnosis:Lumbar radiculopathy   Patient Active Problem List    Diagnosis Date Noted Date Diagnosed    S/P lumbar fusion 2024     Preop examination 2024     Pure hypercholesterolemia 2024     Lumbar spondylolysis 2024     Spondylolisthesis at L4-L5 level 2024     Right lower quadrant abdominal pain 2024     Rib pain 2024     Fall 03/15/2024     Cervicalgia 2024     Trigger finger of left hand 2024     Lumbar spondylosis 2023     Diverticulosis 2023     Hammertoe of right foot      Lumbar radiculopathy      Hypertension      Varicose veins of both lower extremities with pain 2020       LOS (days): 0  Geometric Mean LOS (GMLOS) (days):   Days to GMLOS:     OBJECTIVE:              Current admission status: Outpatient Surgery       Preferred Pharmacy:   [id number] [place] - [address]  [address] [address]  Phone: [phone] Fax: [phone]    Primary Care Provider: BUSTER Gunn    Primary Insurance: [employer]  Secondary Insurance:     ASSESSMENT:  Active Health Care Proxies    There are no active Health Care Proxies on file.                 Readmission Root Cause  30 Day Readmission: No    Patient Information  Admitted from:: Home  Mental Status: Alert  During Assessment patient was accompanied by: Daughter  Assessment information provided by:: Patient  Primary Caregiver: Self  Support Systems: Family members, Daughter  County of Residence: [place]  What city do you live in?: [place]  Home entry access options. Select all that apply.: Stairs  Number of steps to enter home.: 3  Type of Current Residence: 2 story home  Upon entering residence, is there  a bedroom on the main floor (no further steps)?: No  A bedroom is located on the following floor levels of residence (select all that apply):: 2nd Floor  Upon entering residence, is there a bathroom on the main floor (no further steps)?: Yes  Number of steps to 2nd floor from main floor: One Flight  Living Arrangements: Lives w/ Daughter    Activities of Daily Living Prior to Admission  Functional Status: Independent  Completes ADLs independently?: Yes  Ambulates independently?: Yes  Does patient use assisted devices?: No  Does patient currently own DME?: No  Does patient have a history of Outpatient Therapy (PT/OT)?: No  Does the patient have a history of Short-Term Rehab?: No  Does patient have a history of HHC?: No  Does patient currently have HHC?: No         Patient Information Continued  Income Source: Employed  Does patient have prescription coverage?: Yes  Does patient receive dialysis treatments?: No  Does patient have a history of substance abuse?: No  Does patient have a history of Mental Health Diagnosis?: No         Means of Transportation  Means of Transport to [facility]:: Family transport      Social Determinants of Health (SDOH)      Flowsheet Row Most Recent Value   Housing Stability    In the last 12 months, was there a time when you were not able to pay the mortgage or rent on time? N   In the past 12 months, how many times have you moved where you were living? 0   At any time in the past 12 months, were you homeless or living in a shelter (including now)? N   Transportation Needs    In the past 12 months, has lack of transportation kept you from medical appointments or from getting medications? no   In the past 12 months, has lack of transportation kept you from meetings, work, or from getting things needed for daily living? No   Food Insecurity    Within the past 12 months, you worried that your food would run out before you got the money to buy more. Never true   Within the past 12 months, the food  you bought just didn't last and you didn't have money to get more. Never true   Utilities    In the past 12 months has the electric, gas, oil, or water company threatened to shut off services in your home? No            DISCHARGE DETAILS:    Discharge planning discussed with:: Patient and daughter  Freedom of Choice: Yes  Comments - Freedom of Choice: Pt agreeable to [facility], prefers [facility] if possible.  CM contacted family/caregiver?: Yes (daughter at bedside.)  Were Treatment Team discharge recommendations reviewed with patient/caregiver?: Yes  Did patient/caregiver verbalize understanding of patient care needs?: Yes  Were patient/caregiver advised of the risks associated with not following Treatment Team discharge recommendations?: Yes    Contacts  Patient Contacts: Fiona  Relationship to Patient:: Family  Contact Method: In Person  Reason/Outcome: Referral, Discharge Planning, Emergency Contact    Requested Home Health Care         Is the patient interested in HHC at discharge?: Yes  Home Health Discipline requested:: Occupational Therapy, Physical Therapy  Home Health Follow-Up Provider:: PCP  Home Health Services Needed:: Gait/ADL Training, Evaluate Functional Status and Safety, Strengthening/Theraputic Exercises to Improve Function  Homebound Criteria Met:: Uses an Assist Device (i.e. cane, walker, etc), Requires the Assistance of Another Person for Safe Ambulation or to Leave the Home  Supporting Clincal Findings:: Fatigues Easliy in Short Distances, Limited Endurance    DME Referral Provided  Referral made for DME?: Yes  DME referral completed for the following items:: Bedside Commode, Walker  DME Supplier Name:: [facility]    Other Referral/Resources/Interventions Provided:  Interventions: HHC, DME  Referral Comments: Pt is agreeable to HHC referrals, prefers [facility] if possible.  Pt is also agreeable to RW and BSC to be delivered to bedside today.         Treatment Team Recommendation: Home with Home Health  Care  Discharge Destination Plan:: Home with Home Health Care  Transport at Discharge : Stretcher van            Additional Comments: CM used [employer]  for conversation (Tiffany #[id number]). Pt resides at home with family. Pt is independent at baseline and her bedroom is on the second floor of the home.  Pt does not drive but still works.  Pt was agreeable to [facility] referrals but states she prefers [facility] if possible.  CM  sent referrals.  Pt is agreeable to RW and BSC to be ordered for pt and delivered to bedside.  Pt states that at d/c she will need a stretcher to take her home and for people to carry her up the stairs to her room.  CM advised that a stretcher van or BLS to home would likely be an out of pocket cost to pt.  Pt would like CM to have another discussion with her at time of d/c to discuss plan for transport.

## 2024-08-30 NOTE — PLAN OF CARE
Problem: PAIN - ADULT  Goal: Verbalizes/displays adequate comfort level or baseline comfort level  Description: Interventions:  - Encourage patient to monitor pain and request assistance  - Assess pain using appropriate pain scale  - Administer analgesics based on type and severity of pain and evaluate response  - Implement non-pharmacological measures as appropriate and evaluate response  - Consider cultural and social influences on pain and pain management  - Notify physician/advanced practitioner if interventions unsuccessful or patient reports new pain  Outcome: Progressing     Problem: INFECTION - ADULT  Goal: Absence or prevention of progression during hospitalization  Description: INTERVENTIONS:  - Assess and monitor for signs and symptoms of infection  - Monitor lab/diagnostic results  - Monitor all insertion sites, i.e. indwelling lines, tubes, and drains  - Monitor endotracheal if appropriate and nasal secretions for changes in amount and color  - [place] appropriate cooling/warming therapies per order  - Administer medications as ordered  - Instruct and encourage patient and family to use good hand hygiene technique  - Identify and instruct in appropriate isolation precautions for identified infection/condition  Outcome: Progressing     Problem: SAFETY ADULT  Goal: Patient will remain free of falls  Description: INTERVENTIONS:  - Educate patient/family on patient safety including physical limitations  - Instruct patient to call for assistance with activity   - Consult OT/PT to assist with strengthening/mobility   - Keep Call bell within reach  - Keep bed low and locked with side rails adjusted as appropriate  - Keep care items and personal belongings within reach  - Initiate and maintain comfort rounds  - Make Fall Risk Sign visible to staff  - Offer Toileting every 2 Hours, in advance of need  - Initiate/Maintain bed/chair alarm  - Obtain necessary fall risk management equipment: nonskid footwear  -  Apply yellow socks and bracelet for high fall risk patients  - Consider moving patient to room near nurses station  Outcome: Progressing  Goal: Maintain or return to baseline ADL function  Description: INTERVENTIONS:  -  Assess patient's ability to carry out ADLs; assess patient's baseline for ADL function and identify physical deficits which impact ability to perform ADLs (bathing, care of mouth/teeth, toileting, grooming, dressing, etc.)  - Assess/evaluate cause of self-care deficits   - Assess range of motion  - Assess patient's mobility; develop plan if impaired  - Assess patient's need for assistive devices and provide as appropriate  - Encourage maximum independence but intervene and supervise when necessary  - Involve family in performance of ADLs  - Assess for home care needs following discharge   - Consider OT consult to assist with ADL evaluation and planning for discharge  - Provide patient education as appropriate  Outcome: Progressing  Goal: Maintains/Returns to pre admission functional level  Description: INTERVENTIONS:  - Perform AM-PAC 6 Click Basic Mobility/ Daily Activity assessment daily.  - Set and communicate daily mobility goal to care team and patient/family/caregiver.   - Collaborate with rehabilitation services on mobility goals if consulted  - Perform Range of Motion 3 times a day.  - Reposition patient every 2 hours.  - Dangle patient 3 times a day  - Stand patient 3 times a day  - Ambulate patient 3 times a day  - Out of bed to chair 3 times a day   - Out of bed for meals 3 times a day  - Out of bed for toileting  - Record patient progress and toleration of activity level   Outcome: Progressing     Problem: DISCHARGE PLANNING  Goal: Discharge to home or other facility with appropriate resources  Description: INTERVENTIONS:  - Identify barriers to discharge w/patient and caregiver  - Arrange for needed discharge resources and transportation as appropriate  - Identify discharge learning  needs (meds, wound care, etc.)  - Arrange for interpretive services to assist at discharge as needed  - Refer to Case Management Department for coordinating discharge planning if the patient needs post-hospital services based on physician/advanced practitioner order or complex needs related to functional status, cognitive ability, or social support system  Outcome: Progressing     Problem: Knowledge Deficit  Goal: Patient/family/caregiver demonstrates understanding of disease process, treatment plan, medications, and discharge instructions  Description: Complete learning assessment and assess knowledge base.  Interventions:  - Provide teaching at level of understanding  - Provide teaching via preferred learning methods  Outcome: Progressing

## 2024-08-30 NOTE — ANESTHESIA POSTPROCEDURE EVALUATION
Post-Op Assessment Note    CV Status:  Stable  Pain Score: 0    Pain management: adequate       Mental Status:  Arousable and lethargic   Hydration Status:  Euvolemic   PONV Controlled:  Controlled   Airway Patency:  Patent     Post Op Vitals Reviewed: Yes    No anethesia notable event occurred.    Staff: NARA               /58 (08/29/24 2147)    Temp 98.9 °F (37.2 °C) (08/29/24 2147)    Pulse 63 (08/29/24 2147)   Resp 18 (08/29/24 2147)    SpO2   100

## 2024-08-30 NOTE — PROGRESS NOTES
Progress Note - Orthopedics   Mylene Wolff 69 y.o. female MRN: [id number]  Unit/Bed#: -01      Subjective:    69 y.o.female POD 1 L3-S1 decompression TLIF. No acute events, no new complaints. Denies fevers, chills, CP, SOB, N/V. Pain well controlled. Patient primarily concerned about getting comfortable in bed.    Labs:  0   Lab Value Date/Time    HCT 35 08/29/2024 1706    HCT 44.0 08/20/2024 0849    HCT 46.9 (H) 07/16/2024 1341    HCT 41.5 05/29/2024 2323    HGB 11.9 08/29/2024 1706    HGB 13.6 08/20/2024 0849    HGB 14.9 07/16/2024 1341    HGB 13.1 05/29/2024 2323    INR 0.95 08/20/2024 0849    WBC 7.92 08/20/2024 0849    WBC 7.80 07/16/2024 1341    WBC 9.38 05/29/2024 2323       Meds:    Current Facility-Administered Medications:     acetaminophen (TYLENOL) tablet 650 mg, 650 mg, Oral, Q6H AUTUMN, Winter Benjamin PA-C    aluminum-magnesium hydroxide-simethicone (MAALOX) oral suspension 30 mL, 30 mL, Oral, Q6H PRN, Winter Benjamin PA-C    atenolol (TENORMIN) tablet 50 mg, 50 mg, Oral, Daily, Winter Benjamin PA-C    calcium carbonate (TUMS) chewable tablet 1,000 mg, 1,000 mg, Oral, Daily PRN, Winter Benjamin PA-C    ceFAZolin (ANCEF) IVPB (premix in dextrose) 2,000 mg 50 mL, 2,000 mg, Intravenous, Q8H, Winter Benjamin PA-C    docusate sodium (COLACE) capsule 100 mg, 100 mg, Oral, BID, Winter Benjamin PA-C    heparin (porcine) subcutaneous injection 5,000 Units, 5,000 Units, Subcutaneous, Q8H AUTUMN, EZRA MarinoC    methocarbamol (ROBAXIN) tablet 500 mg, 500 mg, Oral, Q6H AUTUMN, Winter Benjamin PA-C    ondansetron (ZOFRAN) injection 4 mg, 4 mg, Intravenous, Q6H PRN, Winter Benjamin PA-C    oxyCODONE (ROXICODONE) immediate release tablet 10 mg, 10 mg, Oral, Q4H PRN, Winter Benjamin PA-C    oxyCODONE (ROXICODONE) IR tablet 5 mg, 5 mg, Oral, Q4H PRN, Winter Benjamin PA-C    pravastatin (PRAVACHOL) tablet 80 mg, 80  "mg, Oral, Daily With Dinner, Winter Benjamin PA-C    senna (SENOKOT) tablet 8.6 mg, 1 tablet, Oral, Daily, Winter Benjamin PA-C    Blood Culture:   No results found for: \"BLOODCX\"    Wound Culture:   No results found for: \"WOUNDCULT\"    Ins and Outs:  I/O last 24 hours:  In: 2900 [I.V.:2900]  Out: 735 [Urine:570; Drains:115; Blood:50]          Physical:  Vitals:    08/29/24 2359   BP: 139/80   Pulse: 70   Resp: 18   Temp: 97.8 °F (36.6 °C)   SpO2: 99%       Musculoskeletal: bilateral Lower Extremity  Surgical dressing clean, dry, intact without signs of strikethrough or saturation.  Drain pulling suction appropriately, 115 cc overnight  Mild tenderness palpation at the surgical site.  Sensation intact L3-S1.  Motor intact, 4/5 hip flexors bilaterally, 5/5 ankle plantarflexion/dorsiflexion, EHL/FHL.  Bilateral lower extremities are warm and well-perfused with capillary refill less than 2 seconds.  DP pulse palpable.    Assessment:    69 y.o.female POD 1 L3-S1 decompression TLIF. Doing well overall.     Plan:  WBAT with 5 lb weight lifting limit to bilateral lower extremities  PT/OT  Pain control  DVT ppx: SQH  Will monitor for ABLA and administer IVF/prbc as indicated for Greater than 2 gram drop or Hgb < 7  Medical management per periop team  Dispo: Ortho will follow    Vahid Owen MD      Please Secure Chat the [facility] Ortho Floor Role with any Questions or Concerns.    "

## 2024-08-30 NOTE — RESTORATIVE TECHNICIAN NOTE
Restorative Technician Note      Patient Name: Mylene Wolff     Restorative Tech Visit Date: 08/30/24  Note Type: Bracing, Initial consult  Patient Position Upon Consult: Supine  Brace Applied: [place] LSO (Size X-Large)  Additional Brace Ordered: No  Patient Position When Brace Applied: Seated (EOB)  Education Provided: Yes; Family or social support of family present for education by provider  Patient Position at End of Consult: Seated edge of bed (Left in the care of PT/OT)  Nurse Communication: Nurse aware of consult, application of brace    Informational handout left at bedside.    Please contact [facility] PT Restorative tech on Epic Secure Chat  in regards to bracing instruction and/or adjustment.    Sheeba Palmer BS

## 2024-08-30 NOTE — OP NOTE
OPERATIVE REPORT  PATIENT NAME: Mylene Wolff    :  1955  MRN: [id number]  Pt Location: [facility] OR ROOM 18    SURGERY DATE: 2024    Surgeons and Role:     * Ephraim Wilkins MD - Primary     * Winter Benjamin PA-C - Assisting     * Shadi Gomez MD - Resident     Preop Diagnosis:  Spondylolisthesis at L4-L5 level [M43.16]  Lumbar spondylosis [M47.816]    Post-Op Diagnosis Codes:     * Spondylolisthesis at L4-L5 level [M43.16]     * Lumbar spondylosis [M47.816]    Procedure(s):  Arthrodesis, L5-S1 TLIF: 99584  Arthrodesis, L4-5 TLIF: 61395   Arthrodesis, L3-4 PL: 22826   Interbody cage, L5-S1: 93521  Interbody cage, L4-5: 16979   Posterior segmental instrumentation/pedicle screw fixation, L3-S1: 92435  Laminectomy/facetectomy, L5-S1: 45041   Laminectomy/facetectomy, L4-5: 61660   Laminectomy/facetectomy, L3-4: 08005  3D computer-assisted navigation: 59396    Specimen(s):  * No specimens in log *    Estimated Blood Loss:   50 mL    Drains:  Closed/Suction Drain Inferior;Left Back Bulb 19 Fr. (Active)   Site Description Unable to view 24 0801   Dressing Status Clean;Dry;Intact 24 0801   Drainage Appearance Bloody 24 08   Status To bulb suction 24 0801   Output (mL) 65 mL 24 1000   Number of days: 1       [REMOVED] Urethral Catheter Latex;Straight-tip 16 Fr. (Removed)   Site Assessment Clean;Skin intact 24   Espino Care Done 24 2100   Collection Container Standard drainage bag 24   Securement Method Securing device (Describe) 24   Output (mL) 700 mL 24 0601   Number of days: 1       [REMOVED] Urethral Catheter 16 Fr. (Removed)   Number of days: 0       Anesthesia Type:   General    Operative Indications:  Spondylolisthesis at L4-L5 level [M43.16]  Lumbar spondylosis [M47.816]    69-year-old female with low back, radicular leg symptoms and ambulatory dysfunction.  After failing conservative management, the risks,  benefits and alternatives to surgery were discussed and patient elected to proceed with surgical intervention.     Plan for multi level posterior lumbar decompression, instrumented posterior spinal fusion with TLIF.  Consent previously obtained in outpatient setting.   Prior to surgery in the holding area, I again explained in detail to the patient and the patients daughter the possible risks of surgery which include he risk of nerve injury, persistent, and/or worsening pain, spinal fluid leak, infection, and/or meningitis, excessive bleeding, paralysis, death, blindness, blood vessel injury, need for further surgery, instability, as well as the potential for unforeseen medical and surgical complications.  We also discussed risk of junctional degeneration, bone graft complications, the differences in efficacy between local bone graft, autologous iliac crest bone graft and allograft, the FDA status of the instrumentation and products, the risk of nonhealing and instrumentation failure, as well as chronic pain.   I reiterated an understanding, that in general, spine surgery is more predictive of improving extremity discomfort rather than axial spine pain and arresting the progression of spinal cord/nerve dysfunction rather than improving it.  Mylene had no further questions.    Operative Findings:  Severe facet hypertrophy, instability, spinal stenosis     Complications:   None    Procedure and Technique:  Patient was identified in the preoperative holding area.  The operative site was appropriately marked.      Patient was taken to the operating room.  Antibiotics were administered.  Sequential compression devices were applied.  After completion anesthesia, neuro monitoring leads were applied and moreno was inserted.  Patient was placed prone on the Jonathon table.  During this time and the entire operation, care was taken to maintain appropriate perfusion pressures.  All bony prominences were properly padded.   Preprocedure fluoroscopic images were obtained in AP and lateral position.  The operative field was then prepped and draped in the normal sterile fashion.  Surgical loupes and headlamp were used during the procedure.      Incision was made in the skin over the intended surgical level and dissection was carried down through the subcutaneous tissue down to level of deep fascia.  Hemostasis was obtained using Bovie cautery.  The deep fascia was elevated off the posterior elements of the L3-S1 in a subperiosteal manner.  An intraoperative radiograph was taken to confirm the appropriate spinal level.  A rongeur was used to remove the spinous process of the selected vertebrae.  The lateral border of the pars was identified at appropriate levels.  Dissection was carried out lateral on the patient's left side to identify the L3, L4 and L5 transverse processes.  The inner transverse membrane was preserved.        Attention was then directed to the placement of implants according to the presurgical plan. Stereotactic computer-assisted navigation (which additionally included but was not limited to intraoperative placement of fiducial markers and image-assisted registration) was utilized to increase precision during placement of instrumentation during surgery while avoiding vital structures.   O-Arm CT image acquisition was completed which confirmed appropriate surgical levels and for L3, L4, L5 and S1 pedicle screws.  First using a navigated bur for a starting hole, followed by navigated drill, the pedicle was drilled.  A ball-tip probe was used to palpate the pedicle and bone was encountered at the base of the tract as well as along all 4 walls.  The pedicle was tapped using a navigated tap.  6.5x40mm L3 & L4 and 7.5x40mm L5 & S1 screws were then placed under navigated guidance. Neurophysiology monitoring was stable.       We turned our attention to the decompression. The L4 spinous processes was removed with a rongeur.  The  bone was then handed off to the back table for preparation as autograft.  The lamina was thinned with the use of a rongeur and high speed matilda.  A small curved curette was then used to undermine the inferior insertion the ligamentum flavum on the undersurface of the inferior lamina.  All dural adhesions were freed from the surrounding bone and ligaments with the use of a Penfield 3 and Ramon prior to the use of a Kerrison punch.       Using a combination of rongeurs and high speed matilda and 3-0 Kerrison and Misonix bone scalpel was used to perform a midline decompression.   At L4 a residual laminar bridge was preserved. The decompression was continued into the left subarticular zone laterally.  A left sided lateral recess decompression was performed, including partial medial facetectomies, with 3-0 and 2-0 Kerrison to decompress thoroughly the lateral recess. Care was taken to preserve approximately 50% of the facet.  A Ramon tool was used to palpate the neural foramen and was noted to be without resistance.  In addition, a Penfield 4 was used to confirm that the exiting roots could be freely mobilized from the medial border of the pedicles.         We then turned our attention to the foraminal decompression and TLIF at the L4-5 segment. Right sided lateral recess decompression and medial facetectomies were performed with 3-0 and 4-0 Kerrison punch.  An osteotome was then used to create an osteotomy through the right sided inferior laminar edge of the cephalad vertebrae exiting at the pars interarticularis at the same level, taking care not to osteotomize the pedicle above.  This allowed removal of the inferior articular process of the cephalad vertebrae.  The superior articular process of the inferior vertebrae was then removed with a large rongeur and the respective pedicle was skeletonized using a 3 and 4 Kerrison punch.  After meticulous hemostasis was achieved with a bipolar cautery, the exiting and  traversing nerve root of the motion segment were then well exposed.  Wide exposure of the transforaminal area was now obtained. Sharp annulotomy and radical discectomy of the disc base was performed using scalpel, pituitaries, straight and angled curettes.  Distraction was carried out intermittently with use of bullet dilators placed into the interspace following the discectomy and pedicle screw distraction.  After a thorough discectomy was obtained from the ipsilateral to contralateral side, the endplates were feathered with angled osteotomes.  The interspace was then sized for an intervertebral prosthetic cage device.  The interspace was trialed to the appropriate size implant which was then placed after being filled with local autograft bone, DBM, as well as I-factor bone graft, and impacted into the interspace carefully protecting both the traversing and exiting nerve roots.  Bone graft was placed in front of the implant prior to placement.  Neurophysiology monitoring was stable.  The device had an excellent fit within the interspace and was found to be in acceptable position on spine imaging.  A Ramon was then passed around the exiting nerve to confirm that it was free without any compression.  In addition, a Penfield 4 was used to confirm that the exiting root could be freely mobilized from the medial border of the pedicles.  Neurophysiology monitoring was stable. A [id number] Medtronic Capstone was used.      We then repeated the same decompression and TLIF procedure at the L5-S1 segement.  Neurophysiology monitoring was stable. A [id number] Medtronic Capstone was again used.  An L5 residual laminar bridge was preserved.       We then turned our attention to the L3-4 segment.  The L3 lamina was thinned with the use of a rongeur and high speed matilda.  A small curved curette was then used to undermine the inferior insertion the ligamentum flavum on the undersurface of the inferior lamina.  All dural adhesions were  freed from the surrounding bone and ligaments with the use of a Penfield 3 and Pershing prior to the use of a Kerrison punch.   Using a combination of rongeurs and high speed matlida and 3-0 Kerrison and Misonix bone scalpel was used to perform a midline decompression.   At L3 a residual laminar bridge was preserved. The decompression was continued into the subarticular zone bilateral.  A lateral recess decompression was performed, including partial medial facetectomies, with 3-0 and 2-0 Kerrison to decompress thoroughly the lateral recess. Care was taken to preserve approximately 50% of the facet.  A Pershing tool was used to palpate the neural foramen and was noted to be without resistance.  In addition, a Penfield 4 was used to confirm that the exiting roots could be freely mobilized from the medial border of the pedicles.           A valsalva was performed.  No dural rents, leaks were attenuations were noted.  No residual sharp bony edges remained.  The wound was extensively irrigated.  Hemostatic agents were applied and there was no active bleeding noted.     The left sided L3, L4 and L5 transverse processes, lateral pars and the lateral wall the facet joint were decorticated with the use of a high-speed matilda.  Local autograft and I-factor bone graft and Priyank DBM were mixed and packed in the left inner transverse space at L3-S1.      O-Arm CT was used to confirm appropriate placement of the screws and interbody cages.  Appropriate sized lordotic rods (90mm Ti) were inserted in the tulip heads and secured at L5 and S1.  The L4-5 spondylolisthesis was the reduced using reduction towers, utilizing fluorsoscopy.  The set screws were final tightened.  Final fluoroscopic images were obtained.  Orthogonal views confirmed appropriate placement of instrumentation.  Neurophysiology monitoring was stable.     Vancomycin powder was placed into the wound.  A deep drain was placed in the wound.  Wound was closed in a layered  fashion including muscle and fascia with 1 Vicryl followed by 2-0 Vicryl and staples for skin.  The drain was secured.  Mepilex dressing was applied to the wound.  The sponge and needle count were reported as correct at the end of the case.       The patient was gently flipped supine, emerged from anesthesia, and extubated.  Patient was transferred to the recovery room in stable condition.        I was present for the entire procedure.    Patient Disposition:  PACU         SIGNATURE: Ephraim Wilkins MD  DATE: August 30, 2024  TIME: 2:09 PM

## 2024-08-30 NOTE — PLAN OF CARE
Problem: PHYSICAL THERAPY ADULT  Goal: Performs mobility at highest level of function for planned discharge setting.  See evaluation for individualized goals.  Description: Treatment/Interventions: Functional transfer training, LE strengthening/ROM, Elevations, Therapeutic exercise, Endurance training, Patient/family training, Bed mobility, Equipment eval/education, Gait training, OT, Spoke to nursing  Equipment Recommended: (S) Walker, Other (Comment) (recommend RW and commode for d/c home; patient would like transport chair)       See flowsheet documentation for full assessment, interventions and recommendations.  Note: Prognosis: Good  Problem List: Decreased strength, Decreased endurance, Impaired balance, Decreased mobility, Pain  Assessment: PT completed evaluation of 69 y.o. female admitted to [facility] on 8/29/2024 for the following planned procedure L3-S1 decompressive TLIF. + spine brace (LSO) and spinal precautions. Patient's current status instabilities include ongoing pain, brace, continuous O2/HR monitoring, falls risk, bed/chair alarms, and a regression in function from baseline.  PMH is significant for HTN, rib pain, and lumbar spondylosis. Prior to this admission patient resided with her spouse, daughter, and grandchildren in a 2 level home (bedroom on 2nd floor; 2 PER). At her baseline she is I with mobility (no use of AD) and ADLS. Patient presents at time of PT evaluation functioning below baseline and currently w/ overall mobility deficits 2* to: impaired balance, gait deviations, decreased activity tolerance and fall risk. During PT evaluation, patient currently is requiring min-AX1 for bed mobility (log roll); min-AX1 for transfers and min-AX1 progressing to close supervision for ambulation w/ RW. Patient will benefit from continued PT services while in hospital in order to address remaining limitations. PT d/c recommendation is for home with HHPT. Patient will continue to benefit  from continued skilled PT this admission to achieve maximal function and safety.        Rehab Resource Intensity Level, PT: III (Minimum Resource Intensity) (hhpt)    See flowsheet documentation for full assessment.

## 2024-08-30 NOTE — ASSESSMENT & PLAN NOTE
Continue atenolol with appropriate adjusted parameters  Continue to hold losartan to avoid DAVINA in the post operative setting  OK to resume in 48 hours if bmp is stable and BP warrants or on DC  Cont hydralazine as needed for SBP >160  stable

## 2024-08-30 NOTE — PLAN OF CARE
Problem: OCCUPATIONAL THERAPY ADULT  Goal: Performs self-care activities at highest level of function for planned discharge setting.  See evaluation for individualized goals.  Description: Treatment Interventions: ADL retraining, Functional transfer training, Endurance training, Cognitive reorientation, Patient/family training, Equipment evaluation/education, Compensatory technique education, Energy conservation, Activityengagement  Equipment Recommended: Bedside commode (/SC)       See flowsheet documentation for full assessment, interventions and recommendations.   8/30/2024 1531 by Maria L Escobedo OT  Note: Limitation: Decreased ADL status, Decreased Safe judgement during ADL, Decreased cognition, Decreased endurance, Decreased self-care trans, Decreased high-level ADLs  Prognosis: Good  Assessment: 70 YO Female SEEN FOR INITIAL OCCUPATIONAL THERAPY EVALUATION S/P L3-S1 DECOMPRESSION TLIF ON 8/29/24. PT CURRENTLY HAS THE FOLLOWING RESTRICTIONS;SPINAL PRECAUTIONS and LSO . PROBLEMS LIST/PMH INCLUDES Allergic, Hammertoe of right foot, Hyperlipidemia, Hypertension, Kidney stone, and Wears glasses. PT IS [place]i SPEAKING ONLY- TRANSLATION YEMI UTILIZED. PT IS FROM HOME WITH FAMILY WHERE SHE REPORTS BEING INDEPENDENT AT BASELINE. PT CURRENTLY REQUIRES OVERALL MIN-MOD A WITH ADLS AND MIN A WITH TRANSFERS / FUNCTIONAL MOBILITY WITH USE OF RW. PT IS LIMITED 2' PAIN, FATIGUE, IMPAIRED BALANCE, FALL RISK , SPINAL PRECAUTIONS, OVERALL WEAKNESS/DECONDITIONING , CUES TO ATTEND TO TASK, ADDITIONAL TIME TO COMPLETE TASKS, INACCESSIBLE HOME ENVIRONMENT, and OVERALL LIMITED ACTIVITY TOLERANCE. PT EDUCATED ON SPINAL PRECAUTIONS, LOG ROLL TECHNIQUE, ENERGY CONSERVATION TECHNIQUES FOR CARRY OVER UPON D/C, INCREASED FAMILY SUPPORT, and CONTINUE PARTICIPATION IN SELF-CARE/MOBILITY WITH STAFF WHILE IN THE HOSPITAL . The patient's raw score on the AM-PAC Daily Activity Inpatient Short Form is 17. A raw score of less than 19 suggests  the patient may benefit from discharge to post-acute rehabilitation services. Please refer to the recommendation of the Occupational Therapist for safe discharge planning. FROM AN OCCUPATIONAL THERAPY PERSPECTIVE, PT CAN RETURN HOME WITH INCREASED FAMILY SUPPORT WHEN MEDICALLY CLEARED + LEVEL III RESOURCES WHEN MEDICALLY CLEARED. WILL CONT TO FOLLOW TO ADDRESS THE BELOW DESCRIBED GOALS.     Rehab Resource Intensity Level, OT: III (Minimum Resource Intensity)       8/30/2024 1531 by Maria L Escobedo OT  Note: Limitation: Decreased ADL status, Decreased Safe judgement during ADL, Decreased cognition, Decreased endurance, Decreased self-care trans, Decreased high-level ADLs  Prognosis: Good  Assessment: 68 YO Female SEEN FOR INITIAL OCCUPATIONAL THERAPY EVALUATION S/P L3-S1 DECOMPRESSION TLIF ON 8/29/24. PT CURRENTLY HAS THE FOLLOWING RESTRICTIONS;SPINAL PRECAUTIONS and LSO . PROBLEMS LIST/PMH INCLUDES Allergic, Hammertoe of right foot, Hyperlipidemia, Hypertension, Kidney stone, and Wears glasses. PT IS Cuban SPEAKING ONLY- TRANSLATION YEMI UTILIZED. PT IS FROM HOME WITH FAMILY WHERE SHE REPORTS BEING INDEPENDENT AT BASELINE. PT CURRENTLY REQUIRES OVERALL MIN-MOD A WITH ADLS AND MIN A WITH TRANSFERS / FUNCTIONAL MOBILITY WITH USE OF RW. PT IS LIMITED 2' PAIN, FATIGUE, IMPAIRED BALANCE, FALL RISK , SPINAL PRECAUTIONS, OVERALL WEAKNESS/DECONDITIONING , CUES TO ATTEND TO TASK, ADDITIONAL TIME TO COMPLETE TASKS, INACCESSIBLE HOME ENVIRONMENT, and OVERALL LIMITED ACTIVITY TOLERANCE. PT EDUCATED ON SPINAL PRECAUTIONS, LOG ROLL TECHNIQUE, ENERGY CONSERVATION TECHNIQUES FOR CARRY OVER UPON D/C, INCREASED FAMILY SUPPORT, and CONTINUE PARTICIPATION IN SELF-CARE/MOBILITY WITH STAFF WHILE IN THE HOSPITAL . The patient's raw score on the AM-PAC Daily Activity Inpatient Short Form is 17. A raw score of less than 19 suggests the patient may benefit from discharge to post-acute rehabilitation services. Please refer to the  recommendation of the Occupational Therapist for safe discharge planning. FROM AN OCCUPATIONAL THERAPY PERSPECTIVE, PT CAN RETURN HOME WITH INCREASED FAMILY SUPPORT WHEN MEDICALLY CLEARED + LEVEL III RESOURCES WHEN MEDICALLY CLEARED. WILL CONT TO FOLLOW TO ADDRESS THE BELOW DESCRIBED GOALS.     Rehab Resource Intensity Level, OT: III (Minimum Resource Intensity)

## 2024-08-30 NOTE — ASSESSMENT & PLAN NOTE
Failed outpatient conservative measures  Elected to undergo lumbar decompression and fusion 8/29/2024  Currently no post operative issues noted  DVT prophylaxis in place  Orthopedics managing pain

## 2024-08-30 NOTE — PLAN OF CARE
Problem: PAIN - ADULT  Goal: Verbalizes/displays adequate comfort level or baseline comfort level  Description: Interventions:  - Encourage patient to monitor pain and request assistance  - Assess pain using appropriate pain scale  - Administer analgesics based on type and severity of pain and evaluate response  - Implement non-pharmacological measures as appropriate and evaluate response  - Consider cultural and social influences on pain and pain management  - Notify physician/advanced practitioner if interventions unsuccessful or patient reports new pain  Outcome: Progressing     Problem: INFECTION - ADULT  Goal: Absence or prevention of progression during hospitalization  Description: INTERVENTIONS:  - Assess and monitor for signs and symptoms of infection  - Monitor lab/diagnostic results  - Monitor all insertion sites, i.e. indwelling lines, tubes, and drains  - Monitor endotracheal if appropriate and nasal secretions for changes in amount and color  - [place] appropriate cooling/warming therapies per order  - Administer medications as ordered  - Instruct and encourage patient and family to use good hand hygiene technique  - Identify and instruct in appropriate isolation precautions for identified infection/condition  Outcome: Progressing     Problem: SAFETY ADULT  Goal: Patient will remain free of falls  Description: INTERVENTIONS:  - Educate patient/family on patient safety including physical limitations  - Instruct patient to call for assistance with activity   - Consult OT/PT to assist with strengthening/mobility   - Keep Call bell within reach  - Keep bed low and locked with side rails adjusted as appropriate  - Keep care items and personal belongings within reach  - Initiate and maintain comfort rounds  - Make Fall Risk Sign visible to staff  - Offer Toileting every 2 Hours, in advance of need  - Initiate/Maintain bed/chair alarm  - Obtain necessary fall risk management equipment: nonskid footwear  -  Apply yellow socks and bracelet for high fall risk patients  - Consider moving patient to room near nurses station  Outcome: Progressing  Goal: Maintain or return to baseline ADL function  Description: INTERVENTIONS:  -  Assess patient's ability to carry out ADLs; assess patient's baseline for ADL function and identify physical deficits which impact ability to perform ADLs (bathing, care of mouth/teeth, toileting, grooming, dressing, etc.)  - Assess/evaluate cause of self-care deficits   - Assess range of motion  - Assess patient's mobility; develop plan if impaired  - Assess patient's need for assistive devices and provide as appropriate  - Encourage maximum independence but intervene and supervise when necessary  - Involve family in performance of ADLs  - Assess for home care needs following discharge   - Consider OT consult to assist with ADL evaluation and planning for discharge  - Provide patient education as appropriate  Outcome: Progressing  Goal: Maintains/Returns to pre admission functional level  Description: INTERVENTIONS:  - Perform AM-PAC 6 Click Basic Mobility/ Daily Activity assessment daily.  - Set and communicate daily mobility goal to care team and patient/family/caregiver.   - Collaborate with rehabilitation services on mobility goals if consulted  - Perform Range of Motion 3 times a day.  - Reposition patient every 2 hours.  - Dangle patient 3 times a day  - Stand patient 3 times a day  - Ambulate patient 3 times a day  - Out of bed to chair 3 times a day   - Out of bed for meals 3 times a day  - Out of bed for toileting  - Record patient progress and toleration of activity level   Outcome: Progressing     Problem: DISCHARGE PLANNING  Goal: Discharge to home or other facility with appropriate resources  Description: INTERVENTIONS:  - Identify barriers to discharge w/patient and caregiver  - Arrange for needed discharge resources and transportation as appropriate  - Identify discharge learning  needs (meds, wound care, etc.)  - Arrange for interpretive services to assist at discharge as needed  - Refer to Case Management Department for coordinating discharge planning if the patient needs post-hospital services based on physician/advanced practitioner order or complex needs related to functional status, cognitive ability, or social support system  Outcome: Progressing     Problem: Knowledge Deficit  Goal: Patient/family/caregiver demonstrates understanding of disease process, treatment plan, medications, and discharge instructions  Description: Complete learning assessment and assess knowledge base.  Interventions:  - Provide teaching at level of understanding  - Provide teaching via preferred learning methods  Outcome: Progressing

## 2024-08-30 NOTE — PROGRESS NOTES
[facility]  Progress Note  Name: Mylene Wolff I  MRN: [id number]  Unit/Bed#: [facility] I Date of Admission: 8/29/2024   Date of Service: 8/30/2024 I Hospital Day: 0    Assessment & Plan   S/P lumbar fusion  Assessment & Plan  Failed outpatient conservative measures  Elected to undergo lumbar decompression and fusion 8/29/2024  Currently no post operative issues noted  DVT prophylaxis in place  Orthopedics managing pain      Hypertension  Assessment & Plan  Continue atenolol with appropriate adjusted parameters  Continue to hold losartan to avoid DAVINA in the post operative setting  OK to resume in 48 hours if bmp is stable and BP warrants or on DC  Cont hydralazine as needed for SBP >160  stable      Pure hypercholesterolemia  Assessment & Plan  Continue statin                 The above assessment and plan was reviewed and updated as determined by my evaluation of the patient on 8/30/2024.    Labs:   Results from last 7 days   Lab Units 08/30/24  0508 08/29/24  1706   WBC Thousand/uL 13.71*  --    HEMOGLOBIN g/dL 11.8  --    I STAT HEMOGLOBIN g/dl  --  11.9   HEMATOCRIT % 37.5  --    HEMATOCRIT, ISTAT %  --  35   PLATELETS Thousands/uL 208  --      Results from last 7 days   Lab Units 08/30/24  0508 08/29/24  1706   SODIUM mmol/L 139  --    POTASSIUM mmol/L 4.2  --    CHLORIDE mmol/L 105  --    CO2 mmol/L 26  --    CO2, I-STAT mmol/L  --  25   BUN mg/dL 11  --    CREATININE mg/dL 0.51*  --    GLUCOSE, ISTAT mg/dl  --  134   CALCIUM mg/dL 8.4  --              Results from last 7 days   Lab Units 08/29/24  2152   POC GLUCOSE mg/dl 135       Imaging  XR spine lumbar 2 or 3 views injury    (Results Pending)       Review of Scheduled Meds:  Current Facility-Administered Medications   Medication Dose Route Frequency Provider Last Rate    acetaminophen  650 mg Oral Q6H AUTUMN Benjamin PA-C      aluminum-magnesium hydroxide-simethicone  30 mL Oral Q6H PRAMRITA Max  Sarah Benjamin PA-C      atenolol  50 mg Oral Daily Winter Benjamin PA-C      calcium carbonate  1,000 mg Oral Daily PRN Winter Benjamin PA-C      cefazolin  2,000 mg Intravenous Q8H Winter Benjamin PA-C 2,000 mg (08/30/24 0530)    docusate sodium  100 mg Oral BID Winter Benjamin PA-C      heparin (porcine)  5,000 Units Subcutaneous Q8H [facility] Winter Benjamin PA-C      methocarbamol  500 mg Oral Q6H [facility] Winter Benjamin PA-C      ondansetron  4 mg Intravenous Q6H PRN Winter Benjamin PA-C      oxyCODONE  10 mg Oral Q4H PRN Winter Benjamin PA-C      oxyCODONE  5 mg Oral Q4H PRN Winter Benjamin PA-C      pravastatin  80 mg Oral Daily With Dinner Winter Benjamin PA-C      senna  1 tablet Oral Daily Winter Benjamin PA-C         Subjective/ HPI: Patient seen and examined. Patients overnight issues or events were reviewed with nursing staff. New or overnight issues include the following:     Pt seen at bedside with daughter. No complaints this am.     ROS:   A 10 point ROS was performed; negative except as noted above.        *Labs /Radiology studies Reviewed  *Medications  reviewed and reconciled as needed  *Please refer to order section for additional ordered labs studies      Physical Examination:  Vitals:   Vitals:    08/30/24 0205 08/30/24 0249 08/30/24 0600 08/30/24 0800   BP:  139/88 124/73 125/76   Pulse: 70 68 76 68   Resp:  20     Temp: 97.5 °F (36.4 °C) 98.2 °F (36.8 °C) 98.8 °F (37.1 °C) 97.5 °F (36.4 °C)   TempSrc:       SpO2: 91% 100% 94% 96%   Weight:       Height:           General Appearance: NAD; pleasant  HEENT: PERRLA, conjuctiva normal; mucous membranes moist; face symmetrical  Neck:  Supple  Lungs: clear bilaterally, normal respiratory effort, no retractions, expiratory effort normal, on room air  CV: regular rate and rhythm, no murmurs rubs or gallops noted   ABD: soft non tender, +BS x4, obese  EXT: DP pulses  intact, no lymphadenopathy, no edema  Skin: normal turgor, normal texture, no rash, lumbar drain and dressing intact  Psych: affect normal, mood normal  Neuro: AAOx3; Yoruba speaking     The above physical exam was reviewed and updated as determined by my evaluation of the patient on 8/30/2024.    Invasive Devices       Peripheral Intravenous Line  Duration             Peripheral IV 08/29/24 Dorsal (posterior);Right Forearm <1 day    Peripheral IV 08/29/24 Left Hand <1 day              Drain  Duration             Closed/Suction Drain Inferior;Left Back Bulb 19 Fr. <1 day                       VTE Pharmacologic Prophylaxis: Heparin  Code Status: Prior  Current Length of Stay: 0 day(s)    Total floor / unit time spent today 30 minutes  Coordination of patient's care was performed in conjunction with consulting services. Time invested included review of patient's labs, vitals, and management of their comorbidities with continued monitoring, examination of patient as well as answering patient questions, documenting her findings and creating progress note in electronic medical record,  ordering appropriate diagnostic testing.       ** Please Note:  voice to text software may have been used in the creation of this document. Although proof errors in transcription or interpretation are a potential of such software**

## 2024-08-30 NOTE — OCCUPATIONAL THERAPY NOTE
Occupational Therapy Evaluation     Patient Name: Mylene Wolff  Today's Date: 2024  Problem List  Principal Problem:    Lumbar radiculopathy  Active Problems:    Hypertension    Pure hypercholesterolemia    S/P lumbar fusion    Past Medical History  Past Medical History:   Diagnosis Date    Allergic     Hammertoe of right foot     OR   reapir today 2023    Hyperlipidemia     Hypertension     Kidney stone     Wears glasses      Past Surgical History  Past Surgical History:   Procedure Laterality Date     SECTION      HERNIA REPAIR  1969    HYSTERECTOMY      removed some of left ovary    MD ARTHRODESIS COMBINED TQ 1NTRSPC LUMBAR Bilateral 2024    Procedure: Navigated L3-S1 decompression with instrumented fusion, TLIF. L4-S1 decompression/laminectomy;  Surgeon: Ephraim Wilkins MD;  [facility]: [facility];  Service: Orthopedics    MD CORRECTION HAMMERTOE Right 2023    Procedure: REPAIR HAMMERTOE 3RD TOE;  Surgeon: Lokesh Simmons DPM;  [facility];  Service: Podiatry    MD LAPS ABD PRTM&OMENTUM DX W/WO SPEC BR/WA SPX N/A 2023    Procedure: LAPAROSCOPY DIAGNOSTIC, LYSIS OF ADHESIONS;  Surgeon: Debbie Clark MD;  Location[facility];  Service: General         24 0845   OT Last Visit   OT Visit Date 24   Note Type   Note type Evaluation  (EVAL; 6307-3254. F/U TX; 4266-6417)   Pain Assessment   Pain Assessment Tool 0-10   Pain Score 8   Pain Location/Orientation Location: Back   Patient's Stated Pain Goal No pain   Hospital Pain Intervention(s) Repositioned;Ambulation/increased activity;Emotional support   Restrictions/Precautions   Weight Bearing Precautions Per Order No   Braces or Orthoses (S)  LSO   Other Precautions Cognitive;Chair Alarm;Bed Alarm;Multiple lines;Fall Risk;Pain;Spinal precautions   Home Living   Type of Home House   Home Layout Two level;Stairs to enter with rails  (2 PER)   Additional Comments NO USE OF DME AT  BASELINE   Prior Function   Level of [place] Independent with ADLs;Independent with functional mobility;Independent with IADLS   Lives With Spouse;Family;Daughter   Receives Help From Family   Falls in the last 6 months 0   Vocational Retired   Lifestyle   Autonomy PT REPORTS BEING OVERALL INDEPENDENT PTA.   Reciprocal Relationships LIVES WITH SUPPORTIVE SPOUSE + ADDITIONAL FAMILY   Intrinsic Gratification ENJOYS SPENDING TIME WITH FAMILY.   ADL   Eating Assistance 7  Independent   Grooming Assistance 5  Supervision/Setup   UB Bathing Assistance 4  Minimal Assistance   LB Bathing Assistance 3  Moderate Assistance   UB Dressing Assistance 4  Minimal Assistance   LB Dressing Assistance 3  Moderate Assistance   Toileting Assistance  4  Minimal Assistance   Functional Assistance 4  Minimal Assistance   Bed Mobility   Rolling L 4  Minimal assistance   Additional items Assist x 1;Increased time required;Verbal cues;LE management   Supine to Sit 4  Minimal assistance   Additional items Assist x 1;Increased time required;Verbal cues;LE management   Sit to Supine Unable to assess   Additional Comments PT LEFT OOB WITH ALL NEEDS IN REACH + CHAIR ALARM ACTIVATED.   Transfers   Sit to Stand 4  Minimal assistance   Additional items Assist x 1;Increased time required;Verbal cues   Stand to Sit 4  Minimal assistance   Additional items Assist x 1;Increased time required;Verbal cues   Functional Mobility   Functional Mobility 4  Minimal assistance   Additional Comments EXCESSIVE SLOW GAIT- IMPROVED WITH INCREASED ACTIVITY   Additional items Rolling walker   Balance   Static Sitting Fair +   Static Standing Fair   Ambulatory Fair -   Activity Tolerance   Activity Tolerance Patient limited by pain;Patient limited by fatigue   Medical Staff Made Aware PT SEEN FOR CO-EVAL WITH SKILLED PHYSICAL THERAPIST 2' NEW PRECAUTIONS/LIMITATIONS, AND LIMITED ACTIVITY TOLERANCE WHICH IMPACT PERFORMANCE AND ARE A REGRESSION FROM PT'S BASELINE.  RESTORATIVE PRESENT TO ASSIST WITH FITTING OF BRACE   Nurse Made Aware APPROPRIATE TO SEE PER RN.   RUE Assessment   RUE Assessment WFL   LUE Assessment   LUE Assessment WFL   Hand Function   Gross Motor Coordination Functional   Fine Motor Coordination Functional   Psychosocial   Psychosocial (WDL) WDL   Cognition   Arousal/Participation Alert;Cooperative   Attention Attends with cues to redirect   Orientation Level Oriented X4   Memory Decreased recall of recent events;Decreased short term memory   Following Commands Follows one step commands without difficulty   Comments PT IS [place] SPEAKING ONLY- [employer] TRANSLATION YEMI UTILIZED. PT REQUIRED MULTIPLE REPEAT INSTRUCTIONS T/O. ALARM ON FOR SAFETY   Assessment   Limitation Decreased ADL status;Decreased Safe judgement during ADL;Decreased cognition;Decreased endurance;Decreased self-care trans;Decreased high-level ADLs   Prognosis Good   Assessment 68 YO Female SEEN FOR INITIAL OCCUPATIONAL THERAPY EVALUATION S/P L3-S1 DECOMPRESSION TLIF ON 8/29/24. PT CURRENTLY HAS THE FOLLOWING RESTRICTIONS;SPINAL PRECAUTIONS and LSO . PROBLEMS LIST/PMH INCLUDES Allergic, Hammertoe of right foot, Hyperlipidemia, Hypertension, Kidney stone, and Wears glasses. PT IS Cuban SPEAKING ONLY- TRANSLATION YEMI UTILIZED. PT IS FROM HOME WITH FAMILY WHERE SHE REPORTS BEING INDEPENDENT AT BASELINE. PT CURRENTLY REQUIRES OVERALL MIN-MOD A WITH ADLS AND MIN A WITH TRANSFERS / FUNCTIONAL MOBILITY WITH USE OF RW. PT IS LIMITED 2' PAIN, FATIGUE, IMPAIRED BALANCE, FALL RISK , SPINAL PRECAUTIONS, OVERALL WEAKNESS/DECONDITIONING , CUES TO ATTEND TO TASK, ADDITIONAL TIME TO COMPLETE TASKS, INACCESSIBLE HOME ENVIRONMENT, and OVERALL LIMITED ACTIVITY TOLERANCE. PT EDUCATED ON SPINAL PRECAUTIONS, LOG ROLL TECHNIQUE, ENERGY CONSERVATION TECHNIQUES FOR CARRY OVER UPON D/C, INCREASED FAMILY SUPPORT, and CONTINUE PARTICIPATION IN SELF-CARE/MOBILITY WITH STAFF WHILE IN THE HOSPITAL . The patient's raw  score on the AM-[facility] Daily Activity Inpatient Short Form is 17. A raw score of less than 19 suggests the patient may benefit from discharge to post-acute rehabilitation services. Please refer to the recommendation of the Occupational Therapist for safe discharge planning. FROM AN OCCUPATIONAL THERAPY PERSPECTIVE, PT CAN RETURN HOME WITH INCREASED FAMILY SUPPORT WHEN MEDICALLY CLEARED + LEVEL III RESOURCES WHEN MEDICALLY CLEARED. WILL CONT TO FOLLOW TO ADDRESS THE BELOW DESCRIBED GOALS.   Goals   Patient Goals TO GET A TRANSPORT CHAIR TO GO TO APPOINTMENTS   LTG Time Frame 10-14   Long Term Goal #1 SEE BELOW   Plan   Treatment Interventions ADL retraining;Functional transfer training;Endurance training;Cognitive reorientation;Patient/family training;Equipment evaluation/education;Compensatory technique education;Energy conservation;Activityengagement   Goal Expiration Date 09/13/24   OT Frequency 2-3x/wk   Discharge Recommendation   Rehab Resource Intensity Level, OT III (Minimum Resource Intensity)   Equipment Recommended Bedside commode  (/SC)   Commode Type Standard   -PAC Daily Activity Inpatient   Lower Body Dressing 2   Bathing 2   Toileting 3   Upper Body Dressing 3   Grooming 3   Eating 4   Daily Activity Raw Score 17   Daily Activity Standardized Score (Calc for Raw Score >=11) 37.26   -PAC Applied Cognition Inpatient   Following a Speech/Presentation 3   Understanding Ordinary Conversation 3   Taking Medications 3   Remembering Where Things Are Placed or Put Away 3   Remembering List of 4-5 Errands 3   Taking Care of Complicated Tasks 3   Applied Cognition Raw Score 18   Applied Cognition Standardized Score 38.07   Additional Treatment Session   Start Time 0830   End Time 0845   Treatment Assessment ADDITIONAL TIME SPENT EDUCATING PT AND HER DAUGHTER ON SPINAL PRECAUTIONS, LOG ROLL TECHNIQUES, LSO MANAGEMENT, AND DME RECS. PT EDUCATIONAL [place] HAND-OUT PROVIDED TO PT REGARDING LSO AND SPINAL  PRECAUTIONS IN ORDER TO ASSIST WITH CARRYOVER UPON D/C. PT REQUESTING W/C TO ASSIST WITH TRANSPORT TO APPOINTMENT (?). ALL CURRENT QUESTIONS/CONCERNS ADDRESSED. PT LEFT OOB WITH ALARM ON AND ALL NEEDS IN REACH. WILL CONT TO FOLLOW.   Additional Treatment Day 1       OCCUPATIONAL THERAPY GOALS TO BE MET WITHIN 14 DAYS:    -Pt will increase bed mobility to MOD I to participate in functional activities with G tolerance and balance.  -Pt will improve functional mobility and transfers to MOD I on/off all surfaces w/ G balance/safety including toileting.  -Pt will participate in lt grooming task with MOD I after set-up standing at sink ~3-5 minutes with G safety and balance.   -Pt will increase independence in all ADLS, INCLUDING LSO MANAGEMENT, to MOD I with G balance sitting upright in chair.  -Pt will improve activity tolerance to G for 30 min txment sessions w/ G carry over of learned energy conservation techniques.  -Pt will improve independence in lt homemaking activities to MOD I without requiring cues for safety.  -Pt will demonstrate G carryover of learned SPINAL PRECAUTIONS, safety techniques and proper body mechanics in functional and leisure activities with use of DME.  -Pt will complete additional cognitive assessment with 100% attention to task in order to assist with safe d/c plan.       Documentation completed by LUIS Hernandez, OTR/L  MOCA Certified ID# [id number]

## 2024-08-30 NOTE — PHYSICAL THERAPY NOTE
Physical Therapy Evaluation and Treatment    Patient's Name: Mylene Wolff    Admitting Diagnosis  Spondylolisthesis at L4-L5 level [M43.16]  Lumbar spondylosis [M47.816]    Problem List  Patient Active Problem List   Diagnosis    Hypertension    Varicose veins of both lower extremities with pain    Lumbar radiculopathy    Hammertoe of right foot    Diverticulosis    Lumbar spondylosis    Trigger finger of left hand    Cervicalgia    Fall    Rib pain    Right lower quadrant abdominal pain    Lumbar spondylolysis    Spondylolisthesis at L4-L5 level    Pure hypercholesterolemia    Preop examination    S/P lumbar fusion       Past Medical History  Past Medical History:   Diagnosis Date    Allergic     Hammertoe of right foot     OR   reapir today 2023    Hyperlipidemia     Hypertension     Kidney stone     Wears glasses        Past Surgical History  Past Surgical History:   Procedure Laterality Date     SECTION      HERNIA REPAIR  1969    HYSTERECTOMY      removed some of left ovary    CT ARTHRODESIS COMBINED TQ 1NTRSPC LUMBAR Bilateral 2024    Procedure: Navigated L3-S1 decompression with instrumented fusion, TLIF. L4-S1 decompression/laminectomy;  Surgeon: Ephraim Wilkins MD;  [facility];  Service: Orthopedics    CT CORRECTION HAMMERTOE Right 2023    Procedure: REPAIR HAMMERTOE 3RD TOE;  Surgeon: Lokesh Simmons DPM;  Location[facility];  Service: Podiatry    CT LAPS ABD PRTM&OMENTUM DX W/WO SPEC BR/WA SPX N/A 2023    Procedure: LAPAROSCOPY DIAGNOSTIC, LYSIS OF ADHESIONS;  Surgeon: Debbie Clark MD;  Location: [facility];  Service: General        24 0850   PT Last Visit   PT Visit Date 24   Note Type   Note type Evaluation  (and treatment)   Pain Assessment   Pain Assessment Tool 0-10   Pain Score 8   Pain Location/Orientation Location: Back   Hospital Pain Intervention(s) Ambulation/increased activity;Repositioned    Restrictions/Precautions   Weight Bearing Precautions Per Order No   Braces or Orthoses (S)  LSO   Other Precautions Spinal precautions;Pain;Fall Risk;Multiple lines   Home Living   Type of Home House   Home Layout Two level;Stairs to enter with rails;Bed/bath upstairs  (2 per)   Additional Comments Prior to this admission patient resided with her spouse, daughter, and grandchildren in a 2 level home (bedroom on 2nd floor; 2 PER). At her baseline she is I with mobility (no use of AD) and ADLS.   Prior Function   Level of [place] Independent with ADLs;Independent with functional mobility   Lives With Spouse;Family;Daughter   Receives Help From Family   General   Additional Pertinent History 69 y.o. female admitted to [facility] on 8/29/2024 for the following planned procedure L3-S1 decompressive TLIF. + spine brace (LSO) and spinal precautions.   Family/Caregiver Present Yes  (dght)   Cognition   Overall Cognitive Status WFL   Arousal/Participation Alert   Following Commands Follows one step commands without difficulty   Comments patient is Hebrew speaking. utilized [employer] service translation- [id number].   Subjective   Subjective patient asking for transport chair upon d/c   RLE Assessment   RLE Assessment WFL  (4/5 grossly)   LLE Assessment   LLE Assessment WFL  (4/5 grossly)   Bed Mobility   Rolling L 4  Minimal assistance   Additional items Assist x 1   Supine to Sit 4  Minimal assistance   Additional items Assist x 1   Sit to Supine Unable to assess   Additional Comments log roll. post eval/treat patient OOB in chair with alarm active and all needs addressed   Transfers   Sit to Stand 4  Minimal assistance   Additional items Assist x 1   Stand to Sit 4  Minimal assistance   Additional items Assist x 1   Additional Comments w/ RW; VC and demonstration for hand placement   Ambulation/Elevation   Gait pattern Excessively slow;Short stride;Decreased foot clearance   Gait Assistance 4  Minimal assist    Additional items Assist x 1   Assistive Device Rolling walker   Distance 10 feet x 2   Ambulation/Elevation Additional Comments intially gait speed significantly slow with reduced stride length. improved with verbal cuing. denied pain while ambulating   Balance   Static Sitting Fair +   Static Standing Fair   Ambulatory Fair -   Endurance Deficit   Endurance Deficit Yes   Endurance Deficit Description pain   Activity Tolerance   Activity Tolerance Patient limited by pain;Patient limited by fatigue   Medical Staff Made Aware This high complexity evaluation was performed with an occupational therapist due to the patient's co-morbidities, clinically unstable presentation, and present impairments which are a regression from the patient's baseline.   Nurse Made Aware bridget to see per RN   Assessment   Prognosis Good   Problem List Decreased strength;Decreased endurance;Impaired balance;Decreased mobility;Pain   Assessment PT completed evaluation of 69 y.o. female admitted to [facility] on 8/29/2024 for the following planned procedure L3-S1 decompressive TLIF. + spine brace (LSO) and spinal precautions.    Patient's current status instabilities include ongoing pain, brace, continuous O2/HR monitoring, falls risk, bed/chair alarms, and a regression in function from baseline.  PMH is significant for HTN, rib pain, and lumbar spondylosis. Prior to this admission patient resided with her spouse, daughter, and grandchildren in a 2 level home (bedroom on 2nd floor; 2 PER). At her baseline she is I with mobility (no use of AD) and ADLS.     Patient presents at time of PT evaluation functioning below baseline and currently w/ overall mobility deficits 2* to: impaired balance, gait deviations, decreased activity tolerance and fall risk. During PT evaluation, patient currently is requiring min-AX1 for bed mobility (log roll); min-AX1 for transfers and min-AX1 progressing to close supervision for ambulation w/ RW. Patient  will benefit from continued PT services while in hospital in order to address remaining limitations.     PT d/c recommendation is for home with HHPT. Patient will continue to benefit from continued skilled PT this admission to achieve maximal function and safety.   Goals   Patient Goals to get a transport chair   LTG Expiration Date 09/13/24   Long Term Goal #1 1) Perform bed mobility mod-I to participate in frequent repositioning and improve skin integrity; 2) Perform functional transfers mod-I to promote I with toileting and OOB mobility; 3) Ambulate 200 feet mod-I with least restrictive device to participate in household and community level mobility; 4) Navigate 12 steps S level in order to safely navigate multiple floors at home   PT Treatment Day 1   Plan   Treatment/Interventions Functional transfer training;LE strengthening/ROM;Elevations;Therapeutic exercise;Endurance training;Patient/family training;Bed mobility;Equipment eval/education;Gait training;OT;Spoke to nursing   PT Frequency 3-5x/wk   Discharge Recommendation   Rehab Resource Intensity Level, PT III (Minimum Resource Intensity)  (hhpt)   Equipment Recommended (S)  Walker;Other (Comment)  (recommend RW and commode for d/c home; patient would like transport chair)   Walker Package Recommended Wheeled walker   Change/add to Walker Package? No   AM-PAC Basic Mobility Inpatient   Turning in Flat Bed Without Bedrails 3   Lying on Back to Sitting on Edge of Flat Bed Without Bedrails 3   Moving Bed to Chair 3   Standing Up From Chair Using Arms 3   Walk in Room 3   Climb 3-5 Stairs With Railing 3   Basic Mobility Inpatient Raw Score 18   Basic Mobility Standardized Score 41.05   [facility] Highest Level Of Mobility   -HLM Goal 6: Walk 10 steps or more   -HLM Achieved 7: Walk 25 feet or more   Additional Treatment Session   Start Time 0835   End Time 0850   Treatment Assessment Patient maintained sitting EOB x 15 minutes with supervision, supporting  self with b/l UE. LSO donned. Patient and daughter participated in education regarding the following: spinal precautions (no bending, lifting > 10 lbs, or twisting), log rolling for bed mobility, recommended DME for d/c home (RW, commode, shower chair). Patient also requesting transport chair to get to and from appointments. Patient aware she will have follow up PT this admission for stair training prior to discharge home. At end of session patient in chair with alarm active and all needs addressed.   Additional Treatment Day 1   End of Consult   Patient Position at End of Consult All needs within reach;Bed/Chair alarm activated;Seated edge of bed     The patient's AM-PAC Basic Mobility Inpatient Standardized Score is less than 42.9, suggesting this patient may benefit from discharge to post-acute rehabilitation services. Please also refer to the recommendation of the Physical Therapist for safe discharge planning.        Deidra Carlos, PT, DPT

## 2024-08-31 LAB
ANION GAP SERPL CALCULATED.3IONS-SCNC: 7 MMOL/L (ref 4–13)
BASOPHILS # BLD AUTO: 0.02 THOUSANDS/ÂΜL (ref 0–0.1)
BASOPHILS NFR BLD AUTO: 0 % (ref 0–1)
BUN SERPL-MCNC: 23 MG/DL (ref 5–25)
CALCIUM SERPL-MCNC: 8 MG/DL (ref 8.4–10.2)
CHLORIDE SERPL-SCNC: 105 MMOL/L (ref 96–108)
CO2 SERPL-SCNC: 26 MMOL/L (ref 21–32)
CREAT SERPL-MCNC: 0.7 MG/DL (ref 0.6–1.3)
EOSINOPHIL # BLD AUTO: 0.12 THOUSAND/ÂΜL (ref 0–0.61)
EOSINOPHIL NFR BLD AUTO: 1 % (ref 0–6)
ERYTHROCYTE [DISTWIDTH] IN BLOOD BY AUTOMATED COUNT: 15.5 % (ref 11.6–15.1)
GFR SERPL CREATININE-BSD FRML MDRD: 89 ML/MIN/1.73SQ M
GLUCOSE P FAST SERPL-MCNC: 115 MG/DL (ref 65–99)
GLUCOSE SERPL-MCNC: 115 MG/DL (ref 65–140)
HCT VFR BLD AUTO: 33.6 % (ref 34.8–46.1)
HGB BLD-MCNC: 10.7 G/DL (ref 11.5–15.4)
IMM GRANULOCYTES # BLD AUTO: 0.03 THOUSAND/UL (ref 0–0.2)
IMM GRANULOCYTES NFR BLD AUTO: 0 % (ref 0–2)
LYMPHOCYTES # BLD AUTO: 2.98 THOUSANDS/ÂΜL (ref 0.6–4.47)
LYMPHOCYTES NFR BLD AUTO: 25 % (ref 14–44)
MCH RBC QN AUTO: 28.8 PG (ref 26.8–34.3)
MCHC RBC AUTO-ENTMCNC: 31.8 G/DL (ref 31.4–37.4)
MCV RBC AUTO: 91 FL (ref 82–98)
MONOCYTES # BLD AUTO: 1.03 THOUSAND/ÂΜL (ref 0.17–1.22)
MONOCYTES NFR BLD AUTO: 9 % (ref 4–12)
NEUTROPHILS # BLD AUTO: 7.87 THOUSANDS/ÂΜL (ref 1.85–7.62)
NEUTS SEG NFR BLD AUTO: 65 % (ref 43–75)
NRBC BLD AUTO-RTO: 0 /100 WBCS
PLATELET # BLD AUTO: 162 THOUSANDS/UL (ref 149–390)
PMV BLD AUTO: 12.6 FL (ref 8.9–12.7)
POTASSIUM SERPL-SCNC: 3.6 MMOL/L (ref 3.5–5.3)
RBC # BLD AUTO: 3.71 MILLION/UL (ref 3.81–5.12)
SODIUM SERPL-SCNC: 138 MMOL/L (ref 135–147)
WBC # BLD AUTO: 12.05 THOUSAND/UL (ref 4.31–10.16)

## 2024-08-31 PROCEDURE — 97116 GAIT TRAINING THERAPY: CPT

## 2024-08-31 PROCEDURE — 80048 BASIC METABOLIC PNL TOTAL CA: CPT

## 2024-08-31 PROCEDURE — 97530 THERAPEUTIC ACTIVITIES: CPT

## 2024-08-31 PROCEDURE — NC001 PR NO CHARGE: Performed by: ORTHOPAEDIC SURGERY

## 2024-08-31 PROCEDURE — 85025 COMPLETE CBC W/AUTO DIFF WBC: CPT

## 2024-08-31 RX ADMIN — ACETAMINOPHEN 650 MG: 325 TABLET ORAL at 06:07

## 2024-08-31 RX ADMIN — SENNOSIDES 17.2 MG: 8.6 TABLET, FILM COATED ORAL at 08:32

## 2024-08-31 RX ADMIN — METHOCARBAMOL 500 MG: 500 TABLET ORAL at 00:30

## 2024-08-31 RX ADMIN — DOCUSATE SODIUM 100 MG: 100 CAPSULE, LIQUID FILLED ORAL at 08:32

## 2024-08-31 RX ADMIN — METHOCARBAMOL 500 MG: 500 TABLET ORAL at 11:07

## 2024-08-31 RX ADMIN — DOCUSATE SODIUM 100 MG: 100 CAPSULE, LIQUID FILLED ORAL at 17:16

## 2024-08-31 RX ADMIN — POLYETHYLENE GLYCOL 3350 17 G: 17 POWDER, FOR SOLUTION ORAL at 08:32

## 2024-08-31 RX ADMIN — ACETAMINOPHEN 650 MG: 325 TABLET ORAL at 00:30

## 2024-08-31 RX ADMIN — HEPARIN SODIUM 5000 UNITS: 5000 INJECTION INTRAVENOUS; SUBCUTANEOUS at 23:09

## 2024-08-31 RX ADMIN — ATENOLOL 50 MG: 50 TABLET ORAL at 08:32

## 2024-08-31 RX ADMIN — METHOCARBAMOL 500 MG: 500 TABLET ORAL at 17:16

## 2024-08-31 RX ADMIN — ACETAMINOPHEN 650 MG: 325 TABLET ORAL at 23:10

## 2024-08-31 RX ADMIN — OXYCODONE HYDROCHLORIDE 10 MG: 10 TABLET ORAL at 08:32

## 2024-08-31 RX ADMIN — ACETAMINOPHEN 650 MG: 325 TABLET ORAL at 11:07

## 2024-08-31 RX ADMIN — METHOCARBAMOL 500 MG: 500 TABLET ORAL at 23:10

## 2024-08-31 RX ADMIN — METHOCARBAMOL 500 MG: 500 TABLET ORAL at 06:07

## 2024-08-31 RX ADMIN — OXYCODONE HYDROCHLORIDE 10 MG: 10 TABLET ORAL at 22:13

## 2024-08-31 RX ADMIN — ACETAMINOPHEN 650 MG: 325 TABLET ORAL at 17:16

## 2024-08-31 RX ADMIN — PRAVASTATIN SODIUM 80 MG: 80 TABLET ORAL at 17:16

## 2024-08-31 RX ADMIN — HEPARIN SODIUM 5000 UNITS: 5000 INJECTION INTRAVENOUS; SUBCUTANEOUS at 13:43

## 2024-08-31 NOTE — PROGRESS NOTES
Progress Note - Orthopedics   Mylene Wolff 69 y.o. female MRN: [id number]  Unit/Bed#: -01      Subjective:    69 y.o.female POD 2 L3-S1 decompression, L4-S1 TLIF. No acute events, no new complaints. Denies fevers, chills, CP, SOB, N/V. Pain well controlled.     Labs:  0   Lab Value Date/Time    HCT 33.6 (L) 08/31/2024 0519    HCT 37.5 08/30/2024 0508    HCT 35 08/29/2024 1706    HCT 44.0 08/20/2024 0849    HGB 10.7 (L) 08/31/2024 0519    HGB 11.8 08/30/2024 0508    HGB 11.9 08/29/2024 1706    HGB 13.6 08/20/2024 0849    INR 0.95 08/20/2024 0849    WBC 12.05 (H) 08/31/2024 0519    WBC 13.71 (H) 08/30/2024 0508    WBC 7.92 08/20/2024 0849       Meds:    Current Facility-Administered Medications:     acetaminophen (TYLENOL) tablet 650 mg, 650 mg, Oral, Q6H AUTUMN, Winter Benjamin PA-C, 650 mg at 08/31/24 0607    aluminum-magnesium hydroxide-simethicone (MAALOX) oral suspension 30 mL, 30 mL, Oral, Q6H PRN, Winter Benjamin PA-C    atenolol (TENORMIN) tablet 50 mg, 50 mg, Oral, Daily, Winter Benjamin PA-C, 50 mg at 08/30/24 0850    calcium carbonate (TUMS) chewable tablet 1,000 mg, 1,000 mg, Oral, Daily PRN, Winter Benjamin PA-C    docusate sodium (COLACE) capsule 100 mg, 100 mg, Oral, BID, Winter Benjamin PA-C, 100 mg at 08/30/24 1703    heparin (porcine) subcutaneous injection 5,000 Units, 5,000 Units, Subcutaneous, Q8H AUTUMN, Winter Benjamin PA-C, 5,000 Units at 08/30/24 2152    methocarbamol (ROBAXIN) tablet 500 mg, 500 mg, Oral, Q6H AUTUMN, Winter Benjamin PA-C, 500 mg at 08/31/24 0607    ondansetron (ZOFRAN) injection 4 mg, 4 mg, Intravenous, Q6H PRN, Winter Benjamin PA-C    oxyCODONE (ROXICODONE) immediate release tablet 10 mg, 10 mg, Oral, Q4H PRN, Winter Benjamin PA-C, 10 mg at 08/30/24 2152    oxyCODONE (ROXICODONE) IR tablet 5 mg, 5 mg, Oral, Q4H PRN, Winter Benjamin PA-C, 5 mg at 08/30/24 0154    polyethylene glycol  "(MIRALAX) packet 17 g, 17 g, Oral, Daily, Rehana Boris, CRNP    pravastatin (PRAVACHOL) tablet 80 mg, 80 mg, Oral, Daily With Dinner, Winter Benjamin PA-C, 80 mg at 08/30/24 1703    senna (SENOKOT) tablet 17.2 mg, 2 tablet, Oral, Daily, Rehana Escalante, CRNP    Blood Culture:   No results found for: \"BLOODCX\"    Wound Culture:   No results found for: \"WOUNDCULT\"    Ins and Outs:  I/O last 24 hours:  In: 1900 [I.V.:1900]  Out: 2815 [Urine:2150; Drains:615; Blood:50]          Physical:  Vitals:    08/31/24 0320   BP:    Pulse:    Resp:    Temp:    SpO2: 95%     Musculoskeletal: Bilateral lower extremity  Surgical dressing clean, dry, intact without signs of strikethrough or saturation  Drain  Suction appropriately  Mild tenderness to palpation surgical site  Sensation intact L3-S1  Motor intact, 4/5 hip flexors bilaterally, 5/5 ankle plantarflexion posterior flexion, atrial/visual  Bilateral lower extremities warm well-perfused with capillary fill less than 2 seconds  DP pulse palpable    Assessment:    69 y.o.female POD 2 L3-S1, L4-S1 decompression TLIF. Doing well overall.     Plan:  WBAT with 5 lb weight lifting limit to bilateral lower extremities  PT/OT  Pain control  DVT ppx: SQH  Will monitor for ABLA and administer IVF/prbc as indicated for Greater than 2 gram drop or Hgb < 7  Medical management per periop team  Dispo: Ortho will follow    Shadi Gomez MD      Please Secure Chat the [facility] Role with any Questions or Concerns.    "

## 2024-08-31 NOTE — PHYSICAL THERAPY NOTE
Physical Therapy Progress Note     08/31/24 1356   PT Last Visit   PT Visit Date 08/31/24   Note Type   Note Type Treatment   Pain Assessment   Pain Assessment Tool 0-10   Pain Score No Pain   Restrictions/Precautions   Braces or Orthoses LSO   Other Precautions Pain;Fall Risk;Chair Alarm   Subjective   Subjective Pt initialy encountered seated in recliner in AM.  Pt appeared very fatigued & in some distress with frequent burping & complaints of diffuse abdominal soreness.  She requested hold PT at that time.  Upon return to room after lunch, pt appeared well and reported feeling much better with less complaints of abdominal pain.  She states she does not like the hospital food and asked the nurse if she has been getting her meds to assist with eventual BM.  Otherwise, she offers no complaints regarding pain or changes in status with activity.   Transfers   Sit to Stand 5  Supervision   Additional items Assist x 1;Armrests;Increased time required   Stand to Sit 5  Supervision   Additional items Assist x 1;Armrests;Increased time required;Verbal cues   Toilet transfer 5  Supervision   Additional items Assist x 1;Armrests;Increased time required   Ambulation/Elevation   Gait pattern Short stride;Inconsistent diya;Shuffling;Decreased foot clearance;Narrow TRAMAINE;Improper Weight shift   Gait Assistance 5  Supervision   Additional items Assist x 1   Assistive Device Rolling walker   Distance 15', 30', 60' x 2   Stair Management Assistance 5  Supervision   Additional items Assist x 1   Stair Management Technique Two rails;Step to pattern;Foreward   Number of Stairs 5   Balance   Static Sitting Fair +   Static Standing Fair   Ambulatory Fair -   Activity Tolerance   Activity Tolerance Patient tolerated treatment well;Patient limited by fatigue;Patient limited by pain   Nurse Made Aware PAULA Foy   Assessment   Prognosis Good   Problem List Decreased strength;Decreased endurance;Impaired balance;Decreased mobility;Pain    Assessment pt demonstrated significant progress in all forms of mobilty today, perfomring transfers without assist when instructed to do so, then ambualted up to household distances with use of RW & no LOB.  She perfomred all tasks at RW on levels including standing to & from toilet & performing pericare & hand washing in bathroom without LOB.  She negotiated steps as noted above, & did so without need for assist.  Anticipate pt will continue to make progress in upcoming days as long as she maintains daily frequent mobility while in hospital setting to avoid risks associated with immobiilty in setting of acute surgery & pain medication.  PT POC & d/c recommendations remain appropriate at this time.   Goals   LTG Expiration Date 09/13/24   Plan   Treatment/Interventions Functional transfer training;LE strengthening/ROM;Elevations;Therapeutic exercise;Endurance training;Patient/family training;Bed mobility;Equipment eval/education;Gait training   Progress Progressing toward goals   PT Frequency 3-5x/wk   Discharge Recommendation   Rehab Resource Intensity Level, PT III (Minimum Resource Intensity)   Equipment Recommended Walker   Walker Package Recommended Wheeled walker   AM-PAC Basic Mobility Inpatient   Turning in Flat Bed Without Bedrails 3   Lying on Back to Sitting on Edge of Flat Bed Without Bedrails 3   Moving Bed to Chair 3   Standing Up From Chair Using Arms 3   Walk in Room 3   Climb 3-5 Stairs With Railing 3   Basic Mobility Inpatient Raw Score 18   Basic Mobility Standardized Score 41.05   [facility] Highest Level Of Mobility   -HLM Goal 6: Walk 10 steps or more   JH-HLM Achieved 7: Walk 25 feet or more       Anselmo Kingston PTA    An [facility] Basic Mobility Raw Score less than 17 suggests pt would benefit from post acute rehab.  Please also refer to the recommendation of the Physical Therapist for safe discharge planning.

## 2024-08-31 NOTE — PLAN OF CARE
Problem: PHYSICAL THERAPY ADULT  Goal: Performs mobility at highest level of function for planned discharge setting.  See evaluation for individualized goals.  Description: Treatment/Interventions: Functional transfer training, LE strengthening/ROM, Elevations, Therapeutic exercise, Endurance training, Patient/family training, Bed mobility, Equipment eval/education, Gait training, OT, Spoke to nursing  Equipment Recommended: (S) Walker, Other (Comment) (recommend RW and commode for d/c home; patient would like transport chair)       See flowsheet documentation for full assessment, interventions and recommendations.  Outcome: Progressing  Note: Prognosis: Good  Problem List: Decreased strength, Decreased endurance, Impaired balance, Decreased mobility, Pain  Assessment: pt demonstrated significant progress in all forms of mobilty today, perfomring transfers without assist when instructed to do so, then ambualted up to household distances with use of RW & no LOB.  She perfomred all tasks at RW on levels including standing to & from toilet & performing pericare & hand washing in bathroom without LOB.  She negotiated steps as noted above, & did so without need for assist.  Anticipate pt will continue to make progress in upcoming days as long as she maintains daily frequent mobility while in hospital setting to avoid risks associated with immobiilty in setting of acute surgery & pain medication.  PT POC & d/c recommendations remain appropriate at this time.        Rehab Resource Intensity Level, PT: III (Minimum Resource Intensity)    See flowsheet documentation for full assessment.

## 2024-08-31 NOTE — PLAN OF CARE
Problem: PAIN - ADULT  Goal: Verbalizes/displays adequate comfort level or baseline comfort level  Description: Interventions:  - Encourage patient to monitor pain and request assistance  - Assess pain using appropriate pain scale  - Administer analgesics based on type and severity of pain and evaluate response  - Implement non-pharmacological measures as appropriate and evaluate response  - Consider cultural and social influences on pain and pain management  - Notify physician/advanced practitioner if interventions unsuccessful or patient reports new pain  Outcome: Progressing     Problem: INFECTION - ADULT  Goal: Absence or prevention of progression during hospitalization  Description: INTERVENTIONS:  - Assess and monitor for signs and symptoms of infection  - Monitor lab/diagnostic results  - Monitor all insertion sites, i.e. indwelling lines, tubes, and drains  - Monitor endotracheal if appropriate and nasal secretions for changes in amount and color  - [place] appropriate cooling/warming therapies per order  - Administer medications as ordered  - Instruct and encourage patient and family to use good hand hygiene technique  - Identify and instruct in appropriate isolation precautions for identified infection/condition  Outcome: Progressing     Problem: SAFETY ADULT  Goal: Patient will remain free of falls  Description: INTERVENTIONS:  - Educate patient/family on patient safety including physical limitations  - Instruct patient to call for assistance with activity   - Consult OT/PT to assist with strengthening/mobility   - Keep Call bell within reach  - Keep bed low and locked with side rails adjusted as appropriate  - Keep care items and personal belongings within reach  - Initiate and maintain comfort rounds  - Make Fall Risk Sign visible to staff  - Offer Toileting every 2 Hours, in advance of need  - Initiate/Maintain bed/chair alarm  - Obtain necessary fall risk management equipment: nonskid footwear  -  Apply yellow socks and bracelet for high fall risk patients  - Consider moving patient to room near nurses station  Outcome: Progressing  Goal: Maintain or return to baseline ADL function  Description: INTERVENTIONS:  -  Assess patient's ability to carry out ADLs; assess patient's baseline for ADL function and identify physical deficits which impact ability to perform ADLs (bathing, care of mouth/teeth, toileting, grooming, dressing, etc.)  - Assess/evaluate cause of self-care deficits   - Assess range of motion  - Assess patient's mobility; develop plan if impaired  - Assess patient's need for assistive devices and provide as appropriate  - Encourage maximum independence but intervene and supervise when necessary  - Involve family in performance of ADLs  - Assess for home care needs following discharge   - Consider OT consult to assist with ADL evaluation and planning for discharge  - Provide patient education as appropriate  Outcome: Progressing  Goal: Maintains/Returns to pre admission functional level  Description: INTERVENTIONS:  - Perform AM-PAC 6 Click Basic Mobility/ Daily Activity assessment daily.  - Set and communicate daily mobility goal to care team and patient/family/caregiver.   - Collaborate with rehabilitation services on mobility goals if consulted  - Perform Range of Motion 3 times a day.  - Reposition patient every 2 hours.  - Dangle patient 3 times a day  - Stand patient 3 times a day  - Ambulate patient 3 times a day  - Out of bed to chair 3 times a day   - Out of bed for meals 3 times a day  - Out of bed for toileting  - Record patient progress and toleration of activity level   Outcome: Progressing     Problem: DISCHARGE PLANNING  Goal: Discharge to home or other facility with appropriate resources  Description: INTERVENTIONS:  - Identify barriers to discharge w/patient and caregiver  - Arrange for needed discharge resources and transportation as appropriate  - Identify discharge learning  needs (meds, wound care, etc.)  - Arrange for interpretive services to assist at discharge as needed  - Refer to Case Management Department for coordinating discharge planning if the patient needs post-hospital services based on physician/advanced practitioner order or complex needs related to functional status, cognitive ability, or social support system  Outcome: Progressing     Problem: Knowledge Deficit  Goal: Patient/family/caregiver demonstrates understanding of disease process, treatment plan, medications, and discharge instructions  Description: Complete learning assessment and assess knowledge base.  Interventions:  - Provide teaching at level of understanding  - Provide teaching via preferred learning methods  Outcome: Progressing

## 2024-09-01 LAB
ANION GAP SERPL CALCULATED.3IONS-SCNC: 6 MMOL/L (ref 4–13)
BASOPHILS # BLD AUTO: 0.04 THOUSANDS/ÂΜL (ref 0–0.1)
BASOPHILS NFR BLD AUTO: 0 % (ref 0–1)
BUN SERPL-MCNC: 11 MG/DL (ref 5–25)
CALCIUM SERPL-MCNC: 8.6 MG/DL (ref 8.4–10.2)
CHLORIDE SERPL-SCNC: 104 MMOL/L (ref 96–108)
CO2 SERPL-SCNC: 29 MMOL/L (ref 21–32)
CREAT SERPL-MCNC: 0.51 MG/DL (ref 0.6–1.3)
EOSINOPHIL # BLD AUTO: 0.2 THOUSAND/ÂΜL (ref 0–0.61)
EOSINOPHIL NFR BLD AUTO: 2 % (ref 0–6)
ERYTHROCYTE [DISTWIDTH] IN BLOOD BY AUTOMATED COUNT: 15.7 % (ref 11.6–15.1)
GFR SERPL CREATININE-BSD FRML MDRD: 99 ML/MIN/1.73SQ M
GLUCOSE SERPL-MCNC: 107 MG/DL (ref 65–140)
HCT VFR BLD AUTO: 32.5 % (ref 34.8–46.1)
HGB BLD-MCNC: 10.1 G/DL (ref 11.5–15.4)
IMM GRANULOCYTES # BLD AUTO: 0.05 THOUSAND/UL (ref 0–0.2)
IMM GRANULOCYTES NFR BLD AUTO: 0 % (ref 0–2)
LYMPHOCYTES # BLD AUTO: 2.73 THOUSANDS/ÂΜL (ref 0.6–4.47)
LYMPHOCYTES NFR BLD AUTO: 23 % (ref 14–44)
MCH RBC QN AUTO: 28.5 PG (ref 26.8–34.3)
MCHC RBC AUTO-ENTMCNC: 31.1 G/DL (ref 31.4–37.4)
MCV RBC AUTO: 92 FL (ref 82–98)
MONOCYTES # BLD AUTO: 1.12 THOUSAND/ÂΜL (ref 0.17–1.22)
MONOCYTES NFR BLD AUTO: 10 % (ref 4–12)
NEUTROPHILS # BLD AUTO: 7.65 THOUSANDS/ÂΜL (ref 1.85–7.62)
NEUTS SEG NFR BLD AUTO: 65 % (ref 43–75)
NRBC BLD AUTO-RTO: 0 /100 WBCS
PLATELET # BLD AUTO: 186 THOUSANDS/UL (ref 149–390)
PMV BLD AUTO: 11.4 FL (ref 8.9–12.7)
POTASSIUM SERPL-SCNC: 4.2 MMOL/L (ref 3.5–5.3)
RBC # BLD AUTO: 3.55 MILLION/UL (ref 3.81–5.12)
SODIUM SERPL-SCNC: 139 MMOL/L (ref 135–147)
WBC # BLD AUTO: 11.79 THOUSAND/UL (ref 4.31–10.16)

## 2024-09-01 PROCEDURE — 80048 BASIC METABOLIC PNL TOTAL CA: CPT

## 2024-09-01 PROCEDURE — 85025 COMPLETE CBC W/AUTO DIFF WBC: CPT

## 2024-09-01 PROCEDURE — NC001 PR NO CHARGE: Performed by: ORTHOPAEDIC SURGERY

## 2024-09-01 RX ADMIN — HEPARIN SODIUM 5000 UNITS: 5000 INJECTION INTRAVENOUS; SUBCUTANEOUS at 14:01

## 2024-09-01 RX ADMIN — ATENOLOL 50 MG: 50 TABLET ORAL at 08:29

## 2024-09-01 RX ADMIN — ACETAMINOPHEN 650 MG: 325 TABLET ORAL at 05:38

## 2024-09-01 RX ADMIN — POLYETHYLENE GLYCOL 3350 17 G: 17 POWDER, FOR SOLUTION ORAL at 08:29

## 2024-09-01 RX ADMIN — DOCUSATE SODIUM 100 MG: 100 CAPSULE, LIQUID FILLED ORAL at 17:00

## 2024-09-01 RX ADMIN — DOCUSATE SODIUM 100 MG: 100 CAPSULE, LIQUID FILLED ORAL at 08:29

## 2024-09-01 RX ADMIN — HEPARIN SODIUM 5000 UNITS: 5000 INJECTION INTRAVENOUS; SUBCUTANEOUS at 05:41

## 2024-09-01 RX ADMIN — ACETAMINOPHEN 650 MG: 325 TABLET ORAL at 11:23

## 2024-09-01 RX ADMIN — ACETAMINOPHEN 650 MG: 325 TABLET ORAL at 17:01

## 2024-09-01 RX ADMIN — OXYCODONE HYDROCHLORIDE 5 MG: 5 TABLET ORAL at 21:55

## 2024-09-01 RX ADMIN — METHOCARBAMOL 500 MG: 500 TABLET ORAL at 11:23

## 2024-09-01 RX ADMIN — METHOCARBAMOL 500 MG: 500 TABLET ORAL at 21:58

## 2024-09-01 RX ADMIN — HEPARIN SODIUM 5000 UNITS: 5000 INJECTION INTRAVENOUS; SUBCUTANEOUS at 22:03

## 2024-09-01 RX ADMIN — ACETAMINOPHEN 650 MG: 325 TABLET ORAL at 21:56

## 2024-09-01 RX ADMIN — SENNOSIDES 17.2 MG: 8.6 TABLET, FILM COATED ORAL at 08:29

## 2024-09-01 RX ADMIN — PRAVASTATIN SODIUM 80 MG: 80 TABLET ORAL at 16:59

## 2024-09-01 RX ADMIN — METHOCARBAMOL 500 MG: 500 TABLET ORAL at 05:42

## 2024-09-01 RX ADMIN — METHOCARBAMOL 500 MG: 500 TABLET ORAL at 17:01

## 2024-09-01 NOTE — PLAN OF CARE
Problem: INFECTION - ADULT  Goal: Absence or prevention of progression during hospitalization  Description: INTERVENTIONS:  - Assess and monitor for signs and symptoms of infection  - Monitor lab/diagnostic results  - Monitor all insertion sites, i.e. indwelling lines, tubes, and drains  - Monitor endotracheal if appropriate and nasal secretions for changes in amount and color  - [place] appropriate cooling/warming therapies per order  - Administer medications as ordered  - Instruct and encourage patient and family to use good hand hygiene technique  - Identify and instruct in appropriate isolation precautions for identified infection/condition  9/1/2024 0155 by Jovan Snyder RN  Outcome: Progressing  9/1/2024 0154 by Jovan Snyder RN  Outcome: Progressing     Problem: SAFETY ADULT  Goal: Patient will remain free of falls  Description: INTERVENTIONS:  - Educate patient/family on patient safety including physical limitations  - Instruct patient to call for assistance with activity   - Consult OT/PT to assist with strengthening/mobility   - Keep Call bell within reach  - Keep bed low and locked with side rails adjusted as appropriate  - Keep care items and personal belongings within reach  - Initiate and maintain comfort rounds  - Make Fall Risk Sign visible to staff  - Offer Toileting every 2 Hours, in advance of need  - Initiate/Maintain bed/chair alarm  - Obtain necessary fall risk management equipment: nonskid footwear  - Apply yellow socks and bracelet for high fall risk patients  - Consider moving patient to room near nurses station  9/1/2024 0155 by Jovan Snyder RN  Outcome: Progressing  9/1/2024 0154 by Jovan Snyder RN  Outcome: Progressing  Goal: Maintain or return to baseline ADL function  Description: INTERVENTIONS:  -  Assess patient's ability to carry out ADLs; assess patient's baseline for ADL function and identify physical deficits which impact ability to perform ADLs (bathing, care of mouth/teeth,  ----- Message from Gonzalo Pandey MD sent at 4/28/2022  4:20 PM CDT -----  He can start OTC miralax once daily with 8 ounces of water.     Dr. Pandey  ----- Message -----  From: Emerald Mijares MA  Sent: 4/28/2022   4:16 PM CDT  To: Gonzalo Pandey MD    I see patient was just seen last month on 03/30/2022. Patient has not been able to make a bowel movement without taking magnesium or Pepto bismol according to his wife. She states that after taking the medication he says he feels better, but without the medication he cannot make any bowel movements.   ----- Message -----  From: Kayce Galindo  Sent: 4/28/2022   4:03 PM CDT  To: Katherin Sánchez Staff    Patient states that he is have stomach issues, and would like someone to call him.         toileting, grooming, dressing, etc.)  - Assess/evaluate cause of self-care deficits   - Assess range of motion  - Assess patient's mobility; develop plan if impaired  - Assess patient's need for assistive devices and provide as appropriate  - Encourage maximum independence but intervene and supervise when necessary  - Involve family in performance of ADLs  - Assess for home care needs following discharge   - Consider OT consult to assist with ADL evaluation and planning for discharge  - Provide patient education as appropriate  9/1/2024 0155 by Jovan Snyder RN  Outcome: Progressing  9/1/2024 0154 by Jovan Snyder RN  Outcome: Progressing  Goal: Maintains/Returns to pre admission functional level  Description: INTERVENTIONS:  - Perform AM-PAC 6 Click Basic Mobility/ Daily Activity assessment daily.  - Set and communicate daily mobility goal to care team and patient/family/caregiver.   - Collaborate with rehabilitation services on mobility goals if consulted  - Perform Range of Motion 3 times a day.  - Reposition patient every 2 hours.  - Dangle patient 3 times a day  - Stand patient 3 times a day  - Ambulate patient 3 times a day  - Out of bed to chair 3 times a day   - Out of bed for meals 3 times a day  - Out of bed for toileting  - Record patient progress and toleration of activity level   9/1/2024 0155 by Jovan Snyder RN  Outcome: Progressing  9/1/2024 0154 by Jovan Snyder RN  Outcome: Progressing     Problem: DISCHARGE PLANNING  Goal: Discharge to home or other facility with appropriate resources  Description: INTERVENTIONS:  - Identify barriers to discharge w/patient and caregiver  - Arrange for needed discharge resources and transportation as appropriate  - Identify discharge learning needs (meds, wound care, etc.)  - Arrange for interpretive services to assist at discharge as needed  - Refer to Case Management Department for coordinating discharge planning if the patient needs post-hospital services based on  physician/advanced practitioner order or complex needs related to functional status, cognitive ability, or social support system  9/1/2024 0155 by Jovan Snyder RN  Outcome: Progressing  9/1/2024 0154 by Jovan Snyder RN  Outcome: Progressing     Problem: Knowledge Deficit  Goal: Patient/family/caregiver demonstrates understanding of disease process, treatment plan, medications, and discharge instructions  Description: Complete learning assessment and assess knowledge base.  Interventions:  - Provide teaching at level of understanding  - Provide teaching via preferred learning methods  9/1/2024 0155 by Jovan Snyder RN  Outcome: Progressing  9/1/2024 0154 by Jovan Snyder RN  Outcome: Progressing

## 2024-09-01 NOTE — PROGRESS NOTES
Patient:    MRN:  [id number]    Edmar Request ID:  [id number]    Level of care reserved:  Home Health Agency    Partner Reserved:  [facility]04 [phone]    Clinical needs requested:    Geography searched:  [phone]    Start of Service:    Request sent:  11:24am EDT on 8/30/2024 by Janine Salcido    Partner reserved:  11:05am EDT on 9/1/2024 by Chacha Fong    Choice list shared:  8:21am EDT on 9/1/2024 by Chacha Fong

## 2024-09-01 NOTE — UTILIZATION REVIEW
Initial Clinical Review    OP SURGERY 8/29 UPGRADED TO INPATIENT ADMISSION 8/31 @ 1507 FOR CONTINUED TX POST-OP WITH SAFE D/C PLAN      08/31/24 1507  INPATIENT ADMISSION  Once         08/31/24 1507   08/29/24 2134  Outpatient No Charge Bed  (Outpatient No Charge Bed/Extended Recovery)  Once         08/29/24 2133     08/31/24 1507  INPATIENT ADMISSION  Once        Transfer Service: Orthopedic Surgery   Question Answer Comment   Level of Care Med Surg    Estimated length of stay More than 2 Midnights    Certification I certify that inpatient services are medically necessary for this patient for a duration of greater than two midnights. See H&P and MD Progress Notes for additional information about the patient's course of treatment.        08/31/24 1507       Elective OP surgical procedure  Age/Sex: 69 y.o. female  Surgery Date: 8/29/2024  Procedure:   Arthrodesis, L5-S1 TLIF: 47736  Arthrodesis, L4-5 TLIF: 37566   Arthrodesis, L3-4 PL: 89255   Interbody cage, L5-S1: 07331  Interbody cage, L4-5: 16958   Posterior segmental instrumentation/pedicle screw fixation, L3-S1: 38111  Laminectomy/facetectomy, L5-S1: 40103   Laminectomy/facetectomy, L4-5: 86590   Laminectomy/facetectomy, L3-4: 78453  3D computer-assisted navigation: 40723   Anesthesia: General  Operative Findings: Severe facet hypertrophy, instability, spinal stenosis        POD#1 8/30 Progress Note: No acute events, no new complaints. Denies N/V. Pain well controlled. Pt primarily concerned about getting comfortable in bed. Neuro exam stable, pt sitting upright in chair with LSO brace in place, drain intact, dressing intact. Continue postoperative plan of care - WBAT with 5 lb wt lifting limit to b/l LE. SQ hep. Pain control, PT, ambulate as tolerated, LSO brace when OOB and ambulating, monitor drain output. Intern med consulted for BP management.   Intern med note - continue atenolol with parameters. Continue to hold losartan to avoid DAVINA, resume in 24 hrs if  BMP stable and BP warrants. Cont hydralazine prn for SBP >160     POD#2 8/31 Progress Note: Upgraded to Inpatient  Pain controlled. Surgical dressing c/d/I without signs of strikethrough or saturation. Drain, suction appropriately. Mild tenderness to palpation surgical site. Sensation intact L3-S1, Motor intact, 4/5 hip flexors b/l, 5/5 ankle plantarflexion posterior flexion, atrial/visual. B/L LE. Continue WBAT with 5 lb wt lifting limit to b/l LE. PT/OT evals with rec for home with HHC/PT. Continue SQH and SCDs for DVT ppx. Monitor drain output. Reg diet.     POD#3 9/1 Progress Note  Pain controlled. VSS. Surgical dressing d/c/I. Drain with suction appropriately putting out ss output. Mild tenderness to palpation of the surgical site. Continue pain control, WBAT, reg diet, po meds. CM working on finding accepting [facility] agency for safe d/c. Continue supportive care.      Admission Orders: Date/Time/Statement:   Admission Orders (From admission, onward)       Ordered        08/31/24 1507  INPATIENT ADMISSION  Once                          Orders Placed This Encounter   Procedures    INPATIENT ADMISSION     Standing Status:   Standing     Number of Occurrences:   1     Order Specific Question:   Level of Care     Answer:   Med Surg [16]     Order Specific Question:   Estimated length of stay     Answer:   More than 2 Midnights     Order Specific Question:   Certification     Answer:   I certify that inpatient services are medically necessary for this patient for a duration of greater than two midnights. See H&P and MD Progress Notes for additional information about the patient's course of treatment.       Vital Signs (last 3 days)       Date/Time Temp Pulse Resp BP MAP (mmHg) SpO2 Calculated FIO2 (%) - Nasal Cannula O2 Flow Rate (L/min) Nasal Cannula O2 Flow Rate (L/min) O2 Device Cardiac (WDL) [place] Coma Scale Score Pain    09/01/24 0800 -- -- -- -- -- -- -- -- -- None (Room air) -- 14 --    09/01/24 07:09:49 98.5  °F (36.9 °C) 70 19 130/66 87 98 % -- -- -- -- -- -- --    09/01/24 05:41:27 97.7 °F (36.5 °C) 71 -- 128/66 87 98 % -- -- -- -- -- -- --    08/31/24 2310 -- -- -- -- -- -- -- -- -- -- -- -- Med Not Given for Pain - for MAR use only    08/31/24 22:41:30 98.5 °F (36.9 °C) 79 -- 126/77 93 95 % -- -- -- -- -- -- --    08/31/24 2213 -- -- -- -- -- -- -- -- -- -- -- -- 8 08/31/24 2204 -- -- -- -- -- -- -- -- -- -- -- 14 8 08/31/24 16:20:05 98.7 °F (37.1 °C) 83 17 114/68 83 95 % -- -- -- -- -- -- --    08/31/24 1600 -- -- -- -- -- -- -- -- -- -- -- 14 --    08/31/24 1356 -- -- -- -- -- -- -- -- -- -- -- -- No Pain    08/31/24 1200 -- -- -- -- -- -- -- -- -- -- -- 14 --    08/31/24 0832 -- -- -- -- -- -- -- -- -- -- -- -- 6    08/31/24 0728 -- -- -- -- -- -- -- -- -- None (Room air) -- 14 No Pain    08/31/24 07:10:14 99.1 °F (37.3 °C) 69 19 117/69 85 94 % -- -- -- -- -- -- --    08/31/24 0320 -- -- -- -- -- 95 % -- -- -- None (Room air) -- -- --    08/31/24 02:30:03 97.3 °F (36.3 °C) 69 19 121/71 88 94 % -- -- -- None (Room air) -- -- --    08/31/24 0000 -- -- -- -- -- 97 % -- -- -- None (Room air) -- -- No Pain    08/30/24 2223 -- -- -- -- -- -- -- -- -- None (Room air) -- 15 3    08/30/24 2152 -- -- -- -- -- -- -- -- -- -- -- -- 8    08/30/24 21:50:01 98.9 °F (37.2 °C) 80 21 125/71 89 99 % -- -- -- -- -- -- --    08/30/24 16:06:46 -- 71 -- 126/71 89 91 % -- -- -- -- -- -- --    08/30/24 1600 -- -- -- -- -- -- -- -- -- -- -- 14 --    08/30/24 1230 -- -- -- -- -- -- -- -- -- -- -- 14 --    08/30/24 12:21:09 98.3 °F (36.8 °C) 65 17 126/71 89 92 % -- -- -- -- -- -- --    08/30/24 1113 -- -- -- -- -- -- -- -- -- -- -- -- 10 - Worst Possible Pain    08/30/24 0850 -- -- -- -- -- -- -- -- -- -- -- -- 8    08/30/24 0845 -- -- -- -- -- -- -- -- -- -- -- -- 8    08/30/24 0801 -- -- -- -- -- -- -- -- -- None (Room air) -- 14 --    08/30/24 08:00:06 97.5 °F (36.4 °C) 68 -- 125/76 92 96 % -- -- -- -- -- -- --    08/30/24 0631 --  -- -- -- -- -- -- -- -- -- -- -- 8 08/30/24 06:00:46 98.8 °F (37.1 °C) 76 -- 124/73 90 94 % -- -- -- -- -- -- --    08/30/24 02:49:03 98.2 °F (36.8 °C) 68 20 139/88 105 100 % -- -- -- -- -- -- --    08/30/24 02:05:45 97.5 °F (36.4 °C) 70 -- -- -- 91 % -- -- -- -- -- -- --    08/30/24 0154 -- -- -- -- -- -- -- -- -- -- -- -- 6 08/30/24 01:45:34 -- 65 -- 142/85 104 100 % -- -- -- -- -- -- --    08/30/24 0007 -- -- -- -- -- -- 28 -- 2 L/min Nasal cannula -- 14 3    08/29/24 23:59:15 97.8 °F (36.6 °C) 70 18 139/80 100 99 % -- -- -- -- -- -- --    08/29/24 2320 -- -- -- -- -- -- -- -- -- -- -- -- 5    08/29/24 2315 -- 67 15 161/78 116 99 % 36 -- 4 L/min Nasal cannula -- 15 --    08/29/24 2300 -- 62 13 139/64 92 99 % 36 -- 4 L/min Nasal cannula -- -- --    08/29/24 2245 -- 64 16 130/69 94 98 % 36 -- 4 L/min Nasal cannula -- 11 --    08/29/24 2230 -- 67 20 140/70 99 100 % 36 -- 4 L/min Nasal cannula -- -- --    08/29/24 2215 -- 64 13 124/60 83 100 % -- -- -- -- -- 6 --    08/29/24 2200 -- 62 14 116/59 82 100 % -- -- -- -- -- 6 --    08/29/24 2147 98.9 °F (37.2 °C) 63 18 115/58 83 100 % -- 6 L/min -- Simple mask WDL 6 --    08/29/24 0942 97.4 °F (36.3 °C) 68 -- 182/90 -- 98 % -- -- -- None (Room air) -- -- --          Weight (last 2 days)       None            Pertinent Labs/Diagnostic Test Results:   Results from last 7 days   Lab Units 09/01/24 0440 08/31/24 0519 08/30/24  0508 08/29/24  1706   WBC Thousand/uL 11.79* 12.05* 13.71*  --    HEMOGLOBIN g/dL 10.1* 10.7* 11.8  --    I STAT HEMOGLOBIN g/dl  --   --   --  11.9   HEMATOCRIT % 32.5* 33.6* 37.5  --    HEMATOCRIT, ISTAT %  --   --   --  35   PLATELETS Thousands/uL 186 162 208  --    TOTAL NEUT ABS Thousands/µL 7.65* 7.87* 12.04*  --      Results from last 7 days   Lab Units 09/01/24 0440 08/31/24  0519 08/30/24  0508 08/29/24  1706   SODIUM mmol/L 139 138 139  --    POTASSIUM mmol/L 4.2 3.6 4.2  --    CHLORIDE mmol/L 104 105 105  --    CO2 mmol/L 29 26 26   --    CO2, I-STAT mmol/L  --   --   --  25   ANION GAP mmol/L 6 7 8  --    BUN mg/dL 11 23 11  --    CREATININE mg/dL 0.51* 0.70 0.51*  --    EGFR ml/min/1.73sq m 99 89 99  --    CALCIUM mg/dL 8.6 8.0* 8.4  --    CALCIUM, IONIZED, ISTAT mmol/L  --   --   --  1.16     Results from last 7 days   Lab Units 08/29/24  2152   POC GLUCOSE mg/dl 135     Results from last 7 days   Lab Units 09/01/24  0440 08/31/24  0519 08/30/24  0508   GLUCOSE RANDOM mg/dL 107 115 139     Results from last 7 days   Lab Units 08/29/24  1706   I STAT BASE EXC mmol/L -2   I STAT O2 SAT % 97*   ISTAT PH ART  7.364   I STAT ART PCO2 mm HG 40.8   I STAT ART PO2 mm HG 98.0   I STAT ART HCO3 mmol/L 23.3       Scheduled Medications:  acetaminophen, 650 mg, Oral, Q6H AUTUMN  atenolol, 50 mg, Oral, Daily  docusate sodium, 100 mg, Oral, BID  heparin (porcine), 5,000 Units, Subcutaneous, Q8H AUTUMN  methocarbamol, 500 mg, Oral, Q6H AUTUMN  polyethylene glycol, 17 g, Oral, Daily  pravastatin, 80 mg, Oral, Daily With Dinner  senna, 2 tablet, Oral, Daily    PRN Meds:  aluminum-magnesium hydroxide-simethicone, 30 mL, Oral, Q6H PRN  calcium carbonate, 1,000 mg, Oral, Daily PRN  ondansetron, 4 mg, Intravenous, Q6H PRN  oxyCODONE, 10 mg, Oral, Q4H PRN 8/30 x3, 8/31 x2  oxyCODONE, 5 mg, Oral, Q4H PRN 8/30 x1        Network Utilization Review Department  ATTENTION: Please call with any questions or concerns to [phone] and carefully listen to the prompts so that you are directed to the right person. All voicemails are confidential.   For Discharge needs, contact Care Management DC Support Team at [phone] opt. 2  Send all requests for admission clinical reviews, approved or denied determinations and any other requests to dedicated fax number below belonging to the campus where the patient is receiving treatment. List of dedicated fax numbers for the Facilities:  FACILITY NAME UR FAX NUMBER   ADMISSION DENIALS (Administrative/Medical Necessity) [phone]    DISCHARGE SUPPORT TEAM (NETWORK) [phone]   PARENT CHILD HEALTH (Maternity/NICU/Pediatrics) [phone]   [facility] [phone]   [facility] [phone]   [facility] [phone]   [facility] [phone]   [facility] [phone]   [facility] [phone]   [facility] [phone]   [facility] [phone]   [facility] [phone]   [facility] [phone]   [facility] [phone]   [facility] [phone]

## 2024-09-01 NOTE — CASE MANAGEMENT
Case Management Discharge Planning Note    [facility]  [facility] /[facility] MRN [id number]  : 1955 Date 2024       Current Admission Date: 2024  Current Admission Diagnosis:Lumbar radiculopathy   Patient Active Problem List    Diagnosis Date Noted Date Diagnosed    S/P lumbar fusion 2024     Preop examination 2024     Pure hypercholesterolemia 2024     Lumbar spondylolysis 2024     Spondylolisthesis at L4-L5 level 2024     Right lower quadrant abdominal pain 2024     Rib pain 2024     Fall 03/15/2024     Cervicalgia 2024     Trigger finger of left hand 2024     Lumbar spondylosis 2023     Diverticulosis 2023     [place]amm[place]oe of right foot      Lumbar radiculopathy      Hypertension      Varicose veins of both lower extremities with pain 2020       LOS (days): 1  Geometric Mean LOS (GMLOS) (days):   Days to GMLOS:     OBJECTIVE:  Risk of Unplanned Readmission Score: 11.49         Current admission status: Inpatient   Preferred Pharmacy:   [id number] - [place], [place] - [address]  [address] [place] [id number]  Phone: [phone] Fax: [phone]    Primary Care Provider: BUSTER Gunn    Primary Insurance: [facility] BLUE [facility]  Secondary Insurance:     DISCHARGE DETAILS:    Discharge planning discussed with:: Patient  Freedom of Choice: Yes  Comments - Freedom of Choice: Discussed FOC  CM contacted family/caregiver?: Yes (Attempted via phone - [place]ian speaking ONLY)  Were Treatment Team discharge recommendations reviewed with patient/caregiver?: Yes  Did patient/caregiver verbalize understanding of patient care needs?: Yes  Were patient/caregiver advised of the risks associated with not following Treatment Team discharge recommendations?: Yes    Contacts  Patient Contacts: Fiona  Relationship to Patient:: Family  Contact Method: Phone  Phone Number:  [phone]  Reason/Outcome: Discharge Planning    Requested Home Health Care         Is the patient interested in HHC at discharge?: Yes  Home Health Discipline requested:: Occupational Therapy, Physical Therapy  [facility] Health Agency Name:: [facility] External Referral Reason (only applicable if external HHA name selected): Services not provided in network or near patient location  Home Health Follow-Up Provider:: PCP  Home Health Services Needed:: Evaluate Functional Status and Safety, Gait/ADL Training, Strengthening/Theraputic Exercises to Improve Function  Homebound Criteria Met:: Uses an Assist Device (i.e. cane, walker, etc), Requires the Assistance of Another Person for Safe Ambulation or to Leave the Home  Supporting Clincal Findings:: Limited Endurance, Fatigues Easliy in Short Distances         Other Referral/Resources/Interventions Provided:  Referral Comments: Met w/pt at bedside to discuss DCP & review HH choices via  # [id number] Danny.  Confirmed home address, phone #, PCP and emergency contact information.  Notified pt [facility] pt at this time. No preference for agency.  [facility] able to accept pt - reserved in [place].  Discussed transportation when stable for discharge - stated family will transport home.  Asked pt if ok to contact her dtr to update of same - agreeable.  CM called pt's dtr Fiona - Fiona does not speak English - asked if she was coming in to visit pt - stated later today.  Will attempt to see dtr if she comes to visit pt.  Confirmed pt received RW & BSC - in pt room.

## 2024-09-01 NOTE — PLAN OF CARE
Problem: PAIN - ADULT  Goal: Verbalizes/displays adequate comfort level or baseline comfort level  Description: Interventions:  - Encourage patient to monitor pain and request assistance  - Assess pain using appropriate pain scale  - Administer analgesics based on type and severity of pain and evaluate response  - Implement non-pharmacological measures as appropriate and evaluate response  - Consider cultural and social influences on pain and pain management  - Notify physician/advanced practitioner if interventions unsuccessful or patient reports new pain  Outcome: Progressing     Problem: INFECTION - ADULT  Goal: Absence or prevention of progression during hospitalization  Description: INTERVENTIONS:  - Assess and monitor for signs and symptoms of infection  - Monitor lab/diagnostic results  - Monitor all insertion sites, i.e. indwelling lines, tubes, and drains  - Monitor endotracheal if appropriate and nasal secretions for changes in amount and color  - [place] appropriate cooling/warming therapies per order  - Administer medications as ordered  - Instruct and encourage patient and family to use good hand hygiene technique  - Identify and instruct in appropriate isolation precautions for identified infection/condition  Outcome: Progressing     Problem: SAFETY ADULT  Goal: Patient will remain free of falls  Description: INTERVENTIONS:  - Educate patient/family on patient safety including physical limitations  - Instruct patient to call for assistance with activity   - Consult OT/PT to assist with strengthening/mobility   - Keep Call bell within reach  - Keep bed low and locked with side rails adjusted as appropriate  - Keep care items and personal belongings within reach  - Initiate and maintain comfort rounds  - Make Fall Risk Sign visible to staff  - Offer Toileting every 2 Hours, in advance of need  - Initiate/Maintain bed/chair alarm  - Obtain necessary fall risk management equipment: nonskid footwear  -  Apply yellow socks and bracelet for high fall risk patients  - Consider moving patient to room near nurses station  Outcome: Progressing  Goal: Maintain or return to baseline ADL function  Description: INTERVENTIONS:  -  Assess patient's ability to carry out ADLs; assess patient's baseline for ADL function and identify physical deficits which impact ability to perform ADLs (bathing, care of mouth/teeth, toileting, grooming, dressing, etc.)  - Assess/evaluate cause of self-care deficits   - Assess range of motion  - Assess patient's mobility; develop plan if impaired  - Assess patient's need for assistive devices and provide as appropriate  - Encourage maximum independence but intervene and supervise when necessary  - Involve family in performance of ADLs  - Assess for home care needs following discharge   - Consider OT consult to assist with ADL evaluation and planning for discharge  - Provide patient education as appropriate  Outcome: Progressing  Goal: Maintains/Returns to pre admission functional level  Description: INTERVENTIONS:  - Perform AM-PAC 6 Click Basic Mobility/ Daily Activity assessment daily.  - Set and communicate daily mobility goal to care team and patient/family/caregiver.   - Collaborate with rehabilitation services on mobility goals if consulted  - Perform Range of Motion 3 times a day.  - Reposition patient every 2 hours.  - Dangle patient 3 times a day  - Stand patient 3 times a day  - Ambulate patient 3 times a day  - Out of bed to chair 3 times a day   - Out of bed for meals 3 times a day  - Out of bed for toileting  - Record patient progress and toleration of activity level   Outcome: Progressing     Problem: DISCHARGE PLANNING  Goal: Discharge to home or other facility with appropriate resources  Description: INTERVENTIONS:  - Identify barriers to discharge w/patient and caregiver  - Arrange for needed discharge resources and transportation as appropriate  - Identify discharge learning  needs (meds, wound care, etc.)  - Arrange for interpretive services to assist at discharge as needed  - Refer to Case Management Department for coordinating discharge planning if the patient needs post-hospital services based on physician/advanced practitioner order or complex needs related to functional status, cognitive ability, or social support system  Outcome: Progressing     Problem: Knowledge Deficit  Goal: Patient/family/caregiver demonstrates understanding of disease process, treatment plan, medications, and discharge instructions  Description: Complete learning assessment and assess knowledge base.  Interventions:  - Provide teaching at level of understanding  - Provide teaching via preferred learning methods  Outcome: Progressing

## 2024-09-01 NOTE — PROGRESS NOTES
Progress Note - Orthopedics   Mylene Wolff 69 y.o. female MRN: [id number]  Unit/Bed#: -01      Subjective:    69 y.o.female POD 3 L3-S1 decompression, L4-S1 TLIF. No acute events, no new complaints. Denies fevers, chills, CP, SOB, N/V. Pain well controlled.     Labs:  0   Lab Value Date/Time    HCT 32.5 (L) 09/01/2024 0440    HCT 33.6 (L) 08/31/2024 0519    HCT 37.5 08/30/2024 0508    HGB 10.1 (L) 09/01/2024 0440    HGB 10.7 (L) 08/31/2024 0519    HGB 11.8 08/30/2024 0508    INR 0.95 08/20/2024 0849    WBC 11.79 (H) 09/01/2024 0440    WBC 12.05 (H) 08/31/2024 0519    WBC 13.71 (H) 08/30/2024 0508       Meds:    Current Facility-Administered Medications:     acetaminophen (TYLENOL) tablet 650 mg, 650 mg, Oral, Q6H AUTUMN, Winter Benjamin PA-C, 650 mg at 09/01/24 0538    aluminum-magnesium hydroxide-simethicone (MAALOX) oral suspension 30 mL, 30 mL, Oral, Q6H PRN, Winter Bejnamin PA-C    atenolol (TENORMIN) tablet 50 mg, 50 mg, Oral, Daily, Winter Benjamin PA-C, 50 mg at 08/31/24 0832    calcium carbonate (TUMS) chewable tablet 1,000 mg, 1,000 mg, Oral, Daily PRN, Winter Benjamin PA-C    docusate sodium (COLACE) capsule 100 mg, 100 mg, Oral, BID, Winter Benjamin PA-C, 100 mg at 08/31/24 1716    heparin (porcine) subcutaneous injection 5,000 Units, 5,000 Units, Subcutaneous, Q8H AUTUMN, Winter Benjamin PA-C, 5,000 Units at 09/01/24 0541    methocarbamol (ROBAXIN) tablet 500 mg, 500 mg, Oral, Q6H AUUTMN, Winter Benjamin PA-C, 500 mg at 09/01/24 0542    ondansetron (ZOFRAN) injection 4 mg, 4 mg, Intravenous, Q6H PRN, Winter Benjamin PA-C    oxyCODONE (ROXICODONE) immediate release tablet 10 mg, 10 mg, Oral, Q4H PRN, Winter Benjamin PA-C, 10 mg at 08/31/24 2213    oxyCODONE (ROXICODONE) IR tablet 5 mg, 5 mg, Oral, Q4H PRN, Winter Benjamin PA-C, 5 mg at 08/30/24 0154    polyethylene glycol (MIRALAX) packet 17 g, 17 g, Oral, Daily,  "Rehana Escalante, CRNP, 17 g at 08/31/24 0832    pravastatin (PRAVACHOL) tablet 80 mg, 80 mg, Oral, Daily With Dinner, Winter Benjamin PA-C, 80 mg at 08/31/24 1716    senna (SENOKOT) tablet 17.2 mg, 2 tablet, Oral, Daily, Rehana Escalante, CRNP, 17.2 mg at 08/31/24 0832    Blood Culture:   No results found for: \"BLOODCX\"    Wound Culture:   No results found for: \"WOUNDCULT\"    Ins and Outs:  I/O last 24 hours:  In: 420 [P.O.:420]  Out: 2260 [Urine:1800; Drains:460]          Physical:  Vitals:    09/01/24 0541   BP: 128/66   Pulse: 71   Resp:    Temp: 97.7 °F (36.5 °C)   SpO2: 98%     Musculoskeletal: Bilateral lower extremities  Surgical dressing clean, dry, intact  Drain with suction appropriately putting out serosanguineous output  Mild tenderness to palpation of the surgical site  Sensation intact L3-S1  Motor intact with 4/5 strength in hip flexors bilaterally, 5/5 motor strength throughout the rest of the bilateral lower extremities  Bilateral lower extremities are warm well-perfused with capillary refill less than 2 seconds  DP pulses are palpable    Assessment:    69 y.o.female POD 3 L3-S1, L4-S1 decompression TLIF. Doing well overall.     Plan:  WBAT with 5 lb weight lifting limit to bilateral lower extremities  PT/OT  Pain control  DVT ppx: SQH  Will monitor for ABLA and administer IVF/prbc as indicated for Greater than 2 gram drop or Hgb < 7  Medical management per periop team  Dispo: Ortho will follow    Shadi Gomez MD      Please Secure Chat the [facility] Floor Role with any Questions or Concerns.    "

## 2024-09-01 NOTE — PLAN OF CARE
Problem: PAIN - ADULT  Goal: Verbalizes/displays adequate comfort level or baseline comfort level  Description: Interventions:  - Encourage patient to monitor pain and request assistance  - Assess pain using appropriate pain scale  - Administer analgesics based on type and severity of pain and evaluate response  - Implement non-pharmacological measures as appropriate and evaluate response  - Consider cultural and social influences on pain and pain management  - Notify physician/advanced practitioner if interventions unsuccessful or patient reports new pain  Outcome: Progressing     Problem: INFECTION - ADULT  Goal: Absence or prevention of progression during hospitalization  Description: INTERVENTIONS:  - Assess and monitor for signs and symptoms of infection  - Monitor lab/diagnostic results  - Monitor all insertion sites, i.e. indwelling lines, tubes, and drains  - Monitor endotracheal if appropriate and nasal secretions for changes in amount and color  - [place] appropriate cooling/warming therapies per order  - Administer medications as ordered  - Instruct and encourage patient and family to use good hand hygiene technique  - Identify and instruct in appropriate isolation precautions for identified infection/condition  Outcome: Progressing     Problem: SAFETY ADULT  Goal: Patient will remain free of falls  Description: INTERVENTIONS:  - Educate patient/family on patient safety including physical limitations  - Instruct patient to call for assistance with activity   - Consult OT/PT to assist with strengthening/mobility   - Keep Call bell within reach  - Keep bed low and locked with side rails adjusted as appropriate  - Keep care items and personal belongings within reach  - Initiate and maintain comfort rounds  - Make Fall Risk Sign visible to staff  - Offer Toileting every 2 Hours, in advance of need  - Initiate/Maintain bed/chair alarm  - Obtain necessary fall risk management equipment: nonskid footwear  -  Apply yellow socks and bracelet for high fall risk patients  - Consider moving patient to room near nurses station  Outcome: Progressing  Goal: Maintain or return to baseline ADL function  Description: INTERVENTIONS:  -  Assess patient's ability to carry out ADLs; assess patient's baseline for ADL function and identify physical deficits which impact ability to perform ADLs (bathing, care of mouth/teeth, toileting, grooming, dressing, etc.)  - Assess/evaluate cause of self-care deficits   - Assess range of motion  - Assess patient's mobility; develop plan if impaired  - Assess patient's need for assistive devices and provide as appropriate  - Encourage maximum independence but intervene and supervise when necessary  - Involve family in performance of ADLs  - Assess for home care needs following discharge   - Consider OT consult to assist with ADL evaluation and planning for discharge  - Provide patient education as appropriate  Outcome: Progressing  Goal: Maintains/Returns to pre admission functional level  Description: INTERVENTIONS:  - Perform AM-PAC 6 Click Basic Mobility/ Daily Activity assessment daily.  - Set and communicate daily mobility goal to care team and patient/family/caregiver.   - Collaborate with rehabilitation services on mobility goals if consulted  - Perform Range of Motion 3 times a day.  - Reposition patient every 2 hours.  - Dangle patient 3 times a day  - Stand patient 3 times a day  - Ambulate patient 3 times a day  - Out of bed to chair 3 times a day   - Out of bed for meals 3 times a day  - Out of bed for toileting  - Record patient progress and toleration of activity level   Outcome: Progressing     Problem: DISCHARGE PLANNING  Goal: Discharge to home or other facility with appropriate resources  Description: INTERVENTIONS:  - Identify barriers to discharge w/patient and caregiver  - Arrange for needed discharge resources and transportation as appropriate  - Identify discharge learning  needs (meds, wound care, etc.)  - Arrange for interpretive services to assist at discharge as needed  - Refer to Case Management Department for coordinating discharge planning if the patient needs post-hospital services based on physician/advanced practitioner order or complex needs related to functional status, cognitive ability, or social support system  Outcome: Progressing     Problem: Knowledge Deficit  Goal: Patient/family/caregiver demonstrates understanding of disease process, treatment plan, medications, and discharge instructions  Description: Complete learning assessment and assess knowledge base.  Interventions:  - Provide teaching at level of understanding  - Provide teaching via preferred learning methods  Outcome: Progressing

## 2024-09-02 PROCEDURE — NC001 PR NO CHARGE: Performed by: ORTHOPAEDIC SURGERY

## 2024-09-02 RX ADMIN — METHOCARBAMOL 500 MG: 500 TABLET ORAL at 05:00

## 2024-09-02 RX ADMIN — ATENOLOL 50 MG: 50 TABLET ORAL at 08:45

## 2024-09-02 RX ADMIN — METHOCARBAMOL 500 MG: 500 TABLET ORAL at 23:05

## 2024-09-02 RX ADMIN — SENNOSIDES 17.2 MG: 8.6 TABLET, FILM COATED ORAL at 08:46

## 2024-09-02 RX ADMIN — ACETAMINOPHEN 650 MG: 325 TABLET ORAL at 17:01

## 2024-09-02 RX ADMIN — DOCUSATE SODIUM 100 MG: 100 CAPSULE, LIQUID FILLED ORAL at 08:45

## 2024-09-02 RX ADMIN — METHOCARBAMOL 500 MG: 500 TABLET ORAL at 11:37

## 2024-09-02 RX ADMIN — POLYETHYLENE GLYCOL 3350 17 G: 17 POWDER, FOR SOLUTION ORAL at 08:45

## 2024-09-02 RX ADMIN — OXYCODONE HYDROCHLORIDE 5 MG: 5 TABLET ORAL at 21:06

## 2024-09-02 RX ADMIN — HEPARIN SODIUM 5000 UNITS: 5000 INJECTION INTRAVENOUS; SUBCUTANEOUS at 21:06

## 2024-09-02 RX ADMIN — ACETAMINOPHEN 650 MG: 325 TABLET ORAL at 23:05

## 2024-09-02 RX ADMIN — HEPARIN SODIUM 5000 UNITS: 5000 INJECTION INTRAVENOUS; SUBCUTANEOUS at 05:02

## 2024-09-02 RX ADMIN — ACETAMINOPHEN 650 MG: 325 TABLET ORAL at 11:37

## 2024-09-02 RX ADMIN — METHOCARBAMOL 500 MG: 500 TABLET ORAL at 17:01

## 2024-09-02 RX ADMIN — ACETAMINOPHEN 650 MG: 325 TABLET ORAL at 05:00

## 2024-09-02 RX ADMIN — HEPARIN SODIUM 5000 UNITS: 5000 INJECTION INTRAVENOUS; SUBCUTANEOUS at 13:37

## 2024-09-02 RX ADMIN — OXYCODONE HYDROCHLORIDE 5 MG: 5 TABLET ORAL at 04:58

## 2024-09-02 RX ADMIN — DOCUSATE SODIUM 100 MG: 100 CAPSULE, LIQUID FILLED ORAL at 17:01

## 2024-09-02 RX ADMIN — PRAVASTATIN SODIUM 80 MG: 80 TABLET ORAL at 16:44

## 2024-09-02 NOTE — PLAN OF CARE
Problem: INFECTION - ADULT  Goal: Absence or prevention of progression during hospitalization  Description: INTERVENTIONS:  - Assess and monitor for signs and symptoms of infection  - Monitor lab/diagnostic results  - Monitor all insertion sites, i.e. indwelling lines, tubes, and drains  - Monitor endotracheal if appropriate and nasal secretions for changes in amount and color  - [place] appropriate cooling/warming therapies per order  - Administer medications as ordered  - Instruct and encourage patient and family to use good hand hygiene technique  - Identify and instruct in appropriate isolation precautions for identified infection/condition  9/2/2024 0037 by Jovan Snyder RN  Outcome: Progressing  9/2/2024 0036 by Jovan Snyder RN  Outcome: Progressing     Problem: SAFETY ADULT  Goal: Patient will remain free of falls  Description: INTERVENTIONS:  - Educate patient/family on patient safety including physical limitations  - Instruct patient to call for assistance with activity   - Consult OT/PT to assist with strengthening/mobility   - Keep Call bell within reach  - Keep bed low and locked with side rails adjusted as appropriate  - Keep care items and personal belongings within reach  - Initiate and maintain comfort rounds  - Make Fall Risk Sign visible to staff  - Offer Toileting every 2 Hours, in advance of need  - Initiate/Maintain bed/chair alarm  - Obtain necessary fall risk management equipment: nonskid footwear  - Apply yellow socks and bracelet for high fall risk patients  - Consider moving patient to room near nurses station  9/2/2024 0037 by Jovan Snyder RN  Outcome: Progressing  9/2/2024 0036 by Jovan Snyder RN  Outcome: Progressing  Goal: Maintain or return to baseline ADL function  Description: INTERVENTIONS:  -  Assess patient's ability to carry out ADLs; assess patient's baseline for ADL function and identify physical deficits which impact ability to perform ADLs (bathing, care of mouth/teeth,  toileting, grooming, dressing, etc.)  - Assess/evaluate cause of self-care deficits   - Assess range of motion  - Assess patient's mobility; develop plan if impaired  - Assess patient's need for assistive devices and provide as appropriate  - Encourage maximum independence but intervene and supervise when necessary  - Involve family in performance of ADLs  - Assess for home care needs following discharge   - Consider OT consult to assist with ADL evaluation and planning for discharge  - Provide patient education as appropriate  9/2/2024 0037 by Jovan Snyder RN  Outcome: Progressing  9/2/2024 0036 by Jovan Snyder RN  Outcome: Progressing  Goal: Maintains/Returns to pre admission functional level  Description: INTERVENTIONS:  - Perform AM-PAC 6 Click Basic Mobility/ Daily Activity assessment daily.  - Set and communicate daily mobility goal to care team and patient/family/caregiver.   - Collaborate with rehabilitation services on mobility goals if consulted  - Perform Range of Motion 3 times a day.  - Reposition patient every 2 hours.  - Dangle patient 3 times a day  - Stand patient 3 times a day  - Ambulate patient 3 times a day  - Out of bed to chair 3 times a day   - Out of bed for meals 3 times a day  - Out of bed for toileting  - Record patient progress and toleration of activity level   9/2/2024 0037 by Jovan Snyder RN  Outcome: Progressing  9/2/2024 0036 by Jovan Snyder RN  Outcome: Progressing     Problem: DISCHARGE PLANNING  Goal: Discharge to home or other facility with appropriate resources  Description: INTERVENTIONS:  - Identify barriers to discharge w/patient and caregiver  - Arrange for needed discharge resources and transportation as appropriate  - Identify discharge learning needs (meds, wound care, etc.)  - Arrange for interpretive services to assist at discharge as needed  - Refer to Case Management Department for coordinating discharge planning if the patient needs post-hospital services based on  physician/advanced practitioner order or complex needs related to functional status, cognitive ability, or social support system  9/2/2024 0037 by Jovan Snyder RN  Outcome: Progressing  9/2/2024 0036 by Jovan Snyder RN  Outcome: Progressing     Problem: Knowledge Deficit  Goal: Patient/family/caregiver demonstrates understanding of disease process, treatment plan, medications, and discharge instructions  Description: Complete learning assessment and assess knowledge base.  Interventions:  - Provide teaching at level of understanding  - Provide teaching via preferred learning methods  9/2/2024 0037 by Jovan Snyder RN  Outcome: Progressing  9/2/2024 0036 by Jovan Snyder RN  Outcome: Progressing

## 2024-09-02 NOTE — PROGRESS NOTES
Progress Note - Orthopedics   Mylene Wolff 69 y.o. female MRN: [id number]    Subjective:  69 y.o. female POD 5 Navigated L3-S1 decompression with instrumented fusion, TLIF. L4-S1 decompression/laminectomy. No acute events, no acute distress. Denies fever/chills, SOB. Denies n/v/d. Pain well controlled.     Objective:  Hemoglobin Trend:  Lab Results   Component Value Date    HGB 10.1 (L) 09/01/2024    HGB 10.7 (L) 08/31/2024    HGB 11.8 08/30/2024    HGB 11.9 08/29/2024    HGB 13.6 08/20/2024     Inflammatory Markers:  Lab Results   Component Value Date    WBC 11.79 (H) 09/01/2024     Meds:    Current Facility-Administered Medications:     acetaminophen (TYLENOL) tablet 650 mg, 650 mg, Oral, Q6H AUTUMN, Winter Benjamin PA-C, 650 mg at 09/02/24 0500    aluminum-magnesium hydroxide-simethicone (MAALOX) oral suspension 30 mL, 30 mL, Oral, Q6H PRN, Winter Benjamin PA-C    atenolol (TENORMIN) tablet 50 mg, 50 mg, Oral, Daily, Winter Benjamin PA-C, 50 mg at 09/02/24 0845    calcium carbonate (TUMS) chewable tablet 1,000 mg, 1,000 mg, Oral, Daily PRN, Winter Benjamin PA-C    docusate sodium (COLACE) capsule 100 mg, 100 mg, Oral, BID, Winter Benjamin PA-C, 100 mg at 09/02/24 0845    heparin (porcine) subcutaneous injection 5,000 Units, 5,000 Units, Subcutaneous, Q8H AUTUMN, Winter Benjamin PA-C, 5,000 Units at 09/02/24 0502    methocarbamol (ROBAXIN) tablet 500 mg, 500 mg, Oral, Q6H AUTUMN, Winter Benjamin PA-C, 500 mg at 09/02/24 0500    ondansetron (ZOFRAN) injection 4 mg, 4 mg, Intravenous, Q6H PRN, Winter Benjamin PA-C    oxyCODONE (ROXICODONE) immediate release tablet 10 mg, 10 mg, Oral, Q4H PRN, Winter Benjamin PA-C, 10 mg at 08/31/24 2213    oxyCODONE (ROXICODONE) IR tablet 5 mg, 5 mg, Oral, Q4H PRN, Winter Benjamin PA-C, 5 mg at 09/02/24 0457    polyethylene glycol (MIRALAX) packet 17 g, 17 g, Oral, Daily, BUSTER Andrews, 17 g at  "09/01/24 0829    pravastatin (PRAVACHOL) tablet 80 mg, 80 mg, Oral, Daily With Dinner, Winter Benjamin PA-C, 80 mg at 09/01/24 1659    senna (SENOKOT) tablet 17.2 mg, 2 tablet, Oral, Daily, BUSTER Andrews, 17.2 mg at 09/02/24 0846    Blood Culture:   No results found for: \"BLOODCX\"    Wound Culture:   No results found for: \"WOUNDCULT\"    Ins and Outs:  I/O last 24 hours:  In: 480 [P.O.:480]  Out: 165 [Drains:165]    Physical Exam:  Vitals:    09/02/24 0733   BP: 136/73   Pulse: 81   Resp: 16   Temp: 98.5 °F (36.9 °C)   SpO2: 94%     Musculoskeletal: Lumbar Spine  Dressing C/D/I   TTP iglesia-incisionally  Sensation intact to light touch L3-S1  Motor intact L2-S1  2+ DP pulse  No calf swelling or ttp  Drain in place with serosanguinous output -- 50cc last shift    Assessment:    69 y.o. female POD 5 Navigated L3-S1 decompression with instrumented fusion, TLIF. L4-S1 decompression/laminectomy doing well postoperatively.    Plan:  WBAT BLE  PT/OT  Incentive spirometry  Pain control  Monitor drain output  Appreciate medicine team co-management  DVT ppx: SCD and early ambulation  Diet: regular  Monitor for acute blood loss anemia and administer or recommend IVF/prbc as indicated for greater than 2 gram Hgb drop or Hgb < 7  Dispo planning -- home w/ home health    Rajeev Vazquez MD  PGY-2 Orthopedic Surgery    Please Secure Chat the [facility] Floor Role with any Questions or Concerns.    "

## 2024-09-02 NOTE — PROGRESS NOTES
Progress Note - Orthopedics   Mylene Wolff 69 y.o. female MRN: [id number]  Unit/Bed#: -01      Subjective:    69 y.o.female POD 4 L3-S1 decompression, L4-S1 TLIF. No acute events, no new complaints. Denies fevers, chills, CP, SOB, N/V. Pain well controlled.     Labs:  0   Lab Value Date/Time    HCT 32.5 (L) 09/01/2024 0440    HCT 33.6 (L) 08/31/2024 0519    HCT 37.5 08/30/2024 0508    HGB 10.1 (L) 09/01/2024 0440    HGB 10.7 (L) 08/31/2024 0519    HGB 11.8 08/30/2024 0508    INR 0.95 08/20/2024 0849    WBC 11.79 (H) 09/01/2024 0440    WBC 12.05 (H) 08/31/2024 0519    WBC 13.71 (H) 08/30/2024 0508       Meds:    Current Facility-Administered Medications:     acetaminophen (TYLENOL) tablet 650 mg, 650 mg, Oral, Q6H AUTUMN, Winter Benjamin PA-C, 650 mg at 09/02/24 0500    aluminum-magnesium hydroxide-simethicone (MAALOX) oral suspension 30 mL, 30 mL, Oral, Q6H PRN, Winter Benjamin PA-C    atenolol (TENORMIN) tablet 50 mg, 50 mg, Oral, Daily, Winter Benjamin PA-C, 50 mg at 09/01/24 0829    calcium carbonate (TUMS) chewable tablet 1,000 mg, 1,000 mg, Oral, Daily PRN, Winter Benjamin PA-C    docusate sodium (COLACE) capsule 100 mg, 100 mg, Oral, BID, Winter Benjamin PA-C, 100 mg at 09/01/24 1700    heparin (porcine) subcutaneous injection 5,000 Units, 5,000 Units, Subcutaneous, Q8H AUTUMN, Winter Benjamin PA-C, 5,000 Units at 09/02/24 0502    methocarbamol (ROBAXIN) tablet 500 mg, 500 mg, Oral, Q6H AUTUMN, Winter Benjamin PA-C, 500 mg at 09/02/24 0500    ondansetron (ZOFRAN) injection 4 mg, 4 mg, Intravenous, Q6H PRN, Winter Benjamin PA-C    oxyCODONE (ROXICODONE) immediate release tablet 10 mg, 10 mg, Oral, Q4H PRN, Winter Benjamin PA-C, 10 mg at 08/31/24 2213    oxyCODONE (ROXICODONE) IR tablet 5 mg, 5 mg, Oral, Q4H PRN, Winter Benjamin PA-C, 5 mg at 09/02/24 0458    polyethylene glycol (MIRALAX) packet 17 g, 17 g, Oral, Daily,  "Rehana Bond, ADEBAYONP, 17 g at 09/01/24 0829    pravastatin (PRAVACHOL) tablet 80 mg, 80 mg, Oral, Daily With Dinner, Winter Benjamin PA-C, 80 mg at 09/01/24 1659    senna (SENOKOT) tablet 17.2 mg, 2 tablet, Oral, Daily, Rehana Escalante, CRNP, 17.2 mg at 09/01/24 0829    Blood Culture:   No results found for: \"BLOODCX\"    Wound Culture:   No results found for: \"WOUNDCULT\"    Ins and Outs:  I/O last 24 hours:  In: 720 [P.O.:720]  Out: 930 [Urine:700; Drains:230]          Physical:  Vitals:    09/02/24 0618   BP: 137/74   Pulse: 76   Resp:    Temp: 98.3 °F (36.8 °C)   SpO2: 93%     Musculoskeletal: Bilateral lower extremities  Surgical dressing clean, dry and intact  Drain with suction peripheral pitting after symptoms have been  Mild tenderness palpation of the surgical site  Sensation intact of the digits with  Motor intact 5/5 strength bilateral lower extremities  Bilateral lower extremities are warm and well-perfused with capillary refill less than 2 seconds  DP pulses are palpable    Assessment:    69 y.o.female POD 4 L3-S1, L4-S1 decompression TLIF. Doing well overall.     Plan:  WBAT with 5 lb weight lifting limit to bilateral lower extremities  PT/OT  Pain control  DVT ppx: SQH  Will monitor for ABLA and administer IVF/prbc as indicated for Greater than 2 gram drop or Hgb < 7  Medical management per periop team  Dispo: Ortho will follow    Shadi Gomez MD      Please Secure Chat the [facility] Floor Role with any Questions or Concerns.    "

## 2024-09-02 NOTE — PLAN OF CARE
Problem: PAIN - ADULT  Goal: Verbalizes/displays adequate comfort level or baseline comfort level  Description: Interventions:  - Encourage patient to monitor pain and request assistance  - Assess pain using appropriate pain scale  - Administer analgesics based on type and severity of pain and evaluate response  - Implement non-pharmacological measures as appropriate and evaluate response  - Consider cultural and social influences on pain and pain management  - Notify physician/advanced practitioner if interventions unsuccessful or patient reports new pain  Outcome: Progressing     Problem: INFECTION - ADULT  Goal: Absence or prevention of progression during hospitalization  Description: INTERVENTIONS:  - Assess and monitor for signs and symptoms of infection  - Monitor lab/diagnostic results  - Monitor all insertion sites, i.e. indwelling lines, tubes, and drains  - Monitor endotracheal if appropriate and nasal secretions for changes in amount and color  - [place] appropriate cooling/warming therapies per order  - Administer medications as ordered  - Instruct and encourage patient and family to use good hand hygiene technique  - Identify and instruct in appropriate isolation precautions for identified infection/condition  Outcome: Progressing     Problem: SAFETY ADULT  Goal: Patient will remain free of falls  Description: INTERVENTIONS:  - Educate patient/family on patient safety including physical limitations  - Instruct patient to call for assistance with activity   - Consult OT/PT to assist with strengthening/mobility   - Keep Call bell within reach  - Keep bed low and locked with side rails adjusted as appropriate  - Keep care items and personal belongings within reach  - Initiate and maintain comfort rounds  - Make Fall Risk Sign visible to staff  - Offer Toileting every 2 Hours, in advance of need  - Initiate/Maintain bed/chair alarm  - Obtain necessary fall risk management equipment: nonskid footwear  -  Apply yellow socks and bracelet for high fall risk patients  - Consider moving patient to room near nurses station  Outcome: Progressing  Goal: Maintain or return to baseline ADL function  Description: INTERVENTIONS:  -  Assess patient's ability to carry out ADLs; assess patient's baseline for ADL function and identify physical deficits which impact ability to perform ADLs (bathing, care of mouth/teeth, toileting, grooming, dressing, etc.)  - Assess/evaluate cause of self-care deficits   - Assess range of motion  - Assess patient's mobility; develop plan if impaired  - Assess patient's need for assistive devices and provide as appropriate  - Encourage maximum independence but intervene and supervise when necessary  - Involve family in performance of ADLs  - Assess for home care needs following discharge   - Consider OT consult to assist with ADL evaluation and planning for discharge  - Provide patient education as appropriate  Outcome: Progressing  Goal: Maintains/Returns to pre admission functional level  Description: INTERVENTIONS:  - Perform AM-PAC 6 Click Basic Mobility/ Daily Activity assessment daily.  - Set and communicate daily mobility goal to care team and patient/family/caregiver.   - Collaborate with rehabilitation services on mobility goals if consulted  - Perform Range of Motion 3 times a day.  - Reposition patient every 2 hours.  - Dangle patient 3 times a day  - Stand patient 3 times a day  - Ambulate patient 3 times a day  - Out of bed to chair 3 times a day   - Out of bed for meals 3 times a day  - Out of bed for toileting  - Record patient progress and toleration of activity level   Outcome: Progressing     Problem: DISCHARGE PLANNING  Goal: Discharge to home or other facility with appropriate resources  Description: INTERVENTIONS:  - Identify barriers to discharge w/patient and caregiver  - Arrange for needed discharge resources and transportation as appropriate  - Identify discharge learning  needs (meds, wound care, etc.)  - Arrange for interpretive services to assist at discharge as needed  - Refer to Case Management Department for coordinating discharge planning if the patient needs post-hospital services based on physician/advanced practitioner order or complex needs related to functional status, cognitive ability, or social support system  Outcome: Progressing     Problem: Knowledge Deficit  Goal: Patient/family/caregiver demonstrates understanding of disease process, treatment plan, medications, and discharge instructions  Description: Complete learning assessment and assess knowledge base.  Interventions:  - Provide teaching at level of understanding  - Provide teaching via preferred learning methods  Outcome: Progressing

## 2024-09-02 NOTE — PLAN OF CARE
Problem: PAIN - ADULT  Goal: Verbalizes/displays adequate comfort level or baseline comfort level  Description: Interventions:  - Encourage patient to monitor pain and request assistance  - Assess pain using appropriate pain scale  - Administer analgesics based on type and severity of pain and evaluate response  - Implement non-pharmacological measures as appropriate and evaluate response  - Consider cultural and social influences on pain and pain management  - Notify physician/advanced practitioner if interventions unsuccessful or patient reports new pain  Outcome: Progressing     Problem: INFECTION - ADULT  Goal: Absence or prevention of progression during hospitalization  Description: INTERVENTIONS:  - Assess and monitor for signs and symptoms of infection  - Monitor lab/diagnostic results  - Monitor all insertion sites, i.e. indwelling lines, tubes, and drains  - Monitor endotracheal if appropriate and nasal secretions for changes in amount and color  - [place] appropriate cooling/warming therapies per order  - Administer medications as ordered  - Instruct and encourage patient and family to use good hand hygiene technique  - Identify and instruct in appropriate isolation precautions for identified infection/condition  Outcome: Progressing     Problem: SAFETY ADULT  Goal: Patient will remain free of falls  Description: INTERVENTIONS:  - Educate patient/family on patient safety including physical limitations  - Instruct patient to call for assistance with activity   - Consult OT/PT to assist with strengthening/mobility   - Keep Call bell within reach  - Keep bed low and locked with side rails adjusted as appropriate  - Keep care items and personal belongings within reach  - Initiate and maintain comfort rounds  - Make Fall Risk Sign visible to staff  - Offer Toileting every 2 Hours, in advance of need  - Initiate/Maintain bed/chair alarm  - Obtain necessary fall risk management equipment: nonskid footwear  -  Apply yellow socks and bracelet for high fall risk patients  - Consider moving patient to room near nurses station  Outcome: Progressing  Goal: Maintain or return to baseline ADL function  Description: INTERVENTIONS:  -  Assess patient's ability to carry out ADLs; assess patient's baseline for ADL function and identify physical deficits which impact ability to perform ADLs (bathing, care of mouth/teeth, toileting, grooming, dressing, etc.)  - Assess/evaluate cause of self-care deficits   - Assess range of motion  - Assess patient's mobility; develop plan if impaired  - Assess patient's need for assistive devices and provide as appropriate  - Encourage maximum independence but intervene and supervise when necessary  - Involve family in performance of ADLs  - Assess for home care needs following discharge   - Consider OT consult to assist with ADL evaluation and planning for discharge  - Provide patient education as appropriate  Outcome: Progressing  Goal: Maintains/Returns to pre admission functional level  Description: INTERVENTIONS:  - Perform AM-PAC 6 Click Basic Mobility/ Daily Activity assessment daily.  - Set and communicate daily mobility goal to care team and patient/family/caregiver.   - Collaborate with rehabilitation services on mobility goals if consulted  - Perform Range of Motion 3 times a day.  - Reposition patient every 2 hours.  - Dangle patient 3 times a day  - Stand patient 3 times a day  - Ambulate patient 3 times a day  - Out of bed to chair 3 times a day   - Out of bed for meals 3 times a day  - Out of bed for toileting  - Record patient progress and toleration of activity level   Outcome: Progressing     Problem: DISCHARGE PLANNING  Goal: Discharge to home or other facility with appropriate resources  Description: INTERVENTIONS:  - Identify barriers to discharge w/patient and caregiver  - Arrange for needed discharge resources and transportation as appropriate  - Identify discharge learning  needs (meds, wound care, etc.)  - Arrange for interpretive services to assist at discharge as needed  - Refer to Case Management Department for coordinating discharge planning if the patient needs post-hospital services based on physician/advanced practitioner order or complex needs related to functional status, cognitive ability, or social support system  Outcome: Progressing     Problem: Knowledge Deficit  Goal: Patient/family/caregiver demonstrates understanding of disease process, treatment plan, medications, and discharge instructions  Description: Complete learning assessment and assess knowledge base.  Interventions:  - Provide teaching at level of understanding  - Provide teaching via preferred learning methods  Outcome: Progressing

## 2024-09-03 VITALS
DIASTOLIC BLOOD PRESSURE: 82 MMHG | TEMPERATURE: 99 F | RESPIRATION RATE: 16 BRPM | BODY MASS INDEX: 30.73 KG/M2 | HEART RATE: 84 BPM | OXYGEN SATURATION: 96 % | WEIGHT: 180 LBS | SYSTOLIC BLOOD PRESSURE: 158 MMHG | HEIGHT: 64 IN

## 2024-09-03 PROCEDURE — 99024 POSTOP FOLLOW-UP VISIT: CPT | Performed by: ORTHOPAEDIC SURGERY

## 2024-09-03 RX ADMIN — HEPARIN SODIUM 5000 UNITS: 5000 INJECTION INTRAVENOUS; SUBCUTANEOUS at 05:37

## 2024-09-03 RX ADMIN — ATENOLOL 50 MG: 50 TABLET ORAL at 10:15

## 2024-09-03 RX ADMIN — METHOCARBAMOL 500 MG: 500 TABLET ORAL at 05:37

## 2024-09-03 RX ADMIN — ACETAMINOPHEN 650 MG: 325 TABLET ORAL at 12:55

## 2024-09-03 RX ADMIN — METHOCARBAMOL 500 MG: 500 TABLET ORAL at 12:55

## 2024-09-03 RX ADMIN — ACETAMINOPHEN 650 MG: 325 TABLET ORAL at 05:37

## 2024-09-03 NOTE — PLAN OF CARE
Problem: PAIN - ADULT  Goal: Verbalizes/displays adequate comfort level or baseline comfort level  Description: Interventions:  - Encourage patient to monitor pain and request assistance  - Assess pain using appropriate pain scale  - Administer analgesics based on type and severity of pain and evaluate response  - Implement non-pharmacological measures as appropriate and evaluate response  - Consider cultural and social influences on pain and pain management  - Notify physician/advanced practitioner if interventions unsuccessful or patient reports new pain  Outcome: Progressing     Problem: INFECTION - ADULT  Goal: Absence or prevention of progression during hospitalization  Description: INTERVENTIONS:  - Assess and monitor for signs and symptoms of infection  - Monitor lab/diagnostic results  - Monitor all insertion sites, i.e. indwelling lines, tubes, and drains  - Monitor endotracheal if appropriate and nasal secretions for changes in amount and color  - [place] appropriate cooling/warming therapies per order  - Administer medications as ordered  - Instruct and encourage patient and family to use good hand hygiene technique  - Identify and instruct in appropriate isolation precautions for identified infection/condition  Outcome: Progressing     Problem: SAFETY ADULT  Goal: Patient will remain free of falls  Description: INTERVENTIONS:  - Educate patient/family on patient safety including physical limitations  - Instruct patient to call for assistance with activity   - Consult OT/PT to assist with strengthening/mobility   - Keep Call bell within reach  - Keep bed low and locked with side rails adjusted as appropriate  - Keep care items and personal belongings within reach  - Initiate and maintain comfort rounds  - Make Fall Risk Sign visible to staff  - Offer Toileting every 2 Hours, in advance of need  - Initiate/Maintain bed/chair alarm  - Obtain necessary fall risk management equipment: nonskid footwear  -  Apply yellow socks and bracelet for high fall risk patients  - Consider moving patient to room near nurses station  Outcome: Progressing  Goal: Maintain or return to baseline ADL function  Description: INTERVENTIONS:  -  Assess patient's ability to carry out ADLs; assess patient's baseline for ADL function and identify physical deficits which impact ability to perform ADLs (bathing, care of mouth/teeth, toileting, grooming, dressing, etc.)  - Assess/evaluate cause of self-care deficits   - Assess range of motion  - Assess patient's mobility; develop plan if impaired  - Assess patient's need for assistive devices and provide as appropriate  - Encourage maximum independence but intervene and supervise when necessary  - Involve family in performance of ADLs  - Assess for home care needs following discharge   - Consider OT consult to assist with ADL evaluation and planning for discharge  - Provide patient education as appropriate  Outcome: Progressing  Goal: Maintains/Returns to pre admission functional level  Description: INTERVENTIONS:  - Perform AM-PAC 6 Click Basic Mobility/ Daily Activity assessment daily.  - Set and communicate daily mobility goal to care team and patient/family/caregiver.   - Collaborate with rehabilitation services on mobility goals if consulted  - Perform Range of Motion 3 times a day.  - Reposition patient every 2 hours.  - Dangle patient 3 times a day  - Stand patient 3 times a day  - Ambulate patient 3 times a day  - Out of bed to chair 3 times a day   - Out of bed for meals 3 times a day  - Out of bed for toileting  - Record patient progress and toleration of activity level   Outcome: Progressing     Problem: DISCHARGE PLANNING  Goal: Discharge to home or other facility with appropriate resources  Description: INTERVENTIONS:  - Identify barriers to discharge w/patient and caregiver  - Arrange for needed discharge resources and transportation as appropriate  - Identify discharge learning  needs (meds, wound care, etc.)  - Arrange for interpretive services to assist at discharge as needed  - Refer to Case Management Department for coordinating discharge planning if the patient needs post-hospital services based on physician/advanced practitioner order or complex needs related to functional status, cognitive ability, or social support system  Outcome: Progressing     Problem: Knowledge Deficit  Goal: Patient/family/caregiver demonstrates understanding of disease process, treatment plan, medications, and discharge instructions  Description: Complete learning assessment and assess knowledge base.  Interventions:  - Provide teaching at level of understanding  - Provide teaching via preferred learning methods  Outcome: Progressing

## 2024-09-03 NOTE — CASE MANAGEMENT
Case Management Discharge Planning Note    [facility]  Location /[facility] MRN [id number]  : 1955 Date 9/3/2024       Current Admission Date: 2024  Current Admission Diagnosis:Lumbar radiculopathy   Patient Active Problem List    Diagnosis Date Noted Date Diagnosed    S/P lumbar fusion 2024     Preop examination 2024     Pure hypercholesterolemia 2024     Lumbar spondylolysis 2024     Spondylolisthesis at L4-L5 level 2024     Right lower quadrant abdominal pain 2024     Rib pain 2024     Fall 03/15/2024     Cervicalgia 2024     Trigger finger of left hand 2024     Lumbar spondylosis 2023     Diverticulosis 2023     [place] of right foot      Lumbar radiculopathy      Hypertension      Varicose veins of both lower extremities with pain 2020       LOS (days): 3  Geometric Mean LOS (GMLOS) (days):   Days to GMLOS:     OBJECTIVE:  Risk of Unplanned Readmission Score: 11.88         Current admission status: Inpatient   Preferred Pharmacy:   [id number] [place] [address]  [address] [address]  Phone: [phone] Fax: [phone]    Primary Care Provider: BUSTER Gunn    Primary Insurance: [employer] [place] [employer]  Secondary Insurance:     DISCHARGE DETAILS:          Other Referral/Resources/Interventions Provided:  Referral Comments: Notified by provider pt clear for dc today.  Message sent in Aidin notifying John WADSWORTH of same.  Nursing to review DCI w/pt and notify pt John WADSWORTH will contact pt for SOC.

## 2024-09-03 NOTE — PLAN OF CARE
Problem: PAIN - ADULT  Goal: Verbalizes/displays adequate comfort level or baseline comfort level  Description: Interventions:  - Encourage patient to monitor pain and request assistance  - Assess pain using appropriate pain scale  - Administer analgesics based on type and severity of pain and evaluate response  - Implement non-pharmacological measures as appropriate and evaluate response  - Consider cultural and social influences on pain and pain management  - Notify physician/advanced practitioner if interventions unsuccessful or patient reports new pain  Outcome: Adequate for Discharge     Problem: INFECTION - ADULT  Goal: Absence or prevention of progression during hospitalization  Description: INTERVENTIONS:  - Assess and monitor for signs and symptoms of infection  - Monitor lab/diagnostic results  - Monitor all insertion sites, i.e. indwelling lines, tubes, and drains  - Monitor endotracheal if appropriate and nasal secretions for changes in amount and color  - [place] appropriate cooling/warming therapies per order  - Administer medications as ordered  - Instruct and encourage patient and family to use good hand hygiene technique  - Identify and instruct in appropriate isolation precautions for identified infection/condition  Outcome: Adequate for Discharge     Problem: SAFETY ADULT  Goal: Patient will remain free of falls  Description: INTERVENTIONS:  - Educate patient/family on patient safety including physical limitations  - Instruct patient to call for assistance with activity   - Consult OT/PT to assist with strengthening/mobility   - Keep Call bell within reach  - Keep bed low and locked with side rails adjusted as appropriate  - Keep care items and personal belongings within reach  - Initiate and maintain comfort rounds  - Make Fall Risk Sign visible to staff  - Offer Toileting every 2 Hours, in advance of need  - Initiate/Maintain bed/chair alarm  - Obtain necessary fall risk management  equipment: nonskid footwear  - Apply yellow socks and bracelet for high fall risk patients  - Consider moving patient to room near nurses station  Outcome: Adequate for Discharge  Goal: Maintain or return to baseline ADL function  Description: INTERVENTIONS:  -  Assess patient's ability to carry out ADLs; assess patient's baseline for ADL function and identify physical deficits which impact ability to perform ADLs (bathing, care of mouth/teeth, toileting, grooming, dressing, etc.)  - Assess/evaluate cause of self-care deficits   - Assess range of motion  - Assess patient's mobility; develop plan if impaired  - Assess patient's need for assistive devices and provide as appropriate  - Encourage maximum independence but intervene and supervise when necessary  - Involve family in performance of ADLs  - Assess for home care needs following discharge   - Consider OT consult to assist with ADL evaluation and planning for discharge  - Provide patient education as appropriate  Outcome: Adequate for Discharge  Goal: Maintains/Returns to pre admission functional level  Description: INTERVENTIONS:  - Perform AM-PAC 6 Click Basic Mobility/ Daily Activity assessment daily.  - Set and communicate daily mobility goal to care team and patient/family/caregiver.   - Collaborate with rehabilitation services on mobility goals if consulted  - Perform Range of Motion 3 times a day.  - Reposition patient every 2 hours.  - Dangle patient 3 times a day  - Stand patient 3 times a day  - Ambulate patient 3 times a day  - Out of bed to chair 3 times a day   - Out of bed for meals 3 times a day  - Out of bed for toileting  - Record patient progress and toleration of activity level   Outcome: Adequate for Discharge     Problem: DISCHARGE PLANNING  Goal: Discharge to home or other facility with appropriate resources  Description: INTERVENTIONS:  - Identify barriers to discharge w/patient and caregiver  - Arrange for needed discharge resources and  transportation as appropriate  - Identify discharge learning needs (meds, wound care, etc.)  - Arrange for interpretive services to assist at discharge as needed  - Refer to Case Management Department for coordinating discharge planning if the patient needs post-hospital services based on physician/advanced practitioner order or complex needs related to functional status, cognitive ability, or social support system  Outcome: Adequate for Discharge     Problem: Knowledge Deficit  Goal: Patient/family/caregiver demonstrates understanding of disease process, treatment plan, medications, and discharge instructions  Description: Complete learning assessment and assess knowledge base.  Interventions:  - Provide teaching at level of understanding  - Provide teaching via preferred learning methods  Outcome: Adequate for Discharge

## 2024-09-04 NOTE — UTILIZATION REVIEW
NOTIFICATION OF ADMISSION DISCHARGE   This is a Notification of Discharge from [facility]. Please be advised that this patient has been discharge from our facility. Below you will find the admission and discharge date and time including the patient’s disposition.   UTILIZATION REVIEW CONTACT:  Myles Miles  Utilization   Network Utilization Review Department  Phone: [phone] x carefully listen to the prompts. All voicemails are confidential.  Email: [email or web address]     ADMISSION INFORMATION  PRESENTATION DATE: 8/29/2024  9:08 AM  OBERVATION ADMISSION DATE: N/A  INPATIENT ADMISSION DATE: 8/31/24  3:07 PM   DISCHARGE DATE: 9/3/2024  3:23 PM   DISPOSITION:Home with Home Health Care    Network Utilization Review Department  ATTENTION: Please call with any questions or concerns to [phone] and carefully listen to the prompts so that you are directed to the right person. All voicemails are confidential.   For Discharge needs, contact Care Management DC Support Team at [phone] opt. 2  Send all requests for admission clinical reviews, approved or denied determinations and any other requests to dedicated fax number below belonging to the campus where the patient is receiving treatment. List of dedicated fax numbers for the Facilities:  FACILITY NAME UR FAX NUMBER   ADMISSION DENIALS (Administrative/Medical Necessity) [phone]   DISCHARGE SUPPORT TEAM ([facility]) [phone]   PARENT CHILD HEALTH (Maternity/NICU/Pediatrics) [phone]   [facility] [phone]   [facility] [phone]   [facility] [phone]   [facility] [phone]   [facility] [phone]   [facility] [phone]   [facility] [phone]   [facility]  [phone]   [facility] [phone]   [facility] [phone]   [facility] [phone]   [facility] [phone]

## 2024-09-09 ENCOUNTER — OFFICE VISIT (OUTPATIENT)
Dept: FAMILY MEDICINE CLINIC | Facility: CLINIC | Age: 69
End: 2024-09-09
Payer: COMMERCIAL

## 2024-09-09 VITALS
BODY MASS INDEX: 32.04 KG/M2 | OXYGEN SATURATION: 99 % | HEART RATE: 85 BPM | SYSTOLIC BLOOD PRESSURE: 110 MMHG | DIASTOLIC BLOOD PRESSURE: 80 MMHG | WEIGHT: 180.8 LBS | HEIGHT: 63 IN | TEMPERATURE: 98 F

## 2024-09-09 DIAGNOSIS — T50.905A MEDICATION ADVERSE EFFECT, INITIAL ENCOUNTER: ICD-10-CM

## 2024-09-09 DIAGNOSIS — M79.18 MUSCLE PAIN, LUMBAR: ICD-10-CM

## 2024-09-09 DIAGNOSIS — E55.9 VITAMIN D DEFICIENCY: ICD-10-CM

## 2024-09-09 DIAGNOSIS — M43.16 SPONDYLOLISTHESIS AT L4-L5 LEVEL: ICD-10-CM

## 2024-09-09 DIAGNOSIS — M43.06 LUMBAR SPONDYLOLYSIS: ICD-10-CM

## 2024-09-09 DIAGNOSIS — Z76.89 ENCOUNTER FOR SUPPORT AND COORDINATION OF TRANSITION OF CARE: Primary | ICD-10-CM

## 2024-09-09 DIAGNOSIS — I10 HYPERTENSION: ICD-10-CM

## 2024-09-09 DIAGNOSIS — Z98.1 S/P LUMBAR FUSION: ICD-10-CM

## 2024-09-09 PROCEDURE — 99495 TRANSJ CARE MGMT MOD F2F 14D: CPT

## 2024-09-09 RX ORDER — CHOLECALCIFEROL (VITAMIN D3) 25 MCG
2000 TABLET ORAL DAILY
Qty: 60 TABLET | Refills: 5 | Status: SHIPPED | OUTPATIENT
Start: 2024-09-09

## 2024-09-09 RX ORDER — LOSARTAN POTASSIUM 50 MG/1
50 TABLET ORAL DAILY
Qty: 90 TABLET | Refills: 0 | Status: SHIPPED | OUTPATIENT
Start: 2024-09-09 | End: 2024-12-08

## 2024-09-09 NOTE — ASSESSMENT & PLAN NOTE
Navigated L3-S1 decompression with instrumented fusion, TLIF. L4-S1 decompression/laminectomy   Doing well post operatively   Follow up with ortho 9/13   Currently in PT   Pain controlled with Oxycodone and Robaxin

## 2024-09-12 DIAGNOSIS — I10 PRIMARY HYPERTENSION: ICD-10-CM

## 2024-09-12 RX ORDER — ATENOLOL 50 MG/1
TABLET ORAL
Qty: 90 TABLET | Refills: 1 | Status: SHIPPED | OUTPATIENT
Start: 2024-09-12

## 2024-09-13 ENCOUNTER — HOSPITAL ENCOUNTER (OUTPATIENT)
Dept: RADIOLOGY | Facility: HOSPITAL | Age: 69
Discharge: HOME | End: 2024-09-13
Attending: ORTHOPAEDIC SURGERY
Payer: COMMERCIAL

## 2024-09-13 ENCOUNTER — OFFICE VISIT (OUTPATIENT)
Dept: OBGYN CLINIC | Facility: HOSPITAL | Age: 69
End: 2024-09-13

## 2024-09-13 VITALS
HEIGHT: 63 IN | HEART RATE: 67 BPM | BODY MASS INDEX: 32.03 KG/M2 | WEIGHT: 180.78 LBS | DIASTOLIC BLOOD PRESSURE: 85 MMHG | SYSTOLIC BLOOD PRESSURE: 162 MMHG

## 2024-09-13 DIAGNOSIS — Z48.89 AFTERCARE FOLLOWING SURGERY: Primary | ICD-10-CM

## 2024-09-13 DIAGNOSIS — Z48.89 AFTERCARE FOLLOWING SURGERY: ICD-10-CM

## 2024-09-13 PROCEDURE — 99024 POSTOP FOLLOW-UP VISIT: CPT | Performed by: ORTHOPAEDIC SURGERY

## 2024-09-13 PROCEDURE — 72100 X-RAY EXAM L-S SPINE 2/3 VWS: CPT

## 2024-09-16 ENCOUNTER — TELEPHONE (OUTPATIENT)
Age: 69
End: 2024-09-16

## 2024-09-16 RX ORDER — OXYCODONE HYDROCHLORIDE 5 MG/1
5 TABLET ORAL EVERY 6 HOURS PRN
Qty: 30 TABLET | Refills: 0 | Status: SHIPPED | OUTPATIENT
Start: 2024-09-16

## 2024-09-16 NOTE — TELEPHONE ENCOUNTER
PA for SUBMITTED oxyCODONE (Roxicodone) 5     via    [][facility]-KEY:   [x]Surescripts- [id number]   []Faxed to plan   []Other website   []Phone call Case ID #     Office notes sent, clinical questions answered. Awaiting determination    Turnaround time for your insurance to make a decision on your Prior Authorization can take 7-21 business days.

## 2024-09-16 NOTE — TELEPHONE ENCOUNTER
PA for oxy 5mg  APPROVED     Date(s) approved 3/15/2025        Patient advised by          []MyChart Message  []Phone call   []LMOM  []L/M to call office as no active Communication consent on file  []Unable to leave detailed message as VM not approved on Communication consent       Pharmacy advised by    [x]Fax  []Phone call    Approval letter scanned into Media Yes

## 2024-09-16 NOTE — PROGRESS NOTES
"POST OP NOTE       Mylene Wolff  here today s/p L3-S1 decompression/fusion         Surgery date: 8/29/24    Subjective: Preoperative symptoms were back pain, radicular leg pain, ambulatory dysfunction. Today, she reports significant improvement in symptoms.  She notes she is able to stand up straighter without any radicular symptoms.  She does have some mild low back pain but this is managed with nonopioid medications.  She has been using a walker as needed.  Patient admits to compliance with activity restrictions.  Denies any fevers, chills, and night sweats.  No cough, no shortness of breath.  No chest pain, no palpitations.      Post-Op Medications:  Tylenol and Robaxin as needed      Objective:  /85   Pulse 67   Ht 5' 3\" (1.6 m)   Wt 82 kg (180 lb 12.4 oz)   BMI 32.02 kg/m²     Strength: 5 out of 5 in bilateral lower extremities  Sensation: Intact light touch all dermatomes  DTR: Present  Gait: Nonantalgic, using a walker but able to ambulate independently in clinic    Posterior incision healing extremely well. Staples intact.  No signs of erythema, discharge, or purulence.  There is no appreciable effusion.    Calf: soft, non tender, no swelling or erythema     Imaging:  I have reviewed and independently visualized the imaging studies (09/16/24)  myself and my interpretation is the following: Instrumentation in place and in good alignment.    Assessment: s/p L3-S1 decompression and fusion, doing well    Plan:  Staples were removed.   Steris were applied.  Patient was instructed to do the following   -Able to walk as much as tolerated.  -Use walker or ambulatory device as needed  -Take medications as needed, oxycodone prescription was prescribed to take as needed if required  -Follow-up in 4 weeks for 6-week postoperative valuation  Mylene CESAR Joséon was instructed to call the office with any concerns or questions.   "

## 2024-09-17 ENCOUNTER — TELEPHONE (OUTPATIENT)
Age: 69
End: 2024-09-17

## 2024-09-17 DIAGNOSIS — M43.16 SPONDYLOLISTHESIS AT L4-L5 LEVEL: ICD-10-CM

## 2024-09-17 DIAGNOSIS — M43.06 LUMBAR SPONDYLOLYSIS: ICD-10-CM

## 2024-09-17 DIAGNOSIS — M47.816 LUMBAR SPONDYLOSIS: ICD-10-CM

## 2024-09-17 DIAGNOSIS — M43.16 SPONDYLOLISTHESIS AT L4-L5 LEVEL: Primary | ICD-10-CM

## 2024-09-17 DIAGNOSIS — Z98.1 S/P LUMBAR FUSION: ICD-10-CM

## 2024-09-17 RX ORDER — METHOCARBAMOL 500 MG/1
500 TABLET, FILM COATED ORAL EVERY 8 HOURS PRN
Qty: 30 TABLET | Refills: 0 | Status: SHIPPED | OUTPATIENT
Start: 2024-09-17

## 2024-09-17 RX ORDER — SENNOSIDES 8.6 MG
650 CAPSULE ORAL EVERY 8 HOURS PRN
Qty: 30 TABLET | Refills: 0 | Status: SHIPPED | OUTPATIENT
Start: 2024-09-17

## 2024-09-17 NOTE — TELEPHONE ENCOUNTER
Patient called the RX Refill Line. Message is being forwarded to the office.     Patient is requesting methocarbamol 500 mg 1 tab every 8 hours     Please contact patient at [phone]

## 2024-09-17 NOTE — TELEPHONE ENCOUNTER
Reason for call:   [x] Refill   [] Prior Auth  [] Other:     Office:   [] PCP/Provider -   [x] Specialty/Provider - [facility]     Medication: acetaminophen (TYLENOL) 650 mg CR tablet     Dose/Frequency: 650 mg, Every 8 hours PRN     Quantity: 30    Pharmacy: [id number]    Does the patient have enough for 3 days?   [] Yes   [x] No - Send as HP to POD

## 2024-09-20 ENCOUNTER — TELEPHONE (OUTPATIENT)
Age: 69
End: 2024-09-20

## 2024-09-20 NOTE — TELEPHONE ENCOUNTER
Caller: Patient ()     Doctor: Claudette    Reason for call: calling to check the status of FMLA forms     Call back#: [phone]

## 2024-10-01 ENCOUNTER — OFFICE VISIT (OUTPATIENT)
Dept: DENTISTRY | Facility: CLINIC | Age: 69
End: 2024-10-01

## 2024-10-01 VITALS — SYSTOLIC BLOOD PRESSURE: 148 MMHG | HEART RATE: 84 BPM | DIASTOLIC BLOOD PRESSURE: 68 MMHG

## 2024-10-01 DIAGNOSIS — K02.9 DENTAL CARIES: ICD-10-CM

## 2024-10-01 DIAGNOSIS — K03.6 ACCRETIONS ON TEETH: ICD-10-CM

## 2024-10-01 DIAGNOSIS — Z01.20 ENCOUNTER FOR DENTAL EXAMINATION: Primary | ICD-10-CM

## 2024-10-01 DIAGNOSIS — K03.6 DENTAL CALCULUS: ICD-10-CM

## 2024-10-01 PROCEDURE — D1110 PROPHYLAXIS - ADULT: HCPCS

## 2024-10-01 PROCEDURE — D0120 PERIODIC ORAL EVALUATION - ESTABLISHED PATIENT: HCPCS | Performed by: DENTIST

## 2024-10-01 NOTE — DENTAL PROCEDURE DETAILS
PERIODIC EXAM, ADULT PROPHY , (no xrays due)   Translation used for rooming  [id number]    REVIEWED MED HX: meds, allergies, health changes reviewed in EPIC. All consents signed.  CHIEF CONCERN: none   she states extraction # 16 went well    Lower RPD fits well  PAIN SCALE:  0  ASA CLASS:  ASA 2 - Patient with mild systemic disease with no functional limitations  PLAQUE:  mild  CALCULUS: Light  BLEEDING:  light  STAIN : Light     PERIO:     Hygiene Procedures:  Scaled, Polished, Flossed and Used Cavitron    Oral Hygiene Instruction: Brushing minimum 2x daily for 2 minutes, daily flossing, Interproximal brush, and Recommended soft toothbrush only    Dispensed: Toothbrush, Toothpaste, Floss, and Flossers    Visual and Tactile Intraoral/ Extraoral evaluation: Oral and Oropharyngeal cancer evaluation. No findings     Dr. MARQUEZ  Reviewed with patient clinical and radiographic findings and patient verbalized understanding. All questions and concerns addressed.     REFERRALS: None    CARIES FINDINGS:   needs class V # 13, 14 and # 3 nu  open magins       TREATMENT  PLAN :   NV:   # 3 nu  Next Recall: 6 month recall / bwx      Last BWX:   Last Panorex/ FMX :   3/2024

## 2024-10-02 ENCOUNTER — OFFICE VISIT (OUTPATIENT)
Dept: DENTISTRY | Facility: CLINIC | Age: 69
End: 2024-10-02

## 2024-10-02 VITALS — DIASTOLIC BLOOD PRESSURE: 78 MMHG | TEMPERATURE: 97.5 F | SYSTOLIC BLOOD PRESSURE: 122 MMHG | HEART RATE: 79 BPM

## 2024-10-02 DIAGNOSIS — K08.50 DEFECTIVE DENTAL RESTORATION: Primary | ICD-10-CM

## 2024-10-02 PROCEDURE — D2391 RESIN-BASED COMPOSITE - 1 SURFACE, POSTERIOR: HCPCS | Performed by: DENTIST

## 2024-10-02 NOTE — DENTAL PROCEDURE DETAILS
Composite Restoration #3 Occlusal    Mylene Wolff 69 y.o. female presents with self to Re for composite restoration  PMH reviewed, no changes, ASA II. Significant medical history: see list. Significant allergies: see list. Significant medications: see list.  Pain Level: 0/10  Diagnosis: Tooth #3 existing OL composite filling with disintegrated occlusal margins. Severe attrition and lack of inter-occlusal clearance.   Radiographs: current.   Consent:  Risks of specific procedure: need for RCT if pulp exposure occurs or in future if pulp is inflamed, need to revise tx plan based on extent of decay, damage to adjacent tooth and/or restoration.  Risks of any dental procedure: post procedural pain or sensitivity, local anesthetic side effects, allergic reaction to dental materials and medications, breakage of local anesthetic needle, aspiration of small dental tools, injury to nearby hard and soft tissues and anatomical structures.  Benefits: prevent further breakdown of tooth and its sequelae.  Alternatives:  no tx.  Tx plan for composite restoration #3 occlusal reviewed. Opportunity to ask questions given, all questions answered to degree of medical and dental certainty.  Patient understands and consent given by self via verbal consent.  Anesthesia: Not needed because patient denies.  Procedure details:  Isolation: cotton rolls, dry angles, and high volume suction  Prepped tooth #3 occlusal margins only while preserved intact part with high speed handpiece.  Caries removed with round carbide on slow speed.  Band placement: not applicable/needed for this restoration.   Etch with 37% H2PO4 15 seconds. Rinsed and suctioned.  Applied  with 20 second scrub, air dried, and light cured.  Restored with packable and flowable (A3 shade) and light cured.  Checked occlusion and adjusted with finishing burs.  Checked contacts with floss  Polished with enhance point.  Verified occlusion and contacts.  POI  is given. Reviewed oral hygiene and need for recall visits.   Patient dismissed ambulatory and alert.  NV: Continue restorative care.

## 2024-10-04 ENCOUNTER — HOSPITAL ENCOUNTER (OUTPATIENT)
Dept: MAMMOGRAPHY | Facility: CLINIC | Age: 69
End: 2024-10-04
Payer: COMMERCIAL

## 2024-10-04 VITALS — HEIGHT: 63 IN | BODY MASS INDEX: 31.89 KG/M2 | WEIGHT: 180 LBS

## 2024-10-04 DIAGNOSIS — Z12.31 ENCOUNTER FOR SCREENING MAMMOGRAM FOR BREAST CANCER: ICD-10-CM

## 2024-10-04 PROCEDURE — 77067 SCR MAMMO BI INCL CAD: CPT

## 2024-10-04 PROCEDURE — 77063 BREAST TOMOSYNTHESIS BI: CPT

## 2024-10-07 ENCOUNTER — TELEPHONE (OUTPATIENT)
Age: 69
End: 2024-10-07

## 2024-10-07 NOTE — TELEPHONE ENCOUNTER
jocelyn [facility] health care called in regards to wanting to know if patient is still being seen in the office. Jocelyn also wanted to confirm office fax number and address to fax over home health care orders.

## 2024-10-08 ENCOUNTER — TELEPHONE (OUTPATIENT)
Age: 69
End: 2024-10-08

## 2024-10-08 DIAGNOSIS — M43.06 LUMBAR SPONDYLOLYSIS: ICD-10-CM

## 2024-10-08 DIAGNOSIS — M43.16 SPONDYLOLISTHESIS AT L4-L5 LEVEL: ICD-10-CM

## 2024-10-08 DIAGNOSIS — Z98.1 S/P LUMBAR FUSION: ICD-10-CM

## 2024-10-08 NOTE — TELEPHONE ENCOUNTER
Pt called, requested refill on the following medication.   assisted with call   vipul  [phone]    Medication:     acetaminophen (TYLENOL) 650 mg CR tablet       Dose/Frequency:     Take 1 tablet (650 mg total) by mouth every 8 (eight) hours as needed for mild pain       Quantity: 30 tablets    Pharmacy: [facility]/pharmacy [id number] - [place] - [address] [place] [address]     Office:   [x] PCP/Provider - ?  [] Speciality/Provider -     Does the patient have enough for 3 days?   [x] Yes   [] No - Send as HP to POD

## 2024-10-08 NOTE — TELEPHONE ENCOUNTER
Pt called, is kindly requesting return call to discuss Mammogram results - done 10/4.   assisted with the call.  vipul  #[phone]

## 2024-10-08 NOTE — TELEPHONE ENCOUNTER
Results reviewed. There is a need for additional imaging, a breast health nurse will reach out to her to schedule further imaging

## 2024-10-08 NOTE — TELEPHONE ENCOUNTER
Spoke to pt and is aware a nurse is going to call for additional imaging which nurse already called.

## 2024-10-09 RX ORDER — SENNOSIDES 8.6 MG
650 CAPSULE ORAL EVERY 8 HOURS PRN
Qty: 30 TABLET | Refills: 0 | Status: SHIPPED | OUTPATIENT
Start: 2024-10-09

## 2024-10-14 ENCOUNTER — OFFICE VISIT (OUTPATIENT)
Dept: OBGYN CLINIC | Facility: HOSPITAL | Age: 69
End: 2024-10-14

## 2024-10-14 ENCOUNTER — HOSPITAL ENCOUNTER (OUTPATIENT)
Dept: RADIOLOGY | Facility: HOSPITAL | Age: 69
Discharge: HOME | End: 2024-10-14
Attending: ORTHOPAEDIC SURGERY
Payer: COMMERCIAL

## 2024-10-14 VITALS
SYSTOLIC BLOOD PRESSURE: 155 MMHG | DIASTOLIC BLOOD PRESSURE: 80 MMHG | HEIGHT: 63 IN | BODY MASS INDEX: 31.88 KG/M2 | WEIGHT: 179.9 LBS | HEART RATE: 71 BPM

## 2024-10-14 DIAGNOSIS — M43.16 SPONDYLOLISTHESIS AT L4-L5 LEVEL: ICD-10-CM

## 2024-10-14 DIAGNOSIS — M47.816 LUMBAR SPONDYLOSIS: ICD-10-CM

## 2024-10-14 DIAGNOSIS — Z98.1 S/P LUMBAR FUSION: ICD-10-CM

## 2024-10-14 DIAGNOSIS — Z98.1 S/P LUMBAR FUSION: Primary | ICD-10-CM

## 2024-10-14 PROCEDURE — 72100 X-RAY EXAM L-S SPINE 2/3 VWS: CPT

## 2024-10-14 PROCEDURE — 99024 POSTOP FOLLOW-UP VISIT: CPT | Performed by: ORTHOPAEDIC SURGERY

## 2024-10-14 RX ORDER — METHOCARBAMOL 500 MG/1
500 TABLET, FILM COATED ORAL EVERY 8 HOURS PRN
Qty: 30 TABLET | Refills: 0 | Status: SHIPPED | OUTPATIENT
Start: 2024-10-14

## 2024-10-15 NOTE — ANESTHESIA POSTPROCEDURE EVALUATION
Post-Op Assessment Note    CV Status:  Stable    Pain management: adequate       Mental Status:  Awake   Hydration Status:  Euvolemic   PONV Controlled:  Controlled   Airway Patency:  Patent     Post Op Vitals Reviewed: Yes    No anethesia notable event occurred.    Staff: Anesthesiologist           Last Filed PACU Vitals:  Vitals Value Taken Time   Temp 97.8 °F (36.6 °C) 08/29/24 2359   Pulse 70 08/29/24 2359   /80 08/29/24 2359   Resp 18 08/29/24 2359   SpO2 99 % 08/29/24 2359       Modified Melissa:  No data recorded       5

## 2024-10-17 NOTE — PROGRESS NOTES
"POST OP NOTE       Mylene Wolff  here today s/p L3-S1 decompression/fusion         Surgery date: 8/29/24    Subjective: Preoperative symptoms were back pain, radicular leg pain, ambulatory dysfunction. Today, she reports continued significant improvement in symptoms.  Not using assistive devices.  Walking better.   Patient admits to compliance with activity restrictions.  Denies any fevers, chills, and night sweats.  No cough, no shortness of breath.  No chest pain, no palpitations.      Post-Op Medications:  Tylenol and Robaxin as needed    Objective:  /80   Pulse 71   Ht 5' 3\" (1.6 m)   Wt 81.6 kg (179 lb 14.3 oz)   BMI 31.87 kg/m²     Strength: 5 out of 5 in bilateral lower extremities  Sensation: Intact light touch all dermatomes  DTR: Present  Gait: Nonantalgic, independent     Posterior incision healing extremely well. No signs of erythema, discharge, or purulence.  There is no appreciable effusion.    Calf: soft, non tender, no swelling or erythema     Imaging:  I have reviewed and independently visualized the imaging studies (10/17/24)  myself and my interpretation is the following: Instrumentation in place and in good alignment.    Assessment: s/p L3-S1 decompression and fusion, doing well    Plan:  Patient was instructed to do the following   -Able to walk as much as tolerated   -Physical therapy prescription provided to work on isometric core and lumbar strengthening, lumbar stretching and lower extremity stretching exercises  -Take medications as needed, Robaxin prescription refill provided  -Follow-up in 6 weeks for postop evaluation  Mylene Wolff was instructed to call the office with any concerns or questions.   "

## 2024-10-17 NOTE — TELEPHONE ENCOUNTER
Magdalena from [facility] called the office to verify that we received home care orders for pt. Fax number was given again.

## 2024-10-22 ENCOUNTER — OFFICE VISIT (OUTPATIENT)
Dept: DENTISTRY | Facility: CLINIC | Age: 69
End: 2024-10-22

## 2024-10-22 VITALS — TEMPERATURE: 98 F | DIASTOLIC BLOOD PRESSURE: 84 MMHG | HEART RATE: 82 BPM | SYSTOLIC BLOOD PRESSURE: 164 MMHG

## 2024-10-22 DIAGNOSIS — K02.9 CARIES: Primary | ICD-10-CM

## 2024-10-22 PROCEDURE — D2391 RESIN-BASED COMPOSITE - 1 SURFACE, POSTERIOR: HCPCS | Performed by: DENTIST

## 2024-10-22 NOTE — DENTAL PROCEDURE DETAILS
Composite Restorations #13 and #14 F     Mylene CallahanMimion 69 y.o. female presents with self to Re for composite restorations  PMH reviewed, no changes, ASA II. Significant medical history: see list. Significant allergies: see list. Significant medications: see list.  Pain Level: 0/10  Diagnosis: Teeth #13 and #14 deep facial abfractions.   Radiographs: current.   Consent:  Risks of specific procedure: need for RCT if pulp exposure occurs or in future if pulp is inflamed, need to revise tx plan based on extent of decay, damage to adjacent tooth and/or restoration.  Risks of any dental procedure: post procedural pain or sensitivity, local anesthetic side effects, allergic reaction to dental materials and medications, breakage of local anesthetic needle, aspiration of small dental tools, injury to nearby hard and soft tissues and anatomical structures.  Benefits: prevent further breakdown of tooth and its sequelae.  Alternatives:  no tx.  Tx plan for composite restoration #3 occlusal reviewed. Opportunity to ask questions given, all questions answered to degree of medical and dental certainty.  Patient understands and consent given by self via verbal consent.  Anesthesia: Not needed because patient denies.  Procedure details:  Isolation: cotton rolls, dry angles, and high volume suction  Prepped teeth #13 and #14 class V facial with high speed handpiece.  Caries removed with round carbide on slow speed.  Band placement: not applicable/needed for this restoration.   Etch with 37% H2PO4 15 seconds. Rinsed and suctioned.  Applied  with 20 second scrub, air dried, and light cured.  Restored with packable and flowable (A3 shade) and light cured.  Checked occlusion and adjusted with finishing burs.  Checked contacts with floss  Polished with enhance point.  Verified occlusion and contacts.  POI is given. Reviewed oral hygiene and need for recall visits.   Patient dismissed ambulatory and alert.  NV:  Recall.

## 2024-11-01 ENCOUNTER — TELEPHONE (OUTPATIENT)
Dept: OBGYN CLINIC | Facility: HOSPITAL | Age: 69
End: 2024-11-01

## 2024-11-01 DIAGNOSIS — M43.06 LUMBAR SPONDYLOLYSIS: ICD-10-CM

## 2024-11-01 DIAGNOSIS — M43.16 SPONDYLOLISTHESIS AT L4-L5 LEVEL: ICD-10-CM

## 2024-11-01 DIAGNOSIS — Z98.1 S/P LUMBAR FUSION: ICD-10-CM

## 2024-11-01 DIAGNOSIS — M47.816 LUMBAR SPONDYLOSIS: ICD-10-CM

## 2024-11-01 RX ORDER — SENNOSIDES 8.6 MG
650 CAPSULE ORAL EVERY 8 HOURS PRN
Qty: 30 TABLET | Refills: 0 | Status: SHIPPED | OUTPATIENT
Start: 2024-11-01

## 2024-11-01 RX ORDER — METHOCARBAMOL 500 MG/1
500 TABLET, FILM COATED ORAL EVERY 8 HOURS PRN
Qty: 30 TABLET | Refills: 0 | Status: SHIPPED | OUTPATIENT
Start: 2024-11-01

## 2024-12-03 ENCOUNTER — TELEPHONE (OUTPATIENT)
Age: 69
End: 2024-12-03

## 2024-12-13 ENCOUNTER — TELEPHONE (OUTPATIENT)
Age: 69
End: 2024-12-13

## 2024-12-13 DIAGNOSIS — E78.2 MIXED HYPERLIPIDEMIA: ICD-10-CM

## 2024-12-13 DIAGNOSIS — M79.18 MUSCLE PAIN, LUMBAR: ICD-10-CM

## 2024-12-13 RX ORDER — ROSUVASTATIN CALCIUM 10 MG/1
10 TABLET, COATED ORAL DAILY
Qty: 90 TABLET | Refills: 1 | Status: SHIPPED | OUTPATIENT
Start: 2024-12-13

## 2024-12-13 NOTE — TELEPHONE ENCOUNTER
PA for Diclofenac Sodium 1% DENIED    Reason:    Message sent to office clinical pool Yes    Denial letter scanned into Media Yes    Appeal started No (Provider will need to decide if appeal is warranted and send clinical documentation to Prior Authorization Team for initiation.)    **Please follow up with your patient regarding denial and next steps**

## 2024-12-13 NOTE — TELEPHONE ENCOUNTER
Medication: rosuvastatin (CRESTOR) 10 MG     Dose/Frequency: 1 tablet daily     Quantity: 90    Pharmacy: [facility]    Office:   [x] PCP/Provider -   [] Speciality/Provider -     Does the patient have enough for 3 days?   [x] Yes   [] No - Send as HP to POD      Medication: Diclofenac Sodium (VOLTAREN) 1 %     Dose/Frequency: Apply 2 g topically 4 times a day     Quantity: 100g    Pharmacy: [facility]    Office:   [x] PCP/Provider -   [] Speciality/Provider -     Does the patient have enough for 3 days?   [] Yes   [] No - Send as HP to POD

## 2024-12-13 NOTE — TELEPHONE ENCOUNTER
PA for Diclofenac Sodium 1% SU[place]ITTED to [facility]    via    []CMM-KEY:   [x]Surescripts-Case ID #[id number]    []Availity-Auth ID # NDC #   []Faxed to plan   []Other website   []Phone call Case ID #     [x]PA sent as URGENT    All office notes, labs and other pertaining documents and studies sent. Clinical questions answered. Awaiting determination from insurance company.     Turnaround time for your insurance to make a decision on your Prior Authorization can take 7-21 business days.

## 2024-12-16 ENCOUNTER — HOSPITAL ENCOUNTER (EMERGENCY)
Facility: HOSPITAL | Age: 69
Discharge: HOME | End: 2024-12-17
Payer: COMMERCIAL

## 2024-12-16 ENCOUNTER — TELEPHONE (OUTPATIENT)
Dept: FAMILY MEDICINE CLINIC | Facility: CLINIC | Age: 69
End: 2024-12-16

## 2024-12-16 ENCOUNTER — OFFICE VISIT (OUTPATIENT)
Dept: FAMILY MEDICINE CLINIC | Facility: CLINIC | Age: 69
End: 2024-12-16
Payer: COMMERCIAL

## 2024-12-16 ENCOUNTER — APPOINTMENT (EMERGENCY)
Dept: RADIOLOGY | Facility: HOSPITAL | Age: 69
End: 2024-12-16
Payer: COMMERCIAL

## 2024-12-16 VITALS
WEIGHT: 179 LBS | HEART RATE: 64 BPM | OXYGEN SATURATION: 98 % | BODY MASS INDEX: 31.71 KG/M2 | DIASTOLIC BLOOD PRESSURE: 98 MMHG | HEIGHT: 63 IN | TEMPERATURE: 97.4 F | SYSTOLIC BLOOD PRESSURE: 168 MMHG

## 2024-12-16 DIAGNOSIS — I10 UNCONTROLLED HYPERTENSION: ICD-10-CM

## 2024-12-16 DIAGNOSIS — M43.16 SPONDYLOLISTHESIS AT L4-L5 LEVEL: ICD-10-CM

## 2024-12-16 DIAGNOSIS — M25.561 ACUTE PAIN OF RIGHT KNEE: Primary | ICD-10-CM

## 2024-12-16 DIAGNOSIS — M25.561 RIGHT KNEE PAIN: ICD-10-CM

## 2024-12-16 DIAGNOSIS — I10 ASYMPTOMATIC HYPERTENSION: Primary | ICD-10-CM

## 2024-12-16 DIAGNOSIS — M43.06 LUMBAR SPONDYLOLYSIS: ICD-10-CM

## 2024-12-16 LAB
ALBUMIN SERPL BCG-MCNC: 4.5 G/DL (ref 3.5–5)
ALP SERPL-CCNC: 90 U/L (ref 34–104)
ALT SERPL W P-5'-P-CCNC: 17 U/L (ref 7–52)
ANION GAP SERPL CALCULATED.3IONS-SCNC: 7 MMOL/L (ref 4–13)
AST SERPL W P-5'-P-CCNC: 20 U/L (ref 13–39)
BASOPHILS # BLD AUTO: 0.05 THOUSANDS/ÂΜL (ref 0–0.1)
BASOPHILS NFR BLD AUTO: 1 % (ref 0–1)
BILIRUB SERPL-MCNC: 0.32 MG/DL (ref 0.2–1)
BUN SERPL-MCNC: 11 MG/DL (ref 5–25)
CALCIUM SERPL-MCNC: 9.2 MG/DL (ref 8.4–10.2)
CARDIAC TROPONIN I PNL SERPL HS: 4 NG/L (ref ?–50)
CHLORIDE SERPL-SCNC: 103 MMOL/L (ref 96–108)
CO2 SERPL-SCNC: 28 MMOL/L (ref 21–32)
CREAT SERPL-MCNC: 0.73 MG/DL (ref 0.6–1.3)
EOSINOPHIL # BLD AUTO: 0.23 THOUSAND/ÂΜL (ref 0–0.61)
EOSINOPHIL NFR BLD AUTO: 3 % (ref 0–6)
ERYTHROCYTE [DISTWIDTH] IN BLOOD BY AUTOMATED COUNT: 17.3 % (ref 11.6–15.1)
GFR SERPL CREATININE-BSD FRML MDRD: 84 ML/MIN/1.73SQ M
GLUCOSE SERPL-MCNC: 84 MG/DL (ref 65–140)
HCT VFR BLD AUTO: 42.5 % (ref 34.8–46.1)
HGB BLD-MCNC: 13.1 G/DL (ref 11.5–15.4)
IMM GRANULOCYTES # BLD AUTO: 0.01 THOUSAND/UL (ref 0–0.2)
IMM GRANULOCYTES NFR BLD AUTO: 0 % (ref 0–2)
LYMPHOCYTES # BLD AUTO: 2.5 THOUSANDS/ÂΜL (ref 0.6–4.47)
LYMPHOCYTES NFR BLD AUTO: 28 % (ref 14–44)
MCH RBC QN AUTO: 27.2 PG (ref 26.8–34.3)
MCHC RBC AUTO-ENTMCNC: 30.8 G/DL (ref 31.4–37.4)
MCV RBC AUTO: 88 FL (ref 82–98)
MONOCYTES # BLD AUTO: 0.68 THOUSAND/ÂΜL (ref 0.17–1.22)
MONOCYTES NFR BLD AUTO: 8 % (ref 4–12)
NEUTROPHILS # BLD AUTO: 5.61 THOUSANDS/ÂΜL (ref 1.85–7.62)
NEUTS SEG NFR BLD AUTO: 60 % (ref 43–75)
NRBC BLD AUTO-RTO: 0 /100 WBCS
PLATELET # BLD AUTO: 232 THOUSANDS/UL (ref 149–390)
PMV BLD AUTO: 11.6 FL (ref 8.9–12.7)
POTASSIUM SERPL-SCNC: 3.6 MMOL/L (ref 3.5–5.3)
PROT SERPL-MCNC: 7.5 G/DL (ref 6.4–8.4)
RBC # BLD AUTO: 4.81 MILLION/UL (ref 3.81–5.12)
SODIUM SERPL-SCNC: 138 MMOL/L (ref 135–147)
WBC # BLD AUTO: 9.08 THOUSAND/UL (ref 4.31–10.16)

## 2024-12-16 PROCEDURE — 85025 COMPLETE CBC W/AUTO DIFF WBC: CPT

## 2024-12-16 PROCEDURE — 80053 COMPREHEN METABOLIC PANEL: CPT

## 2024-12-16 PROCEDURE — 36415 COLL VENOUS BLD VENIPUNCTURE: CPT

## 2024-12-16 PROCEDURE — 99285 EMERGENCY DEPT VISIT HI MDM: CPT

## 2024-12-16 PROCEDURE — 99213 OFFICE O/P EST LOW 20 MIN: CPT

## 2024-12-16 PROCEDURE — 99284 EMERGENCY DEPT VISIT MOD MDM: CPT

## 2024-12-16 PROCEDURE — 84484 ASSAY OF TROPONIN QUANT: CPT

## 2024-12-16 PROCEDURE — 93005 ELECTROCARDIOGRAM TRACING: CPT

## 2024-12-16 PROCEDURE — 71045 X-RAY EXAM CHEST 1 VIEW: CPT

## 2024-12-16 RX ORDER — METHOCARBAMOL 500 MG/1
500 TABLET, FILM COATED ORAL EVERY 8 HOURS PRN
Qty: 30 TABLET | Refills: 0 | Status: SHIPPED | OUTPATIENT
Start: 2024-12-16

## 2024-12-16 RX ORDER — IBUPROFEN 600 MG/1
600 TABLET, FILM COATED ORAL EVERY 8 HOURS SCHEDULED
Qty: 15 TABLET | Refills: 0 | Status: SHIPPED | OUTPATIENT
Start: 2024-12-16 | End: 2024-12-21

## 2024-12-16 RX ORDER — ACETAMINOPHEN 500 MG
1000 TABLET ORAL EVERY 8 HOURS PRN
Qty: 60 TABLET | Refills: 0 | Status: SHIPPED | OUTPATIENT
Start: 2024-12-16

## 2024-12-16 NOTE — ASSESSMENT & PLAN NOTE
Orders:  •  methocarbamol (ROBAXIN) 500 mg tablet; Take 1 tablet (500 mg total) by mouth every 8 (eight) hours as needed for muscle spasms May make you drowsy or dizzy.

## 2024-12-16 NOTE — TELEPHONE ENCOUNTER
Patient called the RX Refill Line. Message is being forwarded to the office.     Patient is requesting - Pt called and stated she was prescribed today ibuprofen (MOTRIN) 600 mg. Pt states she is allergic to motrin she had taken that medication before and her eyes gets swollen. Please review.

## 2024-12-16 NOTE — PROGRESS NOTES
Name: Mylene Wolff      : 1955      MRN: [id number]  Encounter Provider: Clemente Roblero MD  Encounter Date: 2024   Encounter department: [facility]    Assessment & Plan  Acute pain of right knee  Likely osteoarthritis   Requesting xray so will do knee xray, as injury as well   Ibuprofen 600 mg q8hrs jasmin for 5 days then prn   Tylenol 1000 mg q8hrs prn   Ortho referral   Orders:  •  XR knee 4+ vw right injury; Future  •  Ambulatory Referral to Orthopedic Surgery; Future  •  acetaminophen (TYLENOL) 500 mg tablet; Take 2 tablets (1,000 mg total) by mouth every 8 (eight) hours as needed for mild pain  •  ibuprofen (MOTRIN) 600 mg tablet; Take 1 tablet (600 mg total) by mouth every 8 (eight) hours for 5 days    Lumbar spondylolysis    Orders:  •  methocarbamol (ROBAXIN) 500 mg tablet; Take 1 tablet (500 mg total) by mouth every 8 (eight) hours as needed for muscle spasms May make you drowsy or dizzy.    Spondylolisthesis at L4-L5 level    Orders:  •  methocarbamol (ROBAXIN) 500 mg tablet; Take 1 tablet (500 mg total) by mouth every 8 (eight) hours as needed for muscle spasms May make you drowsy or dizzy.         History of Present Illness     Mylene Wolff is a 69 y.o. female with pmhx of lumbar spondylosis presenting today right knee pain       Right knee pain with swelling, was in the bathroom approx a month and it her knee into the wall. She felt pain and discomfort after         Knee Pain   The incident occurred more than 1 week ago. The incident occurred at home. The injury mechanism was a direct blow. The pain is present in the right knee. The pain is at a severity of 10/10. The pain is moderate. Associated symptoms include an inability to bear weight and muscle weakness. The symptoms are aggravated by movement and weight bearing.     Review of Systems   Constitutional:  Negative for chills and fever.   HENT:  Negative for ear pain and sore  throat.    Eyes:  Negative for pain and visual disturbance.   Respiratory:  Negative for cough and shortness of breath.    Cardiovascular:  Negative for chest pain and palpitations.   Gastrointestinal:  Negative for abdominal pain and vomiting.   Genitourinary:  Negative for dysuria and hematuria.   Musculoskeletal:  Negative for arthralgias and back pain.   Skin:  Negative for color change and rash.   Neurological:  Negative for seizures and syncope.   All other systems reviewed and are negative.    Past Medical History:   Diagnosis Date   • Allergic    • Hammertoe of right foot     OR   reapir today 2023   • Hyperlipidemia    • Hypertension    • Kidney stone    • Wears glasses      Past Surgical History:   Procedure Laterality Date   •  SECTION     • HERNIA REPAIR  1969   • HYSTERECTOMY      removed some of left ovary   • OOPHORECTOMY Right    • TN ARTHRODESIS COMBINED TQ 1NTRSPC LUMBAR Bilateral 2024    Procedure: Navigated L3-S1 decompression with instrumented fusion, TLIF. L4-S1 decompression/laminectomy;  Surgeon: Ephraim Wilkins MD;  Location:  MAIN OR;  Service: Orthopedics   • TN CORRECTION HAMMERTOE Right 2023    Procedure: REPAIR HAMMERTOE 3RD TOE;  Surgeon: Lokesh Simmons DPM;  Location: [facility];  Service: Podiatry   • TN LAPS ABD PRTM&OMENTUM DX W/WO SPEC BR/WA SPX N/A 2023    Procedure: LAPAROSCOPY DIAGNOSTIC, LYSIS OF ADHESIONS;  Surgeon: Debbie Clark MD;  Location: [facility];  Service: General     Family History   Problem Relation Age of Onset   • Breast cancer Mother    • Thrombosis Father    • No Known Problems Sister    • No Known Problems Sister    • No Known Problems Sister    • Hypertension Sister    • No Known Problems Daughter    • No Known Problems Daughter    • No Known Problems Daughter    • No Known Problems Daughter    • Diabetes Maternal Grandmother    • No Known Problems Maternal Grandfather    • No Known Problems Paternal  Grandmother    • No Known Problems Paternal Grandfather    • Hypertension Brother    • Hypertension Brother      Social History     Tobacco Use   • Smoking status: Never     Passive exposure: Never   • Smokeless tobacco: Never   Vaping Use   • Vaping status: Never Used   Substance and Sexual Activity   • Alcohol use: Not Currently   • Drug use: Not Currently   • Sexual activity: Not Currently     Partners: Male     Current Outpatient Medications on File Prior to Visit   Medication Sig   • atenolol (TENORMIN) 50 mg tablet TOME ED TABLETA TODOS LOS OLIVA   • Blood Pressure Monitoring (Adult Blood Pressure Cuff Lg) KIT Use in the morning   • cholecalciferol (VITAMIN D3) 1,000 units tablet Take 2 tablets (2,000 Units total) by mouth daily   • Cranberry 125 MG TABS Take by mouth   • Mirabegron ER (Myrbetriq) 50 MG TB24 every 24 hours   • oxyCODONE (Roxicodone) 5 immediate release tablet Take 1 tablet (5 mg total) by mouth every 6 (six) hours as needed for moderate pain Max Daily Amount: 20 mg   • rosuvastatin (CRESTOR) 10 MG tablet Take 1 tablet (10 mg total) by mouth daily   • [DISCONTINUED] acetaminophen (TYLENOL) 650 mg CR tablet Take 1 tablet (650 mg total) by mouth every 8 (eight) hours as needed for mild pain   • [DISCONTINUED] Diclofenac Sodium (VOLTAREN) 1 % Apply 2 g topically 4 (four) times a day   • [DISCONTINUED] methocarbamol (ROBAXIN) 500 mg tablet Take 1 tablet (500 mg total) by mouth every 8 (eight) hours as needed for muscle spasms May make you drowsy or dizzy.   • losartan (COZAAR) 50 mg tablet Take 1 tablet (50 mg total) by mouth daily   • senna (SENOKOT) 8.6 mg Take 1 tablet (8.6 mg total) by mouth daily at bedtime for 7 days     Allergies   Allergen Reactions   • Aspirin Swelling     Immunization History   Administered Date(s) Administered   • INFLUENZA 12/08/2022   • Influenza, high dose seasonal 0.7 mL 12/08/2022   • Pneumococcal Conjugate Vaccine 20-valent (Pcv20), Polysace 12/08/2022   • Tdap  "01/01/2016     Objective   /98 (BP Location: Left arm, Patient Position: Sitting, Cuff Size: Large)   Pulse 64   Temp (!) 97.4 °F (36.3 °C) (Temporal)   Ht 5' 3\" (1.6 m)   Wt 81.2 kg (179 lb)   SpO2 98%   BMI 31.71 kg/m²     Physical Exam  Vitals and nursing note reviewed.   Constitutional:       General: She is not in acute distress.     Appearance: She is well-developed.   HENT:      Head: Normocephalic and atraumatic.   Eyes:      Conjunctiva/sclera: Conjunctivae normal.   Cardiovascular:      Rate and Rhythm: Normal rate and regular rhythm.      Heart sounds: No murmur heard.  Pulmonary:      Effort: Pulmonary effort is normal. No respiratory distress.      Breath sounds: Normal breath sounds.   Abdominal:      Palpations: Abdomen is soft.      Tenderness: There is no abdominal tenderness.   Musculoskeletal:         General: No swelling.      Cervical back: Neck supple.      Right knee: Swelling present. Decreased range of motion. Tenderness present over the MCL.      Left knee: Normal.   Skin:     General: Skin is warm and dry.      Capillary Refill: Capillary refill takes less than 2 seconds.   Neurological:      Mental Status: She is alert.   Psychiatric:         Mood and Affect: Mood normal.         "

## 2024-12-17 ENCOUNTER — APPOINTMENT (EMERGENCY)
Dept: RADIOLOGY | Facility: HOSPITAL | Age: 69
End: 2024-12-17
Payer: COMMERCIAL

## 2024-12-17 VITALS
WEIGHT: 178.13 LBS | TEMPERATURE: 98.6 F | SYSTOLIC BLOOD PRESSURE: 187 MMHG | BODY MASS INDEX: 31.55 KG/M2 | HEART RATE: 73 BPM | RESPIRATION RATE: 20 BRPM | DIASTOLIC BLOOD PRESSURE: 81 MMHG | OXYGEN SATURATION: 99 %

## 2024-12-17 LAB
ATRIAL RATE: 75 BPM
P AXIS: 63 DEGREES
PR INTERVAL: 176 MS
QRS AXIS: 17 DEGREES
QRSD INTERVAL: 78 MS
QT INTERVAL: 380 MS
QTC INTERVAL: 424 MS
T WAVE AXIS: 56 DEGREES
VENTRICULAR RATE: 75 BPM

## 2024-12-17 PROCEDURE — 93010 ELECTROCARDIOGRAM REPORT: CPT | Performed by: INTERNAL MEDICINE

## 2024-12-17 PROCEDURE — 73564 X-RAY EXAM KNEE 4 OR MORE: CPT

## 2024-12-17 RX ORDER — LIDOCAINE 50 MG/G
1 PATCH TOPICAL DAILY
Qty: 30 PATCH | Refills: 0 | Status: SHIPPED | OUTPATIENT
Start: 2024-12-17

## 2024-12-17 NOTE — ED PROVIDER NOTES
Time reflects when diagnosis was documented in both MDM as applicable and the Disposition within this note       Time User Action Codes Description Comment    12/16/2024 11:42 PM Marky Pembertont Add [I10] Asymptomatic hypertension     12/16/2024 11:42 PM Marky Pembertont Add [I10] Uncontrolled hypertension     12/17/2024 12:10 AM Marky Pembertont Add [M25.561] Right knee pain           ED Disposition       ED Disposition   Discharge    Condition   Stable    Date/Time   Mon Dec 16, 2024 11:42 PM    Comment   Mylene Wolff discharge to home/self care.                   Assessment & Plan       Medical Decision Making  Patient with history as below presented with hypertension. History obtained from patient.    Differential diagnosis includes: Asymptomatic hypertension, uncontrolled hypertension, arrhythmia, anemia, ACS    Plan: CBC, CMP, troponin, ECG, chest x-ray    ECG independently interpreted by myself as below. Labs reviewed and unremarkable. Independently reviewed imaging without acute cardiopulmonary disease.  Patient now asking for imaging of her right knee which is ordered as an outpatient because of some ongoing right-sided knee pain that she has been experiencing.  This was ordered and without acute osseous abnormality, likely secondary to osteoarthritis. Reassessed the patient and they continue to be well appearing. Presentation most consistent with asymptomatic hypertension.  Will outpatient follow-up with primary care physician for medication adjustments if warranted.  Right-sided knee pain likely secondary to osteoarthritis. Stable for outpatient management.    Disposition: Discharged with instructions to obtain outpatient follow up of patient's symptoms and findings, with strict return precautions if patient develops new or worsening symptoms. Patient understands this plan and is agreeable. All questions answered. Patient discharged home with return precautions.    Amount and/or Complexity of  "Data Reviewed  Labs: ordered. Decision-making details documented in ED Course.  Radiology: ordered and independent interpretation performed.    Risk  Prescription drug management.        ED Course as of 12/17/24 0232   Mon Dec 16, 2024   2239 Procedure Note: EKG  Date/Time: 12/16/24 10:39 PM   Interpreted by: Brenton Pemberton   Indications / Diagnosis: first nurse  ECG reviewed by me, the ED Provider: yes   The EKG demonstrates:  Rhythm: normal sinus  Intervals: normal intervals  Axis: normal axis  QRS/Blocks: normal QRS  ST Changes: Nonspecific ST changes   2341 hs TnI 0hr: 4  Asymptomatic, no indication for delta   Tue Dec 17, 2024   0011 No acute osseous abnormality on knee x-ray yet to cross over in system.       Medications - No data to display    ED Risk Strat Scores                          SBIRT 22yo+      Flowsheet Row Most Recent Value   Initial Alcohol Screen: US AUDIT-C     1. How often do you have a drink containing alcohol? 0 Filed at: 12/16/2024 2249   2. How many drinks containing alcohol do you have on a typical day you are drinking?  0 Filed at: 12/16/2024 2249   3b. FEMALE Any Age, or MALE 65+: How often do you have 4 or more drinks on one occassion? 0 Filed at: 12/16/2024 2249   Audit-C Score 0 Filed at: 12/16/2024 2249   YULIANA: How many times in the past year have you...    Used an illegal drug or used a prescription medication for non-medical reasons? Never Filed at: 12/16/2024 2249                            History of Present Illness       Chief Complaint   Patient presents with    Hypertension     Pt states she started feeling \"weird\" and like \"her blood pressure was going up.\" About 10 minutes ago. Her systolic at home was 217. Hx of HTN and took bp medication today. Denies CP, SOB, HA, N/V/D. C/o her \"ears feel hot and her lips feel dry.\"        Past Medical History:   Diagnosis Date    Allergic     Hammertoe of right foot     OR   reapir today 7/21/2023    Hyperlipidemia     Hypertension  "    Kidney stone     Wears glasses       Past Surgical History:   Procedure Laterality Date     SECTION      HERNIA REPAIR  1969    HYSTERECTOMY  1994    removed some of left ovary    OOPHORECTOMY Right     OH ARTHRODESIS COMBINED TQ 1NTRSPC LUMBAR Bilateral 2024    Procedure: Navigated L3-S1 decompression with instrumented fusion, TLIF. L4-S1 decompression/laminectomy;  Surgeon: Ephraim Wilkins MD;  Location:  MAIN OR;  Service: Orthopedics    OH CORRECTION HAMMERTOE Right 2023    Procedure: REPAIR HAMMERTOE 3RD TOE;  Surgeon: Lokesh Simmons DPM;  [facility];  Service: Podiatry    OH LAPS ABD PRTM&OMENTUM DX W/WO SPEC BR/WA SPX N/A 2023    Procedure: LAPAROSCOPY DIAGNOSTIC, LYSIS OF ADHESIONS;  Surgeon: Debbie Clark MD;  Location: [facility];  Service: General      Family History   Problem Relation Age of Onset    Breast cancer Mother     Thrombosis Father     No Known Problems Sister     No Known Problems Sister     No Known Problems Sister     Hypertension Sister     No Known Problems Daughter     No Known Problems Daughter     No Known Problems Daughter     No Known Problems Daughter     Diabetes Maternal Grandmother     No Known Problems Maternal Grandfather     No Known Problems Paternal Grandmother     No Known Problems Paternal Grandfather     Hypertension Brother     Hypertension Brother       Social History     Tobacco Use    Smoking status: Never     Passive exposure: Never    Smokeless tobacco: Never   Vaping Use    Vaping status: Never Used   Substance Use Topics    Alcohol use: Not Currently    Drug use: Not Currently      E-Cigarette/Vaping    E-Cigarette Use Never User       E-Cigarette/Vaping Substances    Nicotine No     THC No     CBD No     Flavoring No     Other No     Unknown No       I have reviewed and agree with the history as documented.     Patient is a 69-year-old female with a significant past medical history of hypertension,  "hyperlipidemia, presenting for evaluation of elevated blood pressure readings at home.  Patient reports that she had an elevated blood pressure reading at an outpatient office visit earlier today, so she took her blood pressure again when she got home and noted that it was persistently elevated.  This concerned her and brought him in for further evaluation.  Patient does note that she has been having some elevated readings at recent office visits.  Her primary care doctor is not interested in this.  She denies any symptoms of chest pain, difficulty breathing, neurological deficit, decreased urination.  She does report that she had \"hot feeling\" behind her ears that has since resolved.  She reports compliance with her antihypertensives without any recent dose changes.  She does note that she has been getting some pain in her right knee as of late that she is following with as an outpatient. She is otherwise without complaint.        Review of Systems   Eyes:  Negative for visual disturbance.   Respiratory:  Negative for shortness of breath.    Cardiovascular:  Negative for chest pain.   Gastrointestinal:  Negative for vomiting.   Genitourinary:  Negative for decreased urine volume.   Neurological:  Negative for weakness, numbness and headaches.           Objective       ED Triage Vitals   Temperature Pulse Blood Pressure Respirations SpO2 Patient Position - Orthostatic VS   12/16/24 2309 12/16/24 2231 12/16/24 2231 12/16/24 2231 12/16/24 2231 12/16/24 2231   98.6 °F (37 °C) 73 (!) 199/95 20 99 % Lying      Temp Source Heart Rate Source BP Location FiO2 (%) Pain Score    12/16/24 2309 12/16/24 2231 12/16/24 2231 -- --    Oral Monitor Right arm        Vitals      Date and Time Temp Pulse SpO2 Resp BP Pain Score FACES Pain Rating User   12/17/24 0109 -- -- -- -- 187/81 -- -- VF   12/16/24 2330 -- 73 99 % 20 182/81 -- -- VF   12/16/24 2309 98.6 °F (37 °C) 69 99 % 20 184/88 -- -- VF   12/16/24 2246 -- -- -- -- 171/81 -- " -- GJ   12/16/24 2245 -- 73 99 % 20 171/81 -- -- VF   12/16/24 2231 -- 73 99 % 20 199/95 -- --             Physical Exam  Vitals and nursing note reviewed.   Constitutional:       General: She is not in acute distress.     Appearance: Normal appearance. She is not ill-appearing.   HENT:      Head: Normocephalic and atraumatic.      Right Ear: External ear normal.      Left Ear: External ear normal.      Nose: Nose normal.      Mouth/Throat:      Mouth: Mucous membranes are moist.   Eyes:      General: No visual field deficit or scleral icterus.        Right eye: No discharge.         Left eye: No discharge.      Extraocular Movements: Extraocular movements intact.      Conjunctiva/sclera: Conjunctivae normal.      Pupils: Pupils are equal, round, and reactive to light.   Cardiovascular:      Rate and Rhythm: Normal rate and regular rhythm.      Pulses: Normal pulses.      Heart sounds: Normal heart sounds. No murmur heard.     No friction rub. No gallop.   Pulmonary:      Effort: Pulmonary effort is normal. No respiratory distress.      Breath sounds: Normal breath sounds. No wheezing, rhonchi or rales.   Abdominal:      General: Abdomen is flat. There is no distension.      Palpations: Abdomen is soft. There is no mass.      Tenderness: There is no abdominal tenderness.   Genitourinary:     Comments: Deferred  Musculoskeletal:      Right lower leg: No edema.      Left lower leg: No edema.      Comments: Mild diffuse tenderness of the right knee.  No effusion.  No overlying warmth or skin changes.  Full range of motion with full strength.  Neurovascularly intact distally as per routine.  Patient ambulatory without issue.   Skin:     General: Skin is warm and dry.   Neurological:      General: No focal deficit present.      Mental Status: She is alert and oriented to person, place, and time.      GCS: GCS eye subscore is 4. GCS verbal subscore is 5. GCS motor subscore is 6.      Cranial Nerves: No cranial nerve  deficit, dysarthria or facial asymmetry.      Sensory: Sensation is intact. No sensory deficit.      Motor: Motor function is intact. No pronator drift.      Coordination: Coordination is intact. Finger-Nose-Finger Test normal.   Psychiatric:         Mood and Affect: Mood normal.         Results Reviewed       Procedure Component Value Units Date/Time    HS Troponin 0hr (reflex protocol) [[id number]]  (Normal) Collected: 12/16/24 2308    Lab Status: Final result Specimen: Blood from Arm, Right Updated: 12/16/24 2335     hs TnI 0hr 4 ng/L     Comprehensive metabolic panel [[id number]] Collected: 12/16/24 2308    Lab Status: Final result Specimen: Blood from Arm, Right Updated: 12/16/24 2328     Sodium 138 mmol/L      Potassium 3.6 mmol/L      Chloride 103 mmol/L      CO2 28 mmol/L      ANION GAP 7 mmol/L      BUN 11 mg/dL      Creatinine 0.73 mg/dL      Glucose 84 mg/dL      Calcium 9.2 mg/dL      AST 20 U/L      ALT 17 U/L      Alkaline Phosphatase 90 U/L      Total Protein 7.5 g/dL      Albumin 4.5 g/dL      Total Bilirubin 0.32 mg/dL      eGFR 84 ml/min/1.73sq m     Narrative:      National Kidney Disease Foundation guidelines for Chronic Kidney Disease (CKD):     Stage 1 with normal or high GFR (GFR > 90 mL/min/1.73 square meters)    Stage 2 Mild CKD (GFR = 60-89 mL/min/1.73 square meters)    Stage 3A Moderate CKD (GFR = 45-59 mL/min/1.73 square meters)    Stage 3B Moderate CKD (GFR = 30-44 mL/min/1.73 square meters)    Stage 4 Severe CKD (GFR = 15-29 mL/min/1.73 square meters)    Stage 5 End Stage CKD (GFR <15 mL/min/1.73 square meters)  Note: GFR calculation is accurate only with a steady state creatinine    CBC and differential [[id number]]  (Abnormal) Collected: 12/16/24 2308    Lab Status: Final result Specimen: Blood from Arm, Right Updated: 12/16/24 2313     WBC 9.08 Thousand/uL      RBC 4.81 Million/uL      Hemoglobin 13.1 g/dL      Hematocrit 42.5 %      MCV 88 fL      MCH 27.2 pg      MCHC 30.8 g/dL       RDW 17.3 %      MPV 11.6 fL      Platelets 232 Thousands/uL      nRBC 0 /100 WBCs      Segmented % 60 %      Immature Grans % 0 %      Lymphocytes % 28 %      Monocytes % 8 %      Eosinophils Relative 3 %      Basophils Relative 1 %      Absolute Neutrophils 5.61 Thousands/µL      Absolute Immature Grans 0.01 Thousand/uL      Absolute Lymphocytes 2.50 Thousands/µL      Absolute Monocytes 0.68 Thousand/µL      Eosinophils Absolute 0.23 Thousand/µL      Basophils Absolute 0.05 Thousands/µL             XR chest 1 view portable   ED Interpretation by Brenton Pemberton DO (12/16 0982)   No acute cardiopulmonary disease      XR knee 4+ views Right injury    (Results Pending)       Procedures    ED Medication and Procedure Management   Prior to Admission Medications   Prescriptions Last Dose Informant Patient Reported? Taking?   Blood Pressure Monitoring (Adult Blood Pressure Cuff Lg) KIT  Self No No   Sig: Use in the morning   Cranberry 125 MG TABS   Yes No   Sig: Take by mouth   Mirabegron ER (Myrbetriq) 50 MG TB24  Self Yes No   Sig: every 24 hours   acetaminophen (TYLENOL) 500 mg tablet   No No   Sig: Take 2 tablets (1,000 mg total) by mouth every 8 (eight) hours as needed for mild pain   atenolol (TENORMIN) 50 mg tablet   No No   Sig: TOME ED TABLETA TODOS LOS OLIVA   cholecalciferol (VITAMIN D3) 1,000 units tablet   No No   Sig: Take 2 tablets (2,000 Units total) by mouth daily   ibuprofen (MOTRIN) 600 mg tablet   No No   Sig: Take 1 tablet (600 mg total) by mouth every 8 (eight) hours for 5 days   losartan (COZAAR) 50 mg tablet   No No   Sig: Take 1 tablet (50 mg total) by mouth daily   methocarbamol (ROBAXIN) 500 mg tablet   No No   Sig: Take 1 tablet (500 mg total) by mouth every 8 (eight) hours as needed for muscle spasms May make you drowsy or dizzy.   oxyCODONE (Roxicodone) 5 immediate release tablet   No No   Sig: Take 1 tablet (5 mg total) by mouth every 6 (six) hours as needed for moderate pain Max Daily  Amount: 20 mg   rosuvastatin (CRESTOR) 10 MG tablet   No No   Sig: Take 1 tablet (10 mg total) by mouth daily   senna (SENOKOT) 8.6 mg  Self No No   Sig: Take 1 tablet (8.6 mg total) by mouth daily at bedtime for 7 days      Facility-Administered Medications: None     Discharge Medication List as of 12/17/2024  1:07 AM        START taking these medications    Details   lidocaine (Lidoderm) 5 % Apply 1 patch topically over 12 hours daily Remove & Discard patch within 12 hours or as directed by MD, Starting Tue 12/17/2024, Normal           CONTINUE these medications which have NOT CHANGED    Details   acetaminophen (TYLENOL) 500 mg tablet Take 2 tablets (1,000 mg total) by mouth every 8 (eight) hours as needed for mild pain, Starting Mon 12/16/2024, Normal      atenolol (TENORMIN) 50 mg tablet TOME ED TABLETA TODOS LOS OLIVA, Normal      Blood Pressure Monitoring (Adult Blood Pressure Cuff Lg) KIT Use in the morning, Starting Mon 6/26/2023, Normal      cholecalciferol (VITAMIN D3) 1,000 units tablet Take 2 tablets (2,000 Units total) by mouth daily, Starting Mon 9/9/2024, Normal      Cranberry 125 MG TABS Take by mouth, Historical Med      ibuprofen (MOTRIN) 600 mg tablet Take 1 tablet (600 mg total) by mouth every 8 (eight) hours for 5 days, Starting Mon 12/16/2024, Until Sat 12/21/2024, Normal      losartan (COZAAR) 50 mg tablet Take 1 tablet (50 mg total) by mouth daily, Starting Mon 9/9/2024, Until Sun 12/8/2024, Normal      methocarbamol (ROBAXIN) 500 mg tablet Take 1 tablet (500 mg total) by mouth every 8 (eight) hours as needed for muscle spasms May make you drowsy or dizzy., Starting Mon 12/16/2024, Normal      Mirabegron ER (Myrbetriq) 50 MG TB24 every 24 hours, Starting Wed 8/30/2023, Historical Med      oxyCODONE (Roxicodone) 5 immediate release tablet Take 1 tablet (5 mg total) by mouth every 6 (six) hours as needed for moderate pain Max Daily Amount: 20 mg, Starting Mon 9/16/2024, Normal      rosuvastatin  (CRESTOR) 10 MG tablet Take 1 tablet (10 mg total) by mouth daily, Starting Fri 12/13/2024, Normal      senna (SENOKOT) 8.6 mg Take 1 tablet (8.6 mg total) by mouth daily at bedtime for 7 days, Starting Thu 5/30/2024, Until Thu 6/6/2024, Normal           No discharge procedures on file.  ED SEPSIS DOCUMENTATION   Time reflects when diagnosis was documented in both MDM as applicable and the Disposition within this note       Time User Action Codes Description Comment    12/16/2024 11:42 PM Brenton Pemberton [I10] Asymptomatic hypertension     12/16/2024 11:42 PM Brenton Pemberton [I10] Uncontrolled hypertension     12/17/2024 12:10 AM Brenton Pemberton [M25.561] Right knee pain                  Brenton Pemberton, DO  12/17/24 0232

## 2024-12-17 NOTE — DISCHARGE INSTRUCTIONS
Your evaluation suggests that your symptoms are due to a non emergent cause.    Please follow up with your primary care physician within two days.    Return to the Emergency Department if you experience worsening or concerning symptoms.    Thank you for choosing us for your care.    Ferreira evaluación sugiere que chelsie síntomas se deben a lillian causa no emergente.    Sachi un seguimiento con ferreira médico de atención primaria dentro de dos días.    Regrese al Departamento de Emergencias si experimenta síntomas preocupantes o que empeoran.    Mariano por elegirnos para ferreira atención.

## 2024-12-18 ENCOUNTER — APPOINTMENT (OUTPATIENT)
Age: 69
End: 2024-12-18
Payer: COMMERCIAL

## 2024-12-18 ENCOUNTER — OFFICE VISIT (OUTPATIENT)
Age: 69
End: 2024-12-18
Payer: COMMERCIAL

## 2024-12-18 VITALS — WEIGHT: 178 LBS | HEIGHT: 63 IN | BODY MASS INDEX: 31.54 KG/M2

## 2024-12-18 DIAGNOSIS — M25.561 ACUTE PAIN OF RIGHT KNEE: ICD-10-CM

## 2024-12-18 DIAGNOSIS — M79.661 RIGHT CALF PAIN: Primary | ICD-10-CM

## 2024-12-18 PROCEDURE — 99204 OFFICE O/P NEW MOD 45 MIN: CPT | Performed by: ORTHOPAEDIC SURGERY

## 2024-12-18 PROCEDURE — 73564 X-RAY EXAM KNEE 4 OR MORE: CPT

## 2024-12-18 NOTE — PROGRESS NOTES
REASON FOR VISIT:  No chief complaint on file.      HISTORY OF PRESENT ILLNESS:  RIGHT knee pain for about 1 month. No acute injury or trauma. Stood up from sitting while in the bathroom and felt worse pain. Pain located posterior       Pain worse with activity and walking. No locking or catching.    Tried activity modification, tried Tylenol and diclofenac gel (cannot do oral diclofenac due to allergy)  Pain has improved somewhat with these medications          Past Medical History:   Diagnosis Date    Allergic     Hammertoe of right foot     OR   reapir today 2023    Hyperlipidemia     Hypertension     Kidney stone     Wears glasses         Past Surgical History:   Procedure Laterality Date     SECTION      HERNIA REPAIR  1969    HYSTERECTOMY      removed some of left ovary    OOPHORECTOMY Right     AR ARTHRODESIS COMBINED TQ 1NTRSPC LUMBAR Bilateral 2024    Procedure: Navigated L3-S1 decompression with instrumented fusion, TLIF. L4-S1 decompression/laminectomy;  Surgeon: Ephraim Wilkins MD;  Location:  MAIN OR;  Service: Orthopedics    AR CORRECTION HAMMERTOE Right 2023    Procedure: REPAIR HAMMERTOE 3RD TOE;  Surgeon: Lokesh Simmons DPM;  [facility];  Service: Podiatry    AR LAPS ABD PRTM&OMENTUM DX W/WO SPEC BR/WA SPX N/A 2023    Procedure: LAPAROSCOPY DIAGNOSTIC, LYSIS OF ADHESIONS;  Surgeon: Debbie Clark MD;  Location: [facility];  Service: General       Social History     Socioeconomic History    Marital status: /Civil Union     Spouse name: Not on file    Number of children: 4    Years of education: Not on file    Highest education level: Not on file   Occupational History    Not on file   Tobacco Use    Smoking status: Never     Passive exposure: Never    Smokeless tobacco: Never   Vaping Use    Vaping status: Never Used   Substance and Sexual Activity    Alcohol use: Not Currently    Drug use: Not Currently    Sexual activity: Not  Currently     Partners: Male   Other Topics Concern    Not on file   Social History Narrative    Not on file     Social Drivers of Health     Financial Resource Strain: Not on file   Food Insecurity: No Food Insecurity (9/3/2024)    Nursing - Inadequate Food Risk Classification     Worried About Running Out of Food in the Last Year: Never true     Ran Out of Food in the Last Year: Never true     Ran Out of Food in the Last Year: Not on file   Transportation Needs: No Transportation Needs (9/3/2024)    PRAPARE - Transportation     Lack of Transportation (Medical): No     Lack of Transportation (Non-Medical): No   Physical Activity: Not on file   Stress: Not on file   Social Connections: Not on file   Intimate Partner Violence: Not on file   Housing Stability: Low Risk  (9/3/2024)    Housing Stability Vital Sign     Unable to Pay for Housing in the Last Year: No     Number of Times Moved in the Last Year: 0     Homeless in the Last Year: No       MEDICATIONS:    Current Outpatient Medications:     acetaminophen (TYLENOL) 500 mg tablet, Take 2 tablets (1,000 mg total) by mouth every 8 (eight) hours as needed for mild pain, Disp: 60 tablet, Rfl: 0    atenolol (TENORMIN) 50 mg tablet, TOME ED TABLETA TODOS LOS OLIVA, Disp: 90 tablet, Rfl: 1    Blood Pressure Monitoring (Adult Blood Pressure Cuff Lg) KIT, Use in the morning, Disp: 1 kit, Rfl: 0    cholecalciferol (VITAMIN D3) 1,000 units tablet, Take 2 tablets (2,000 Units total) by mouth daily, Disp: 60 tablet, Rfl: 5    Cranberry 125 MG TABS, Take by mouth, Disp: , Rfl:     ibuprofen (MOTRIN) 600 mg tablet, Take 1 tablet (600 mg total) by mouth every 8 (eight) hours for 5 days, Disp: 15 tablet, Rfl: 0    lidocaine (Lidoderm) 5 %, Apply 1 patch topically over 12 hours daily Remove & Discard patch within 12 hours or as directed by MD, Disp: 30 patch, Rfl: 0    losartan (COZAAR) 50 mg tablet, Take 1 tablet (50 mg total) by mouth daily, Disp: 90 tablet, Rfl: 0     methocarbamol (ROBAXIN) 500 mg tablet, Take 1 tablet (500 mg total) by mouth every 8 (eight) hours as needed for muscle spasms May make you drowsy or dizzy., Disp: 30 tablet, Rfl: 0    Mirabegron ER (Myrbetriq) 50 MG TB24, every 24 hours, Disp: , Rfl:     oxyCODONE (Roxicodone) 5 immediate release tablet, Take 1 tablet (5 mg total) by mouth every 6 (six) hours as needed for moderate pain Max Daily Amount: 20 mg, Disp: 30 tablet, Rfl: 0    rosuvastatin (CRESTOR) 10 MG tablet, Take 1 tablet (10 mg total) by mouth daily, Disp: 90 tablet, Rfl: 1    senna (SENOKOT) 8.6 mg, Take 1 tablet (8.6 mg total) by mouth daily at bedtime for 7 days, Disp: 7 tablet, Rfl: 0    ALLERGIES:  Allergies   Allergen Reactions    Aspirin Swelling    Motrin [Ibuprofen] Itching       MAJOR FINDINGS:  Mylene Wolff is pleasant, healthy appearing, and in no acute distress.     RIGHT knee  Skin intact, ROM 2-0-130   Trace effusion  Normal patella tracking   No patella apprehension  Joint line tenderness: none, Popeye test: negative  Anterior drawer: negative, Lachman: stable, Posterior drawer: negative   Stable to varus/valgus  Sensation intact sp/dp/t  Strength intact 5/5 dorsiflexion/plantarflexion/SLR Extremity wwp  + calf pain, mild + Cynthia      LEFT knee  Skin intact, ROM 3-0-130   No effusion  Normal patella tracking   No patella apprehension  Joint line tenderness: none, Popeye test: negative  Anterior drawer: negative, Lachman: stable, Posterior drawer: negative   Stable to varus/valgus  Sensation intact sp/dp/t  Strength intact 5/5 dorsiflexion/plantarflexion/SLR Extremity wwp  No calf pain    IMAGING  I personally reviewed and interpreted the imaging studies  I reviewed the prior imaging studies and also obtained new bilateral imaging:   X-rays performed on the  BILATERAL  knee show mild arthritis       ASSESSMENT:  RIGHT knee posterior pain, mild arthritis     PLAN:  Discussed treatment options  Ultrasound RLE  Continue  voltaren gel and Tylenol as needed  Says she has had diclofenac gel without issue  She takes acetaminophen as well  Can continue these medications  Prefers to hold off on injection for now    PT script provided  Ultrasound RIGHT calf  Follow up after imaging    Official  [id number] used for this entire visit      CC:  Janine Sotelo, BUSTER Roblero, [place]*

## 2025-01-08 ENCOUNTER — OFFICE VISIT (OUTPATIENT)
Dept: OBGYN CLINIC | Facility: HOSPITAL | Age: 70
End: 2025-01-08
Payer: COMMERCIAL

## 2025-01-08 ENCOUNTER — HOSPITAL ENCOUNTER (OUTPATIENT)
Dept: RADIOLOGY | Facility: HOSPITAL | Age: 70
Discharge: HOME | End: 2025-01-08
Attending: ORTHOPAEDIC SURGERY
Payer: COMMERCIAL

## 2025-01-08 VITALS — WEIGHT: 177.91 LBS | HEIGHT: 63 IN | BODY MASS INDEX: 31.52 KG/M2

## 2025-01-08 DIAGNOSIS — R52 PAIN: ICD-10-CM

## 2025-01-08 DIAGNOSIS — R52 PAIN: Primary | ICD-10-CM

## 2025-01-08 PROCEDURE — 72110 X-RAY EXAM L-2 SPINE 4/>VWS: CPT

## 2025-01-08 PROCEDURE — 99213 OFFICE O/P EST LOW 20 MIN: CPT | Performed by: ORTHOPAEDIC SURGERY

## 2025-01-08 NOTE — PROGRESS NOTES
"POST OP NOTE       Mylene MacdonaldCari  here today s/p L3-S1 decompression/fusion         Surgery date: 8/29/24    Subjective: Preoperative symptoms were back pain, radicular leg pain, ambulatory dysfunction. Today, she reports a fall while in the [place] with aggravation of pain. Pain has since improved, but does continue with some right low back/gluteal region pain with ambulation at times. Overall doing well in the post-operative period. Continues to appreciate benefit in the postoperative period. U[place]bek interpretation was used for the entire encounter. Ambulating as much as tolerated. Not using any assistive devices. She is planning to return to work on Sunday, January 12th. Patient admits to compliance with activity restrictions.  Denies any fevers, chills, and night sweats.  No cough, no shortness of breath.  No chest pain, no palpitations.      Post-Op Medications:  Tylenol and Robaxin as needed    Objective:  Ht 5' 3\" (1.6 m)   Wt 80.7 kg (177 lb 14.6 oz)   BMI 31.52 kg/m²     Strength: 5 out of 5 in bilateral lower extremities  Sensation: Intact light touch all dermatomes  DTR: Present  Gait: Nonantalgic, independent     Posterior incision healing extremely well. No signs of erythema, discharge, or purulence. There is no appreciable effusion.    Calf: soft, non tender, no swelling or erythema     Imaging:  I have reviewed and independently visualized the imaging studies (01/08/25)  myself and my interpretation is the following: Instrumentation in place and in good alignment.    Assessment: s/p L3-S1 decompression and fusion, doing well.    Plan:  Patient was instructed to do the following   -Able to walk as much as tolerated.  -Continue with physical therapy to work on isometric core and lumbar strengthening, lumbar stretching and lower extremity stretching exercises. Continue to maintain at home exercises and stretches.  -Patient may return to work as tolerated. Work note provided in " chart.  -Take medications as needed. No refills required at today's visit.  -Follow-up in 6 months. Repeat X-rays.  Mylene Wolff was instructed to call the office with any concerns or questions.

## 2025-01-08 NOTE — LETTER
January 8, 2025     Patient: Mylene Wolff  YOB: 1955  Date of Visit: 1/8/2025      To Whom it May Concern:    Mylene Wolff is under my professional care. Mylene was seen in my office on 1/8/2025. Mylene may return to work with restrictions, may lift up to 15-20 lbs.    If you have any questions or concerns, please don't hesitate to call.         Sincerely,          Ephraim Wilkins MD        CC: No Recipients

## 2025-01-09 ENCOUNTER — OFFICE VISIT (OUTPATIENT)
Dept: OBGYN CLINIC | Facility: CLINIC | Age: 70
End: 2025-01-09
Payer: COMMERCIAL

## 2025-01-09 DIAGNOSIS — M67.442 GANGLION CYST OF FLEXOR TENDON SHEATH OF FINGER OF LEFT HAND: Primary | ICD-10-CM

## 2025-01-09 PROCEDURE — 20612 ASPIRATE/INJ GANGLION CYST: CPT | Performed by: SURGERY

## 2025-01-09 NOTE — PROGRESS NOTES
HPI:  69F here for follow-up.  Today she states the left ring finger cyst has returned.  It is mildly bothersome to her and she is interested in repeat aspiration today.  Her last aspiration was about a year ago.      PE: Left ring finger: Open wounds or erythema.  No ecchymosis or swelling.  Palpable mass distal to the A1 pulley consistent with flexor tendon sheath cyst.  Normal range of motion of the finger without clicking or locking.  Sensation intact to light touch.  Good cap refill at the fingertip.  Palpable radial pulse.      A/P: Left ring finger flexor tendon sheath cyst  -Needle aspiration performed performed in the office today with rupture of cyst.  -Band-aid applied.  -Activities as tolerated.  -Follow-up PRN.

## 2025-01-09 NOTE — PROGRESS NOTES
"Hand/upper extremity injection  Universal Protocol:  procedure performed by consultantConsent: Verbal consent obtained.  Risks and benefits: risks, benefits and alternatives were discussed  Consent given by: patient  Time out: Immediately prior to procedure a \"time out\" was called to verify the correct patient, procedure, equipment, support staff and site/side marked as required.  Timeout called at: 1/9/2025 2:57 PM.  Patient understanding: patient states understanding of the procedure being performed  Site marked: the operative site was marked  Patient identity confirmed: verbally with patient  Supporting Documentation  Indications: tendon swelling   Procedure Details  Condition comment: left ring finger flexor retinacular cyst   Preparation: Patient was prepped and draped in the usual sterile fashion  Needle size: 18 G  Ultrasound guidance: no  Approach: volar  Patient tolerance: patient tolerated the procedure well with no immediate complications  Dressing:  Sterile dressing applied    Mass resolved on puncture           "

## 2025-01-10 ENCOUNTER — HOSPITAL ENCOUNTER (OUTPATIENT)
Dept: NON INVASIVE DIAGNOSTICS | Facility: HOSPITAL | Age: 70
Discharge: HOME | End: 2025-01-10
Attending: ORTHOPAEDIC SURGERY
Payer: COMMERCIAL

## 2025-01-10 DIAGNOSIS — M79.661 RIGHT CALF PAIN: ICD-10-CM

## 2025-01-10 PROCEDURE — 93971 EXTREMITY STUDY: CPT

## 2025-01-10 PROCEDURE — 93971 EXTREMITY STUDY: CPT | Performed by: STUDENT IN AN ORGANIZED HEALTH CARE EDUCATION/TRAINING PROGRAM

## 2025-01-16 ENCOUNTER — OFFICE VISIT (OUTPATIENT)
Age: 70
End: 2025-01-16
Payer: COMMERCIAL

## 2025-01-16 DIAGNOSIS — M25.561 ACUTE PAIN OF RIGHT KNEE: Primary | ICD-10-CM

## 2025-01-16 PROCEDURE — 99213 OFFICE O/P EST LOW 20 MIN: CPT | Performed by: ORTHOPAEDIC SURGERY

## 2025-01-16 NOTE — PROGRESS NOTES
REASON FOR VISIT:  Chief Complaint   Patient presents with    Right Knee - Swelling, Follow-up       HISTORY OF PRESENT ILLNESS:  RIGHT knee pain for about 1 month. No acute injury or trauma. Stood up from sitting while in the bathroom and felt worse pain. Pain located posterior     Pain worse with activity and walking. No locking or catching.    Tried activity modification, tried Tylenol and diclofenac gel (cannot do oral diclofenac due to allergy)  Pain has improved somewhat with these medications      Interval hx 25:   Pain has improved somewhat since prior visit. Ultrasound completed as well.         Past Medical History:   Diagnosis Date    Allergic     Hammertoe of right foot     OR   reapir today 2023    Hyperlipidemia     Hypertension     Kidney stone     Wears glasses         Past Surgical History:   Procedure Laterality Date     SECTION      HERNIA REPAIR  1969    HYSTERECTOMY      removed some of left ovary    OOPHORECTOMY Right     VA ARTHRODESIS COMBINED TQ 1NTRSPC LUMBAR Bilateral 2024    Procedure: Navigated L3-S1 decompression with instrumented fusion, TLIF. L4-S1 decompression/laminectomy;  Surgeon: Ephraim Wilkins MD;  [facility];  Service: Orthopedics    VA CORRECTION HAMMERTOE Right 2023    Procedure: REPAIR HAMMERTOE 3RD TOE;  Surgeon: Lokesh Simmons DPM;  [facility];  Service: Podiatry    VA LAPS ABD PRTM&OMENTUM DX W/WO SPEC BR/WA SPX N/A 2023    Procedure: LAPAROSCOPY DIAGNOSTIC, LYSIS OF ADHESIONS;  Surgeon: Debbie Clark MD;  [facility];  Service: General       Social History     Socioeconomic History    Marital status: /Civil Union     Spouse name: Not on file    Number of children: 4    Years of education: Not on file    Highest education level: Not on file   Occupational History    Not on file   Tobacco Use    Smoking status: Never     Passive exposure: Never    Smokeless tobacco: Never    Vaping Use    Vaping status: Never Used   Substance and Sexual Activity    Alcohol use: Not Currently    Drug use: Not Currently    Sexual activity: Not Currently     Partners: Male   Other Topics Concern    Not on file   Social History Narrative    Not on file     Social Drivers of Health     Financial Resource Strain: Not on file   Food Insecurity: No Food Insecurity (9/3/2024)    Nursing - Inadequate Food Risk Classification     Worried About Running Out of Food in the Last Year: Never true     Ran Out of Food in the Last Year: Never true     Ran Out of Food in the Last Year: Not on file   Transportation Needs: No Transportation Needs (9/3/2024)    PRAPARE - Transportation     Lack of Transportation (Medical): No     Lack of Transportation (Non-Medical): No   Physical Activity: Not on file   Stress: Not on file   Social Connections: Not on file   Intimate Partner Violence: Not on file   Housing Stability: Low Risk  (9/3/2024)    Housing Stability Vital Sign     Unable to Pay for Housing in the Last Year: No     Number of Times Moved in the Last Year: 0     Homeless in the Last Year: No       MEDICATIONS:    Current Outpatient Medications:     acetaminophen (TYLENOL) 500 mg tablet, Take 2 tablets (1,000 mg total) by mouth every 8 (eight) hours as needed for mild pain, Disp: 60 tablet, Rfl: 0    atenolol (TENORMIN) 50 mg tablet, TOME ED TABLETA TODOS LOS OLIVA, Disp: 90 tablet, Rfl: 1    Blood Pressure Monitoring (Adult Blood Pressure Cuff Lg) KIT, Use in the morning, Disp: 1 kit, Rfl: 0    cholecalciferol (VITAMIN D3) 1,000 units tablet, Take 2 tablets (2,000 Units total) by mouth daily, Disp: 60 tablet, Rfl: 5    Cranberry 125 MG TABS, Take by mouth, Disp: , Rfl:     ibuprofen (MOTRIN) 600 mg tablet, Take 1 tablet (600 mg total) by mouth every 8 (eight) hours for 5 days, Disp: 15 tablet, Rfl: 0    lidocaine (Lidoderm) 5 %, Apply 1 patch topically over 12 hours daily Remove & Discard patch within 12 hours or as  directed by MD, Disp: 30 patch, Rfl: 0    losartan (COZAAR) 50 mg tablet, Take 1 tablet (50 mg total) by mouth daily, Disp: 90 tablet, Rfl: 0    methocarbamol (ROBAXIN) 500 mg tablet, Take 1 tablet (500 mg total) by mouth every 8 (eight) hours as needed for muscle spasms May make you drowsy or dizzy., Disp: 30 tablet, Rfl: 0    Mirabegron ER (Myrbetriq) 50 MG TB24, every 24 hours, Disp: , Rfl:     oxyCODONE (Roxicodone) 5 immediate release tablet, Take 1 tablet (5 mg total) by mouth every 6 (six) hours as needed for moderate pain Max Daily Amount: 20 mg, Disp: 30 tablet, Rfl: 0    rosuvastatin (CRESTOR) 10 MG tablet, Take 1 tablet (10 mg total) by mouth daily, Disp: 90 tablet, Rfl: 1    senna (SENOKOT) 8.6 mg, Take 1 tablet (8.6 mg total) by mouth daily at bedtime for 7 days, Disp: 7 tablet, Rfl: 0    ALLERGIES:  Allergies   Allergen Reactions    Aspirin Swelling    Motrin [Ibuprofen] Itching       MAJOR FINDINGS:  Mylene Wolff is pleasant, healthy appearing, and in no acute distress.     RIGHT knee  Skin intact, ROM 2-0-130   No effusion  Normal patella tracking   No patella apprehension  Joint line tenderness: none, Popeye test: negative  Anterior drawer: negative, Lachman: stable, Posterior drawer: negative   Stable to varus/valgus  Sensation intact sp/dp/t  Strength intact 5/5 dorsiflexion/plantarflexion/SLR Extremity wwp  Minimal calf pain        IMAGING  I personally reviewed and interpreted the imaging studies  I reviewed the prior imaging studies and also obtained new bilateral imaging:   X-rays performed on the  BILATERAL  knee show mild arthritis       Ultrasound RLE 1/10/25:   No evidence of acute or chronic deep vein thrombosis .  No evidence of superficial thrombophlebitis noted.  Doppler evaluation shows a normal response to augmentation maneuvers.  Popliteal, posterior tibial and anterior tibial arterial Doppler waveforms are  triphasic.      ASSESSMENT:  RIGHT knee posterior pain, mild  arthritis     PLAN:  Discussed treatment options  Ultrasound reviewed  Pain improved  Home exercises as needed  Follow up if pain returns or if any new concerns    Official  used for this visit       CC:  BUSTER Gunn No ref. provider found

## 2025-01-27 ENCOUNTER — TELEPHONE (OUTPATIENT)
Age: 70
End: 2025-01-27

## 2025-01-27 NOTE — TELEPHONE ENCOUNTER
Caller: Patient     Doctor: Claudette     Reason for call: Patient requesting script for Tramadol for her back pain, sent to [facility] on file     Call back#: [phone]

## 2025-01-28 DIAGNOSIS — M43.06 LUMBAR SPONDYLOLYSIS: ICD-10-CM

## 2025-01-28 DIAGNOSIS — M25.561 ACUTE PAIN OF RIGHT KNEE: ICD-10-CM

## 2025-01-28 DIAGNOSIS — M43.16 SPONDYLOLISTHESIS AT L4-L5 LEVEL: ICD-10-CM

## 2025-01-30 RX ORDER — PSEUDOEPHED/ACETAMINOPH/DIPHEN 30MG-500MG
TABLET ORAL
Qty: 60 TABLET | Refills: 0 | Status: SHIPPED | OUTPATIENT
Start: 2025-01-30

## 2025-01-30 RX ORDER — METHOCARBAMOL 500 MG/1
TABLET, FILM COATED ORAL
Qty: 30 TABLET | Refills: 0 | Status: SHIPPED | OUTPATIENT
Start: 2025-01-30

## 2025-02-22 DIAGNOSIS — T50.905A MEDICATION ADVERSE EFFECT, INITIAL ENCOUNTER: ICD-10-CM

## 2025-02-22 DIAGNOSIS — I10 HYPERTENSION: ICD-10-CM

## 2025-02-22 RX ORDER — LOSARTAN POTASSIUM 50 MG/1
TABLET ORAL
Qty: 90 TABLET | Refills: 1 | Status: SHIPPED | OUTPATIENT
Start: 2025-02-22

## 2025-03-09 DIAGNOSIS — I10 PRIMARY HYPERTENSION: ICD-10-CM

## 2025-03-10 RX ORDER — ATENOLOL 50 MG/1
TABLET ORAL
Qty: 90 TABLET | Refills: 1 | Status: SHIPPED | OUTPATIENT
Start: 2025-03-10

## 2025-03-26 ENCOUNTER — TELEPHONE (OUTPATIENT)
Dept: OBGYN CLINIC | Facility: HOSPITAL | Age: 70
End: 2025-03-26

## 2025-03-26 NOTE — TELEPHONE ENCOUNTER
Caller: Patient    Doctor: N/A    Reason for call: Ruth -  for [facility] - was trying to reach patient to let her know that they have resources for her if she needs them for case management. Advised we have not seen her in ortho since Jan 2025 and isn't due back in until July 2025. She is going to reach out to her PCP    Call back#: n/a

## 2025-04-09 ENCOUNTER — TELEPHONE (OUTPATIENT)
Age: 70
End: 2025-04-09

## 2025-04-09 ENCOUNTER — OFFICE VISIT (OUTPATIENT)
Dept: OBGYN CLINIC | Facility: HOSPITAL | Age: 70
End: 2025-04-09
Payer: COMMERCIAL

## 2025-04-09 ENCOUNTER — HOSPITAL ENCOUNTER (OUTPATIENT)
Dept: RADIOLOGY | Facility: HOSPITAL | Age: 70
Discharge: HOME | End: 2025-04-09
Payer: COMMERCIAL

## 2025-04-09 VITALS — HEIGHT: 63 IN | WEIGHT: 177.91 LBS | BODY MASS INDEX: 31.52 KG/M2

## 2025-04-09 DIAGNOSIS — M25.561 ACUTE PAIN OF RIGHT KNEE: ICD-10-CM

## 2025-04-09 DIAGNOSIS — Z98.1 S/P LUMBAR FUSION: Primary | ICD-10-CM

## 2025-04-09 DIAGNOSIS — Z98.1 S/P LUMBAR FUSION: ICD-10-CM

## 2025-04-09 PROCEDURE — 72100 X-RAY EXAM L-S SPINE 2/3 VWS: CPT

## 2025-04-09 PROCEDURE — 99213 OFFICE O/P EST LOW 20 MIN: CPT | Performed by: ORTHOPAEDIC SURGERY

## 2025-04-09 RX ORDER — SENNOSIDES 8.6 MG
650 CAPSULE ORAL EVERY 8 HOURS PRN
Qty: 30 TABLET | Refills: 0 | Status: SHIPPED | OUTPATIENT
Start: 2025-04-09

## 2025-04-09 NOTE — PROGRESS NOTES
Name: Mylene Wolff      : 1955       MRN: [id number]   Encounter Provider: Ephraim Wilkins MD   Encounter Date: 25  Encounter department: [facility] ORTHOPEDIC CARE SPECIALISTS [facility] ORTHOPEDIC SPINE SURGERY    Medical Decision Making:     Assessment & Plan           Diagnostic Tests   IMAGING: I have personally reviewed the images and these are my findings:  Lumbar Spine X-rays from ***: multi level lumbar spondylosis with loss of disc height, facet hypertrophy, no apparent spondylolisthesis, no appreciated lytic/blastic lesions, no obvious instability    Lumbar Spine MRI from ***: multi level lumbar disc degeneration with disc desiccation & loss of disc height ***     Subjective:      Chief Complaint: Back Pain    HPI:  Mylene Wolff is a 70 y.o. female presenting for initial visit with chief complaint of ***.  Pain began *** .  Describes pain as *** in nature.  Pain is worse with *** and improves with ***.  Denies any charles trauma. Denies fever or chills, no night sweats. Denies any bladder or bowel changes.      Conservative therapy includes the following:   Medications: ***    Injections: ***     Physical Therapy: ***  Chiropractic Medicine: ***has not attempted  Accupunture/Massage Therapy: ***has not attempted     Nicotine dependent: ***  Occupation: ***  Living situation: {CWLIVIN}  ADLs: {CWADL:19364}    Objective:     Family History   Problem Relation Age of Onset    Breast cancer Mother     Thrombosis Father     No Known Problems Sister     No Known Problems Sister     No Known Problems Sister     Hypertension Sister     No Known Problems Daughter     No Known Problems Daughter     No Known Problems Daughter     No Known Problems Daughter     Diabetes Maternal Grandmother     No Known Problems Maternal Grandfather     No Known Problems Paternal Grandmother     No Known Problems Paternal Grandfather     Hypertension Brother     Hypertension  Brother        Past Medical History:   Diagnosis Date    Allergic     Hammertoe of right foot     OR   reapir today 2023    Hyperlipidemia     Hypertension     Kidney stone     Wears glasses        Current Outpatient Medications   Medication Sig Dispense Refill    Acetaminophen Extra Strength 500 MG TABS TAKE 2 TABLETS (1,000 MG TOTAL) BY MOUTH EVERY 8 (EIGHT) HOURS AS NEEDED FOR MILD PAIN 60 tablet 0    atenolol (TENORMIN) 50 mg tablet TOME 1 TABLETA POR VIA ORAL TODOS LOS OLIVA 90 tablet 1    Blood Pressure Monitoring (Adult Blood Pressure Cuff Lg) KIT Use in the morning 1 kit 0    cholecalciferol (VITAMIN D3) 1,000 units tablet Take 2 tablets (2,000 Units total) by mouth daily 60 tablet 5    Cranberry 125 MG TABS Take by mouth      ibuprofen (MOTRIN) 600 mg tablet Take 1 tablet (600 mg total) by mouth every 8 (eight) hours for 5 days 15 tablet 0    lidocaine (Lidoderm) 5 % Apply 1 patch topically over 12 hours daily Remove & Discard patch within 12 hours or as directed by MD 30 patch 0    losartan (COZAAR) 50 mg tablet TOME 1 TABLETA POR VIA ORAL TODOS LOS OLIVA 90 tablet 1    methocarbamol (ROBAXIN) 500 mg tablet TOME ED TABLETA POR VIA ORAL CADA OCHO HORAS CUANDO SEA NECESARIO FOR MUSCLE SPASMS MAY MAKE DROWSY/DIZZY 30 tablet 0    Mirabegron ER (Myrbetriq) 50 MG TB24 every 24 hours      oxyCODONE (Roxicodone) 5 immediate release tablet Take 1 tablet (5 mg total) by mouth every 6 (six) hours as needed for moderate pain Max Daily Amount: 20 mg 30 tablet 0    rosuvastatin (CRESTOR) 10 MG tablet Take 1 tablet (10 mg total) by mouth daily 90 tablet 1    senna (SENOKOT) 8.6 mg Take 1 tablet (8.6 mg total) by mouth daily at bedtime for 7 days 7 tablet 0     No current facility-administered medications for this visit.       Past Surgical History:   Procedure Laterality Date     SECTION      HERNIA REPAIR  1969    HYSTERECTOMY      removed some of left ovary    OOPHORECTOMY Right     [place] ARTHRODESIS  COMBINED TQ 1NTRSPC LUMBAR Bilateral 08/29/2024    Procedure: Navigated L3-S1 decompression with instrumented fusion, TLIF. L4-S1 decompression/laminectomy;  Surgeon: Ephraim Wilkins MD;  [facility];  Service: Orthopedics    RI CORRECTION HAMMERTOE Right 07/21/2023    Procedure: REPAIR HAMMERTOE 3RD TOE;  Surgeon: Lokesh Simmons DPM;  [facility];  Service: Podiatry    RI LAPS ABD PRTM&OMENTUM DX W/WO SPEC BR/WA SPX N/A 01/01/2023    Procedure: LAPAROSCOPY DIAGNOSTIC, LYSIS OF ADHESIONS;  Surgeon: Debbie Clark MD;  Location[facility];  Service: General       Social History     Socioeconomic History    Marital status: /Civil Union     Spouse name: Not on file    Number of children: 4    Years of education: Not on file    Highest education level: Not on file   Occupational History    Not on file   Tobacco Use    Smoking status: Never     Passive exposure: Never    Smokeless tobacco: Never   Vaping Use    Vaping status: Never Used   Substance and Sexual Activity    Alcohol use: Not Currently    Drug use: Not Currently    Sexual activity: Not Currently     Partners: Male   Other Topics Concern    Not on file   Social History Narrative    Not on file     Social Drivers of Health     Financial Resource Strain: Not on file   Food Insecurity: No Food Insecurity (9/3/2024)    Nursing - Inadequate Food Risk Classification     Worried About Running Out of Food in the Last Year: Never true     Ran Out of Food in the Last Year: Never true     Ran Out of Food in the Last Year: Not on file   Transportation Needs: No Transportation Needs (9/3/2024)    PRAPARE - Transportation     Lack of Transportation (Medical): No     Lack of Transportation (Non-Medical): No   Physical Activity: Not on file   Stress: Not on file   Social Connections: Not on file   Intimate Partner Violence: Not on file   Housing Stability: Low Risk  (9/3/2024)    Housing Stability Vital Sign     Unable to Pay for Housing  "in the Last Year: No     Number of Times Moved in the Last Year: 0     Homeless in the Last Year: No       Allergies   Allergen Reactions    Aspirin Swelling    Motrin [Ibuprofen] Itching       Review of Systems  General- denies fever/chills  HEENT- denies hearing loss or sore throat  Eyes- denies eye pain or visual disturbances, denies red eyes  Respiratory- denies cough or SOB  Cardio- denies chest pain or palpitations  GI- denies abdominal pain  Endocrine- denies urinary frequency  Urinary- denies pain with urination  Musculoskeletal- Negative except noted above  Skin- denies rashes or wounds  Neurological- denies dizziness or headache  Psychiatric- denies anxiety or difficulty concentrating    Physical Exam  Ht 5' 3\" (1.6 m)   Wt 80.7 kg (177 lb 14.6 oz)   BMI 31.52 kg/m²     General/Constitutional: No apparent distress: well-nourished and well developed.  Lymphatic: No appreciable lymphadenopathy  Respiratory: Non-labored breathing  Vascular: No edema, swelling or tenderness, except as noted in detailed exam.  Integumentary: No impressive skin lesions present, except as noted in detailed exam.  Psych: Normal mood and affect, oriented to person, place and time.  MSK: normal other than stated in HPI and exam  Gait & balance: no evidence of myelopathic gait, ambulates {CWgait:46917}    Lumbar spine range of motion:  -Forward flexion to 90  -Extension to neutral  There is mild tenderness with palpation along lumbar paraspinal musculature, no midline tenderness     Neurologic:    Lower Extremity Motor Function ***   Right  Left    Iliopsoas  5/5  5/5    Quadriceps 5/5 5/5   Tibialis anterior  5/5  5/5    EHL  5/5  5/5    Gastroc. muscle  5/5  5/5      Sensory: light touch is intact to bilateral lower extremities     Reflexes:  Intact, diminished ***    Other tests:  Straight Leg Raise: {negative or positive:07139}  Sharron SI: {negative or positive:46352}  HERMANN SI: {negative or positive:18402}  Greater troch: no " "tenderness ***  Internal/external hip ROM: intact, no pain ***  Flexion/extension knee ROM: intact, no pain   Vascular: WWP extremities    Electronic Medical Records were reviewed including***    Procedures, if performed today     None performed       Portions of the record may have been created with voice recognition software.  Occasional wrong word or \"sound a like\" substitutions may have occurred due to the inherent limitations of voice recognition software.  Read the chart carefully and recognize, using context, where substitutions have occurred.    "

## 2025-04-09 NOTE — PROGRESS NOTES
Name: Mylene Wolff      : 1955       MRN: [id number]   Encounter Provider: Ephraim Wilkins MD   Encounter Date: 25  Encounter department: [facility] ORTHOPEDIC CARE SPECIALISTS [facility] ORTHOPEDIC SPINE SURGERY    Medical Decision Making:     Assessment & Plan  S/P lumbar fusion    The clinical, physical and imaging findings were reviewed with the patient  Discussed the etiology and pathomechanics of lumbar disc degeneration, bone contusions, lumbar fusion recovery  Discussed treatment options   Non operative treatment options include physical therapy, at home exercises, activity modifications, chiropractic medicine, acupuncture, oral medications, and interventional spine procedures  After discussion with the patient we agreed upon continued conservative treatments  Recommend continued HEP to work on core strengthening, lumbar ROM, strengthening, and stretching exercises.   Continue with oral medications as needed.  Patient currently taking Robaxin and Tylenol as needed.  Ice/heat, OTC pain medication as needed.  Work note provided.  Follow-up:  Return in about 3 months for follow up after further conservative treatments.     Orders:    XR spine lumbar 2 or 3 views injury; Future    acetaminophen (TYLENOL) 650 mg CR tablet; Take 1 tablet (650 mg total) by mouth every 8 (eight) hours as needed for mild pain         Diagnostic Tests   IMAGING: I have personally reviewed the images and these are my findings:  Lumbar Spine X-rays from 2025: Stable appearing L3-S1 bilateral pedicle screw and dmitri construct with interbody cage, x-ray slightly rotated however there is appears to be some mild adjacent level spondylosis     Subjective:      Chief Complaint: Back Pain    HPI:  Mylene Wolff is a 70 y.o. female presenting for follow-up visit.  Patient was visiting the [place] and had a fall with aggravation of lumbar pain after falling off a chair. She notes  some right sided lumbar pain and is worried she did something to her surgery.  She recently returned to work.  She would like a note for work stating that she can have a chair to sit on while performing some of her duties.  She is otherwise doing very well.  She is taking Tylenol and Robaxin as needed.    Objective:     Family History   Problem Relation Age of Onset    Breast cancer Mother     Thrombosis Father     No Known Problems Sister     No Known Problems Sister     No Known Problems Sister     Hypertension Sister     No Known Problems Daughter     No Known Problems Daughter     No Known Problems Daughter     No Known Problems Daughter     Diabetes Maternal Grandmother     No Known Problems Maternal Grandfather     No Known Problems Paternal Grandmother     No Known Problems Paternal Grandfather     Hypertension Brother     Hypertension Brother        Past Medical History:   Diagnosis Date    Allergic     Hammertoe of right foot     OR   reapir today 7/21/2023    Hyperlipidemia     Hypertension     Kidney stone     Wears glasses        Current Outpatient Medications   Medication Sig Dispense Refill    acetaminophen (TYLENOL) 650 mg CR tablet Take 1 tablet (650 mg total) by mouth every 8 (eight) hours as needed for mild pain 30 tablet 0    Acetaminophen Extra Strength 500 MG TABS TAKE 2 TABLETS (1,000 MG TOTAL) BY MOUTH EVERY 8 (EIGHT) HOURS AS NEEDED FOR MILD PAIN 60 tablet 0    atenolol (TENORMIN) 50 mg tablet TOME 1 TABLETA POR VIA ORAL TODOS LOS OLIVA 90 tablet 1    Blood Pressure Monitoring (Adult Blood Pressure Cuff Lg) KIT Use in the morning 1 kit 0    cholecalciferol (VITAMIN D3) 1,000 units tablet Take 2 tablets (2,000 Units total) by mouth daily 60 tablet 5    Cranberry 125 MG TABS Take by mouth      ibuprofen (MOTRIN) 600 mg tablet Take 1 tablet (600 mg total) by mouth every 8 (eight) hours for 5 days 15 tablet 0    lidocaine (Lidoderm) 5 % Apply 1 patch topically over 12 hours daily Remove & Discard  patch within 12 hours or as directed by MD 30 patch 0    losartan (COZAAR) 50 mg tablet TOME 1 TABLETA POR VIA ORAL TODOS LOS OLIVA 90 tablet 1    methocarbamol (ROBAXIN) 500 mg tablet TOME ED TABLETA POR VIA ORAL CADA OCHO HORAS CUANDO SEA NECESARIO FOR MUSCLE SPASMS MAY MAKE DROWSY/DIZZY 30 tablet 0    Mirabegron ER (Myrbetriq) 50 MG TB24 every 24 hours      oxyCODONE (Roxicodone) 5 immediate release tablet Take 1 tablet (5 mg total) by mouth every 6 (six) hours as needed for moderate pain Max Daily Amount: 20 mg 30 tablet 0    rosuvastatin (CRESTOR) 10 MG tablet Take 1 tablet (10 mg total) by mouth daily 90 tablet 1    senna (SENOKOT) 8.6 mg Take 1 tablet (8.6 mg total) by mouth daily at bedtime for 7 days 7 tablet 0     No current facility-administered medications for this visit.       Past Surgical History:   Procedure Laterality Date     SECTION      HERNIA REPAIR  1969    HYSTERECTOMY      removed some of left ovary    OOPHORECTOMY Right     AR ARTHRODESIS COMBINED TQ 1NTRSPC LUMBAR Bilateral 2024    Procedure: Navigated L3-S1 decompression with instrumented fusion, TLIF. L4-S1 decompression/laminectomy;  Surgeon: Ephraim Wilkins MD;  [facility];  Service: Orthopedics    AR CORRECTION HAMMERTOE Right 2023    Procedure: REPAIR HAMMERTOE 3RD TOE;  Surgeon: Lokesh Simmons DPM;  [facility];  Service: Podiatry    AR LAPS ABD PRTM&OMENTUM DX W/WO SPEC BR/WA SPX N/A 2023    Procedure: LAPAROSCOPY DIAGNOSTIC, LYSIS OF ADHESIONS;  Surgeon: Debbie Clark MD;  [facility];  Service: General       Social History     Socioeconomic History    Marital status: /Civil Union     Spouse name: Not on file    Number of children: 4    Years of education: Not on file    Highest education level: Not on file   Occupational History    Not on file   Tobacco Use    Smoking status: Never     Passive exposure: Never    Smokeless tobacco: Never   Vaping  "Use    Vaping status: Never Used   Substance and Sexual Activity    Alcohol use: Not Currently    Drug use: Not Currently    Sexual activity: Not Currently     Partners: Male   Other Topics Concern    Not on file   Social History Narrative    Not on file     Social Drivers of Health     Financial Resource Strain: Not on file   Food Insecurity: No Food Insecurity (9/3/2024)    Nursing - Inadequate Food Risk Classification     Worried About Running Out of Food in the Last Year: Never true     Ran Out of Food in the Last Year: Never true     Ran Out of Food in the Last Year: Not on file   Transportation Needs: No Transportation Needs (9/3/2024)    PRAPARE - Transportation     Lack of Transportation (Medical): No     Lack of Transportation (Non-Medical): No   Physical Activity: Not on file   Stress: Not on file   Social Connections: Not on file   Intimate Partner Violence: Not on file   Housing Stability: Low Risk  (9/3/2024)    Housing Stability Vital Sign     Unable to Pay for Housing in the Last Year: No     Number of Times Moved in the Last Year: 0     Homeless in the Last Year: No       Allergies   Allergen Reactions    Aspirin Swelling    Motrin [Ibuprofen] Itching       Review of Systems  General- denies fever/chills  HEENT- denies hearing loss or sore throat  Eyes- denies eye pain or visual disturbances, denies red eyes  Respiratory- denies cough or SOB  Cardio- denies chest pain or palpitations  GI- denies abdominal pain  Endocrine- denies urinary frequency  Urinary- denies pain with urination  Musculoskeletal- Negative except noted above  Skin- denies rashes or wounds  Neurological- denies dizziness or headache  Psychiatric- denies anxiety or difficulty concentrating    Physical Exam  Ht 5' 3\" (1.6 m)   Wt 80.7 kg (177 lb 14.6 oz)   BMI 31.52 kg/m²     General/Constitutional: No apparent distress: well-nourished and well developed.  Lymphatic: No appreciable lymphadenopathy  Respiratory: Non-labored " "breathing  Vascular: No edema, swelling or tenderness, except as noted in detailed exam.  Integumentary: No impressive skin lesions present, except as noted in detailed exam.  Psych: Normal mood and affect, oriented to person, place and time.  MSK: normal other than stated in HPI and exam  Gait & balance: no evidence of myelopathic gait, ambulates Independently     Lumbar spine range of motion:  -Forward flexion to 70  -Extension to neutral  There is mild tenderness with palpation along lumbar paraspinal musculature, no midline tenderness, left-sided tenderness along the iliac crest, no open wounds and no bruising    Neurologic:    Lower Extremity Motor Function    Right  Left    Iliopsoas  5/5  5/5    Quadriceps 5/5 5/5   Tibialis anterior  5/5  5/5    EHL  5/5  5/5    Gastroc. muscle  5/5  5/5      Sensory: light touch is intact to bilateral lower extremities     Reflexes:  Intact, diminished     Electronic Medical Records were reviewed including none relevant     Procedures, if performed today     None performed       Portions of the record may have been created with voice recognition software.  Occasional wrong word or \"sound a like\" substitutions may have occurred due to the inherent limitations of voice recognition software.  Read the chart carefully and recognize, using context, where substitutions have occurred.   "

## 2025-04-11 NOTE — ASSESSMENT & PLAN NOTE
The clinical, physical and imaging findings were reviewed with the patient  Discussed the etiology and pathomechanics of lumbar disc degeneration, bone contusions, lumbar fusion recovery  Discussed treatment options   Non operative treatment options include physical therapy, at home exercises, activity modifications, chiropractic medicine, acupuncture, oral medications, and interventional spine procedures  After discussion with the patient we agreed upon continued conservative treatments  Recommend continued HEP to work on core strengthening, lumbar ROM, strengthening, and stretching exercises.   Continue with oral medications as needed.  Patient currently taking Robaxin and Tylenol as needed.  Ice/heat, OTC pain medication as needed.  Work note provided.  Follow-up:  Return in about 3 months for follow up after further conservative treatments.     Orders:    XR spine lumbar 2 or 3 views injury; Future    acetaminophen (TYLENOL) 650 mg CR tablet; Take 1 tablet (650 mg total) by mouth every 8 (eight) hours as needed for mild pain

## 2025-04-15 ENCOUNTER — TELEPHONE (OUTPATIENT)
Age: 70
End: 2025-04-15

## 2025-04-15 NOTE — TELEPHONE ENCOUNTER
Patient called; warm transfer to [facility] #[id number]. Patient called for appointment to discuss medication for blood pressure. Appointment scheduled for 4/16/25.

## 2025-04-16 ENCOUNTER — OFFICE VISIT (OUTPATIENT)
Dept: FAMILY MEDICINE CLINIC | Facility: CLINIC | Age: 70
End: 2025-04-16
Payer: COMMERCIAL

## 2025-04-16 VITALS
WEIGHT: 177 LBS | TEMPERATURE: 97.8 F | OXYGEN SATURATION: 98 % | SYSTOLIC BLOOD PRESSURE: 158 MMHG | HEART RATE: 76 BPM | DIASTOLIC BLOOD PRESSURE: 90 MMHG | HEIGHT: 63 IN | BODY MASS INDEX: 31.36 KG/M2

## 2025-04-16 DIAGNOSIS — R23.9 RECENT SKIN CHANGES: ICD-10-CM

## 2025-04-16 DIAGNOSIS — Z00.00 ANNUAL PHYSICAL EXAM: Primary | ICD-10-CM

## 2025-04-16 DIAGNOSIS — M54.16 LUMBAR RADICULOPATHY: ICD-10-CM

## 2025-04-16 DIAGNOSIS — M17.12 OSTEOARTHRITIS OF LEFT KNEE, UNSPECIFIED OSTEOARTHRITIS TYPE: ICD-10-CM

## 2025-04-16 PROCEDURE — 99396 PREV VISIT EST AGE 40-64: CPT

## 2025-04-16 PROCEDURE — 99397 PER PM REEVAL EST PAT 65+ YR: CPT

## 2025-04-16 PROCEDURE — 99214 OFFICE O/P EST MOD 30 MIN: CPT

## 2025-04-16 NOTE — PROGRESS NOTES
Adult Annual Physical  Name: Mylene Wolff      : 1955      MRN: [id number]  Encounter Provider: Clemente Roblero MD  Encounter Date: 2025   Encounter department: [facility]    :  Assessment & Plan  Annual physical exam         Lumbar radiculopathy  Following with ortho        Osteoarthritis of left knee, unspecified osteoarthritis type  Xray in 2024 - Moderate tricompartmental osteoarthritis, evidenced by articular cartilage loss, marginal osteophytes, and subchondral sclerosis.   Is established with ortho   Can use voltaren gel   Recommended therapy and follow up with ortho to consider if she needs a replacement  Orders:  •  Diclofenac Sodium (VOLTAREN) 1 %; Apply 2 g topically 4 (four) times a day as needed (back pain)    Recent skin changes    Orders:  •  Ambulatory Referral to Dermatology; Future        Preventive Screenings:    - Cervical cancer screening: screening not indicated   - Breast cancer screening: screening up-to-date     Immunizations:  - Immunizations due: Influenza and Zoster (Shingrix)         History of Present Illness     Adult Annual Physical:  Patient presents for annual physical.     Diet and Physical Activity:  - Diet/Nutrition: well balanced diet.  - Exercise: no formal exercise.    Depression Screening:  - PHQ-2 Score: 0    General Health:  - Sleep: sleeps well, 7-8 hours of sleep on average and snores loudly.  - Hearing: normal hearing right ear and normal hearing left ear.  - Vision: wears contacts.  - Dental: regular dental visits.    /GYN Health:  - Follows with GYN: yes.   - Menopause: postmenopausal.   - History of STDs: no    Advanced Care Planning:  - Has an advanced directive?: no    - Has a durable medical POA?: no    - ACP document given to patient?: no      Review of Systems   Constitutional:  Negative for chills and fever.   HENT:  Negative for ear pain and sore throat.    Eyes:  Negative for pain and visual  disturbance.   Respiratory:  Negative for cough and shortness of breath.    Cardiovascular:  Negative for chest pain and palpitations.   Gastrointestinal:  Negative for abdominal pain and vomiting.   Genitourinary:  Negative for dysuria and hematuria.   Musculoskeletal:  Negative for arthralgias and back pain.   Skin:  Negative for color change and rash.   Neurological:  Negative for seizures and syncope.   All other systems reviewed and are negative.    Current Outpatient Medications on File Prior to Visit   Medication Sig Dispense Refill   • acetaminophen (TYLENOL) 650 mg CR tablet Take 1 tablet (650 mg total) by mouth every 8 (eight) hours as needed for mild pain 30 tablet 0   • Acetaminophen Extra Strength 500 MG TABS TAKE 2 TABLETS (1,000 MG TOTAL) BY MOUTH EVERY 8 (EIGHT) HOURS AS NEEDED FOR MILD PAIN 60 tablet 0   • atenolol (TENORMIN) 50 mg tablet TOME 1 TABLETA POR VIA ORAL TODOS LOS OLIVA 90 tablet 1   • Blood Pressure Monitoring (Adult Blood Pressure Cuff Lg) KIT Use in the morning 1 kit 0   • cholecalciferol (VITAMIN D3) 1,000 units tablet Take 2 tablets (2,000 Units total) by mouth daily 60 tablet 5   • Cranberry 125 MG TABS Take by mouth     • lidocaine (Lidoderm) 5 % Apply 1 patch topically over 12 hours daily Remove & Discard patch within 12 hours or as directed by MD 30 patch 0   • losartan (COZAAR) 50 mg tablet TOME 1 TABLETA POR VIA ORAL TODOS LOS OLIVA 90 tablet 1   • methocarbamol (ROBAXIN) 500 mg tablet TOME ED TABLETA POR VIA ORAL CADA OCHO HORAS CUANDO SEA NECESARIO FOR MUSCLE SPASMS MAY MAKE DROWSY/DIZZY 30 tablet 0   • Mirabegron ER (Myrbetriq) 50 MG TB24 every 24 hours     • rosuvastatin (CRESTOR) 10 MG tablet Take 1 tablet (10 mg total) by mouth daily 90 tablet 1   • [DISCONTINUED] oxyCODONE (Roxicodone) 5 immediate release tablet Take 1 tablet (5 mg total) by mouth every 6 (six) hours as needed for moderate pain Max Daily Amount: 20 mg 30 tablet 0   • ibuprofen (MOTRIN) 600 mg tablet Take  "1 tablet (600 mg total) by mouth every 8 (eight) hours for 5 days 15 tablet 0   • senna (SENOKOT) 8.6 mg Take 1 tablet (8.6 mg total) by mouth daily at bedtime for 7 days 7 tablet 0     No current facility-administered medications on file prior to visit.      Social History     Tobacco Use   • Smoking status: Never     Passive exposure: Never   • Smokeless tobacco: Never   Vaping Use   • Vaping status: Never Used   Substance and Sexual Activity   • Alcohol use: Not Currently   • Drug use: Not Currently   • Sexual activity: Not Currently     Partners: Male       Objective   /90 (BP Location: Left arm, Patient Position: Sitting, Cuff Size: Large)   Pulse 76   Temp 97.8 °F (36.6 °C) (Temporal)   Ht 5' 3\" (1.6 m)   Wt 80.3 kg (177 lb)   SpO2 98%   BMI 31.35 kg/m²     Physical Exam  Vitals and nursing note reviewed.   Constitutional:       General: She is not in acute distress.     Appearance: She is well-developed.   HENT:      Head: Normocephalic and atraumatic.   Eyes:      Conjunctiva/sclera: Conjunctivae normal.   Cardiovascular:      Rate and Rhythm: Normal rate and regular rhythm.      Heart sounds: No murmur heard.  Pulmonary:      Effort: Pulmonary effort is normal. No respiratory distress.      Breath sounds: Normal breath sounds.   Abdominal:      Palpations: Abdomen is soft.      Tenderness: There is no abdominal tenderness.   Musculoskeletal:         General: No swelling.      Cervical back: Neck supple.      Left knee: Swelling present. Decreased range of motion. Tenderness present.   Skin:     General: Skin is warm and dry.      Capillary Refill: Capillary refill takes less than 2 seconds.   Neurological:      Mental Status: She is alert.   Psychiatric:         Mood and Affect: Mood normal.         "

## 2025-04-16 NOTE — PATIENT INSTRUCTIONS
"Patient Education     Examen físico de rutina para adultos   Conceptos Básicos   Redactado por los médicos y editores de UpToDate   ¿Qué es un examen físico? -- Un examen físico es lillian consulta de rutina o \"revisión\" con ferreira médico. También se conoce virginie \"consulta de bienestar\" o \"consulta preventiva\".  Sheeba cada consulta, el médico hará lo siguiente:   Preguntará por ferreira mouna física y mental   Preguntará sobre chelsie hábitos, conductas y estilo de bing   Le hará un examen   Le administrará las vacunas que cristopher necesarias   Hablará con usted sobre cualquier medicina que tome   Le dará consejos sobre ferreira mouna   Responderá chelsie preguntas  Hacerse revisiones periódicas es lillian parte importante del cuidado de ferreira mouna. Puede ayudar a ferreira médico a encontrar y tratar cualquier problema que tenga. Alonzo también es importante para prevenir problemas de mouna.  Un examen físico de rutina es diferente de lillian \"consulta por enfermedad\". Lillian consulta por enfermedad es cuando lo atiende un médico debido a un problema o inquietud de mouna. Dado que los exámenes físicos se programan con anticipación, usted puede pensar en lo que quiere preguntarle al médico.  ¿Con qué frecuencia jono hacerme un examen físico? -- Depende de ferreira edad y de ferreira estado de mouna. En general, en el anel de las personas mayores de 21 años:   Si tiene menos de 50 años, es posible que pueda hacerse un examen físico cada 3 años.   Si tiene 50 años o más, ferreira médico podría recomendarle un examen físico cada año.  Si tiene un padecimiento de mouna crónico, scott virginie diabetes o presión arterial sergey, ferreira médico probablemente querrá verlo con más frecuencia.  ¿Qué sucede sheeba un examen físico? -- En general, cada consulta incluirá:   Examen físico - El médico o enfermero revisará ferreira estatura, peso, frecuencia cardíaca y presión arterial. También le examinará los ojos y los oídos. Le preguntará cómo se siente y si tiene algún síntoma que le moleste.   Medicinas - Es lillian " "buena idea llevar lillian lista de todos las medicinas que inga cada vez que acude a la consulta médica. Ferreira médico le hablará sobre romana medicinas y responderá a romana preguntas. Dígale si tiene algún efecto secundario que le moleste. También debe informarle si tiene dificultades para pagar alguna de romana medicinas.   Hábitos y comportamientos - [place] incluye:   Ferreira dieta   Romana hábitos de ejercicio   Si fuma, erica alcohol o consume drogas   Si es sexualmente activo   Si se siente seguro en casa  Ferreira médico hablará con usted sobre las cosas que puede hacer para mejorar ferreira mouna y reducir el riesgo de tener problemas de mouna. También ofrecerá ayuda y apoyo. Por ejemplo, si quiere dejar de fumar, puede darle consejos y recetarle medicinas. Si quiere mejorar ferreira alimentación o realizar más actividad física, ferreira médico también puede ayudarlo a lograr estos objetivos.   Pruebas de laboratorio, si son necesarias - Las pruebas que le realicen dependerán de ferreira edad y situación. Por ejemplo, es posible que ferreira médico quiera revisar ferreira:   Colesterol   Azúcar en shankar   Nivel de emy   Vacunas - Las vacunas recomendadas dependerán de ferreira edad, ferreira mouna y de las vacunas que ya haya recibido. Las vacunas son muy importantes porque pueden prevenir ciertas infecciones graves o mortales.   Análisis sobre las pruebas de detección - \"Detección\" significa revisar si hay enfermedades u otros problemas de mouna antes de que causen síntomas. Ferreira médico puede recomendar pruebas de detección según ferreira edad, riesgo y preferencias. [place] podría incluir pruebas para detectar:   Cáncer, virginie cáncer de seno, próstata, odette uterino, ovario, colorrectal, próstata, pulmón o piel   Infecciones de transmisión sexual tales virginie clamidia y gonorrea   Padecimientos de mouna mental tales virginie depresión y ansiedad.  El médico le hablará sobre los diferentes tipos de pruebas de detección. Puede ayudarlo a decidir qué pruebas de detección debe hacerse. También le puede " explicar lo que podrían significar los resultados.   Responder preguntas - El examen físico es un buen momento para hacerle preguntas al médico o enfermero sobre ferreira mouna. Si es necesario, también puede derivarlo a otros médicos o especialistas.  Los adultos mayores de 65 años a menudo también necesitan otros cuidados. A medida que envejece, ferreira médico hablará con usted sobre:   Cómo evitar las caídas en el hogar   Pruebas de audición o visión   Pruebas de memoria   Cómo rae chelsie medicinas de manera flood   Asegurarse de tener la ayuda y el apoyo que necesita en casa  Todos los artículos se actualizan a medida que se descubre nueva evidencia y culmina nuestro proceso de evaluación por homólogos   Jose E artículo se recuperó de UpToDate el: May 02, 2024.  Artículo 513991 Versión [id number]  Release: 32.4.3 - C32.122  © 2024 UpToDate, Inc. Todos los derechos reservados.  Exención de responsabilidad y uso de la información del consumidor   Descargo de responsabilidad: esta información generalizada es un resumen limitado de información sobre el diagnóstico, el tratamiento y/o los medicamentos. No pretende ser exhaustiva y se debe utilizar virginie herramienta para ayudar al usuario a comprender y/o evaluar las posibles opciones de diagnóstico y tratamiento. No incluye toda la información sobre afecciones, tratamientos, medicamentos, efectos secundarios o riesgos puedan ser aplicables a un paciente específico. No tiene el propósito de servir virginie recomendación médica ni de sustituir la recomendación médica, el diagnóstico o el tratamiento de un profesional de atención médica que se base en el examen y la evaluación de jose e profesional de la mouna respecto a las circunstancias específicas y únicas del paciente. Los pacientes deben hablar con un profesional de atención médica para obtener información completa sobre ferreira mouna, cuestiones médicas y opciones de tratamiento, incluidos los riesgos o los beneficios relacionados con  el uso de medicamentos. Esta información no certifica que los tratamientos o medicamentos cristopher seguros, eficaces o estén aprobados para tratar a un paciente específico. UpToDate, Inc. y chelsie afiliados renuncian a cualquier garantía o responsabilidad relacionada con esta información o el uso de la misma.El uso de esta información está sujeto a las Condiciones de uso, disponibles en https://www.[employer].com/en/know/clinical-effectiveness-terms. 2024© UpToDate, Inc. y chelsie afiliados y/o licenciantes. Todos los derechos reservados.  Copyright     Patient Education     Ejercicios de amplitud de movimiento activa, rodillas y tobillos   Acerca de jose e tian   Los ejercicios de amplitud de movimiento activa o AMA se realizan cuando lillian persona mueve los músculos sin la ayuda de otra persona o dispositivo. Usar pesas o bandas elásticas para estos ejercicios puede hacerlos más dificultosos.  General   Antes de comenzar con un programa, consulte a ferreira médico para saber si está lo suficientemente elle virginie para hacer estos ejercicios. Es posible que el médico le pida que trabaje con un entrenador o fisioterapeuta para elaborar un programa seguro de actividad física que cumpla con chelsie necesidades.  Ejercicios de fortalecimiento   Los ejercicios de fortalecimiento mantienen los músculos firmes y anatoliy. Siéntese mientras hace estos ejercicios. Asegúrese de adoptar lillian buena postura. Comience repitiendo cada ejercicio 2 o 3 veces. Progrese hasta hacer cada ejercicio 10 veces. Trate de hacer estos ejercicios 2 o 3 veces al día. Sachi todos los ejercicios de forma lenta.  Fortalecimiento y flexión para rodillas: levante la pierna hasta que la rodilla esté recta. Ahora, vuelva a colocarla en el piso y doble la rodilla tanto virginie pueda. Sachi esto con la otra pierna.  [place] para tobillos en posición sentada: mueva cada pie hacia arriba y hacia abajo virginie si estuviera presionando y soltando el pedal del acelerador.  Círculos con los  tobillos sentado: sachi círculos con los tobillos, moviendo los dedos del pie en lillian dirección. Ahora, muévalos en círculo hacia la dirección contraria.   Alfabeto con el tobillo en posición sentada: imagine que está escribiendo el abecedario con cada pie. No mueva toda la pierna para esto, alejandrina con  el tobillo. Sachi todas las letras del alfabeto. [place] descansos cortos si se cansa.  Flexiones de dedos: flexione los dedos de los pies hacia arriba y hacia abajo.             ¿Cuáles serán los resultados?   Menos dolor y entumecimiento  Mantener chelsie músculos anatoliy y flexibles  Ayudar a curarse más rápido después de lillian lesión o cirugía  Aumenta el flujo sanguíneo a lillian parte del cuerpo  Lo ayuda a sentirse mejor y más relajado  Le da más energía  Músculos más tonificados  Resulta más fácil hacer las actividades diarias  Mejorar la función articular y la movilidad  Consejos útiles   Manténgase activa y realice ejercicios para mantener los músculos anatoliy y flexibles.  No contenga la respiración cuando realice actividad física. [place] aumentará la presión arterial. Si usted suele hacer esto, pruebe contar en voz sergey mientras hace ejercicio. Si algún ejercicio le general malestar, deje de hacerlo inmediatamente.  Intente caminar o andar en bicicleta a un ritmo lento sheeba algunos minutos para calentar los músculos. Sachi esto luego de ejercitar.  Ejercitarse antes de cada comida es lillian buena manera de mantener lillian rutina.  Es posible que se sienta algo incómodo cuando sachi ejercicio, facundo no debería sentir un dolor intenso. Si siente un dolor intenso, deje lo que está haciendo. Si el dolor continúa, llame al médico.  ¿Dónde puedo obtener más información?   Arth[employer] Foundation  http://www.arthritistoday.org/fitness/index.php   Exención de responsabilidad y uso de la información del consumidor   Esta información general es un resumen limitado de la información sobre el diagnóstico, el tratamiento y/o la medicación.  No pretende ser exhaustivo y debe utilizarse virginie lillian herramienta para ayudar al usuario a comprender y/o evaluar las posibles opciones de diagnóstico y tratamiento. NO incluye toda la información sobre las enfermedades, los tratamientos, los medicamentos, los efectos secundarios o los riesgos que pueden aplicarse a un paciente específico. No tiene por objeto ser un consejo médico ni un sustituto del consejo médico. [place] pretende reemplazar al diagnóstico o el tratamiento proporcionados por un proveedor de atención médica con base en el examen y la evaluación por parte de jose e proveedor de las circunstancias específicas y únicas de un paciente. Los pacientes deben hablar con un proveedor de atención médica para obtener información completa sobre ferreira mouna, preguntas médicas y opciones de tratamiento, incluidos los riesgos o beneficios relacionados con el uso de medicamentos. Esta información no respalda ningún tratamiento o medicamento virginie seguro, eficaz o aprobado para tratar a un paciente específico. UpToDate, Inc. y chelsie afiliados renuncian a cualquier garantía o responsabilidad relacionada con esta información o con el uso que se alexandra de esta. El uso de esta información se rige por las Condiciones de uso, disponibles en https://www.[employer]terskluwer.com/en/know/clinical-effectiveness-terms   Copyright   Copyright © 2024 UpToDate, Inc. y chelsie licenciantes y/o afiliados. Todos los derechos reservados.

## 2025-05-05 DIAGNOSIS — M17.12 OSTEOARTHRITIS OF LEFT KNEE, UNSPECIFIED OSTEOARTHRITIS TYPE: ICD-10-CM

## 2025-05-05 DIAGNOSIS — I10 PRIMARY HYPERTENSION: ICD-10-CM

## 2025-05-05 RX ORDER — ATENOLOL 50 MG/1
50 TABLET ORAL DAILY
Qty: 90 TABLET | Refills: 1 | Status: SHIPPED | OUTPATIENT
Start: 2025-05-05

## 2025-05-05 NOTE — TELEPHONE ENCOUNTER
Last visit  04/16/2025  No upcoming appointment     Patient is requesting refills. These are atenolol (tenormin) 50 mg tablet, Diclofenac Sodium (voltaren) 1%, but pharmacy needs to be changed to [facility] pharmacy, also acetaminophen (tylenol) 650 mg cr tablet and methocarbamol (robaxin) 500 mg tablet, which when I tried to pend it stated that they already have pended reorders. Only 1 medication was pended. All medications are to be sent to [facility] pharmacy on [address] [facility]. Patient has removed [facility] pharmacy from her list. Please advise.

## 2025-05-11 ENCOUNTER — HOSPITAL ENCOUNTER (EMERGENCY)
Facility: HOSPITAL | Age: 70
Discharge: HOME | End: 2025-05-11
Attending: EMERGENCY MEDICINE | Admitting: EMERGENCY MEDICINE
Payer: COMMERCIAL

## 2025-05-11 ENCOUNTER — APPOINTMENT (EMERGENCY)
Dept: CT IMAGING | Facility: HOSPITAL | Age: 70
End: 2025-05-11
Payer: COMMERCIAL

## 2025-05-11 VITALS
RESPIRATION RATE: 18 BRPM | TEMPERATURE: 97.7 F | BODY MASS INDEX: 29.91 KG/M2 | DIASTOLIC BLOOD PRESSURE: 86 MMHG | HEART RATE: 62 BPM | SYSTOLIC BLOOD PRESSURE: 177 MMHG | WEIGHT: 168.87 LBS | OXYGEN SATURATION: 97 %

## 2025-05-11 DIAGNOSIS — M54.50 LOW BACK PAIN: ICD-10-CM

## 2025-05-11 DIAGNOSIS — M25.561 RIGHT KNEE PAIN: ICD-10-CM

## 2025-05-11 DIAGNOSIS — R03.0 ELEVATED BLOOD PRESSURE READING: ICD-10-CM

## 2025-05-11 DIAGNOSIS — R51.9 HEADACHE: Primary | ICD-10-CM

## 2025-05-11 LAB
ALBUMIN SERPL BCG-MCNC: 4.2 G/DL (ref 3.5–5)
ALP SERPL-CCNC: 84 U/L (ref 34–104)
ALT SERPL W P-5'-P-CCNC: 22 U/L (ref 7–52)
ANION GAP SERPL CALCULATED.3IONS-SCNC: 5 MMOL/L (ref 4–13)
AST SERPL W P-5'-P-CCNC: 23 U/L (ref 13–39)
BASOPHILS # BLD AUTO: 0.03 THOUSANDS/ÂΜL (ref 0–0.1)
BASOPHILS NFR BLD AUTO: 0 % (ref 0–1)
BILIRUB SERPL-MCNC: 0.42 MG/DL (ref 0.2–1)
BUN SERPL-MCNC: 13 MG/DL (ref 5–25)
CALCIUM SERPL-MCNC: 9.6 MG/DL (ref 8.4–10.2)
CHLORIDE SERPL-SCNC: 101 MMOL/L (ref 96–108)
CO2 SERPL-SCNC: 31 MMOL/L (ref 21–32)
CREAT SERPL-MCNC: 0.62 MG/DL (ref 0.6–1.3)
EOSINOPHIL # BLD AUTO: 0.19 THOUSAND/ÂΜL (ref 0–0.61)
EOSINOPHIL NFR BLD AUTO: 2 % (ref 0–6)
ERYTHROCYTE [DISTWIDTH] IN BLOOD BY AUTOMATED COUNT: 16.1 % (ref 11.6–15.1)
GFR SERPL CREATININE-BSD FRML MDRD: 92 ML/MIN/1.73SQ M
GLUCOSE SERPL-MCNC: 90 MG/DL (ref 65–140)
HCT VFR BLD AUTO: 41.1 % (ref 34.8–46.1)
HGB BLD-MCNC: 13.1 G/DL (ref 11.5–15.4)
IMM GRANULOCYTES # BLD AUTO: 0.01 THOUSAND/UL (ref 0–0.2)
IMM GRANULOCYTES NFR BLD AUTO: 0 % (ref 0–2)
LYMPHOCYTES # BLD AUTO: 2.69 THOUSANDS/ÂΜL (ref 0.6–4.47)
LYMPHOCYTES NFR BLD AUTO: 34 % (ref 14–44)
MCH RBC QN AUTO: 29 PG (ref 26.8–34.3)
MCHC RBC AUTO-ENTMCNC: 31.9 G/DL (ref 31.4–37.4)
MCV RBC AUTO: 91 FL (ref 82–98)
MONOCYTES # BLD AUTO: 0.64 THOUSAND/ÂΜL (ref 0.17–1.22)
MONOCYTES NFR BLD AUTO: 8 % (ref 4–12)
NEUTROPHILS # BLD AUTO: 4.37 THOUSANDS/ÂΜL (ref 1.85–7.62)
NEUTS SEG NFR BLD AUTO: 56 % (ref 43–75)
NRBC BLD AUTO-RTO: 0 /100 WBCS
PLATELET # BLD AUTO: 211 THOUSANDS/UL (ref 149–390)
PMV BLD AUTO: 11.5 FL (ref 8.9–12.7)
POTASSIUM SERPL-SCNC: 3.6 MMOL/L (ref 3.5–5.3)
PROT SERPL-MCNC: 7 G/DL (ref 6.4–8.4)
RBC # BLD AUTO: 4.51 MILLION/UL (ref 3.81–5.12)
SODIUM SERPL-SCNC: 137 MMOL/L (ref 135–147)
WBC # BLD AUTO: 7.93 THOUSAND/UL (ref 4.31–10.16)

## 2025-05-11 PROCEDURE — 96365 THER/PROPH/DIAG IV INF INIT: CPT

## 2025-05-11 PROCEDURE — 80053 COMPREHEN METABOLIC PANEL: CPT | Performed by: PHYSICIAN ASSISTANT

## 2025-05-11 PROCEDURE — 96375 TX/PRO/DX INJ NEW DRUG ADDON: CPT

## 2025-05-11 PROCEDURE — 36415 COLL VENOUS BLD VENIPUNCTURE: CPT | Performed by: PHYSICIAN ASSISTANT

## 2025-05-11 PROCEDURE — 99284 EMERGENCY DEPT VISIT MOD MDM: CPT

## 2025-05-11 PROCEDURE — 99284 EMERGENCY DEPT VISIT MOD MDM: CPT | Performed by: PHYSICIAN ASSISTANT

## 2025-05-11 PROCEDURE — 70450 CT HEAD/BRAIN W/O DYE: CPT

## 2025-05-11 PROCEDURE — 85025 COMPLETE CBC W/AUTO DIFF WBC: CPT | Performed by: PHYSICIAN ASSISTANT

## 2025-05-11 RX ORDER — KETOROLAC TROMETHAMINE 30 MG/ML
15 INJECTION, SOLUTION INTRAMUSCULAR; INTRAVENOUS ONCE
Status: COMPLETED | OUTPATIENT
Start: 2025-05-11 | End: 2025-05-11

## 2025-05-11 RX ORDER — ACETAMINOPHEN 10 MG/ML
1000 INJECTION, SOLUTION INTRAVENOUS ONCE
Status: COMPLETED | OUTPATIENT
Start: 2025-05-11 | End: 2025-05-11

## 2025-05-11 RX ORDER — LIDOCAINE 50 MG/G
1 PATCH TOPICAL DAILY PRN
Qty: 9 PATCH | Refills: 0 | Status: SHIPPED | OUTPATIENT
Start: 2025-05-11

## 2025-05-11 RX ADMIN — SODIUM CHLORIDE 500 ML: 0.9 INJECTION, SOLUTION INTRAVENOUS at 18:16

## 2025-05-11 RX ADMIN — KETOROLAC TROMETHAMINE 15 MG: 30 INJECTION, SOLUTION INTRAMUSCULAR; INTRAVENOUS at 18:15

## 2025-05-11 RX ADMIN — ACETAMINOPHEN 1000 MG: 10 INJECTION INTRAVENOUS at 18:16

## 2025-05-12 NOTE — ED PROVIDER NOTES
Time reflects when diagnosis was documented in both MDM as applicable and the Disposition within this note       Time User Action Codes Description Comment    5/11/2025  8:05 PM Santiago Odell [R51.9] Headache     5/11/2025  8:05 PM Santiago Odell [R03.0] Elevated blood pressure reading     5/11/2025  8:09 PM Santiago Odell Add [M54.50] Low back pain     5/11/2025  8:09 PM Santiago Odell [M25.561] Right knee pain           ED Disposition       ED Disposition   Discharge    Condition   Stable    Date/Time   Sun May 11, 2025  8:04 PM    Comment   Mylene Wolff discharge to home/self care.                   Assessment & Plan       Medical Decision Making  Patient presenting with headache which began around 3 PM, reportedly more intense than prior headaches and severe at onset.  This has since improved overall although is still moderate at arrival.  She has no associated neurologic symptoms or gross deficits.  Given severity of headache initially, CT of head obtained for evaluation of SAH which is within normal limits.  Toradol and Ofirmev provided with significant overall improvement in headache.    Patient notes elevated blood pressure reading at home when checking her blood pressure following onset headache.  She has been compliant with her medication regimen.  On arrival to the emergency department, she is fairly hypertensive although somewhat anxious regarding her symptoms.  Her blood pressure on recheck is significantly improved although she does remain somewhat hypertensive.  She has no associated symptoms or lab findings to suggest endorgan damage.  She is advised to monitor her blood pressures at home and keep track of these readings, follow-up with primary care physician for blood pressure recheck and possible medication adjustment as needed.    She has chronic right lower back pain, this is unchanged from baseline.  No recent trauma to the area.  She request prescription  lidocaine patches, discussed these may not be covered by insurance but will provide prescription for.    She notes chronic left knee pain, unchanged from baseline.  Ambulatory without difficulty.  No recent trauma to her knee.  Will refer for orthopedic follow-up.    Amount and/or Complexity of Data Reviewed  Labs: ordered.  Radiology: ordered.    Risk  Prescription drug management.             Medications   ketorolac (TORADOL) injection 15 mg (15 mg Intravenous Given 25)   acetaminophen (Ofirmev) injection 1,000 mg (0 mg Intravenous Stopped 25)   sodium chloride 0.9 % bolus 500 mL (0 mL Intravenous Stopped 25)       ED Risk Strat Scores                    No data recorded        SBIRT 22yo+      Flowsheet Row Most Recent Value   Initial Alcohol Screen: US AUDIT-C     1. How often do you have a drink containing alcohol? 0 Filed at: 2025   2. How many drinks containing alcohol do you have on a typical day you are drinking?  0 Filed at: 2025   3a. Male UNDER 65: How often do you have five or more drinks on one occasion? 0 Filed at: 2025   3b. FEMALE Any Age, or MALE 65+: How often do you have 4 or more drinks on one occassion? 0 Filed at: 2025   Audit-C Score 0 Filed at: 2025   YULIANA: How many times in the past year have you...    Used an illegal drug or used a prescription medication for non-medical reasons? Never Filed at: 2025                            History of Present Illness       Chief Complaint   Patient presents with    Headache     Started with headache and dizziness at work today. Patient reports going home to check blood pressure and having a sbp in 190s.        Past Medical History:   Diagnosis Date    Allergic     Hammertoe of right foot     OR   reapir today 2023    Hyperlipidemia     Hypertension     Kidney stone     Wears glasses       Past Surgical History:   Procedure Laterality Date      SECTION      HERNIA REPAIR  01/01/1969    HYSTERECTOMY  1994    removed some of left ovary    OOPHORECTOMY Right     TN ARTHRODESIS COMBINED TQ 1NTRSPC LUMBAR Bilateral 08/29/2024    Procedure: Navigated L3-S1 decompression with instrumented fusion, TLIF. L4-S1 decompression/laminectomy;  Surgeon: Ephraim Wilkins MD;  Location: [facility];  Service: Orthopedics    TN CORRECTION HAMMERTOE Right 07/21/2023    Procedure: REPAIR HAMMERTOE 3RD TOE;  Surgeon: Lokesh Simmons DPM;  Location: [facility];  Service: Podiatry    TN LAPS ABD PRTM&OMENTUM DX W/WO SPEC BR/WA SPX N/A 01/01/2023    Procedure: LAPAROSCOPY DIAGNOSTIC, LYSIS OF ADHESIONS;  Surgeon: Debbie Clark MD;  Location: [facility];  Service: General      Family History   Problem Relation Age of Onset    Breast cancer Mother     Thrombosis Father     No Known Problems Sister     No Known Problems Sister     No Known Problems Sister     Hypertension Sister     No Known Problems Daughter     No Known Problems Daughter     No Known Problems Daughter     No Known Problems Daughter     Diabetes Maternal Grandmother     No Known Problems Maternal Grandfather     No Known Problems Paternal Grandmother     No Known Problems Paternal Grandfather     Hypertension Brother     Hypertension Brother       Social History     Tobacco Use    Smoking status: Never     Passive exposure: Never    Smokeless tobacco: Never   Vaping Use    Vaping status: Never Used   Substance Use Topics    Alcohol use: Not Currently    Drug use: Not Currently      E-Cigarette/Vaping    E-Cigarette Use Never User       E-Cigarette/Vaping Substances    Nicotine No     THC No     CBD No     Flavoring No     Other No     Unknown No       I have reviewed and agree with the history as documented.     Mylene is a 70-year-old female, history of hypertension on atenolol and losartan, presenting with elevated blood pressure reading as well as headache this afternoon.  She reports onset of headache  at work around 3 PM today which she reports was very severe and she rates as a 10/10.  This headache has since improved overall although she still notes a mild-moderate generalized headache.  No photophobia or phonophobia.  No nausea or vomiting.    Patient reports checking her blood pressure after onset of headache and noting it to be elevated.  She did take her usual blood pressure medications this morning.  Upon recognition of her elevated blood pressure, she took a 1/2 dose of her usual losartan.  She denies associated dizziness, numbness, tingling, weakness.  Denies chest pain or shortness of breath.     She also reports chronic right lower back pain for which she follows with a specialist.  This pain is unchanged.  She requests lidocaine patch prescription for this.  No recent trauma to the area.    She also reports pain in her left knee which is also chronic.  No recent trauma to her knee.  She is able to ambulate although notes discomfort with certain activities.  She request follow-up with orthopedics.      History provided by:  Patient   used: Yes        Review of Systems   Constitutional:  Negative for chills and fever.   HENT:  Negative for congestion, rhinorrhea and sore throat.    Eyes:  Negative for pain and visual disturbance.   Respiratory:  Negative for cough, shortness of breath and wheezing.    Cardiovascular:  Negative for chest pain and palpitations.   Gastrointestinal:  Negative for abdominal pain, nausea and vomiting.   Genitourinary:  Negative for dysuria, frequency and urgency.   Musculoskeletal:  Positive for arthralgias and back pain. Negative for neck pain and neck stiffness.   Skin:  Negative for rash and wound.   Neurological:  Positive for headaches. Negative for dizziness, weakness, light-headedness and numbness.           Objective       ED Triage Vitals   Temperature Pulse Blood Pressure Respirations SpO2 Patient Position - Orthostatic VS   05/11/25 1721  05/11/25 1722 05/11/25 1722 05/11/25 1722 05/11/25 1722 05/11/25 1722   97.7 °F (36.5 °C) 72 (!) 221/104 18 99 % Sitting      Temp Source Heart Rate Source BP Location FiO2 (%) Pain Score    05/11/25 1721 05/11/25 1722 05/11/25 1722 -- 05/11/25 1815    Oral Monitor Right arm  3      Vitals      Date and Time Temp Pulse SpO2 Resp BP Pain Score FACES Pain Rating User   05/11/25 1957 -- 62 97 % 18 177/86 -- -- TS   05/11/25 1848 -- -- -- -- 175/85 -- -- ML   05/11/25 1819 -- -- -- -- -- 3 -- ML   05/11/25 1815 -- -- -- -- -- 3 -- ML   05/11/25 1722 -- 72 99 % 18 221/104 -- -- KG   05/11/25 1721 97.7 °F (36.5 °C) -- -- -- -- -- -- KG            Physical Exam  Constitutional:       General: She is not in acute distress.     Appearance: She is well-developed. She is not diaphoretic.   HENT:      Head: Normocephalic and atraumatic.      Right Ear: External ear normal.      Left Ear: External ear normal.   Eyes:      Extraocular Movements:      Right eye: Normal extraocular motion and no nystagmus.      Left eye: Normal extraocular motion and no nystagmus.      Conjunctiva/sclera: Conjunctivae normal.      Pupils: Pupils are equal, round, and reactive to light.   Cardiovascular:      Rate and Rhythm: Normal rate and regular rhythm.   Pulmonary:      Effort: Pulmonary effort is normal. No accessory muscle usage or respiratory distress.   Abdominal:      General: Abdomen is flat. There is no distension.   Musculoskeletal:      Cervical back: Normal range of motion. No rigidity.   Skin:     General: Skin is warm and dry.      Capillary Refill: Capillary refill takes less than 2 seconds.      Findings: No erythema or rash.   Neurological:      Mental Status: She is alert and oriented to person, place, and time.      GCS: GCS eye subscore is 4. GCS verbal subscore is 5. GCS motor subscore is 6.      Cranial Nerves: Cranial nerves 2-12 are intact.      Sensory: Sensation is intact.      Motor: Motor function is intact. No  abnormal muscle tone.      Coordination: Coordination is intact. Coordination normal.   Psychiatric:         Behavior: Behavior normal.         Thought Content: Thought content normal.         Judgment: Judgment normal.         Results Reviewed       Procedure Component Value Units Date/Time    Comprehensive metabolic panel [[id number]] Collected: 05/11/25 1812    Lab Status: Final result Specimen: Blood from Arm, Right Updated: 05/11/25 1844     Sodium 137 mmol/L      Potassium 3.6 mmol/L      Chloride 101 mmol/L      CO2 31 mmol/L      ANION GAP 5 mmol/L      BUN 13 mg/dL      Creatinine 0.62 mg/dL      Glucose 90 mg/dL      Calcium 9.6 mg/dL      AST 23 U/L      ALT 22 U/L      Alkaline Phosphatase 84 U/L      Total Protein 7.0 g/dL      Albumin 4.2 g/dL      Total Bilirubin 0.42 mg/dL      eGFR 92 ml/min/1.73sq m     Narrative:      [employer] Kid[employer] guidelines for Chronic Kidney Disease (CKD):     Stage 1 with normal or high GFR (GFR > 90 mL/min/1.73 square meters)    Stage 2 Mild CKD (GFR = 60-89 mL/min/1.73 square meters)    Stage 3A Moderate CKD (GFR = 45-59 mL/min/1.73 square meters)    Stage 3B Moderate CKD (GFR = 30-44 mL/min/1.73 square meters)    Stage 4 Severe CKD (GFR = 15-29 mL/min/1.73 square meters)    Stage 5 End Stage CKD (GFR <15 mL/min/1.73 square meters)  Note: GFR calculation is accurate only with a steady state creatinine    CBC and differential [[id number]]  (Abnormal) Collected: 05/11/25 1812    Lab Status: Final result Specimen: Blood from Arm, Right Updated: 05/11/25 1830     WBC 7.93 Thousand/uL      RBC 4.51 Million/uL      Hemoglobin 13.1 g/dL      Hematocrit 41.1 %      MCV 91 fL      MCH 29.0 pg      MCHC 31.9 g/dL      RDW 16.1 %      MPV 11.5 fL      Platelets 211 Thousands/uL      nRBC 0 /100 WBCs      Segmented % 56 %      Immature Grans % 0 %      Lymphocytes % 34 %      Monocytes % 8 %      Eosinophils Relative 2 %      Basophils Relative 0 %      Absolute  Neutrophils 4.37 Thousands/µL      Absolute Immature Grans 0.01 Thousand/uL      Absolute Lymphocytes 2.69 Thousands/µL      Absolute Monocytes 0.64 Thousand/µL      Eosinophils Absolute 0.19 Thousand/µL      Basophils Absolute 0.03 Thousands/µL             CT head without contrast   Final Interpretation by Kenneth Mondragon MD (05/11 1916)      No acute intracranial abnormality. Right sphenoid sinusitis.                  Workstation performed: [id number]             Procedures    ED Medication and Procedure Management   Prior to Admission Medications   Prescriptions Last Dose Informant Patient Reported? Taking?   Acetaminophen Extra Strength 500 MG TABS   No No   Sig: TAKE 2 TABLETS (1,000 MG TOTAL) BY MOUTH EVERY 8 (EIGHT) HOURS AS NEEDED FOR MILD PAIN   Blood Pressure Monitoring (Adult Blood Pressure Cuff Lg) KIT  Self No No   Sig: Use in the morning   Cranberry 125 MG TABS   Yes No   Sig: Take by mouth   Diclofenac Sodium (VOLTAREN) 1 %   No No   Sig: Apply 2 g topically 4 (four) times a day as needed (back pain)   Mirabegron ER (Myrbetriq) 50 MG TB24  Self Yes No   Sig: every 24 hours   acetaminophen (TYLENOL) 650 mg CR tablet   No No   Sig: Take 1 tablet (650 mg total) by mouth every 8 (eight) hours as needed for mild pain   atenolol (TENORMIN) 50 mg tablet   No No   Sig: Take 1 tablet (50 mg total) by mouth daily   cholecalciferol (VITAMIN D3) 1,000 units tablet   No No   Sig: Take 2 tablets (2,000 Units total) by mouth daily   ibuprofen (MOTRIN) 600 mg tablet   No No   Sig: Take 1 tablet (600 mg total) by mouth every 8 (eight) hours for 5 days   lidocaine (Lidoderm) 5 %   No No   Sig: Apply 1 patch topically over 12 hours daily Remove & Discard patch within 12 hours or as directed by MD   losartan (COZAAR) 50 mg tablet   No No   Sig: TOME 1 TABLETA POR VIA ORAL TODOS LOS OLIVA   methocarbamol (ROBAXIN) 500 mg tablet   No No   Sig: TOME ED TABLETA POR VIA ORAL CADA OCHO HORAS CUANDO SEA NECESARIO FOR MUSCLE  SPASMS MAY MAKE DROWSY/DIZZY   rosuvastatin (CRESTOR) 10 MG tablet   No No   Sig: Take 1 tablet (10 mg total) by mouth daily   senna (SENOKOT) 8.6 mg  Self No No   Sig: Take 1 tablet (8.6 mg total) by mouth daily at bedtime for 7 days      Facility-Administered Medications: None     Discharge Medication List as of 5/11/2025  8:12 PM        START taking these medications    Details   !! lidocaine (Lidoderm) 5 % Apply 1 patch topically over 12 hours daily as needed (Low back pain) Remove & Discard patch within 12 hours or as directed by MD, Starting Sun 5/11/2025, Normal       !! - Potential duplicate medications found. Please discuss with provider.        CONTINUE these medications which have NOT CHANGED    Details   acetaminophen (TYLENOL) 650 mg CR tablet Take 1 tablet (650 mg total) by mouth every 8 (eight) hours as needed for mild pain, Starting Wed 4/9/2025, Normal      Acetaminophen Extra Strength 500 MG TABS TAKE 2 TABLETS (1,000 MG TOTAL) BY MOUTH EVERY 8 (EIGHT) HOURS AS NEEDED FOR MILD PAIN, Normal      atenolol (TENORMIN) 50 mg tablet Take 1 tablet (50 mg total) by mouth daily, Starting Mon 5/5/2025, Normal      Blood Pressure Monitoring (Adult Blood Pressure Cuff Lg) KIT Use in the morning, Starting Mon 6/26/2023, Normal      cholecalciferol (VITAMIN D3) 1,000 units tablet Take 2 tablets (2,000 Units total) by mouth daily, Starting Mon 9/9/2024, Normal      Cranberry 125 MG TABS Take by mouth, Historical Med      Diclofenac Sodium (VOLTAREN) 1 % Apply 2 g topically 4 (four) times a day as needed (back pain), Starting Wed 4/16/2025, Normal      ibuprofen (MOTRIN) 600 mg tablet Take 1 tablet (600 mg total) by mouth every 8 (eight) hours for 5 days, Starting Mon 12/16/2024, Until Sat 12/21/2024, Normal      !! lidocaine (Lidoderm) 5 % Apply 1 patch topically over 12 hours daily Remove & Discard patch within 12 hours or as directed by MD, Starting Tue 12/17/2024, Normal      losartan (COZAAR) 50 mg tablet  TOME 1 TABLETA POR VIA ORAL TODOS LOS OLIVA, Normal      methocarbamol (ROBAXIN) 500 mg tablet TOME ED TABLETA POR VIA ORAL CADA OCHO HORAS CUANDO SEA NECESARIO FOR MUSCLE SPASMS MAY MAKE DROWSY/DIZZY, Normal      Mirabegron ER (Myrbetriq) 50 MG TB24 every 24 hours, Starting Wed 8/30/2023, Historical Med      rosuvastatin (CRESTOR) 10 MG tablet Take 1 tablet (10 mg total) by mouth daily, Starting Fri 12/13/2024, Normal      senna (SENOKOT) 8.6 mg Take 1 tablet (8.6 mg total) by mouth daily at bedtime for 7 days, Starting Thu 5/30/2024, Until Thu 6/6/2024, Normal       !! - Potential duplicate medications found. Please discuss with provider.        No discharge procedures on file.  ED SEPSIS DOCUMENTATION   Time reflects when diagnosis was documented in both MDM as applicable and the Disposition within this note       Time User Action Codes Description Comment    5/11/2025  8:05 PM Santiago Odell [R51.9] Headache     5/11/2025  8:05 PM Santiago Odell [R03.0] Elevated blood pressure reading     5/11/2025  8:09 PM Santiago Odell [M54.50] Low back pain     5/11/2025  8:09 PM Santiago Odell [M25.561] Right knee pain                  Santiago Odell PA-C  05/11/25 2128

## 2025-05-12 NOTE — DISCHARGE INSTRUCTIONS
Please refer to the attached information for strict return instructions. If symptoms worsen or new symptoms develop please return to the ER. Please follow up with orthopedics regarding your knee pain. Please follow up with your primary care physician for re-evaluation and BP recheck.

## 2025-05-20 DIAGNOSIS — E55.9 VITAMIN D DEFICIENCY: ICD-10-CM

## 2025-05-20 RX ORDER — CHOLECALCIFEROL (VITAMIN D3) 25 MCG
2000 TABLET ORAL DAILY
Qty: 60 TABLET | Refills: 5 | Status: SHIPPED | OUTPATIENT
Start: 2025-05-20

## 2025-05-20 NOTE — TELEPHONE ENCOUNTER
Medication: cholecalciferol (VITAMIN D3) 1,000 units tablet     Dose/Frequency: Take 2 tablets (2,000 Units total) by mouth daily     Quantity: 60 tablet (30 day supply)     Pharmacy: [facility]/pharm[facility] #[id number] - [address] [place] [address]  Phone: [phone]  Fax: [phone]     Office:   [x] PCP/Provider - Clemente Roblero MD   [] Speciality/Provider -     Does the patient have enough for 3 days?   [] Yes   [x] No - Send as HP to POD

## 2025-05-21 ENCOUNTER — APPOINTMENT (EMERGENCY)
Dept: CT IMAGING | Facility: HOSPITAL | Age: 70
End: 2025-05-21
Payer: COMMERCIAL

## 2025-05-21 ENCOUNTER — HOSPITAL ENCOUNTER (EMERGENCY)
Facility: HOSPITAL | Age: 70
Discharge: HOME | End: 2025-05-22
Attending: EMERGENCY MEDICINE
Payer: COMMERCIAL

## 2025-05-21 DIAGNOSIS — M25.562 BILATERAL KNEE PAIN: ICD-10-CM

## 2025-05-21 DIAGNOSIS — I10 HTN (HYPERTENSION): ICD-10-CM

## 2025-05-21 DIAGNOSIS — M19.90 OSTEOARTHRITIS: ICD-10-CM

## 2025-05-21 DIAGNOSIS — M25.561 BILATERAL KNEE PAIN: ICD-10-CM

## 2025-05-21 DIAGNOSIS — R10.9 ABDOMINAL PAIN: Primary | ICD-10-CM

## 2025-05-21 LAB
ALBUMIN SERPL BCG-MCNC: 4.5 G/DL (ref 3.5–5)
ALP SERPL-CCNC: 94 U/L (ref 34–104)
ALT SERPL W P-5'-P-CCNC: 16 U/L (ref 7–52)
ANION GAP SERPL CALCULATED.3IONS-SCNC: 6 MMOL/L (ref 4–13)
AST SERPL W P-5'-P-CCNC: 15 U/L (ref 13–39)
BASOPHILS # BLD AUTO: 0.05 THOUSANDS/ÂΜL (ref 0–0.1)
BASOPHILS NFR BLD AUTO: 1 % (ref 0–1)
BILIRUB SERPL-MCNC: 0.45 MG/DL (ref 0.2–1)
BUN SERPL-MCNC: 19 MG/DL (ref 5–25)
CALCIUM SERPL-MCNC: 9.7 MG/DL (ref 8.4–10.2)
CHLORIDE SERPL-SCNC: 101 MMOL/L (ref 96–108)
CO2 SERPL-SCNC: 31 MMOL/L (ref 21–32)
CREAT SERPL-MCNC: 0.54 MG/DL (ref 0.6–1.3)
EOSINOPHIL # BLD AUTO: 0.18 THOUSAND/ÂΜL (ref 0–0.61)
EOSINOPHIL NFR BLD AUTO: 2 % (ref 0–6)
ERYTHROCYTE [DISTWIDTH] IN BLOOD BY AUTOMATED COUNT: 15.9 % (ref 11.6–15.1)
GFR SERPL CREATININE-BSD FRML MDRD: 96 ML/MIN/1.73SQ M
GLUCOSE SERPL-MCNC: 97 MG/DL (ref 65–140)
HCT VFR BLD AUTO: 42.8 % (ref 34.8–46.1)
HGB BLD-MCNC: 13.8 G/DL (ref 11.5–15.4)
IMM GRANULOCYTES # BLD AUTO: 0.02 THOUSAND/UL (ref 0–0.2)
IMM GRANULOCYTES NFR BLD AUTO: 0 % (ref 0–2)
LACTATE SERPL-SCNC: 0.6 MMOL/L (ref 0.5–2)
LYMPHOCYTES # BLD AUTO: 2.58 THOUSANDS/ÂΜL (ref 0.6–4.47)
LYMPHOCYTES NFR BLD AUTO: 26 % (ref 14–44)
MAGNESIUM SERPL-MCNC: 2.2 MG/DL (ref 1.9–2.7)
MCH RBC QN AUTO: 29.2 PG (ref 26.8–34.3)
MCHC RBC AUTO-ENTMCNC: 32.2 G/DL (ref 31.4–37.4)
MCV RBC AUTO: 91 FL (ref 82–98)
MONOCYTES # BLD AUTO: 0.62 THOUSAND/ÂΜL (ref 0.17–1.22)
MONOCYTES NFR BLD AUTO: 6 % (ref 4–12)
NEUTROPHILS # BLD AUTO: 6.31 THOUSANDS/ÂΜL (ref 1.85–7.62)
NEUTS SEG NFR BLD AUTO: 65 % (ref 43–75)
NRBC BLD AUTO-RTO: 0 /100 WBCS
PLATELET # BLD AUTO: 260 THOUSANDS/UL (ref 149–390)
PMV BLD AUTO: 11 FL (ref 8.9–12.7)
POTASSIUM SERPL-SCNC: 4.3 MMOL/L (ref 3.5–5.3)
PROT SERPL-MCNC: 7.7 G/DL (ref 6.4–8.4)
RBC # BLD AUTO: 4.73 MILLION/UL (ref 3.81–5.12)
SODIUM SERPL-SCNC: 138 MMOL/L (ref 135–147)
WBC # BLD AUTO: 9.76 THOUSAND/UL (ref 4.31–10.16)

## 2025-05-21 PROCEDURE — 96374 THER/PROPH/DIAG INJ IV PUSH: CPT

## 2025-05-21 PROCEDURE — 96375 TX/PRO/DX INJ NEW DRUG ADDON: CPT

## 2025-05-21 PROCEDURE — 99285 EMERGENCY DEPT VISIT HI MDM: CPT | Performed by: EMERGENCY MEDICINE

## 2025-05-21 PROCEDURE — 83735 ASSAY OF MAGNESIUM: CPT | Performed by: EMERGENCY MEDICINE

## 2025-05-21 PROCEDURE — 36415 COLL VENOUS BLD VENIPUNCTURE: CPT | Performed by: EMERGENCY MEDICINE

## 2025-05-21 PROCEDURE — 83605 ASSAY OF LACTIC ACID: CPT | Performed by: EMERGENCY MEDICINE

## 2025-05-21 PROCEDURE — 80053 COMPREHEN METABOLIC PANEL: CPT | Performed by: EMERGENCY MEDICINE

## 2025-05-21 PROCEDURE — 99284 EMERGENCY DEPT VISIT MOD MDM: CPT

## 2025-05-21 PROCEDURE — 96361 HYDRATE IV INFUSION ADD-ON: CPT

## 2025-05-21 PROCEDURE — 85025 COMPLETE CBC W/AUTO DIFF WBC: CPT | Performed by: EMERGENCY MEDICINE

## 2025-05-21 PROCEDURE — 74177 CT ABD & PELVIS W/CONTRAST: CPT

## 2025-05-21 RX ORDER — ONDANSETRON 2 MG/ML
4 INJECTION INTRAMUSCULAR; INTRAVENOUS ONCE
Status: COMPLETED | OUTPATIENT
Start: 2025-05-21 | End: 2025-05-21

## 2025-05-21 RX ORDER — ACETAMINOPHEN 10 MG/ML
1000 INJECTION, SOLUTION INTRAVENOUS ONCE
Status: COMPLETED | OUTPATIENT
Start: 2025-05-21 | End: 2025-05-21

## 2025-05-21 RX ADMIN — SODIUM CHLORIDE 1000 ML: 0.9 INJECTION, SOLUTION INTRAVENOUS at 22:30

## 2025-05-21 RX ADMIN — IOHEXOL 100 ML: 350 INJECTION, SOLUTION INTRAVENOUS at 23:37

## 2025-05-21 RX ADMIN — ACETAMINOPHEN 1000 MG: 10 INJECTION INTRAVENOUS at 22:32

## 2025-05-21 RX ADMIN — ONDANSETRON 4 MG: 2 INJECTION INTRAMUSCULAR; INTRAVENOUS at 22:30

## 2025-05-22 VITALS
SYSTOLIC BLOOD PRESSURE: 192 MMHG | HEART RATE: 65 BPM | WEIGHT: 170.42 LBS | OXYGEN SATURATION: 97 % | TEMPERATURE: 97.7 F | DIASTOLIC BLOOD PRESSURE: 91 MMHG | BODY MASS INDEX: 30.19 KG/M2 | RESPIRATION RATE: 18 BRPM

## 2025-05-22 PROCEDURE — 96375 TX/PRO/DX INJ NEW DRUG ADDON: CPT

## 2025-05-22 RX ORDER — KETOROLAC TROMETHAMINE 30 MG/ML
15 INJECTION, SOLUTION INTRAMUSCULAR; INTRAVENOUS ONCE
Status: COMPLETED | OUTPATIENT
Start: 2025-05-22 | End: 2025-05-22

## 2025-05-22 RX ADMIN — KETOROLAC TROMETHAMINE 15 MG: 30 INJECTION, SOLUTION INTRAMUSCULAR; INTRAVENOUS at 00:11

## 2025-05-22 NOTE — ED PROVIDER NOTES
ED Disposition       None          Assessment & Plan   {Hyperlinks  Risk Stratification - NIHSS - HEART SCORE - Fill out sepsis note and make sure you call [phone] if severe or septic shock:[id number]}    Medical Decision Making      Differential diagnosis includes but not limited to:  Appendicitis, viral syndrome, constipation, AMI, NSTEMI, pneumonia, pneuothorax, gerd, gastritis,  mesenteric ischemia, mesenteric adenitis, pancreatitis, cholecystitis, choledocholithiasis, hepatitis, bowel obstruction, ileus, gastroenteritis, colitis, malignancy, AAA, perforation, toxicologic poisoning, renal infarct, acute kidney injury, splenic infarct, splenic injury, nephrolithiasis, UTI, muscular strain, intra-abdominal hematoma, hernia      Will check labs to evaluate for electrolyte abnormality, DAVINA, anemia, significant leukocytosis, pancreatitis, hepatitis and biliary etiology. Will also check urine for pregnancy and UTI      Amount and/or Complexity of Data Reviewed  Independent Historian: caregiver     Details:     Patient's grandson at bedside        External Data Reviewed: notes.     Details:    chart review shows that patient has had a hysterectomy, , hernia repair.  Patient's last CT abdomen pelvis was May 2024 which showed no acute findings.  Prior to that in  patient had a CT did not show any acute findings      Patient did have x-rays of her knees on 2024 as well as 2024.  Patient had no acute findings but does show moderate tricompartmental osteoarthritis with cartilage loss bilaterally      Labs: ordered. Decision-making details documented in ED Course.     Details:   No anemia, thrombocytopenia or leukocytosis  No acute kidney injury electrolyte abnormality  Lactic acid within normal range      Radiology: ordered. Decision-making details documented in ED Course.    Risk  Prescription drug management.        ED Course as of 25 0016   Wed May 21, 2025   2207  "Patient    Disclosure: Voice to text software was used in the preparation of this document and could have resulted in translational errors.      Occasional wrong word or \"sound a like\" substitutions may have occurred due to the inherent limitations of voice recognition software.  Read the chart carefully and recognize, using context, where substitutions have occurred.       I have independently reviewed external records are available to me to the level of detail possible within the time constraints of my patient care responsibilities in the ED.       2258 Comprehensive metabolic panel(!)  WNL       2258 Lactic acid, plasma (w/reflex if result > 2.0)  WNL       Thu May 22, 2025   0000 CT abdomen pelvis with contrast  No acute findings   0014  Long discussion with patient and family regarding workup.  In regards to abdominal pain.  She has no evidence of endorgan damage or lactic acidosis or septicemia.  CT scan did not show any acute findings.  Discussed with her stick to a bland diet and follow-up with PCP In regards to patient's bilateral chronic knee pain she is asking for Toradol.  I discussed with her at length regarding that she is allergic to Motrin and that this causes itchiness of the eyes.  She has only denies any anaphylaxis symptoms and denies any swelling of the throat, hives or rash.  Will give 15 mg Toradol.  Also discussed with her that her old x-rays show osteoarthritis and she has an appointment in the morning with orthopedics that is the most imperative to follow-up with    Answered questions at bedside and patient agreeable for discharge home.  She is asking why is her blood pressure high.  She states that she only took half of one of her blood pressure medications.  No evidence of endorgan damage, headache, chest pain.  Discussed with her is imperative to complete all her medications as prescribed    Counseling: I had a detailed discussion with the patient and/or guardian regarding: the " historical points, exam findings, and any diagnostic results supporting the discharge diagnosis, lab results, radiology results, discharge instructions reviewed with patient and/or family/caregiver and understanding was verbalized. Instructions given to return to the emergency department if symptoms worsen or persist, or if there are any questions or concerns that arise at home.     All imaging and/or lab testing discussed with patient, strict return to ED precautions discussed. Patient recommended to follow up promptly with appropriate outpatient provider. Patient and/or family members verbalizes understanding and agrees with plan. Patient and/or family members were given opportunity to ask questions, all questions were answered at this time. Patient is stable for discharge       Medications   sodium chloride 0.9 % bolus 1,000 mL (1,000 mL Intravenous New Bag 5/21/25 2230)   acetaminophen (Ofirmev) injection 1,000 mg (0 mg Intravenous Stopped 5/21/25 2247)   ondansetron (ZOFRAN) injection 4 mg (4 mg Intravenous Given 5/21/25 2230)   iohexol (OMNIPAQUE) 350 MG/ML injection (MULTI-DOSE) 100 mL (100 mL Intravenous Given 5/21/25 2337)       ED Risk Strat Scores                      No data recorded        SBIRT 22yo+      Flowsheet Row Most Recent Value   Initial Alcohol Screen: US AUDIT-C     1. How often do you have a drink containing alcohol? 0 Filed at: 05/21/2025 2324   2. How many drinks containing alcohol do you have on a typical day you are drinking?  0 Filed at: 05/21/2025 2324   3b. FEMALE Any Age, or MALE 65+: How often do you have 4 or more drinks on one occassion? 0 Filed at: 05/21/2025 2324   Audit-C Score 0 Filed at: 05/21/2025 2324   YULIANA: How many times in the past year have you...    Used an illegal drug or used a prescription medication for non-medical reasons? Never Filed at: 05/21/2025 2324                            History of Present Illness   {Hyperlinks  History (Med, Surg, Fam, Social) -  Current Medications - Allergies  :[id number]}    Chief Complaint   Patient presents with    Abdominal Pain     On and off abd pain since yesterday. States multiple bowel movements today but denies diarrhea. Denies vomiting.     Knee Pain     Also c/o ongoing bilateral knee pain. Denies fall or injury. States was seen previously for knee pain and given medication and wants medication given last time.       Past Medical History[1]   Past Surgical History[2]   Family History[3]   Social History[4]   E-Cigarette/Vaping    E-Cigarette Use Never User       E-Cigarette/Vaping Substances    Nicotine No     THC No     CBD No     Flavoring No     Other No     Unknown No       I have reviewed and agree with the history as documented.     Patient is a 70-year-old female Setswana-speaking only here with grandson who helps translate.  Patient has a history of hypertension, cervicalgia, hypercholesteremia, diverticulosis, spondylolisthesis states that she is here for intermittent abdominal pain since yesterday as well as bilateral knee pain.  She reports that she has had multiple abdominal surgeries.  She states that for the past 2 days she has had intermittent abdominal discomfort described as cramping.  She has some nausea with no vomiting but does report multiple episodes of diarrhea.  There is no melena or bright red blood per rectum.  She has no fevers or chills.  She states that she has decreased p.o. intake because she feels like she is just getting get diarrhea.  She has no recent antibiotic use.  No recent surgeries.      Patient also reports that she has an appointment tomorrow for her knees but still complaining of knee pain.  She has no falls or injuries and was requesting medicine that she had last time.  Reviewed chart with patient and family and she did receive Toradol.  Discussed with her that we will give her Tylenol at this time and wait for her GFR/creatinine in order to give her Toradol            History  provided by:  Medical records, patient and relative   used: Yes (Patient's grandson at bedside)    Abdominal Pain  Pain location:  Generalized  Pain quality: aching, cramping, fullness and pressure    Pain radiates to:  Does not radiate  Pain severity:  Moderate  Onset quality:  Gradual  Duration:  2 days  Timing:  Intermittent  Progression:  Waxing and waning  Chronicity:  Recurrent  Context: not alcohol use, not awakening from sleep, not diet changes, not eating, not laxative use, not medication withdrawal, not previous surgeries, not recent illness, not recent sexual activity, not recent travel, not retching, not sick contacts, not suspicious food intake and not trauma    Relieved by:  Nothing  Worsened by:  Nothing  Ineffective treatments:  None tried  Associated symptoms: anorexia, diarrhea and nausea    Associated symptoms: no belching, no chest pain, no chills, no constipation, no cough, no dysuria, no fatigue, no fever, no flatus, no hematemesis, no hematochezia, no hematuria, no melena, no shortness of breath, no sore throat and no vomiting    Risk factors: being elderly, multiple surgeries and obesity    Risk factors: no alcohol abuse, no aspirin use, no NSAID use and no recent hospitalization    Knee Pain  Pain details:     Quality:  Unable to specify    Duration: weeks to months.  Chronicity:  Chronic  Dislocation: no    Foreign body present:  No foreign bodies  Prior injury to area:  No  Relieved by:  None tried  Worsened by:  Nothing  Ineffective treatments:  None tried  Associated symptoms: stiffness    Associated symptoms: no back pain, no decreased ROM, no fatigue, no fever, no itching, no muscle weakness, no neck pain, no numbness, no swelling and no tingling    Risk factors: no concern for non-accidental trauma, no frequent fractures, no known bone disorder, no obesity and no recent illness        Review of Systems   Constitutional: Negative.  Negative for chills, fatigue and  fever.   HENT: Negative.  Negative for ear pain and sore throat.    Eyes: Negative.  Negative for pain and visual disturbance.   Respiratory: Negative.  Negative for cough and shortness of breath.    Cardiovascular: Negative.  Negative for chest pain and palpitations.   Gastrointestinal:  Positive for abdominal pain, anorexia, diarrhea and nausea. Negative for constipation, flatus, hematemesis, hematochezia, melena and vomiting.   Genitourinary: Negative.  Negative for dysuria and hematuria.   Musculoskeletal:  Positive for stiffness. Negative for arthralgias, back pain and neck pain.   Skin:  Negative for color change, itching and rash.   Neurological: Negative.  Negative for seizures and syncope.   Hematological: Negative.    Psychiatric/Behavioral: Negative.     All other systems reviewed and are negative.          Objective   {Hyperlinks  Historical Vitals - Historical Labs - Chart Review/Microbiology - Last Echo - Code Status  :[id number]}    ED Triage Vitals [05/21/25 2137]   Temperature Pulse Blood Pressure Respirations SpO2 Patient Position - Orthostatic VS   97.7 °F (36.5 °C) 69 (!) 182/88 18 97 % Sitting      Temp Source Heart Rate Source BP Location FiO2 (%) Pain Score    Oral Monitor Right arm -- --      Vitals      Date and Time Temp Pulse SpO2 Resp BP Pain Score FACES Pain Rating User   05/21/25 2137 97.7 °F (36.5 °C) 69 97 % 18 182/88 -- -- JR            Physical Exam  Vitals and nursing note reviewed.   Constitutional:       General: She is not in acute distress.     Appearance: Normal appearance. She is well-developed and overweight.   HENT:      Head: Normocephalic and atraumatic.      Mouth/Throat:      Lips: Pink.      Mouth: Mucous membranes are moist.     Eyes:      General: Gaze aligned appropriately.      Extraocular Movements: Extraocular movements intact.      Conjunctiva/sclera: Conjunctivae normal.      Pupils: Pupils are equal, round, and reactive to light.       Cardiovascular:       Rate and Rhythm: Normal rate and regular rhythm.      Pulses:           Radial pulses are 2+ on the right side and 2+ on the left side.        Dorsalis pedis pulses are 2+ on the right side and 2+ on the left side.      Heart sounds: Normal heart sounds, S1 normal and S2 normal. No murmur heard.  Pulmonary:      Effort: Pulmonary effort is normal. No respiratory distress.      Breath sounds: Normal breath sounds.   Abdominal:      General: Bowel sounds are normal.      Palpations: Abdomen is soft.      Tenderness: There is no abdominal tenderness. There is no right CVA tenderness, left CVA tenderness, guarding or rebound. Negative signs include Cheatham's sign, Rovsing's sign and McBurney's sign.     Musculoskeletal:         General: No swelling.      Cervical back: Neck supple.      Right lower leg: No edema.      Left lower leg: No edema.        Legs:       Comments: Patient has full active range of motion of bilateral hips, knees and ankles pain-free.  There is no obvious deformity or injury.  Patient has compartments that are soft bilateral lower extremities     Skin:     General: Skin is warm and dry.      Capillary Refill: Capillary refill takes less than 2 seconds.     Neurological:      General: No focal deficit present.      Mental Status: She is alert and oriented to person, place, and time.      GCS: GCS eye subscore is 4. GCS verbal subscore is 5. GCS motor subscore is 6.      Cranial Nerves: Cranial nerves 2-12 are intact.      Sensory: Sensation is intact.      Motor: Motor function is intact.      Coordination: Coordination is intact.     Psychiatric:         Mood and Affect: Mood normal.         Behavior: Behavior is cooperative.         Results Reviewed       Procedure Component Value Units Date/Time    Comprehensive metabolic panel [[id number]]  (Abnormal) Collected: 05/21/25 2232    Lab Status: Final result Specimen: Blood from Arm, Right Updated: 05/21/25 2259     Sodium 138 mmol/L      Potassium  4.3 mmol/L      Chloride 101 mmol/L      CO2 31 mmol/L      ANION GAP 6 mmol/L      BUN 19 mg/dL      Creatinine 0.54 mg/dL      Glucose 97 mg/dL      Calcium 9.7 mg/dL      AST 15 U/L      ALT 16 U/L      Alkaline Phosphatase 94 U/L      Total Protein 7.7 g/dL      Albumin 4.5 g/dL      Total Bilirubin 0.45 mg/dL      eGFR 96 ml/min/1.73sq m     Narrative:      National Kidney Disease Foundation guidelines for Chronic Kidney Disease (CKD):     Stage 1 with normal or high GFR (GFR > 90 mL/min/1.73 square meters)    Stage 2 Mild CKD (GFR = 60-89 mL/min/1.73 square meters)    Stage 3A Moderate CKD (GFR = 45-59 mL/min/1.73 square meters)    Stage 3B Moderate CKD (GFR = 30-44 mL/min/1.73 square meters)    Stage 4 Severe CKD (GFR = 15-29 mL/min/1.73 square meters)    Stage 5 End Stage CKD (GFR <15 mL/min/1.73 square meters)  Note: GFR calculation is accurate only with a steady state creatinine    Magnesium [[id number]]  (Normal) Collected: 05/21/25 2232    Lab Status: Final result Specimen: Blood from Arm, Right Updated: 05/21/25 2257     Magnesium 2.2 mg/dL     Lactic acid, plasma (w/reflex if result > 2.0) [[id number]]  (Normal) Collected: 05/21/25 2232    Lab Status: Final result Specimen: Blood from Arm, Right Updated: 05/21/25 2256     LACTIC ACID 0.6 mmol/L     Narrative:      Result may be elevated if tourniquet was used during collection.    CBC and differential [[id number]]  (Abnormal) Collected: 05/21/25 2232    Lab Status: Final result Specimen: Blood from Arm, Right Updated: 05/21/25 2243     WBC 9.76 Thousand/uL      RBC 4.73 Million/uL      Hemoglobin 13.8 g/dL      Hematocrit 42.8 %      MCV 91 fL      MCH 29.2 pg      MCHC 32.2 g/dL      RDW 15.9 %      MPV 11.0 fL      Platelets 260 Thousands/uL      nRBC 0 /100 WBCs      Segmented % 65 %      Immature Grans % 0 %      Lymphocytes % 26 %      Monocytes % 6 %      Eosinophils Relative 2 %      Basophils Relative 1 %      Absolute Neutrophils 6.31  Thousands/µL      Absolute Immature Grans 0.02 Thousand/uL      Absolute Lymphocytes 2.58 Thousands/µL      Absolute Monocytes 0.62 Thousand/µL      Eosinophils Absolute 0.18 Thousand/µL      Basophils Absolute 0.05 Thousands/µL             CT abdomen pelvis with contrast    (Results Pending)       Procedures    ED Medication and Procedure Management   Prior to Admission Medications   Prescriptions Last Dose Informant Patient Reported? Taking?   Acetaminophen Extra Strength 500 MG TABS   No No   Sig: TAKE 2 TABLETS (1,000 MG TOTAL) BY MOUTH EVERY 8 (EIGHT) HOURS AS NEEDED FOR MILD PAIN   Blood Pressure Monitoring (Adult Blood Pressure Cuff Lg) KIT  Self No No   Sig: Use in the morning   Cranberry 125 MG TABS   Yes No   Sig: Take by mouth   Diclofenac Sodium (VOLTAREN) 1 %   No No   Sig: Apply 2 g topically 4 (four) times a day as needed (back pain)   Mirabegron ER (Myrbetriq) 50 MG TB24  Self Yes No   Sig: every 24 hours   acetaminophen (TYLENOL) 650 mg CR tablet   No No   Sig: Take 1 tablet (650 mg total) by mouth every 8 (eight) hours as needed for mild pain   atenolol (TENORMIN) 50 mg tablet   No No   Sig: Take 1 tablet (50 mg total) by mouth daily   cholecalciferol (VITAMIN D3) 1,000 units tablet   No No   Sig: Take 2 tablets (2,000 Units total) by mouth daily   ibuprofen (MOTRIN) 600 mg tablet   No No   Sig: Take 1 tablet (600 mg total) by mouth every 8 (eight) hours for 5 days   lidocaine (Lidoderm) 5 %   No No   Sig: Apply 1 patch topically over 12 hours daily Remove & Discard patch within 12 hours or as directed by MD   lidocaine (Lidoderm) 5 %   No No   Sig: Apply 1 patch topically over 12 hours daily as needed (Low back pain) Remove & Discard patch within 12 hours or as directed by MD   losartan (COZAAR) 50 mg tablet   No No   Sig: TOME 1 TABLETA POR VIA ORAL TODOS LOS OLIVA   methocarbamol (ROBAXIN) 500 mg tablet   No No   Sig: TOME ED TABLETA POR VIA ORAL CADA OCHO HORAS CUANDO SEA NECESARIO FOR MUSCLE  SPASMS MAY MAKE DROWSY/DIZZY   rosuvastatin (CRESTOR) 10 MG tablet   No No   Sig: Take 1 tablet (10 mg total) by mouth daily   senna (SENOKOT) 8.6 mg  Self No No   Sig: Take 1 tablet (8.6 mg total) by mouth daily at bedtime for 7 days      Facility-Administered Medications: None     Patient's Medications   Discharge Prescriptions    No medications on file     No discharge procedures on file.  ED SEPSIS DOCUMENTATION              [1]   Past Medical History:  Diagnosis Date    Allergic     Hammertoe of right foot     OR   reapir today 2023    Hyperlipidemia     Hypertension     Kidney stone     Wears glasses    [2]   Past Surgical History:  Procedure Laterality Date     SECTION      HERNIA REPAIR  1969    HYSTERECTOMY      removed some of left ovary    OOPHORECTOMY Right     DE ARTHRODESIS COMBINED TQ 1NTRSPC LUMBAR Bilateral 2024    Procedure: Navigated L3-S1 decompression with instrumented fusion, TLIF. L4-S1 decompression/laminectomy;  Surgeon: Ephraim Wilkins MD;  Location:  MAIN OR;  Service: Orthopedics    DE CORRECTION HAMMERTOE Right 2023    Procedure: REPAIR HAMMERTOE 3RD TOE;  Surgeon: Lokesh Simmons DPM;  Location: [facility];  Service: Podiatry    DE LAPS ABD PRTM&OMENTUM DX W/WO SPEC BR/WA SPX N/A 2023    Procedure: LAPAROSCOPY DIAGNOSTIC, LYSIS OF ADHESIONS;  Surgeon: Debbie Clark MD;  Location: [facility];  Service: General   [3]   Family History  Problem Relation Name Age of Onset    Breast cancer Mother      Thrombosis Father      No Known Problems Sister      No Known Problems Sister      No Known Problems Sister      Hypertension Sister      No Known Problems Daughter      No Known Problems Daughter      No Known Problems Daughter      No Known Problems Daughter      Diabetes Maternal Grandmother      No Known Problems Maternal Grandfather      No Known Problems Paternal Grandmother      No Known Problems Paternal Grandfather      Hypertension  Brother      Hypertension Brother     [4]   Social History  Tobacco Use    Smoking status: Never     Passive exposure: Never    Smokeless tobacco: Never   Vaping Use    Vaping status: Never Used   Substance Use Topics    Alcohol use: Not Currently    Drug use: Not Currently

## 2025-05-22 NOTE — DISCHARGE INSTRUCTIONS
Por favor, mantenga ferreira giana con ortopedia mañana en [facility] - esto es para el jueves 22 de erickson.

## 2025-05-29 ENCOUNTER — OFFICE VISIT (OUTPATIENT)
Age: 70
End: 2025-05-29
Payer: COMMERCIAL

## 2025-05-29 ENCOUNTER — TELEPHONE (OUTPATIENT)
Age: 70
End: 2025-05-29

## 2025-05-29 DIAGNOSIS — M17.11 PRIMARY OSTEOARTHRITIS OF RIGHT KNEE: ICD-10-CM

## 2025-05-29 DIAGNOSIS — G89.29 CHRONIC PAIN OF BOTH KNEES: Primary | ICD-10-CM

## 2025-05-29 DIAGNOSIS — M25.561 CHRONIC PAIN OF BOTH KNEES: Primary | ICD-10-CM

## 2025-05-29 DIAGNOSIS — M25.562 CHRONIC PAIN OF BOTH KNEES: Primary | ICD-10-CM

## 2025-05-29 DIAGNOSIS — M17.12 OSTEOARTHRITIS OF LEFT KNEE, UNSPECIFIED OSTEOARTHRITIS TYPE: ICD-10-CM

## 2025-05-29 DIAGNOSIS — Z98.1 S/P LUMBAR FUSION: ICD-10-CM

## 2025-05-29 DIAGNOSIS — M25.561 ACUTE PAIN OF RIGHT KNEE: ICD-10-CM

## 2025-05-29 PROCEDURE — 99214 OFFICE O/P EST MOD 30 MIN: CPT | Performed by: ORTHOPAEDIC SURGERY

## 2025-05-29 RX ORDER — SENNOSIDES 8.6 MG
650 CAPSULE ORAL EVERY 8 HOURS PRN
Qty: 30 TABLET | Refills: 0 | Status: SHIPPED | OUTPATIENT
Start: 2025-05-29

## 2025-05-29 NOTE — TELEPHONE ENCOUNTER
PA for VOLTAREN 8% SUBMITTED to  [employer]     via    []CMM-KEY:    [x]Surescripts-Case ID #    []Availity-Auth ID #  NDC #    []Faxed to plan   []Other website    []Phone call Case ID #      [x]PA sent as URGENT    All office notes, labs and other pertaining documents and studies sent. Clinical questions answered. Awaiting determination from insurance company.     Turnaround time for your insurance to make a decision on your Prior Authorization can take 7-21 business days.

## 2025-05-29 NOTE — ASSESSMENT & PLAN NOTE
Orders:    Ambulatory Referral to Physical Therapy; Future    Durable Medical Equipment    Diclofenac Sodium (VOLTAREN) 1 %; Apply 2 g topically 4 (four) times a day as needed (back pain)

## 2025-05-29 NOTE — PROGRESS NOTES
:  Assessment & Plan  Chronic pain of both knees  Osteoarthritis of left knee, unspecified osteoarthritis type  Primary osteoarthritis of right knee    Orders:    Ambulatory Referral to Physical Therapy; Future    Durable Medical Equipment    Diclofenac Sodium (VOLTAREN) 1 %; Apply 2 g topically 4 (four) times a day as needed (back pain)    BILATERAL knee pain   Discussed treatment options  Pain has persisted and increased in bilateral knees   Bilateral short hinge knee braces provided today for support  New script for PT sent   Discussed injections with patient - she would like to hold off for now   Follow up if pain returns or if any new concerns    Official  used for this visit       REASON FOR VISIT:  Chief Complaint   Patient presents with    Right Knee - Follow-up    Left Knee - Follow-up       HISTORY OF PRESENT ILLNESS:  RIGHT knee pain for about 1 month. No acute injury or trauma. Stood up from sitting while in the bathroom and felt worse pain. Pain located posterior     Pain worse with activity and walking. No locking or catching.    Tried activity modification, tried Tylenol and diclofenac gel (cannot do oral diclofenac due to allergy)  Pain has improved somewhat with these medications      Interval hx 25:   Pain has improved somewhat since prior visit. Ultrasound completed as well.       Interval Hx 25  Constant pain of bilateral knees  LEFT knee has become more bothersome   Mobility is somewhat better of the R knee      Past Medical History:   Diagnosis Date    Allergic     Hammertoe of right foot     OR   reapir today 2023    Hyperlipidemia     Hypertension     Kidney stone     Wears glasses         Past Surgical History:   Procedure Laterality Date     SECTION      HERNIA REPAIR  1969    HYSTERECTOMY      removed some of left ovary    OOPHORECTOMY Right     [place] ARTHRODESIS COMBINED TQ 1NTRSPC LUMBAR Bilateral 2024    Procedure: Navigated L3-S1  decompression with instrumented fusion, TLIF. L4-S1 decompression/laminectomy;  Surgeon: Ephraim Wilkins MD;  Location: [facility];  Service: Orthopedics    NE CORRECTION HAMMERTOE Right 07/21/2023    Procedure: REPAIR HAMMERTOE 3RD TOE;  Surgeon: Lokesh Simmons DPM;  Location: [facility];  Service: Podiatry    NE LAPS ABD PRTM&OMENTUM DX W/WO SPEC BR/WA SPX N/A 01/01/2023    Procedure: LAPAROSCOPY DIAGNOSTIC, LYSIS OF ADHESIONS;  Surgeon: Debbie Clark MD;  Location: [facility];  Service: General       Social History     Socioeconomic History    Marital status: /Civil Union     Spouse name: Not on file    Number of children: 4    Years of education: Not on file    Highest education level: Not on file   Occupational History    Not on file   Tobacco Use    Smoking status: Never     Passive exposure: Never    Smokeless tobacco: Never   Vaping Use    Vaping status: Never Used   Substance and Sexual Activity    Alcohol use: Not Currently    Drug use: Not Currently    Sexual activity: Not Currently     Partners: Male   Other Topics Concern    Not on file   Social History Narrative    Not on file     Social Drivers of Health     Financial Resource Strain: Not on file   Food Insecurity: No Food Insecurity (4/16/2025)    Nursing - Inadequate Food Risk Classification     Worried About Running Out of Food in the Last Year: Never true     Ran Out of Food in the Last Year: Never true     Ran Out of Food in the Last Year: Not on file   Transportation Needs: No Transportation Needs (4/16/2025)    PRAPARE - Transportation     Lack of Transportation (Medical): No     Lack of Transportation (Non-Medical): No   Physical Activity: Not on file   Stress: Not on file   Social Connections: Not on file   Intimate Partner Violence: Not on file   Housing Stability: Low Risk  (4/16/2025)    Housing Stability Vital Sign     Unable to Pay for Housing in the Last Year: No     Number of Times Moved in the Last Year: 0      Homeless in the Last Year: No       MEDICATIONS:    Current Outpatient Medications:     acetaminophen (TYLENOL) 650 mg CR tablet, Take 1 tablet (650 mg total) by mouth every 8 (eight) hours as needed for mild pain, Disp: 30 tablet, Rfl: 0    Diclofenac Sodium (VOLTAREN) 1 %, Apply 2 g topically 4 (four) times a day as needed (back pain), Disp: 100 g, Rfl: 0    Acetaminophen Extra Strength 500 MG TABS, TAKE 2 TABLETS (1,000 MG TOTAL) BY MOUTH EVERY 8 (EIGHT) HOURS AS NEEDED FOR MILD PAIN, Disp: 60 tablet, Rfl: 0    atenolol (TENORMIN) 50 mg tablet, Take 1 tablet (50 mg total) by mouth daily, Disp: 90 tablet, Rfl: 1    Blood Pressure Monitoring (Adult Blood Pressure Cuff Lg) KIT, Use in the morning, Disp: 1 kit, Rfl: 0    cholecalciferol (VITAMIN D3) 1,000 units tablet, Take 2 tablets (2,000 Units total) by mouth daily, Disp: 60 tablet, Rfl: 5    Cranberry 125 MG TABS, Take by mouth, Disp: , Rfl:     ibuprofen (MOTRIN) 600 mg tablet, Take 1 tablet (600 mg total) by mouth every 8 (eight) hours for 5 days, Disp: 15 tablet, Rfl: 0    lidocaine (Lidoderm) 5 %, Apply 1 patch topically over 12 hours daily Remove & Discard patch within 12 hours or as directed by MD, Disp: 30 patch, Rfl: 0    lidocaine (Lidoderm) 5 %, Apply 1 patch topically over 12 hours daily as needed (Low back pain) Remove & Discard patch within 12 hours or as directed by MD, Disp: 9 patch, Rfl: 0    losartan (COZAAR) 50 mg tablet, TOME 1 TABLETA POR VIA ORAL TODOS LOS OLIVA, Disp: 90 tablet, Rfl: 1    methocarbamol (ROBAXIN) 500 mg tablet, TOME ED TABLETA POR VIA ORAL CADA OCHO HORAS CUANDO SEA NECESARIO FOR MUSCLE SPASMS MAY MAKE DROWSY/DIZZY, Disp: 30 tablet, Rfl: 0    Mirabegron ER (Myrbetriq) 50 MG TB24, every 24 hours, Disp: , Rfl:     rosuvastatin (CRESTOR) 10 MG tablet, Take 1 tablet (10 mg total) by mouth daily, Disp: 90 tablet, Rfl: 1    senna (SENOKOT) 8.6 mg, Take 1 tablet (8.6 mg total) by mouth daily at bedtime for 7 days, Disp: 7 tablet,  Rfl: 0    ALLERGIES:  Allergies   Allergen Reactions    Aspirin Swelling    Motrin [Ibuprofen] Itching    Diclofenac Eye Swelling       MAJOR FINDINGS:  Mylene Wolff is pleasant, healthy appearing, and in no acute distress.     RIGHT knee  Skin intact, ROM 2-0-130   No effusion  Normal patella tracking   No patella apprehension  Joint line tenderness: medial, Popeye test: negative  Anterior drawer: negative, Lachman: stable, Posterior drawer: negative   Stable to varus/valgus  Sensation intact sp/dp/t  Strength intact 5/5 dorsiflexion/plantarflexion/SLR Extremity wwp  No calf pain    LEFT knee  Skin intact, ROM 0-0-130   No effusion  Normal patella tracking   No patella apprehension  Joint line tenderness: medial, Popeye test: negative  Anterior drawer: negative, Lachman: stable, Posterior drawer: negative   Stable to varus/valgus  Sensation intact sp/dp/t  Strength intact 5/5 dorsiflexion/plantarflexion/SLR Extremity wwp  No calf pain      IMAGING  I personally reviewed and interpreted the imaging studies  I reviewed the prior imaging studies and also obtained new bilateral imaging:   X-rays performed on the BILATERAL knee show mild arthritis       Ultrasound RLE 1/10/25:   No evidence of acute or chronic deep vein thrombosis .  No evidence of superficial thrombophlebitis noted.  Doppler evaluation shows a normal response to augmentation maneuvers.  Popliteal, posterior tibial and anterior tibial arterial Doppler waveforms are  triphasic.      CC:  BUSTER Gunn, [place]*

## 2025-05-30 ENCOUNTER — OFFICE VISIT (OUTPATIENT)
Dept: DENTISTRY | Facility: CLINIC | Age: 70
End: 2025-05-30

## 2025-05-30 VITALS — SYSTOLIC BLOOD PRESSURE: 174 MMHG | HEART RATE: 67 BPM | DIASTOLIC BLOOD PRESSURE: 93 MMHG

## 2025-05-30 DIAGNOSIS — Z01.20 ENCOUNTER FOR DENTAL EXAMINATION: Primary | ICD-10-CM

## 2025-05-30 PROCEDURE — D1110 PROPHYLAXIS - ADULT: HCPCS | Performed by: DENTAL HYGIENIST

## 2025-05-30 PROCEDURE — D0274 BITEWINGS - 4 RADIOGRAPHIC IMAGES: HCPCS | Performed by: DENTAL HYGIENIST

## 2025-05-30 PROCEDURE — D0120 PERIODIC ORAL EVALUATION - ESTABLISHED PATIENT: HCPCS

## 2025-05-30 NOTE — PROGRESS NOTES
PERIODIC EXAM, ADULT PROPHY , 4 BWX   REVIEWED MED HX: meds, allergies, health changes reviewed in [facility]. All consents signed.  CHIEF CONCERN: no dental pain or concerns  PAIN SCALE:  0  ASA CLASS:  II  PLAQUE:  moderate  CALCULUS:  moderate  BLEEDING:   light to mod  STAIN :   light      PERIO: Mild - mod bone loss and recession  ---Do FMP at next recall - not enough time today    I-PAD Kyrgyz translation -  # [id number] - 10 min    Hygiene Procedures:  Scaled, Polished, Flossed and Used Cavitron    Oral Hygiene Instruction: Brushing Minimum 2x daily for 2 minutes, daily flossing, Listerine, and Recommended soft toothbrush only    Dispensed: Toothbrush, Toothpaste, Floss    Visual and Tactile Intraoral/ Extraoral evaluation: Oral and Oropharyngeal cancer evaluation. No findings     Dr. Kel Clinton   Reviewed with patient clinical and radiographic findings and patient verbalized understanding. All questions and concerns addressed.     REFERRALS: none    CARIES FINDINGS: no decay noted       TREATMENT  PLAN :   NV1:  6mrc - no xrays - 50 min    Last BWX: 5/30/25  Last Panorex/  Last  FMX : 3/25/24

## 2025-05-30 NOTE — TELEPHONE ENCOUNTER
PA for VOLTAREN  APPROVED     Date(s) approved 5/30/-5/30/2028    Case #     Patient advised by          []MyChart Message  []Phone call   [x]LMOM  []L/M to call office as no active Communication consent on file  []Unable to leave detailed message as VM not approved on Communication consent       Pharmacy advised by    []Fax  []Phone call  []Secure Chat    Specialty Pharmacy    []      Approval letter scanned into Media Yes

## 2025-06-05 ENCOUNTER — EVALUATION (OUTPATIENT)
Age: 70
End: 2025-06-05
Payer: COMMERCIAL

## 2025-06-05 DIAGNOSIS — G89.29 CHRONIC PAIN OF BOTH KNEES: ICD-10-CM

## 2025-06-05 DIAGNOSIS — M25.562 CHRONIC PAIN OF BOTH KNEES: ICD-10-CM

## 2025-06-05 DIAGNOSIS — M25.561 CHRONIC PAIN OF BOTH KNEES: ICD-10-CM

## 2025-06-05 DIAGNOSIS — M17.11 PRIMARY OSTEOARTHRITIS OF RIGHT KNEE: ICD-10-CM

## 2025-06-05 DIAGNOSIS — M17.12 OSTEOARTHRITIS OF LEFT KNEE, UNSPECIFIED OSTEOARTHRITIS TYPE: Primary | ICD-10-CM

## 2025-06-05 PROCEDURE — 97163 PT EVAL HIGH COMPLEX 45 MIN: CPT

## 2025-06-05 NOTE — PROGRESS NOTES
"PT Evaluation   Today's date: 2025  Patient name: Mylene Wolff  : 1955  MRN: [id number]  Referring provider: Tessa Ramirez PA-C  Dx:   Encounter Diagnosis     ICD-10-CM    1. Osteoarthritis of left knee, unspecified osteoarthritis type  M17.12       2. Primary osteoarthritis of right knee  M17.11                      Assessment/Plan    Subjective Evaluation    History of Present Illness  Mechanism of injury: Patient is here today for bilateral OA approximately 1 month ago.  She told PT that she was sleeping one night and the pain in her knees began.  Patient reports she went to see Dr Gallego.  He told her to do PT.  She did not want the injection he was offering.    Pain   Bilaterally Knees  Currently 0/10  At Best 0/10  At Worst 10/10  Patient described her pain as cracking in the knees.  Patient came late and does not speak English.  Patient also was talking about pain throughout her entire body while she sleeps.     AGGS: sleeping (3AM), transfers  EASES: rest  Patient's Goal: \"I want to be able to live without pain\"      Objective     General Comments:      Knee Comments  LQS: not tested today     Observation: knees appear slightly swollen     Palpation: increased tone in the bilateral GA/popliteus     ROM   Flexion R 115*P! L 115*P!  Extension R 0* L 0*             Precautions:       Manuals                                                                 Neuro Re-Ed                                                                                                        Ther Ex                                                                                                                     Ther Activity                                       Gait Training                                       Modalities                                          Access Code: [email or web address]  URL: [email or web address]  Date: 2025  Prepared by: Ej Mendoza    Exercises  - Supine Active " Straight Leg Raise  - 1 x daily - 7 x weekly - 3 sets - 10 reps  - Sidelying Hip Abduction  - 1 x daily - 7 x weekly - 3 sets - 10 reps

## 2025-06-11 DIAGNOSIS — M17.12 OSTEOARTHRITIS OF LEFT KNEE, UNSPECIFIED OSTEOARTHRITIS TYPE: ICD-10-CM

## 2025-06-11 NOTE — TELEPHONE ENCOUNTER
Reason for call:   [x] Refill   [] Prior Auth  [x] Other: Patient says she finished the one that was sent on 5/29/25    Office:   [] PCP/Provider -   [x] Specialty/Provider - [facility]    Medication: Diclofenac Sodium (VOLTAREN) 1 %     Dose/Frequency: 2 g, 4 times daily PRN     Quantity: 100 g    Pharmacy: [facility] #[id number]    Local Pharmacy   Does the patient have enough for 3 days?   [] Yes   [x] No - Send as HP to POD    Mail Away Pharmacy   Does the patient have enough for 10 days?   [] Yes   [] No - Send as HP to POD

## 2025-06-23 ENCOUNTER — TELEPHONE (OUTPATIENT)
Age: 70
End: 2025-06-23

## 2025-06-23 NOTE — TELEPHONE ENCOUNTER
Patient called in , I got an interpretor, patient needed to confirm when dr xiong appt was, date provided she said hang on & the hungup

## 2025-06-25 ENCOUNTER — OFFICE VISIT (OUTPATIENT)
Dept: DENTISTRY | Facility: CLINIC | Age: 70
End: 2025-06-25

## 2025-06-25 VITALS — SYSTOLIC BLOOD PRESSURE: 158 MMHG | HEART RATE: 64 BPM | DIASTOLIC BLOOD PRESSURE: 90 MMHG

## 2025-06-25 DIAGNOSIS — Z01.20 ENCOUNTER FOR DENTAL EXAMINATION: Primary | ICD-10-CM

## 2025-06-25 PROCEDURE — D0140 LIMITED ORAL EVALUATION - PROBLEM FOCUSED: HCPCS

## 2025-06-25 PROCEDURE — D0220 INTRAORAL - PERIAPICAL FIRST RADIOGRAPHIC IMAGE: HCPCS

## 2025-06-25 PROCEDURE — D2940 PROTECTIVE RESTORATION: HCPCS

## 2025-06-25 NOTE — PROGRESS NOTES
"Procedure Details  12  - INTRAORAL - PERIAPICAL FIRST RADIOGRAPHIC IMAGE  12  - PROTECTIVE RESTORATION   - LIMITED ORAL EVALUATION - PROBLEM FOCUSED  Utilized /translation ID: Yes. Language interpreted:  Ukrainian   [id number]    70 y.o. female patient [facility] to [facility] for a Limited exam.       PMH: Reviewed health history no change- Patient is ASA 3 - Patient with moderate systemic disease with functional limitations.     Chief complaint: \"fractured lingual cusp # 12  no pain but sharp to tongue\"    Consent:  Discussed that limited exam focuses on problem area, and same day tx is not guaranteed.  Patient explained to if they wish to have anything else evaluated, they need to return to the practice at which they are a patient of record or schedule a comprehensive exam afterwards.  Patient understands and consent was given by self via verbal consent.    Subjective history:   Pain Scale: 3/10  Region: UL  Provocation:Chewing      Objective clinical findings:              Oral cancer screening: Within normal limits              Extraoral exam: no remarkable findings.  Intraoral exam: Fractured restorations/teeth              Radiographs: Periaplical tooth #12           Findings: fractured lingual cusp  needs crown  Pt expressed concern htat tooth may get infected before she can return for crown  DR GARCIA offered temp filling today  Assessment/Plan: tooth #12 restorable. Good. Recommended Post and core and [place] . Patient agrees.   Referral(s): None  Rx: None  Comprehensive care disposition: patient of record    Protective Filling    Prepped tooth #12 with pumice and prophy brush on slow speed. Placed [place] matrix. Isolation with cotton rolls and dri-angles    Scrubbed cavity conditioner into prep for 30 seconds, rinse, dry.  Restored with Fuji Triage shade pink and light cured.    Refined with finishing burs, polished with enhance point. Verified occlusion and contacts. Pt left " satisfied.    Patient dismissed ambulatory and alert.    NV: Pre auth for core build up and crown # 12

## 2025-07-03 ENCOUNTER — TELEPHONE (OUTPATIENT)
Age: 70
End: 2025-07-03

## 2025-07-03 NOTE — TELEPHONE ENCOUNTER
Caller:  Nichole [facility]    Doctor:      Reason for call: calling to verify phone # and patients PCP they have been trying to reach the patient. Confirmed we have the same # and provided phone # to PCP    Call back#: n/a

## 2025-07-03 NOTE — TELEPHONE ENCOUNTER
Diamond jameson called in from [facility] to inform Primary Care Provider patient had two visits to the Emergency Room in may one for abdominal pain and one for hypertension

## 2025-07-10 DIAGNOSIS — M25.561 ACUTE PAIN OF RIGHT KNEE: ICD-10-CM

## 2025-07-10 DIAGNOSIS — M43.16 SPONDYLOLISTHESIS AT L4-L5 LEVEL: ICD-10-CM

## 2025-07-10 DIAGNOSIS — M43.06 LUMBAR SPONDYLOLYSIS: ICD-10-CM

## 2025-07-10 DIAGNOSIS — I10 HYPERTENSION: ICD-10-CM

## 2025-07-10 DIAGNOSIS — I10 PRIMARY HYPERTENSION: ICD-10-CM

## 2025-07-10 DIAGNOSIS — E78.2 MIXED HYPERLIPIDEMIA: ICD-10-CM

## 2025-07-10 DIAGNOSIS — E55.9 VITAMIN D DEFICIENCY: ICD-10-CM

## 2025-07-10 DIAGNOSIS — T50.905A MEDICATION ADVERSE EFFECT, INITIAL ENCOUNTER: ICD-10-CM

## 2025-07-10 NOTE — TELEPHONE ENCOUNTER
Medication: Acetaminophen Extra Strength 500 MG TAB     Dose/Frequency: TAKE 2 TABLETS (1,000 MG TOTAL) BY MOUTH EVERY 8 (EIGHT) HOURS AS NEEDED FOR MILD PAIN     Quantity: 60 table     Pharmacy: [facility]/pharmacy #[address] [place] [address]     Office:   [x] PCP/Provider - BUSTER Gunn   [] Speciality/Provider -     Does the patient have enough for 3 days?   [] Yes   [x] No - Send as HP to POD    Medication: cholecalciferol (VITAMIN D3) 1,000 units table     Dose/Frequency: Take 2 tablets (2,000 Units total) by mouth daily     Quantity:  60 tablet     Pharmacy: [facility]/pharmacy [address] [place] [address]     Office:   [x] PCP/Provider - BUSTER Gunn   [] Speciality/Provider -     Does the patient have enough for 3 days?   [] Yes   [x] No - Send as HP to POD    Medication: losartan (COZAAR) 50 mg tablet     Dose/Frequency: OME 1 TABLETA POR VIA ORAL TODOS LOS OLIVA     Quantity:  90 tablet     Pharmacy: [facility]/pharmacy #[address] [place] [address]     Office:   [x] PCP/Provider - BUSTER Gunn   [] Speciality/Provider -     Does the patient have enough for 3 days?   [] Yes   [x] No - Send as HP to POD  Medication: rosuvastatin (CRESTOR) 10 MG tablet     Dose/Frequency:     Take 1 tablet (10 mg total) by mouth daily       Quantity: : 90 tablet     Pharmacy: [facility]/pharmacy [address] [place] [address]     Office:   [x] PCP/Provider - BUSTER Gunn   [] Speciality/Provider -     Does the patient have enough for 3 days?   [] Yes   [x] No - Send as HP to POD    Medication: atenolol (TENORMIN) 50 mg tablet     Dose/Frequency:     Take 1 tablet (50 mg total) by mouth daily       Quantity: 90 tablet     Pharmacy: [facility]/pharmacy [address] [place] [address]     Office:   [x] PCP/Provider - BUSTER Gunn   [] Speciality/Provider -     Does the patient have  enough for 3 days?   [] Yes   [x] No - Send as HP to POD    Medication:     methocarbamol (ROBAXIN) 500 mg tablet       Dose/Frequency:     TOME ED TABLETA POR VIA ORAL CADA OCHO HORAS CUANDO SEA NECESARIO FOR MUSCLE SPASMS MAY MAKE DROWSY/DIZZ       Quantity: : 30 table    Pharmacy: [facility]/pharmacy #[id number] - [place] - [address] [facility] [phone]     Office:   [x] PCP/Provider - BUSTER Gunn   [] Speciality/Provider -     Does the patient have enough for 3 days?   [] Yes   [x] No - Send as HP to POD

## 2025-07-14 RX ORDER — ATENOLOL 50 MG/1
50 TABLET ORAL DAILY
Qty: 90 TABLET | Refills: 1 | Status: SHIPPED | OUTPATIENT
Start: 2025-07-14

## 2025-07-14 RX ORDER — PSEUDOEPHED/ACETAMINOPH/DIPHEN 30MG-500MG
500 TABLET ORAL EVERY 8 HOURS PRN
Qty: 60 TABLET | Refills: 0 | Status: SHIPPED | OUTPATIENT
Start: 2025-07-14

## 2025-07-14 RX ORDER — ROSUVASTATIN CALCIUM 10 MG/1
10 TABLET, COATED ORAL DAILY
Qty: 90 TABLET | Refills: 1 | Status: SHIPPED | OUTPATIENT
Start: 2025-07-14

## 2025-07-14 RX ORDER — METHOCARBAMOL 500 MG/1
500 TABLET, FILM COATED ORAL 2 TIMES DAILY PRN
Qty: 30 TABLET | Refills: 0 | Status: SHIPPED | OUTPATIENT
Start: 2025-07-14

## 2025-07-14 RX ORDER — CHOLECALCIFEROL (VITAMIN D3) 25 MCG
2000 TABLET ORAL DAILY
Qty: 60 TABLET | Refills: 5 | Status: SHIPPED | OUTPATIENT
Start: 2025-07-14

## 2025-07-14 RX ORDER — LOSARTAN POTASSIUM 50 MG/1
50 TABLET ORAL DAILY
Qty: 90 TABLET | Refills: 1 | Status: SHIPPED | OUTPATIENT
Start: 2025-07-14

## 2025-07-21 ENCOUNTER — TELEPHONE (OUTPATIENT)
Age: 70
End: 2025-07-21

## 2025-07-21 NOTE — TELEPHONE ENCOUNTER
Caller: Patient/    Doctor/Office: Dr mcclure    Call regarding :  returned call     Call was transferred to: Derm

## 2025-07-21 NOTE — TELEPHONE ENCOUNTER
Lvm for pt regarding appt scheduled for 07/22 with Dr DAS in our [place] office. Informed pt that Dr DAS is having a schedule change and we will need to r/s this appt for another day. Informed pt that we will be cancelling this appointment for now but to callback to r/s this appt as soon as able. MYC message was left as well for patient.

## (undated) DEVICE — INTENDED FOR TISSUE SEPARATION, AND OTHER PROCEDURES THAT REQUIRE A SHARP SURGICAL BLADE TO PUNCTURE OR CUT.: Brand: BARD-PARKER SAFETY BLADES SIZE 10, STERILE

## (undated) DEVICE — SWABSTCK, BENZOIN TINCTURE, 1/PK, STRL: Brand: APLICARE

## (undated) DEVICE — MARKER REFLECTIVE RADIOPAQUE SPHERE

## (undated) DEVICE — TOOL MR8-14MH30 MR8 14CM MATCH 3MM: Brand: MIDAS REX MR8

## (undated) DEVICE — 10FR FRAZIER SUCTION HANDLE: Brand: CARDINAL HEALTH

## (undated) DEVICE — HEMOSTATIC MATRIX SURGIFLO 8ML W/THROMBIN

## (undated) DEVICE — PAD GROUNDING DUAL ADULT

## (undated) DEVICE — SYRINGE 10ML LL

## (undated) DEVICE — CURITY NON-ADHERENT STRIPS: Brand: CURITY

## (undated) DEVICE — JACKSON-PRATT 100CC BULB RESERVOIR: Brand: CARDINAL HEALTH

## (undated) DEVICE — NEURO PATTIES 1/2 X 3

## (undated) DEVICE — INTENDED FOR TISSUE SEPARATION, AND OTHER PROCEDURES THAT REQUIRE A SHARP SURGICAL BLADE TO PUNCTURE OR CUT.: Brand: BARD-PARKER ® CARBON RIB-BACK BLADES

## (undated) DEVICE — PROXIMATE SKIN STAPLERS (35 WIDE) CONTAINS 35 STAINLESS STEEL STAPLES (FIXED HEAD): Brand: PROXIMATE

## (undated) DEVICE — ANTIBACTERIAL VIOLET BRAIDED (POLYGLACTIN 910), SYNTHETIC ABSORBABLE SUTURE: Brand: COATED VICRYL

## (undated) DEVICE — DRAPE SHEET THREE QUARTER

## (undated) DEVICE — DISPOSABLE EQUIPMENT COVER: Brand: SMALL TOWEL DRAPE

## (undated) DEVICE — TRAY FOLEY 16FR URIMETER SURESTEP

## (undated) DEVICE — 2000CC GUARDIAN II: Brand: GUARDIAN

## (undated) DEVICE — DRAPE C-ARMOUR

## (undated) DEVICE — MONITORING SPINAL IMPULSE CASE FEE

## (undated) DEVICE — PLUMEPEN PRO 10FT

## (undated) DEVICE — DRESSING MEPILEX AG BORDER POST-OP 4 X 6 IN

## (undated) DEVICE — GAUZE SPONGES,16 PLY: Brand: CURITY

## (undated) DEVICE — DRESSING MEPILEX FOAM BORDER SACRUM 6.3 X 7.9IN

## (undated) DEVICE — SUPPLY FEE STD

## (undated) DEVICE — LIGHT HANDLE COVER SLEEVE DISP BLUE STELLAR

## (undated) DEVICE — MEDI-VAC YANKAUER SUCTION HANDLE W/STRAIGHT TIP & CONTROL VENT: Brand: CARDINAL HEALTH

## (undated) DEVICE — ACE WRAP 4 IN UNSTERILE

## (undated) DEVICE — SPECIMEN CONTAINER STERILE PEEL PACK

## (undated) DEVICE — CAST PADDING 4 IN SYNTHETIC NON-STRL

## (undated) DEVICE — BETHLEHEM UNIVERSAL  MIONR EXT: Brand: CARDINAL HEALTH

## (undated) DEVICE — ROD 1553201080 5.5 TI CP4 NS CURV 80MM
Type: IMPLANTABLE DEVICE | Site: SPINE LUMBAR | Status: NON-FUNCTIONAL
Brand: CD HORIZON® SPINAL SYSTEM
Removed: 2024-08-29

## (undated) DEVICE — BONESCALPEL 20MM BLUNT W/TUBESET

## (undated) DEVICE — SUT VICRYL 3-0 PS-2 27 IN J427H

## (undated) DEVICE — ELECTRODE BLADE MOD E-Z CLEAN 2.5IN 6.4CM -0012M

## (undated) DEVICE — DRAPE LAPAROTOMY W/POUCHES

## (undated) DEVICE — DRESSING MEPORE FILM ADHESIVE 4 X 5IN

## (undated) DEVICE — CHLORAPREP HI-LITE 26ML ORANGE

## (undated) DEVICE — GLOVE INDICATOR PI UNDERGLOVE SZ 7.5 BLUE

## (undated) DEVICE — 3M™ TEGADERM™ CHG DRESSING 25/CARTON 4 CARTONS/CASE 1659: Brand: TEGADERM™

## (undated) DEVICE — BETHLEHEM UNIVERSAL SPINE, KIT: Brand: CARDINAL HEALTH

## (undated) DEVICE — NEEDLE 18 G X 1 1/2

## (undated) DEVICE — SUT ETHILON 4-0 PS-2 18 IN 1667H

## (undated) DEVICE — NEEDLE 25G X 1 1/2

## (undated) DEVICE — PENCIL ELECTROSURG E-Z CLEAN -0035H

## (undated) DEVICE — BLADE SAGITTAL 25.6 X 9.5MM

## (undated) DEVICE — SUT VICRYL 1 CT-1 CR/8 27 IN JJ40G

## (undated) DEVICE — C-ARM: Brand: UNBRANDED

## (undated) DEVICE — SUT VICRYL 4-0 PS-2 27 IN J426H

## (undated) DEVICE — JACKSON TABLE FOAM POSITIONING KIT: Brand: CARDINAL HEALTH

## (undated) DEVICE — JP CHAN DRN SIL HUBLESS 19FR W/TRO: Brand: CARDINAL HEALTH

## (undated) DEVICE — GLOVE SRG BIOGEL 7.5

## (undated) DEVICE — SURGI KIT INSTRUMENT ORGANIZER

## (undated) DEVICE — CURITY STRETCH BANDAGE: Brand: CURITY

## (undated) DEVICE — SUT PDS PLUS 1 CTX 36IN PDP371T

## (undated) DEVICE — DRILL BIT 2MM CALIBRATED

## (undated) DEVICE — SPONGE SCRUB 4 PCT CHLORHEXIDINE

## (undated) DEVICE — SCD SEQUENTIAL COMPRESSION COMFORT SLEEVE MEDIUM KNEE LENGTH: Brand: KENDALL SCD

## (undated) DEVICE — DRAPE SURGIKIT SADDLE BAG

## (undated) DEVICE — DRESSING MEPILEX FOAM BORDER FLEX 4 X 4IN